# Patient Record
Sex: MALE | Race: WHITE | Employment: OTHER | URBAN - METROPOLITAN AREA
[De-identification: names, ages, dates, MRNs, and addresses within clinical notes are randomized per-mention and may not be internally consistent; named-entity substitution may affect disease eponyms.]

---

## 2018-02-01 ENCOUNTER — SURG/PROC ORDERS (OUTPATIENT)
Dept: SURGERY | Age: 58
End: 2018-02-01

## 2018-02-01 ENCOUNTER — INITIAL CONSULT (OUTPATIENT)
Dept: SURGERY | Age: 58
End: 2018-02-01

## 2018-02-01 VITALS
HEIGHT: 60 IN | BODY MASS INDEX: 19.04 KG/M2 | SYSTOLIC BLOOD PRESSURE: 120 MMHG | DIASTOLIC BLOOD PRESSURE: 70 MMHG | WEIGHT: 97 LBS

## 2018-02-01 DIAGNOSIS — D17.0 LIPOMA OF SCALP: Primary | ICD-10-CM

## 2018-02-01 PROCEDURE — 99202 OFFICE O/P NEW SF 15 MIN: CPT | Performed by: SURGERY

## 2018-02-01 RX ORDER — SODIUM CHLORIDE 0.9 % (FLUSH) 0.9 %
10 SYRINGE (ML) INJECTION PRN
Status: CANCELLED | OUTPATIENT
Start: 2018-02-01

## 2018-02-01 RX ORDER — HEPARIN SODIUM 5000 [USP'U]/ML
5000 INJECTION, SOLUTION INTRAVENOUS; SUBCUTANEOUS ONCE
Status: CANCELLED | OUTPATIENT
Start: 2018-02-01

## 2018-02-01 RX ORDER — SODIUM CHLORIDE 0.9 % (FLUSH) 0.9 %
10 SYRINGE (ML) INJECTION EVERY 12 HOURS SCHEDULED
Status: CANCELLED | OUTPATIENT
Start: 2018-02-01

## 2018-02-01 NOTE — PROGRESS NOTES
New Patient Via Saroj Mustafa MD    [address], [address] [facility]  [facility]  [phone]    Lincoln Pappas   YOB: 1960    Date of Visit:  2/1/2018    Annette Trujillo MD    Chief Complaint: Scalp cyst    HPI:  Patient resents for evaluation of a lump on his posterior scalp. He is nonverbal but has caregivers with him state that he has had a lump on his scalp for some time. It is slowly been growing larger. It seems to be bothersome to him as he rubs on it. The skin overlying it has gotten a little red. It has not been infected or drained    Allergies   Allergen Reactions    Clonidine Derivatives     Penicillins     Tetracyclines & Related      Outpatient Prescriptions Marked as Taking for the 2/1/18 encounter (Initial consult) with Karly Slaughter MD   Medication Sig Dispense Refill    QUEtiapine (SEROQUEL XR) 50 MG extended release tablet Take 100 mg by mouth nightly      polyethylene glycol (MIRALAX) powder Take 17 g by mouth daily.  traZODone (DESYREL) 50 MG tablet Take 50 mg by mouth nightly.  Calcium-Vitamin D (OYSTER-CABRERA 500 + D PO) Take 1 tablet by mouth 2 times daily. Past Medical History:   Diagnosis Date    Bipolar disorder ([address]     Impulse control disorder     Influenza A 2/13/14    Mental retardation, profound (I.Q. < 20)     Osteopenia      Past Surgical History:   Procedure Laterality Date    CATARACT REMOVAL      COLONOSCOPY  06/20/2016    incomplete, poor prep     History reviewed. No pertinent family history. Social History     Social History    Marital status: Single     Spouse name: N/A    Number of children: N/A    Years of education: N/A     Occupational History    Not on file.      Social History Main Topics    Smoking status: Never Smoker    Smokeless tobacco: Never Used    Alcohol use No    Drug use: No    Sexual activity: Not Currently     Other

## 2018-02-19 ENCOUNTER — HOSPITAL ENCOUNTER (OUTPATIENT)
Dept: SURGERY | Age: 58
Discharge: HOME | End: 2018-02-19
Attending: SURGERY | Admitting: SURGERY

## 2018-02-19 VITALS
RESPIRATION RATE: 16 BRPM | BODY MASS INDEX: 19.04 KG/M2 | TEMPERATURE: 96.8 F | SYSTOLIC BLOOD PRESSURE: 105 MMHG | DIASTOLIC BLOOD PRESSURE: 67 MMHG | HEIGHT: 60 IN | OXYGEN SATURATION: 94 % | WEIGHT: 97 LBS | HEART RATE: 109 BPM

## 2018-02-19 PROCEDURE — 21014 EXC FACE TUM DEEP 2 CM/>: CPT | Performed by: SURGERY

## 2018-02-19 RX ORDER — LIDOCAINE HYDROCHLORIDE 10 MG/ML
1 INJECTION, SOLUTION EPIDURAL; INFILTRATION; INTRACAUDAL; PERINEURAL
Status: ACTIVE | OUTPATIENT
Start: 2018-02-19 | End: 2018-02-19

## 2018-02-19 RX ORDER — SODIUM CHLORIDE 0.9 % (FLUSH) 0.9 %
10 SYRINGE (ML) INJECTION PRN
Status: DISCONTINUED | OUTPATIENT
Start: 2018-02-19 | End: 2018-02-20 | Stop reason: HOSPADM

## 2018-02-19 RX ORDER — SODIUM CHLORIDE 0.9 % (FLUSH) 0.9 %
10 SYRINGE (ML) INJECTION EVERY 12 HOURS SCHEDULED
Status: DISCONTINUED | OUTPATIENT
Start: 2018-02-19 | End: 2018-02-20 | Stop reason: HOSPADM

## 2018-02-19 RX ORDER — SODIUM CHLORIDE, SODIUM LACTATE, POTASSIUM CHLORIDE, CALCIUM CHLORIDE 600; 310; 30; 20 MG/100ML; MG/100ML; MG/100ML; MG/100ML
INJECTION, SOLUTION INTRAVENOUS CONTINUOUS
Status: DISCONTINUED | OUTPATIENT
Start: 2018-02-19 | End: 2018-02-20 | Stop reason: HOSPADM

## 2018-02-19 RX ORDER — HEPARIN SODIUM 5000 [USP'U]/ML
INJECTION, SOLUTION INTRAVENOUS; SUBCUTANEOUS
Status: DISCONTINUED
Start: 2018-02-19 | End: 2018-02-20 | Stop reason: HOSPADM

## 2018-02-19 RX ORDER — HEPARIN SODIUM 5000 [USP'U]/ML
5000 INJECTION, SOLUTION INTRAVENOUS; SUBCUTANEOUS ONCE
Status: DISCONTINUED | OUTPATIENT
Start: 2018-02-19 | End: 2018-02-20 | Stop reason: HOSPADM

## 2018-02-19 ASSESSMENT — PAIN - FUNCTIONAL ASSESSMENT: PAIN_FUNCTIONAL_ASSESSMENT: 0-10

## 2018-02-19 NOTE — ANESTHESIA PRE-OP
Department of Anesthesiology  Preprocedure Note       Name:  Parish Santana   Age:  63 y.o.  :  1960                                          MRN:  [id number]         Date:  2018      Surgeon:    Procedure:    Medications prior to admission:   Prior to Admission medications    Medication Sig Start Date End Date Taking? Authorizing Provider   QUEtiapine (SEROQUEL XR) 50 MG extended release tablet Take 100 mg by mouth 3 times daily     Historical Provider, MD   polyethylene glycol (MIRALAX) powder Take 17 g by mouth daily. Historical Provider, MD   traZODone (DESYREL) 50 MG tablet Take 50 mg by mouth nightly. Historical Provider, MD   Calcium-Vitamin D (OYSTER-CABRERA 500 + D PO) Take 1 tablet by mouth 2 times daily. 11   Historical Provider, MD       Current medications:    Current Outpatient Prescriptions   Medication Sig Dispense Refill    QUEtiapine (SEROQUEL XR) 50 MG extended release tablet Take 100 mg by mouth 3 times daily       polyethylene glycol (MIRALAX) powder Take 17 g by mouth daily.  traZODone (DESYREL) 50 MG tablet Take 50 mg by mouth nightly.  Calcium-Vitamin D (OYSTER-CABRERA 500 + D PO) Take 1 tablet by mouth 2 times daily. No current facility-administered medications for this encounter. Allergies: Allergies   Allergen Reactions    Clonidine Derivatives     Penicillins     Tetracyclines & Related        Problem List:  There is no problem list on file for this patient.       Past Medical History:        Diagnosis Date    Bipolar disorder ([facility] [address]     Developmental non-verbal disorder     Impulse control disorder     Influenza A 14    Mental retardation, profound (I.Q. < 20)     Osteopenia        Past Surgical History:        Procedure Laterality Date    CATARACT REMOVAL      COLONOSCOPY  2016    incomplete, poor prep       Social History:    Social History   Substance Use Topics    Smoking status: Never Smoker    Smokeless tobacco: Never

## 2018-02-22 ENCOUNTER — TELEPHONE (OUTPATIENT)
Dept: SURGERY | Age: 58
End: 2018-02-22

## 2018-11-05 ENCOUNTER — HOSPITAL ENCOUNTER (EMERGENCY)
Age: 58
Discharge: HOME | End: 2018-11-05
Attending: EMERGENCY MEDICINE
Payer: MEDICARE

## 2018-11-05 VITALS
RESPIRATION RATE: 18 BRPM | OXYGEN SATURATION: 96 % | HEART RATE: 114 BPM | SYSTOLIC BLOOD PRESSURE: 132 MMHG | TEMPERATURE: 98.2 F | DIASTOLIC BLOOD PRESSURE: 83 MMHG | WEIGHT: 95 LBS | BODY MASS INDEX: 18.55 KG/M2

## 2018-11-05 DIAGNOSIS — S01.01XA LACERATION OF SCALP WITHOUT FOREIGN BODY, INITIAL ENCOUNTER: Primary | ICD-10-CM

## 2018-11-05 PROCEDURE — 99282 EMERGENCY DEPT VISIT SF MDM: CPT

## 2018-11-05 PROCEDURE — 4500000022 HC ED LEVEL 2 PROCEDURE

## 2018-11-05 ASSESSMENT — PAIN SCALES - WONG BAKER: WONGBAKER_NUMERICALRESPONSE: 4

## 2018-11-06 NOTE — ED PROVIDER NOTES
[facility] Emergency Department  11/5/18    I independently performed a history and physical on Duane A Carota. All imaging, and labs performed at this visit were reviewed. All diagnostic, treatment, and disposition decisions were made by myself in conjunction with the mid-level provider. Briefly, this is a 63 y.o. male here for right posterior scalp laceration. ED Triage Vitals [11/05/18 1747]   BP Temp Temp src Pulse Resp SpO2 Height Weight   132/83 -- -- 132 18 95 % -- 95 lb (43.1 kg)        Exam showed Laceration to right occipital scalp. Patient at baseline mental status. No vomiting. No LOC. He would require sedation for CT head, which I do not think would be beneficial.  Discussed with caregiver to return if any change in mental status. Discussed return precautions and need for f/u. Verbalized understanding of diagnosis and discharge plan. For further details of Duane A Carota's emergency department encounter, please see Beronica Bull's documentation. This chart was generated in part by using Dragon Dictation system and may contain errors related to that system including errors in grammar, punctuation, and spelling, as well as words and phrases that may be inappropriate. If there are any questions or concerns please feel free to contact the dictating provider for clarification.            Venus Cr DO  11/05/18 5847

## 2018-11-06 NOTE — ED NOTES
Discharge instructions reviewed with Mr. Curtis Erickson 's caregiver; she verbalized understanding. Copy of discharge instructions given. Mr. Curtis Erickson was discharged to home in good condition per personal vehicle, caregiver driving. He exited the ED without difficulty.         Nazia Perdo RN  11/05/18 9903

## 2018-12-27 ENCOUNTER — HOSPITAL ENCOUNTER (EMERGENCY)
Age: 58
Discharge: HOME | End: 2018-12-27
Payer: MEDICARE

## 2018-12-27 VITALS — HEART RATE: 104 BPM | WEIGHT: 95 LBS | BODY MASS INDEX: 18.55 KG/M2 | RESPIRATION RATE: 18 BRPM
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
## 2018-12-27 PROCEDURE — 99284 EMERGENCY DEPT VISIT MOD MDM: CPT

## 2018-12-28 NOTE — ED PROVIDER NOTES
Positives and Pertinent negatives as per HPI. Except as noted above in the ROS, problem specific ROS was completed and is negative. Physical Exam:  Physical Exam   Constitutional: He appears well-developed and well-nourished. No distress. HENT:   Head: Normocephalic and atraumatic. Right Ear: External ear normal.   Left Ear: External ear normal.   Nose: Nose normal.   Mouth/Throat: Oropharynx is clear and moist.   Eyes: Pupils are equal, round, and reactive to light. Conjunctivae are normal.   Neck: Normal range of motion. No spinous process tenderness and no muscular tenderness present. Cardiovascular: Normal rate, regular rhythm, normal heart sounds and intact distal pulses. Exam reveals no gallop and no friction rub. No murmur heard. Pulmonary/Chest: Effort normal and breath sounds normal. No respiratory distress. He has no wheezes. He has no rales. He exhibits no tenderness. Abdominal: Soft. There is no tenderness. There is no guarding. Musculoskeletal: Normal range of motion. He exhibits no edema, tenderness or deformity. Lymphadenopathy:     He has no cervical adenopathy. Neurological: He is alert. No sensory deficit. Skin: Skin is warm and dry. Capillary refill takes less than 2 seconds. He is not diaphoretic. Psychiatric: He has a normal mood and affect. His behavior is normal. Judgment and thought content normal.   Normal per baseline       MEDICAL DECISION MAKING    Vitals:    Vitals:    12/27/18 1924   Pulse: 104   Resp: 18   Weight: 95 lb (43.1 kg)       LABS:Labs Reviewed - No data to display     Remainder of labs reviewed and werenegative at this time or not returned at the time of this note.     RADIOLOGY:   Non-plain film images such as CT, Ultrasound and MRI are read by the radiologist. MAYA Anand CNP have directly visualized the radiologic plain film image(s) with the below findings:        Interpretation per the Radiologist below, if available at the time

## 2019-04-30 ENCOUNTER — APPOINTMENT (OUTPATIENT)
Dept: CT IMAGING | Age: 59
End: 2019-04-30
Payer: MEDICARE

## 2019-04-30 ENCOUNTER — HOSPITAL ENCOUNTER (EMERGENCY)
Age: 59
Discharge: HOME | End: 2019-04-30
Attending: EMERGENCY MEDICINE
Payer: MEDICARE

## 2019-04-30 VITALS
BODY MASS INDEX: 18.65 KG/M2 | HEART RATE: 80 BPM | SYSTOLIC BLOOD PRESSURE: 110 MMHG | TEMPERATURE: 98.9 F | RESPIRATION RATE: 18 BRPM | DIASTOLIC BLOOD PRESSURE: 68 MMHG | WEIGHT: 95 LBS | HEIGHT: 60 IN | OXYGEN SATURATION: 100 %

## 2019-04-30 DIAGNOSIS — S09.90XA CLOSED HEAD INJURY, INITIAL ENCOUNTER: Primary | ICD-10-CM

## 2019-04-30 DIAGNOSIS — S00.81XA FACIAL ABRASION, INITIAL ENCOUNTER: ICD-10-CM

## 2019-04-30 PROCEDURE — 70450 CT HEAD/BRAIN W/O DYE: CPT

## 2019-04-30 PROCEDURE — 72125 CT NECK SPINE W/O DYE: CPT

## 2019-04-30 PROCEDURE — 6370000000 HC RX 637 (ALT 250 FOR IP): Performed by: NURSE PRACTITIONER

## 2019-04-30 PROCEDURE — 96372 THER/PROPH/DIAG INJ SC/IM: CPT

## 2019-04-30 PROCEDURE — 6360000002 HC RX W HCPCS: Performed by: NURSE PRACTITIONER

## 2019-04-30 PROCEDURE — 99283 EMERGENCY DEPT VISIT LOW MDM: CPT

## 2019-04-30 PROCEDURE — 2500000003 HC RX 250 WO HCPCS: Performed by: NURSE PRACTITIONER

## 2019-04-30 RX ORDER — HALOPERIDOL 5 MG/ML
2 INJECTION INTRAMUSCULAR ONCE
Status: COMPLETED | OUTPATIENT
Start: 2019-04-30 | End: 2019-04-30

## 2019-04-30 RX ORDER — DIPHENHYDRAMINE HYDROCHLORIDE 50 MG/ML
25 INJECTION INTRAMUSCULAR; INTRAVENOUS ONCE
Status: COMPLETED | OUTPATIENT
Start: 2019-04-30 | End: 2019-04-30

## 2019-04-30 RX ORDER — KETAMINE HYDROCHLORIDE 100 MG/ML
1 INJECTION, SOLUTION INTRAMUSCULAR; INTRAVENOUS ONCE
Status: COMPLETED | OUTPATIENT
Start: 2019-04-30 | End: 2019-04-30

## 2019-04-30 RX ORDER — LORAZEPAM 2 MG/ML
1 INJECTION INTRAMUSCULAR ONCE
Status: COMPLETED | OUTPATIENT
Start: 2019-04-30 | End: 2019-04-30

## 2019-04-30 RX ADMIN — IBUPROFEN 432 MG: 100 SUSPENSION ORAL at 17:10

## 2019-04-30 RX ADMIN — DIPHENHYDRAMINE HYDROCHLORIDE 25 MG: 50 INJECTION, SOLUTION INTRAMUSCULAR; INTRAVENOUS at 20:32

## 2019-04-30 RX ADMIN — LORAZEPAM 1 MG: 2 INJECTION, SOLUTION INTRAMUSCULAR; INTRAVENOUS at 18:56

## 2019-04-30 RX ADMIN — KETAMINE HYDROCHLORIDE 40 MG: 100 INJECTION INTRAMUSCULAR; INTRAVENOUS at 20:33

## 2019-04-30 RX ADMIN — HALOPERIDOL LACTATE 2 MG: 5 INJECTION INTRAMUSCULAR at 19:22

## 2019-04-30 ASSESSMENT — PAIN DESCRIPTION - PAIN TYPE
TYPE: ACUTE PAIN
TYPE: ACUTE PAIN

## 2019-04-30 ASSESSMENT — PAIN SCALES - WONG BAKER
WONGBAKER_NUMERICALRESPONSE: 2
WONGBAKER_NUMERICALRESPONSE: 2
WONGBAKER_NUMERICALRESPONSE: 0

## 2019-04-30 ASSESSMENT — PAIN SCALES - GENERAL
PAINLEVEL_OUTOF10: 0
PAINLEVEL_OUTOF10: 2
PAINLEVEL_OUTOF10: 0
PAINLEVEL_OUTOF10: 2

## 2019-04-30 ASSESSMENT — PAIN DESCRIPTION - LOCATION: LOCATION: HEAD

## 2019-04-30 NOTE — ED PROVIDER NOTES
**[facility]  [facility]  eMERGENCY dEPARTMENT eNCOUnter      Pt Name: Mala Coleman  DWE:[id number]  Barbaragf[place]iana 1960  Date of evaluation: 4/30/2019  Provider: MAYA Sarmiento - CNP      Chief Complaint:    Chief Complaint   Patient presents with   [facility] Fall     tripped off of the back porch, abrasion to R eye and right hand. Nursing Notes, Past Medical Hx, Past Surgical Hx, Social Hx, Allergies, and Family Hx were all reviewed and agreed with or any disagreements were addressed in the HPI.    HPI:  (Location, Duration, Timing, Severity,Quality, Assoc Sx, Context, Modifying factors)  This is a  63 y.o. male who presents today with caregiver, patient was in outside  He went to walk outside, fell out the back porch and fell forward, hitting the right side of his head on the ground causing abrasion to his right eyebrow and right cheek, abrasion to his right hand on his first knuckle. However, nursing staff that takes care of him is here at the bedside, the caregiver states the patient is acting appropriately with a normal mental status. Long finger pain is a 2 out of 10. Injury occurred just prior to ED arrival, they deny giving any medicine for symptoms. No additional complaints, no additional aggravating or alleviating factors. The patient presents awake, alert and in no acute distress or toxic appearance.     PastMedical/Surgical History:      Diagnosis Date    Bipolar disorder ([address]     Developmental non-verbal disorder     Impulse control disorder     Influenza A 2/13/14    Mental retardation, profound (I.Q. < 20)     Osteopenia          Procedure Laterality Date    CATARACT REMOVAL      COLONOSCOPY  06/20/2016    incomplete, poor prep       Medications:  Discharge Medication List as of 4/30/2019  9:38 PM      CONTINUE these medications which have NOT CHANGED    Details   QUEtiapine (SEROQUEL XR) 50 MG extended release tablet Take 100 mg by mouth 3 times daily Historical Med      polyethylene glycol (MIRALAX) powder Take 17 g by mouth daily. traZODone (DESYREL) 50 MG tablet Take 50 mg by mouth nightly. Calcium-Vitamin D (OYSTER-CABRERA 500 + D PO) Take 1 tablet by mouth 2 times daily. Review of Systems:  Review of Systems   Unable to perform ROS: Other (A complete review of systems by the patient is unable to be obtained secondary to the patient's developmental delays and disabilities)     Positives and Pertinent negatives as per HPI. Except as noted above in the ROS, problem specific ROS was completed and is negative. Physical Exam:  Physical Exam   Constitutional: He appears well-developed and well-nourished. HENT:   Head: Normocephalic. Right Ear: External ear normal.   Left Ear: External ear normal.   Mouth/Throat: Oropharynx is clear and moist.   Eyes: Right eye exhibits no discharge. Left eye exhibits no discharge. No scleral icterus. Neck: Normal range of motion. Neck supple. Cardiovascular: Normal rate and normal heart sounds. Pulmonary/Chest: Effort normal and breath sounds normal. No respiratory distress. Abdominal: Soft. There is no tenderness. Musculoskeletal: Normal range of motion. Moving arms and legs in attempting to walk which is normal per the caregivers, no signs of acute bony abnormality   Neurological: He is alert. Patient has developmental disability, profound mental retardation, essentially is nonverbal.  However he is acting appropriate per the nursing caregivers at the bedside who take care of him regularly. Skin: Skin is warm. Capillary refill takes less than 2 seconds. He is not diaphoretic. No pallor. Abrasion to the right hand across the entire first knuckle. No open lacerations. Patient also has superficial abrasion with hematoma to the right eyelid. All bleeding is controlled. Psychiatric: He has a normal mood and affect.  His behavior is normal.   Nursing note and vitals reviewed. MEDICAL DECISION MAKING    Vitals:    Vitals:    04/30/19 1607 04/30/19 1817 04/30/19 2000 04/30/19 2213   BP:   114/78 110/68   Pulse: 98  78 80   Resp: 22 20 18 18   Temp: 98.9 °F (37.2 °C)      TempSrc: Axillary      SpO2: 98% 97% 100% 100%   Weight: 95 lb (43.1 kg)      Height: 5' (1.524 m)          LABS:Labs Reviewed - No data to display     Remainder of labs reviewed and werenegative at this time or not returned at the time of this note. RADIOLOGY:   Non-plain film images such as CT, Ultrasound and MRI are read by the radiologist. Christa BUTTS APRN - CNP have directly visualized the radiologic plain film image(s) with the below findings:        Interpretation per the Radiologist below, if available at the time of thisnote:    CT HEAD WO CONTRAST   Final Result   No acute intracranial abnormality. Questionable minimally displaced fracture of the lateral wall of the right   maxillary sinus. Air-fluid level in the right maxillary sinus could represent hemorrhage or   acute sinusitis. CT Cervical Spine WO Contrast   Final Result   No acute fracture or traumatic malalignment. Mild reversal the normal cervical lordosis, which may be secondary to patient   positioning, but also raises the possibility of muscle spasm.               MEDICAL DECISION MAKING / ED COURSE:      PROCEDURES:   Procedures    None    Patient was given:     Medications   ibuprofen (ADVIL;MOTRIN) 100 MG/5ML suspension 432 mg (432 mg Oral Given 4/30/19 1710)   LORazepam (ATIVAN) injection 1 mg (1 mg Intramuscular Given 4/30/19 1856)   haloperidol lactate (HALDOL) injection 2 mg (2 mg Intramuscular Given 4/30/19 1922)   diphenhydrAMINE (BENADRYL) injection 25 mg (25 mg Intramuscular Given 4/30/19 2032)   ketamine (KETALAR) injection 40 mg (40 mg Intramuscular Given 4/30/19 2033)       Patient presents today with caregiver, patient had just gotten home from adult , he went to walk outside, fell out the back porch and fell forward, hitting the right side of his head on the ground causing abrasion to his right eyebrow and right cheek, abrasion to his right hand on his first knuckle. After evaluation and examination patient I ordered a CT scan of the head and neck, patient was unable to lay flat asked really appear to be very anxious. He was given Ativan and Haldol along with Benadryl. Patient still was very anxious and refusing to lie down. I did evaluate the patient's chart it appears he had a similar fall back in April 2018, at that time they had to give him ketamine in order to get imaging. Ketamine was ordered after discussing case with attending physician, Dr. Matt Olmedo. Patient tolerated the CT scan well. CT of the head shows no acute intracranial abnormality. There is a questionably minimally displaced maxillary fracture on the lateral wall however, his discomfort is all in the right eyebrow. CT cervical spine shows no acute fracture or traumatic malalignment. However, I estimate there is LOW risk for SKULL FRACTURE, INTRACRANIAL HEMORRHAGE, CERVICAL SPINE INJURY, SUBDURAL OR EPIDURAL HEMATOMA,  thus I consider the discharge disposition reasonable. Therefore, shared medical decision was made between the attending physician, the patient, his caregivers and myself and we agreed the patient could be discharged home with outpatient follow-up. Patient was discharged home with instructions to follow-up with the PCP in the next 2-3 days reevaluation. Return to the ER for any worsening symptoms. The patient's caregiver verbalized understanding of discharge instructions. The patient tolerated their visit well. I evaluated the patient. The physician was available for consultation as needed. The patient and / or the family were informed of the results of anytests, a time was given to answer questions, a plan was proposed and they agreed with plan.  Patient left the emergency department stable condition. CLINICAL IMPRESSION:  1. Closed head injury, initial encounter    2.  Facial abrasion, initial encounter        DISPOSITION Decision To Discharge 04/30/2019 09:35:42 PM      PATIENT REFERRED TO:  Jamari Aleman MD  [address] [place] [address] [address]. [address] [facility]  [phone]    Schedule an appointment as soon as possible for a visit in 2 days  Follow-up with her family doctor in the next 2 days for reevaluation      DISCHARGE MEDICATIONS:  Discharge Medication List as of 4/30/2019  9:38 PM          DISCONTINUED MEDICATIONS:  Discharge Medication List as of 4/30/2019  9:38 PM                 (Please note the MDM and HPI sections of this note were completed with a voice recognition program.  Efforts weremade to edit the dictations but occasionally words are mis-transcribed.)    Electronically signed, MAYA Burrows CNP,         MAYA Burrows CNP  04/30/19 3540

## 2019-05-01 NOTE — ED NOTES
Hadolol administered per order, attempted to CT scan, unsuccessful, NP notified. Pt trying to get on floor, restless and cannot sit still.       Catia Mckeon, RN  04/30/19 104 Ilsa Lujan RN  04/30/19 8930

## 2019-05-01 NOTE — ED PROVIDER NOTES
I independently performed a history and physical on Duane A Carota. All diagnostic, treatment, and disposition decisions were made by myself in conjunction with the advanced practice provider.     -Riley Kingsley is a 63 y.o. male with a history of developmental nonverbal disorder, bipolar disorder, impulse control disorder presents to ED status post fall. -PE: patient with swelling and laceration to right side of face, EOMI, PERRL  -CT head: No acute intracranial abnormality. Questionable minimally displaced fracture of the lateral wall of the right maxillary sinus. Air-fluid level in the right maxillary sinus Hemorrhage or  Acute Sinusitis  -CT c spine: No acute fracture or traumatic malalignment. Mild reversal of the normal cervical lordosis which may be secondary to patient positioning but also received possibility of muscle spasm.  -No acute pathology was noted and plan for discharge home with close follow up with PCP was discussed with patient and family. Strict ED return precautions given for new/worsending symptoms. Patient and family in agreement with plan, verbally confirm understanding and have no further questions/concerns. For further details of [address] [place] [address] emergency department encounter, please see NP [facility]'s documentation.         Keturah Hernandez MD  04/30/19 1527

## 2019-05-01 NOTE — ED NOTES
Ativan administered per order, attempted CT unsuccessful.  NP notified      Mayda Rubio, RN  04/30/19 0846

## 2019-05-01 NOTE — ED NOTES
Wound cleansed, surgical glue applied, pt tolerated procedure well.       Tennille Cheatham RN  04/30/19 5204

## 2019-05-01 NOTE — ED NOTES
Care aide states that pt has blood in his mouth, assessment revealed nasal bleeding that was dry and dark red. Water offered pt refused.       Ricky Simms RN  04/30/19 8450

## 2019-05-01 NOTE — ED NOTES
Ketamine and Benadryl administered per order, pt on monitor, VSS, CT scan successful, NP notified.       Nick Tovar RN  04/30/19 9750

## 2019-10-01 ENCOUNTER — APPOINTMENT (OUTPATIENT)
Dept: CT IMAGING | Age: 59
End: 2019-10-01
Payer: MEDICARE

## 2019-10-01 ENCOUNTER — HOSPITAL ENCOUNTER (EMERGENCY)
Age: 59
Discharge: HOME | End: 2019-10-01
Payer: MEDICARE

## 2019-10-01 VITALS
BODY MASS INDEX: 19.24 KG/M2 | WEIGHT: 98 LBS | HEART RATE: 96 BPM | OXYGEN SATURATION: 98 % | RESPIRATION RATE: 20 BRPM | HEIGHT: 60 IN
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
## 2019-10-01 DIAGNOSIS — S09.90XA CLOSED HEAD INJURY, INITIAL ENCOUNTER: ICD-10-CM

## 2019-10-01 PROCEDURE — 6370000000 HC RX 637 (ALT 250 FOR IP): Performed by: PHYSICIAN ASSISTANT

## 2019-10-01 PROCEDURE — 70450 CT HEAD/BRAIN W/O DYE: CPT

## 2019-10-01 PROCEDURE — 6360000002 HC RX W HCPCS: Performed by: PHYSICIAN ASSISTANT

## 2019-10-01 PROCEDURE — 99283 EMERGENCY DEPT VISIT LOW MDM: CPT

## 2019-10-01 PROCEDURE — 72125 CT NECK SPINE W/O DYE: CPT

## 2019-10-01 PROCEDURE — 96372 THER/PROPH/DIAG INJ SC/IM: CPT

## 2019-10-01 RX ORDER — ACETAMINOPHEN 160 MG/5ML
650 SOLUTION ORAL ONCE
Status: COMPLETED | OUTPATIENT
Start: 2019-10-01 | End: 2019-10-01

## 2019-10-01 RX ORDER — LORAZEPAM 2 MG/ML
2 INJECTION INTRAMUSCULAR ONCE
Status: COMPLETED | OUTPATIENT
Start: 2019-10-01 | End: 2019-10-01

## 2019-10-01 RX ORDER — HALOPERIDOL 5 MG/ML
5 INJECTION INTRAMUSCULAR ONCE
Status: COMPLETED | OUTPATIENT
Start: 2019-10-01 | End: 2019-10-01

## 2019-10-01 RX ORDER — LORAZEPAM 2 MG/ML
2 INJECTION INTRAMUSCULAR ONCE
Status: DISCONTINUED | OUTPATIENT
Start: 2019-10-01 | End: 2019-10-01

## 2019-10-01 RX ORDER — DIPHENHYDRAMINE HYDROCHLORIDE 50 MG/ML
50 INJECTION INTRAMUSCULAR; INTRAVENOUS ONCE
Status: COMPLETED | OUTPATIENT
Start: 2019-10-01 | End: 2019-10-01

## 2019-10-01 RX ADMIN — HALOPERIDOL LACTATE 5 MG: 5 INJECTION INTRAMUSCULAR at 15:13

## 2019-10-01 RX ADMIN — ACETAMINOPHEN 650 MG: 650 SOLUTION ORAL at 13:06

## 2019-10-01 RX ADMIN — DIPHENHYDRAMINE HYDROCHLORIDE 50 MG: 50 INJECTION, SOLUTION INTRAMUSCULAR; INTRAVENOUS at 12:36

## 2019-10-01 RX ADMIN — LORAZEPAM 2 MG: 2 INJECTION INTRAMUSCULAR; INTRAVENOUS at 12:36

## 2019-10-01 RX ADMIN — LORAZEPAM 2 MG: 2 INJECTION, SOLUTION INTRAMUSCULAR; INTRAVENOUS at 13:48

## 2019-10-01 RX ADMIN — HALOPERIDOL LACTATE 5 MG: 5 INJECTION, SOLUTION INTRAMUSCULAR at 14:15

## 2019-10-01 ASSESSMENT — PAIN SCALES - GENERAL: PAINLEVEL_OUTOF10: 8

## 2019-12-01 ENCOUNTER — APPOINTMENT (OUTPATIENT)
Dept: CT IMAGING | Age: 59
End: 2019-12-01
Payer: MEDICARE

## 2019-12-01 ENCOUNTER — APPOINTMENT (OUTPATIENT)
Dept: GENERAL RADIOLOGY | Age: 59
End: 2019-12-01
Payer: MEDICARE

## 2019-12-01 ENCOUNTER — HOSPITAL ENCOUNTER (OUTPATIENT)
Age: 59
Setting detail: OBSERVATION
Discharge: ANOTHER HOSPITAL | End: 2019-12-02
Attending: EMERGENCY MEDICINE | Admitting: FAMILY MEDICINE
Payer: MEDICARE

## 2019-12-01 ENCOUNTER — HOSPITAL ENCOUNTER (EMERGENCY)
Age: 59
Discharge: HOME | End: 2019-12-01
Attending: EMERGENCY MEDICINE
Payer: MEDICARE

## 2019-12-01 VITALS
SYSTOLIC BLOOD PRESSURE: 131 MMHG | DIASTOLIC BLOOD PRESSURE: 95 MMHG | RESPIRATION RATE: 16 BRPM | OXYGEN SATURATION: 97 % | TEMPERATURE: 98.7 F | HEART RATE: 85 BPM

## 2019-12-01 DIAGNOSIS — N34.2 INFECTIVE URETHRITIS: ICD-10-CM

## 2019-12-01 DIAGNOSIS — R40.4 ALTERED LEVEL OF CONSCIOUSNESS: ICD-10-CM

## 2019-12-01 DIAGNOSIS — N20.0 KIDNEY STONE: ICD-10-CM

## 2019-12-01 DIAGNOSIS — R53.83 FATIGUE, UNSPECIFIED TYPE: Primary | ICD-10-CM

## 2019-12-01 DIAGNOSIS — N30.00 ACUTE CYSTITIS WITHOUT HEMATURIA: ICD-10-CM

## 2019-12-01 DIAGNOSIS — R53.1 GENERAL WEAKNESS: ICD-10-CM

## 2019-12-01 DIAGNOSIS — I48.91 ATRIAL FIBRILLATION WITH RAPID VENTRICULAR RESPONSE (HCC): Primary | ICD-10-CM

## 2019-12-01 LAB
A/G RATIO: 1.5 (ref 1.1–2.2)
A/G RATIO: 1.5 (ref 1.1–2.2)
ALBUMIN SERPL-MCNC: 4.3 G/DL (ref 3.4–5)
ALBUMIN SERPL-MCNC: 4.4 G/DL (ref 3.4–5)
ALP BLD-CCNC: 115 U/L (ref 40–129)
ALP BLD-CCNC: 125 U/L (ref 40–129)
ALT SERPL-CCNC: 12 U/L (ref 10–40)
ALT SERPL-CCNC: 12 U/L (ref 10–40)
AMORPHOUS: ABNORMAL /HPF
ANION GAP SERPL CALCULATED.3IONS-SCNC: 12 MMOL/L (ref 3–16)
ANION GAP SERPL CALCULATED.3IONS-SCNC: 13 MMOL/L (ref 3–16)
AST SERPL-CCNC: 15 U/L (ref 15–37)
AST SERPL-CCNC: 20 U/L (ref 15–37)
BACTERIA: ABNORMAL /HPF
BASE EXCESS VENOUS: 4.3 MMOL/L (ref -3–3)
BASOPHILS ABSOLUTE: 0 K/UL (ref 0–0.2)
BASOPHILS ABSOLUTE: 0 K/UL (ref 0–0.2)
BASOPHILS RELATIVE PERCENT: 0.5 %
BASOPHILS RELATIVE PERCENT: 0.7 %
BILIRUB SERPL-MCNC: 0.3 MG/DL (ref 0–1)
BILIRUB SERPL-MCNC: 0.4 MG/DL (ref 0–1)
BILIRUBIN URINE: NEGATIVE
BLOOD, URINE: ABNORMAL
BUN BLDV-MCNC: 18 MG/DL (ref 7–20)
BUN BLDV-MCNC: 21 MG/DL (ref 7–20)
CALCIUM SERPL-MCNC: 10 MG/DL (ref 8.3–10.6)
CALCIUM SERPL-MCNC: 10.3 MG/DL (ref 8.3–10.6)
CARBOXYHEMOGLOBIN: 2.3 % (ref 0–1.5)
CHLORIDE BLD-SCNC: 100 MMOL/L (ref 99–110)
CHLORIDE BLD-SCNC: 101 MMOL/L (ref 99–110)
CLARITY: ABNORMAL
CO2: 29 MMOL/L (ref 21–32)
CO2: 29 MMOL/L (ref 21–32)
COLOR: YELLOW
CREAT SERPL-MCNC: 0.9 MG/DL (ref 0.9–1.3)
CREAT SERPL-MCNC: 1 MG/DL (ref 0.9–1.3)
EOSINOPHILS ABSOLUTE: 0 K/UL (ref 0–0.6)
EOSINOPHILS ABSOLUTE: 0 K/UL (ref 0–0.6)
EOSINOPHILS RELATIVE PERCENT: 0.3 %
EOSINOPHILS RELATIVE PERCENT: 0.8 %
GFR AFRICAN AMERICAN: >60
GFR AFRICAN AMERICAN: >60
GFR NON-AFRICAN AMERICAN: >60
GFR NON-AFRICAN AMERICAN: >60
GLOBULIN: 2.8 G/DL
GLOBULIN: 3 G/DL
GLUCOSE BLD-MCNC: 111 MG/DL (ref 70–99)
GLUCOSE BLD-MCNC: 135 MG/DL (ref 70–99)
GLUCOSE URINE: NEGATIVE MG/DL
HCO3 VENOUS: 30.6 MMOL/L (ref 23–29)
HCT VFR BLD CALC: 41.5 % (ref 40.5–52.5)
HCT VFR BLD CALC: 44.5 % (ref 40.5–52.5)
HEMOGLOBIN: 13.9 G/DL (ref 13.5–17.5)
HEMOGLOBIN: 14.9 G/DL (ref 13.5–17.5)
KETONES, URINE: NEGATIVE MG/DL
LACTIC ACID: 0.9 MMOL/L (ref 0.4–2)
LACTIC ACID: 1.2 MMOL/L (ref 0.4–2)
LEUKOCYTE ESTERASE, URINE: NEGATIVE
LYMPHOCYTES ABSOLUTE: 0.9 K/UL (ref 1–5.1)
LYMPHOCYTES ABSOLUTE: 1.1 K/UL (ref 1–5.1)
LYMPHOCYTES RELATIVE PERCENT: 10.4 %
LYMPHOCYTES RELATIVE PERCENT: 19.8 %
MCH RBC QN AUTO: 28.9 PG (ref 26–34)
MCH RBC QN AUTO: 29 PG (ref 26–34)
MCHC RBC AUTO-ENTMCNC: 33.5 G/DL (ref 31–36)
MCHC RBC AUTO-ENTMCNC: 33.6 G/DL (ref 31–36)
MCV RBC AUTO: 86.3 FL (ref 80–100)
MCV RBC AUTO: 86.5 FL (ref 80–100)
METHEMOGLOBIN VENOUS: 0.6 %
MICROSCOPIC EXAMINATION: YES
MONOCYTES ABSOLUTE: 0.5 K/UL (ref 0–1.3)
MONOCYTES ABSOLUTE: 0.6 K/UL (ref 0–1.3)
MONOCYTES RELATIVE PERCENT: 7 %
MONOCYTES RELATIVE PERCENT: 8.2 %
NEUTROPHILS ABSOLUTE: 4 K/UL (ref 1.7–7.7)
NEUTROPHILS ABSOLUTE: 7.3 K/UL (ref 1.7–7.7)
NEUTROPHILS RELATIVE PERCENT: 70.5 %
NEUTROPHILS RELATIVE PERCENT: 81.8 %
NITRITE, URINE: NEGATIVE
O2 CONTENT, VEN: 17 VOL %
O2 SAT, VEN: 76 %
O2 THERAPY: ABNORMAL
PCO2, VEN: 51.2 MMHG (ref 40–50)
PDW BLD-RTO: 13.7 % (ref 12.4–15.4)
PDW BLD-RTO: 13.9 % (ref 12.4–15.4)
PH UA: 7.5 (ref 5–8)
PH VENOUS: 7.39 (ref 7.35–7.45)
PLATELET # BLD: 372 K/UL (ref 135–450)
PLATELET # BLD: 376 K/UL (ref 135–450)
PMV BLD AUTO: 7.5 FL (ref 5–10.5)
PMV BLD AUTO: 7.5 FL (ref 5–10.5)
PO2, VEN: 40.4 MMHG (ref 25–40)
POTASSIUM SERPL-SCNC: 4.1 MMOL/L (ref 3.5–5.1)
POTASSIUM SERPL-SCNC: 4.6 MMOL/L (ref 3.5–5.1)
PROTEIN UA: NEGATIVE MG/DL
RAPID INFLUENZA  B AGN: NEGATIVE
RAPID INFLUENZA A AGN: NEGATIVE
RBC # BLD: 4.81 M/UL (ref 4.2–5.9)
RBC # BLD: 5.15 M/UL (ref 4.2–5.9)
RBC UA: ABNORMAL /HPF (ref 0–2)
SODIUM BLD-SCNC: 141 MMOL/L (ref 136–145)
SODIUM BLD-SCNC: 143 MMOL/L (ref 136–145)
SPECIFIC GRAVITY UA: 1.01 (ref 1–1.03)
TCO2 CALC VENOUS: 32 MMOL/L
TOTAL PROTEIN: 7.1 G/DL (ref 6.4–8.2)
TOTAL PROTEIN: 7.4 G/DL (ref 6.4–8.2)
TROPONIN: <0.01 NG/ML
URINE TYPE: ABNORMAL
UROBILINOGEN, URINE: 0.2 E.U./DL
WBC # BLD: 5.6 K/UL (ref 4–11)
WBC # BLD: 8.9 K/UL (ref 4–11)
WBC UA: ABNORMAL /HPF (ref 0–5)

## 2019-12-01 PROCEDURE — 96374 THER/PROPH/DIAG INJ IV PUSH: CPT

## 2019-12-01 PROCEDURE — 2500000003 HC RX 250 WO HCPCS: Performed by: EMERGENCY MEDICINE

## 2019-12-01 PROCEDURE — 71045 X-RAY EXAM CHEST 1 VIEW: CPT

## 2019-12-01 PROCEDURE — 83605 ASSAY OF LACTIC ACID: CPT

## 2019-12-01 PROCEDURE — 96361 HYDRATE IV INFUSION ADD-ON: CPT

## 2019-12-01 PROCEDURE — 96365 THER/PROPH/DIAG IV INF INIT: CPT

## 2019-12-01 PROCEDURE — 85025 COMPLETE CBC W/AUTO DIFF WBC: CPT

## 2019-12-01 PROCEDURE — 2580000003 HC RX 258: Performed by: EMERGENCY MEDICINE

## 2019-12-01 PROCEDURE — 80053 COMPREHEN METABOLIC PANEL: CPT

## 2019-12-01 PROCEDURE — 93005 ELECTROCARDIOGRAM TRACING: CPT | Performed by: EMERGENCY MEDICINE

## 2019-12-01 PROCEDURE — 99284 EMERGENCY DEPT VISIT MOD MDM: CPT

## 2019-12-01 PROCEDURE — 96375 TX/PRO/DX INJ NEW DRUG ADDON: CPT

## 2019-12-01 PROCEDURE — 84484 ASSAY OF TROPONIN QUANT: CPT

## 2019-12-01 PROCEDURE — 82803 BLOOD GASES ANY COMBINATION: CPT

## 2019-12-01 PROCEDURE — 87804 INFLUENZA ASSAY W/OPTIC: CPT

## 2019-12-01 PROCEDURE — 99291 CRITICAL CARE FIRST HOUR: CPT

## 2019-12-01 PROCEDURE — 81001 URINALYSIS AUTO W/SCOPE: CPT

## 2019-12-01 PROCEDURE — 2500000003 HC RX 250 WO HCPCS

## 2019-12-01 PROCEDURE — 6360000002 HC RX W HCPCS: Performed by: EMERGENCY MEDICINE

## 2019-12-01 PROCEDURE — 74176 CT ABD & PELVIS W/O CONTRAST: CPT

## 2019-12-01 RX ORDER — ONDANSETRON 2 MG/ML
4 INJECTION INTRAMUSCULAR; INTRAVENOUS ONCE
Status: COMPLETED | OUTPATIENT
Start: 2019-12-01 | End: 2019-12-01

## 2019-12-01 RX ORDER — 0.9 % SODIUM CHLORIDE 0.9 %
1000 INTRAVENOUS SOLUTION INTRAVENOUS ONCE
Status: COMPLETED | OUTPATIENT
Start: 2019-12-01 | End: 2019-12-02

## 2019-12-01 RX ORDER — CEFUROXIME AXETIL 250 MG/1
250 TABLET ORAL 2 TIMES DAILY
Qty: 14 TABLET | Refills: 0 | Status: ON HOLD | OUTPATIENT
Start: 2019-12-01 | End: 2019-12-05 | Stop reason: HOSPADM

## 2019-12-01 RX ORDER — DILTIAZEM HYDROCHLORIDE 5 MG/ML
INJECTION INTRAVENOUS
Status: COMPLETED
Start: 2019-12-01 | End: 2019-12-01

## 2019-12-01 RX ORDER — 0.9 % SODIUM CHLORIDE 0.9 %
1000 INTRAVENOUS SOLUTION INTRAVENOUS ONCE
Status: COMPLETED | OUTPATIENT
Start: 2019-12-01 | End: 2019-12-01

## 2019-12-01 RX ORDER — DILTIAZEM HYDROCHLORIDE 5 MG/ML
10 INJECTION INTRAVENOUS ONCE
Status: COMPLETED | OUTPATIENT
Start: 2019-12-02 | End: 2019-12-02

## 2019-12-01 RX ORDER — DILTIAZEM HYDROCHLORIDE 5 MG/ML
10 INJECTION INTRAVENOUS ONCE
Status: COMPLETED | OUTPATIENT
Start: 2019-12-01 | End: 2019-12-01

## 2019-12-01 RX ADMIN — ONDANSETRON 4 MG: 2 INJECTION INTRAMUSCULAR; INTRAVENOUS at 22:52

## 2019-12-01 RX ADMIN — CEFTRIAXONE SODIUM 1 G: 1 INJECTION, POWDER, FOR SOLUTION INTRAMUSCULAR; INTRAVENOUS at 13:25

## 2019-12-01 RX ADMIN — SODIUM CHLORIDE 1000 ML: 9 INJECTION, SOLUTION INTRAVENOUS at 22:52

## 2019-12-01 RX ADMIN — DILTIAZEM HYDROCHLORIDE 5 MG/HR: 5 INJECTION INTRAVENOUS at 23:12

## 2019-12-01 RX ADMIN — SODIUM CHLORIDE 1000 ML: 9 INJECTION, SOLUTION INTRAVENOUS at 12:43

## 2019-12-01 RX ADMIN — DILTIAZEM HYDROCHLORIDE 10 MG: 5 INJECTION INTRAVENOUS at 22:56

## 2019-12-02 ENCOUNTER — APPOINTMENT (OUTPATIENT)
Dept: CT IMAGING | Age: 59
End: 2019-12-02
Payer: MEDICARE

## 2019-12-02 ENCOUNTER — HOSPITAL ENCOUNTER (INPATIENT)
Age: 59
LOS: 3 days | Discharge: NURSING, REHAB OR LONG-TERM CARE | DRG: 025 | End: 2019-12-05
Attending: INTERNAL MEDICINE | Admitting: INTERNAL MEDICINE
Payer: MEDICARE

## 2019-12-02 ENCOUNTER — ANESTHESIA EVENT (OUTPATIENT)
Dept: OPERATING ROOM | Age: 59
DRG: 025 | End: 2019-12-02
Payer: MEDICARE

## 2019-12-02 VITALS
RESPIRATION RATE: 13 BRPM | TEMPERATURE: 98.4 F | OXYGEN SATURATION: 95 % | WEIGHT: 84.22 LBS | DIASTOLIC BLOOD PRESSURE: 89 MMHG | HEIGHT: 60 IN | HEART RATE: 82 BPM | SYSTOLIC BLOOD PRESSURE: 124 MMHG | BODY MASS INDEX: 16.53 KG/M2

## 2019-12-02 PROBLEM — R41.82 ALTERED MENTAL STATUS: Status: ACTIVE | Noted: 2019-12-02

## 2019-12-02 PROBLEM — I48.91 ATRIAL FIBRILLATION WITH RVR (HCC): Status: ACTIVE | Noted: 2019-12-02

## 2019-12-02 PROBLEM — G93.5 BRAIN HERNIATION (HCC): Status: ACTIVE | Noted: 2019-12-02

## 2019-12-02 PROBLEM — N30.01 ACUTE CYSTITIS WITH HEMATURIA: Status: ACTIVE | Noted: 2019-12-02

## 2019-12-02 PROBLEM — S06.5XAS: Status: ACTIVE | Noted: 2019-12-02

## 2019-12-02 PROBLEM — S06.5XAA SUBDURAL HEMATOMA: Status: ACTIVE | Noted: 2019-12-02

## 2019-12-02 LAB
ANION GAP SERPL CALCULATED.3IONS-SCNC: 7 MMOL/L (ref 3–16)
BILIRUBIN URINE: NEGATIVE
BLOOD, URINE: ABNORMAL
BUN BLDV-MCNC: 18 MG/DL (ref 7–20)
CALCIUM SERPL-MCNC: 8.8 MG/DL (ref 8.3–10.6)
CHLORIDE BLD-SCNC: 110 MMOL/L (ref 99–110)
CLARITY: CLEAR
CO2: 26 MMOL/L (ref 21–32)
COLOR: YELLOW
CREAT SERPL-MCNC: 0.7 MG/DL (ref 0.9–1.3)
D DIMER: <200 NG/ML DDU (ref 0–229)
EKG ATRIAL RATE: 357 BPM
EKG DIAGNOSIS: NORMAL
EKG Q-T INTERVAL: 266 MS
EKG QRS DURATION: 84 MS
EKG QTC CALCULATION (BAZETT): 455 MS
EKG R AXIS: -82 DEGREES
EKG T AXIS: 22 DEGREES
EKG VENTRICULAR RATE: 176 BPM
GFR AFRICAN AMERICAN: >60
GFR NON-AFRICAN AMERICAN: >60
GLUCOSE BLD-MCNC: 119 MG/DL (ref 70–99)
GLUCOSE URINE: NEGATIVE MG/DL
HCT VFR BLD CALC: 35.4 % (ref 40.5–52.5)
HEMOGLOBIN: 11.8 G/DL (ref 13.5–17.5)
KETONES, URINE: ABNORMAL MG/DL
LEUKOCYTE ESTERASE, URINE: NEGATIVE
MCH RBC QN AUTO: 29.2 PG (ref 26–34)
MCHC RBC AUTO-ENTMCNC: 33.3 G/DL (ref 31–36)
MCV RBC AUTO: 87.6 FL (ref 80–100)
MICROSCOPIC EXAMINATION: YES
NITRITE, URINE: NEGATIVE
PDW BLD-RTO: 13.8 % (ref 12.4–15.4)
PH UA: 6 (ref 5–8)
PLATELET # BLD: 278 K/UL (ref 135–450)
PMV BLD AUTO: 7.3 FL (ref 5–10.5)
POTASSIUM REFLEX MAGNESIUM: 3.9 MMOL/L (ref 3.5–5.1)
PRO-BNP: 119 PG/ML (ref 0–124)
PROTEIN UA: NEGATIVE MG/DL
RBC # BLD: 4.04 M/UL (ref 4.2–5.9)
RBC UA: ABNORMAL /HPF (ref 0–2)
SODIUM BLD-SCNC: 143 MMOL/L (ref 136–145)
SPECIFIC GRAVITY UA: 1.02 (ref 1–1.03)
TROPONIN: <0.01 NG/ML
TSH REFLEX: 0.93 UIU/ML (ref 0.27–4.2)
URINE TYPE: ABNORMAL
UROBILINOGEN, URINE: 0.2 E.U./DL
WBC # BLD: 5.5 K/UL (ref 4–11)
WBC UA: ABNORMAL /HPF (ref 0–5)

## 2019-12-02 PROCEDURE — 84484 ASSAY OF TROPONIN QUANT: CPT

## 2019-12-02 PROCEDURE — 81001 URINALYSIS AUTO W/SCOPE: CPT

## 2019-12-02 PROCEDURE — 83880 ASSAY OF NATRIURETIC PEPTIDE: CPT

## 2019-12-02 PROCEDURE — 86850 RBC ANTIBODY SCREEN: CPT

## 2019-12-02 PROCEDURE — 86901 BLOOD TYPING SEROLOGIC RH(D): CPT

## 2019-12-02 PROCEDURE — 2580000003 HC RX 258

## 2019-12-02 PROCEDURE — 87086 URINE CULTURE/COLONY COUNT: CPT

## 2019-12-02 PROCEDURE — 70450 CT HEAD/BRAIN W/O DYE: CPT

## 2019-12-02 PROCEDURE — 80048 BASIC METABOLIC PNL TOTAL CA: CPT

## 2019-12-02 PROCEDURE — G0378 HOSPITAL OBSERVATION PER HR: HCPCS

## 2019-12-02 PROCEDURE — 96376 TX/PRO/DX INJ SAME DRUG ADON: CPT

## 2019-12-02 PROCEDURE — 6370000000 HC RX 637 (ALT 250 FOR IP)

## 2019-12-02 PROCEDURE — 36415 COLL VENOUS BLD VENIPUNCTURE: CPT

## 2019-12-02 PROCEDURE — 2500000003 HC RX 250 WO HCPCS

## 2019-12-02 PROCEDURE — 51701 INSERT BLADDER CATHETER: CPT

## 2019-12-02 PROCEDURE — 96361 HYDRATE IV INFUSION ADD-ON: CPT

## 2019-12-02 PROCEDURE — 51798 US URINE CAPACITY MEASURE: CPT

## 2019-12-02 PROCEDURE — 6360000002 HC RX W HCPCS

## 2019-12-02 PROCEDURE — 2500000003 HC RX 250 WO HCPCS: Performed by: EMERGENCY MEDICINE

## 2019-12-02 PROCEDURE — 96372 THER/PROPH/DIAG INJ SC/IM: CPT

## 2019-12-02 PROCEDURE — 85027 COMPLETE CBC AUTOMATED: CPT

## 2019-12-02 PROCEDURE — 85379 FIBRIN DEGRADATION QUANT: CPT

## 2019-12-02 PROCEDURE — 99220 PR INITIAL OBSERVATION CARE/DAY 70 MINUTES: CPT | Performed by: INTERNAL MEDICINE

## 2019-12-02 PROCEDURE — 2000000000 HC ICU R&B

## 2019-12-02 PROCEDURE — 86900 BLOOD TYPING SEROLOGIC ABO: CPT

## 2019-12-02 PROCEDURE — 85610 PROTHROMBIN TIME: CPT

## 2019-12-02 PROCEDURE — 84443 ASSAY THYROID STIM HORMONE: CPT

## 2019-12-02 DEVICE — PLATE BNE L12MM THK0.4MM 2 H CRANIOMAXILLOFACIAL BILAT BLU: Type: IMPLANTABLE DEVICE | Site: CRANIAL | Status: FUNCTIONAL

## 2019-12-02 DEVICE — SCREW BNE L4MM DIA1.5MM CRANIOFACIAL TI SELF DRL: Type: IMPLANTABLE DEVICE | Site: CRANIAL | Status: FUNCTIONAL

## 2019-12-02 RX ORDER — QUETIAPINE FUMARATE 50 MG/1
50 TABLET, EXTENDED RELEASE ORAL SEE ADMIN INSTRUCTIONS
Status: DISCONTINUED | OUTPATIENT
Start: 2019-12-02 | End: 2019-12-02

## 2019-12-02 RX ORDER — SODIUM CHLORIDE 0.9 % (FLUSH) 0.9 %
10 SYRINGE (ML) INJECTION EVERY 12 HOURS SCHEDULED
Status: DISCONTINUED | OUTPATIENT
Start: 2019-12-02 | End: 2019-12-02 | Stop reason: HOSPADM

## 2019-12-02 RX ORDER — SODIUM CHLORIDE 9 MG/ML
INJECTION, SOLUTION INTRAVENOUS CONTINUOUS
Status: DISCONTINUED | OUTPATIENT
Start: 2019-12-02 | End: 2019-12-02 | Stop reason: HOSPADM

## 2019-12-02 RX ORDER — TRAZODONE HYDROCHLORIDE 50 MG/1
50 TABLET ORAL NIGHTLY
Status: DISCONTINUED | OUTPATIENT
Start: 2019-12-02 | End: 2019-12-02 | Stop reason: HOSPADM

## 2019-12-02 RX ORDER — SODIUM CHLORIDE 0.9 % (FLUSH) 0.9 %
10 SYRINGE (ML) INJECTION EVERY 12 HOURS SCHEDULED
Status: DISCONTINUED | OUTPATIENT
Start: 2019-12-03 | End: 2019-12-05 | Stop reason: HOSPADM

## 2019-12-02 RX ORDER — POLYETHYLENE GLYCOL 3350 17 G/17G
17 POWDER, FOR SOLUTION ORAL DAILY
Status: DISCONTINUED | OUTPATIENT
Start: 2019-12-02 | End: 2019-12-02

## 2019-12-02 RX ORDER — METOPROLOL SUCCINATE 25 MG/1
25 TABLET, EXTENDED RELEASE ORAL DAILY
Status: DISCONTINUED | OUTPATIENT
Start: 2019-12-02 | End: 2019-12-02

## 2019-12-02 RX ORDER — QUETIAPINE FUMARATE 50 MG/1
150 TABLET, EXTENDED RELEASE ORAL EVERY 24 HOURS
Status: DISCONTINUED | OUTPATIENT
Start: 2019-12-02 | End: 2019-12-02 | Stop reason: HOSPADM

## 2019-12-02 RX ORDER — SODIUM CHLORIDE 0.9 % (FLUSH) 0.9 %
10 SYRINGE (ML) INJECTION PRN
Status: DISCONTINUED | OUTPATIENT
Start: 2019-12-02 | End: 2019-12-02 | Stop reason: HOSPADM

## 2019-12-02 RX ORDER — CEFUROXIME AXETIL 250 MG/1
250 TABLET ORAL EVERY 12 HOURS SCHEDULED
Status: DISCONTINUED | OUTPATIENT
Start: 2019-12-02 | End: 2019-12-02

## 2019-12-02 RX ORDER — QUETIAPINE FUMARATE 50 MG/1
100 TABLET, EXTENDED RELEASE ORAL EVERY 24 HOURS
Status: DISCONTINUED | OUTPATIENT
Start: 2019-12-02 | End: 2019-12-02 | Stop reason: HOSPADM

## 2019-12-02 RX ORDER — SODIUM CHLORIDE 9 MG/ML
INJECTION, SOLUTION INTRAVENOUS CONTINUOUS
Status: DISCONTINUED | OUTPATIENT
Start: 2019-12-02 | End: 2019-12-02

## 2019-12-02 RX ORDER — LABETALOL 20 MG/4 ML (5 MG/ML) INTRAVENOUS SYRINGE
10 EVERY 4 HOURS PRN
Status: DISCONTINUED | OUTPATIENT
Start: 2019-12-02 | End: 2019-12-05 | Stop reason: HOSPADM

## 2019-12-02 RX ORDER — ACETAMINOPHEN 325 MG/1
650 TABLET ORAL EVERY 4 HOURS PRN
Status: DISCONTINUED | OUTPATIENT
Start: 2019-12-02 | End: 2019-12-02 | Stop reason: HOSPADM

## 2019-12-02 RX ORDER — ASPIRIN 81 MG/1
81 TABLET, CHEWABLE ORAL DAILY
Qty: 30 TABLET | Refills: 3 | Status: CANCELLED | OUTPATIENT
Start: 2019-12-03

## 2019-12-02 RX ORDER — DILTIAZEM HYDROCHLORIDE 60 MG/1
30 TABLET, FILM COATED ORAL EVERY 6 HOURS SCHEDULED
Status: DISCONTINUED | OUTPATIENT
Start: 2019-12-02 | End: 2019-12-02

## 2019-12-02 RX ORDER — QUETIAPINE FUMARATE 50 MG/1
50 TABLET, EXTENDED RELEASE ORAL DAILY
Status: DISCONTINUED | OUTPATIENT
Start: 2019-12-02 | End: 2019-12-02 | Stop reason: HOSPADM

## 2019-12-02 RX ORDER — ONDANSETRON 2 MG/ML
4 INJECTION INTRAMUSCULAR; INTRAVENOUS EVERY 6 HOURS PRN
Status: DISCONTINUED | OUTPATIENT
Start: 2019-12-02 | End: 2019-12-02 | Stop reason: HOSPADM

## 2019-12-02 RX ORDER — SODIUM CHLORIDE 0.9 % (FLUSH) 0.9 %
10 SYRINGE (ML) INJECTION PRN
Status: DISCONTINUED | OUTPATIENT
Start: 2019-12-02 | End: 2019-12-05 | Stop reason: HOSPADM

## 2019-12-02 RX ORDER — CALCIUM CARBONATE/VITAMIN D3 250-3.125
500 TABLET ORAL 2 TIMES DAILY
Status: DISCONTINUED | OUTPATIENT
Start: 2019-12-02 | End: 2019-12-02 | Stop reason: HOSPADM

## 2019-12-02 RX ORDER — ASPIRIN 81 MG/1
81 TABLET, CHEWABLE ORAL DAILY
Status: DISCONTINUED | OUTPATIENT
Start: 2019-12-02 | End: 2019-12-02 | Stop reason: HOSPADM

## 2019-12-02 RX ADMIN — SODIUM CHLORIDE: 9 INJECTION, SOLUTION INTRAVENOUS at 13:20

## 2019-12-02 RX ADMIN — DILTIAZEM HYDROCHLORIDE 5 MG/HR: 5 INJECTION INTRAVENOUS at 07:35

## 2019-12-02 RX ADMIN — DILTIAZEM HYDROCHLORIDE 10 MG: 5 INJECTION INTRAVENOUS at 00:00

## 2019-12-02 RX ADMIN — SODIUM CHLORIDE: 9 INJECTION, SOLUTION INTRAVENOUS at 02:48

## 2019-12-02 RX ADMIN — ENOXAPARIN SODIUM 40 MG: 40 INJECTION SUBCUTANEOUS at 10:05

## 2019-12-02 ASSESSMENT — PAIN SCALES - GENERAL
PAINLEVEL_OUTOF10: 0
PAINLEVEL_OUTOF10: 2

## 2019-12-03 ENCOUNTER — APPOINTMENT (OUTPATIENT)
Dept: CT IMAGING | Age: 59
DRG: 025 | End: 2019-12-03
Attending: INTERNAL MEDICINE
Payer: MEDICARE

## 2019-12-03 ENCOUNTER — APPOINTMENT (OUTPATIENT)
Dept: GENERAL RADIOLOGY | Age: 59
DRG: 025 | End: 2019-12-03
Attending: INTERNAL MEDICINE
Payer: MEDICARE

## 2019-12-03 ENCOUNTER — ANESTHESIA (OUTPATIENT)
Dept: OPERATING ROOM | Age: 59
DRG: 025 | End: 2019-12-03
Payer: MEDICARE

## 2019-12-03 VITALS — DIASTOLIC BLOOD PRESSURE: 65 MMHG | SYSTOLIC BLOOD PRESSURE: 94 MMHG | TEMPERATURE: 60.6 F | OXYGEN SATURATION: 99 %

## 2019-12-03 LAB
ABO/RH: NORMAL
ANION GAP SERPL CALCULATED.3IONS-SCNC: 10 MMOL/L (ref 3–16)
ANION GAP SERPL CALCULATED.3IONS-SCNC: 13 MMOL/L (ref 3–16)
ANTIBODY SCREEN: NORMAL
BUN BLDV-MCNC: 16 MG/DL (ref 7–20)
BUN BLDV-MCNC: 17 MG/DL (ref 7–20)
CALCIUM SERPL-MCNC: 8.9 MG/DL (ref 8.3–10.6)
CALCIUM SERPL-MCNC: 9 MG/DL (ref 8.3–10.6)
CHLORIDE BLD-SCNC: 107 MMOL/L (ref 99–110)
CHLORIDE BLD-SCNC: 108 MMOL/L (ref 99–110)
CO2: 20 MMOL/L (ref 21–32)
CO2: 24 MMOL/L (ref 21–32)
CREAT SERPL-MCNC: 0.7 MG/DL (ref 0.9–1.3)
CREAT SERPL-MCNC: 0.8 MG/DL (ref 0.9–1.3)
EKG ATRIAL RATE: 84 BPM
EKG DIAGNOSIS: NORMAL
EKG P AXIS: 60 DEGREES
EKG P-R INTERVAL: 130 MS
EKG Q-T INTERVAL: 398 MS
EKG QRS DURATION: 90 MS
EKG QTC CALCULATION (BAZETT): 470 MS
EKG R AXIS: -71 DEGREES
EKG T AXIS: 37 DEGREES
EKG VENTRICULAR RATE: 84 BPM
GFR AFRICAN AMERICAN: >60
GFR AFRICAN AMERICAN: >60
GFR NON-AFRICAN AMERICAN: >60
GFR NON-AFRICAN AMERICAN: >60
GLUCOSE BLD-MCNC: 95 MG/DL (ref 70–99)
GLUCOSE BLD-MCNC: 96 MG/DL (ref 70–99)
HCT VFR BLD CALC: 39 % (ref 40.5–52.5)
HCT VFR BLD CALC: 39.2 % (ref 40.5–52.5)
HEMOGLOBIN: 12.7 G/DL (ref 13.5–17.5)
HEMOGLOBIN: 12.8 G/DL (ref 13.5–17.5)
INR BLD: 1.13 (ref 0.86–1.14)
MCH RBC QN AUTO: 29.2 PG (ref 26–34)
MCH RBC QN AUTO: 29.3 PG (ref 26–34)
MCHC RBC AUTO-ENTMCNC: 32.7 G/DL (ref 31–36)
MCHC RBC AUTO-ENTMCNC: 32.7 G/DL (ref 31–36)
MCV RBC AUTO: 89.4 FL (ref 80–100)
MCV RBC AUTO: 89.8 FL (ref 80–100)
PDW BLD-RTO: 13.6 % (ref 12.4–15.4)
PDW BLD-RTO: 13.8 % (ref 12.4–15.4)
PLATELET # BLD: 272 K/UL (ref 135–450)
PLATELET # BLD: 284 K/UL (ref 135–450)
PMV BLD AUTO: 7.5 FL (ref 5–10.5)
PMV BLD AUTO: 8 FL (ref 5–10.5)
POTASSIUM REFLEX MAGNESIUM: 3.7 MMOL/L (ref 3.5–5.1)
POTASSIUM SERPL-SCNC: 4.6 MMOL/L (ref 3.5–5.1)
PROTHROMBIN TIME: 13.1 SEC (ref 10–13.2)
RBC # BLD: 4.36 M/UL (ref 4.2–5.9)
RBC # BLD: 4.37 M/UL (ref 4.2–5.9)
SODIUM BLD-SCNC: 141 MMOL/L (ref 136–145)
SODIUM BLD-SCNC: 141 MMOL/L (ref 136–145)
URINE CULTURE, ROUTINE: NORMAL
WBC # BLD: 6.3 K/UL (ref 4–11)
WBC # BLD: 8.6 K/UL (ref 4–11)

## 2019-12-03 PROCEDURE — 94761 N-INVAS EAR/PLS OXIMETRY MLT: CPT

## 2019-12-03 PROCEDURE — 94750 HC PULMONARY COMPLIANCE STUDY: CPT

## 2019-12-03 PROCEDURE — 2720000010 HC SURG SUPPLY STERILE: Performed by: NEUROLOGICAL SURGERY

## 2019-12-03 PROCEDURE — 009430Z DRAINAGE OF INTRACRANIAL SUBDURAL SPACE WITH DRAINAGE DEVICE, PERCUTANEOUS APPROACH: ICD-10-PCS | Performed by: NEUROLOGICAL SURGERY

## 2019-12-03 PROCEDURE — 70450 CT HEAD/BRAIN W/O DYE: CPT

## 2019-12-03 PROCEDURE — 6370000000 HC RX 637 (ALT 250 FOR IP): Performed by: NEUROLOGICAL SURGERY

## 2019-12-03 PROCEDURE — 74018 RADEX ABDOMEN 1 VIEW: CPT

## 2019-12-03 PROCEDURE — 94002 VENT MGMT INPAT INIT DAY: CPT

## 2019-12-03 PROCEDURE — 6360000002 HC RX W HCPCS: Performed by: ANESTHESIOLOGY

## 2019-12-03 PROCEDURE — 93005 ELECTROCARDIOGRAM TRACING: CPT | Performed by: STUDENT IN AN ORGANIZED HEALTH CARE EDUCATION/TRAINING PROGRAM

## 2019-12-03 PROCEDURE — 36415 COLL VENOUS BLD VENIPUNCTURE: CPT

## 2019-12-03 PROCEDURE — 2580000003 HC RX 258: Performed by: STUDENT IN AN ORGANIZED HEALTH CARE EDUCATION/TRAINING PROGRAM

## 2019-12-03 PROCEDURE — 6360000002 HC RX W HCPCS

## 2019-12-03 PROCEDURE — 2500000003 HC RX 250 WO HCPCS: Performed by: NEUROLOGICAL SURGERY

## 2019-12-03 PROCEDURE — 2580000003 HC RX 258: Performed by: ANESTHESIOLOGY

## 2019-12-03 PROCEDURE — 2700000000 HC OXYGEN THERAPY PER DAY

## 2019-12-03 PROCEDURE — 2500000003 HC RX 250 WO HCPCS: Performed by: STUDENT IN AN ORGANIZED HEALTH CARE EDUCATION/TRAINING PROGRAM

## 2019-12-03 PROCEDURE — 2709999900 HC NON-CHARGEABLE SUPPLY: Performed by: NEUROLOGICAL SURGERY

## 2019-12-03 PROCEDURE — 80048 BASIC METABOLIC PNL TOTAL CA: CPT

## 2019-12-03 PROCEDURE — 3600000014 HC SURGERY LEVEL 4 ADDTL 15MIN: Performed by: NEUROLOGICAL SURGERY

## 2019-12-03 PROCEDURE — 3700000001 HC ADD 15 MINUTES (ANESTHESIA): Performed by: NEUROLOGICAL SURGERY

## 2019-12-03 PROCEDURE — 2580000003 HC RX 258

## 2019-12-03 PROCEDURE — 2580000003 HC RX 258: Performed by: NEUROLOGICAL SURGERY

## 2019-12-03 PROCEDURE — 3700000000 HC ANESTHESIA ATTENDED CARE: Performed by: NEUROLOGICAL SURGERY

## 2019-12-03 PROCEDURE — 3600000004 HC SURGERY LEVEL 4 BASE: Performed by: NEUROLOGICAL SURGERY

## 2019-12-03 PROCEDURE — 94770 HC ETCO2 MONITOR DAILY: CPT

## 2019-12-03 PROCEDURE — 2000000000 HC ICU R&B

## 2019-12-03 PROCEDURE — 85027 COMPLETE CBC AUTOMATED: CPT

## 2019-12-03 PROCEDURE — 6360000002 HC RX W HCPCS: Performed by: STUDENT IN AN ORGANIZED HEALTH CARE EDUCATION/TRAINING PROGRAM

## 2019-12-03 PROCEDURE — 93010 ELECTROCARDIOGRAM REPORT: CPT | Performed by: INTERNAL MEDICINE

## 2019-12-03 PROCEDURE — 2500000003 HC RX 250 WO HCPCS: Performed by: ANESTHESIOLOGY

## 2019-12-03 PROCEDURE — C1713 ANCHOR/SCREW BN/BN,TIS/BN: HCPCS | Performed by: NEUROLOGICAL SURGERY

## 2019-12-03 PROCEDURE — 99291 CRITICAL CARE FIRST HOUR: CPT | Performed by: INTERNAL MEDICINE

## 2019-12-03 RX ORDER — TRAMADOL HYDROCHLORIDE 50 MG/1
100 TABLET ORAL EVERY 6 HOURS PRN
Status: DISCONTINUED | OUTPATIENT
Start: 2019-12-03 | End: 2019-12-05 | Stop reason: HOSPADM

## 2019-12-03 RX ORDER — BISACODYL 10 MG
10 SUPPOSITORY, RECTAL RECTAL DAILY PRN
Status: DISCONTINUED | OUTPATIENT
Start: 2019-12-03 | End: 2019-12-05 | Stop reason: HOSPADM

## 2019-12-03 RX ORDER — NALOXONE HYDROCHLORIDE 0.4 MG/ML
0.2 INJECTION, SOLUTION INTRAMUSCULAR; INTRAVENOUS; SUBCUTANEOUS PRN
Status: DISCONTINUED | OUTPATIENT
Start: 2019-12-03 | End: 2019-12-05 | Stop reason: HOSPADM

## 2019-12-03 RX ORDER — TRAZODONE HYDROCHLORIDE 50 MG/1
50 TABLET ORAL NIGHTLY
Status: DISCONTINUED | OUTPATIENT
Start: 2019-12-03 | End: 2019-12-05 | Stop reason: HOSPADM

## 2019-12-03 RX ORDER — BUPIVACAINE HYDROCHLORIDE AND EPINEPHRINE 5; 5 MG/ML; UG/ML
INJECTION, SOLUTION EPIDURAL; INTRACAUDAL; PERINEURAL PRN
Status: DISCONTINUED | OUTPATIENT
Start: 2019-12-03 | End: 2019-12-03 | Stop reason: ALTCHOICE

## 2019-12-03 RX ORDER — MAGNESIUM HYDROXIDE 1200 MG/15ML
LIQUID ORAL CONTINUOUS PRN
Status: COMPLETED | OUTPATIENT
Start: 2019-12-03 | End: 2019-12-03

## 2019-12-03 RX ORDER — CLINDAMYCIN PHOSPHATE 150 MG/ML
INJECTION, SOLUTION INTRAVENOUS PRN
Status: DISCONTINUED | OUTPATIENT
Start: 2019-12-03 | End: 2019-12-03 | Stop reason: SDUPTHER

## 2019-12-03 RX ORDER — SENNA AND DOCUSATE SODIUM 50; 8.6 MG/1; MG/1
1 TABLET, FILM COATED ORAL 2 TIMES DAILY
Status: DISCONTINUED | OUTPATIENT
Start: 2019-12-03 | End: 2019-12-05 | Stop reason: HOSPADM

## 2019-12-03 RX ORDER — SODIUM CHLORIDE 9 MG/ML
INJECTION, SOLUTION INTRAVENOUS CONTINUOUS
Status: DISCONTINUED | OUTPATIENT
Start: 2019-12-03 | End: 2019-12-05 | Stop reason: HOSPADM

## 2019-12-03 RX ORDER — FENTANYL CITRATE 50 UG/ML
INJECTION, SOLUTION INTRAMUSCULAR; INTRAVENOUS PRN
Status: DISCONTINUED | OUTPATIENT
Start: 2019-12-03 | End: 2019-12-03 | Stop reason: SDUPTHER

## 2019-12-03 RX ORDER — ONDANSETRON 2 MG/ML
INJECTION INTRAMUSCULAR; INTRAVENOUS PRN
Status: DISCONTINUED | OUTPATIENT
Start: 2019-12-03 | End: 2019-12-03 | Stop reason: SDUPTHER

## 2019-12-03 RX ORDER — SODIUM CHLORIDE, SODIUM LACTATE, POTASSIUM CHLORIDE, CALCIUM CHLORIDE 600; 310; 30; 20 MG/100ML; MG/100ML; MG/100ML; MG/100ML
INJECTION, SOLUTION INTRAVENOUS CONTINUOUS PRN
Status: DISCONTINUED | OUTPATIENT
Start: 2019-12-03 | End: 2019-12-03 | Stop reason: SDUPTHER

## 2019-12-03 RX ORDER — ONDANSETRON 2 MG/ML
4 INJECTION INTRAMUSCULAR; INTRAVENOUS EVERY 6 HOURS PRN
Status: DISCONTINUED | OUTPATIENT
Start: 2019-12-03 | End: 2019-12-05 | Stop reason: HOSPADM

## 2019-12-03 RX ORDER — PROPOFOL 10 MG/ML
INJECTION, EMULSION INTRAVENOUS PRN
Status: DISCONTINUED | OUTPATIENT
Start: 2019-12-03 | End: 2019-12-03 | Stop reason: SDUPTHER

## 2019-12-03 RX ORDER — PROPOFOL 10 MG/ML
10 INJECTION, EMULSION INTRAVENOUS
Status: DISCONTINUED | OUTPATIENT
Start: 2019-12-03 | End: 2019-12-04

## 2019-12-03 RX ORDER — SODIUM CHLORIDE 0.9 % (FLUSH) 0.9 %
10 SYRINGE (ML) INJECTION PRN
Status: DISCONTINUED | OUTPATIENT
Start: 2019-12-03 | End: 2019-12-04

## 2019-12-03 RX ORDER — CLINDAMYCIN PHOSPHATE 600 MG/50ML
600 INJECTION INTRAVENOUS EVERY 8 HOURS
Status: DISCONTINUED | OUTPATIENT
Start: 2019-12-03 | End: 2019-12-04

## 2019-12-03 RX ORDER — ACETAMINOPHEN 325 MG/1
650 TABLET ORAL EVERY 4 HOURS PRN
Status: DISCONTINUED | OUTPATIENT
Start: 2019-12-03 | End: 2019-12-05 | Stop reason: HOSPADM

## 2019-12-03 RX ORDER — SODIUM CHLORIDE 9 MG/ML
INJECTION, SOLUTION INTRAVENOUS CONTINUOUS
Status: DISCONTINUED | OUTPATIENT
Start: 2019-12-03 | End: 2019-12-03

## 2019-12-03 RX ORDER — POLYETHYLENE GLYCOL 3350 17 G/17G
17 POWDER, FOR SOLUTION ORAL DAILY PRN
Status: DISCONTINUED | OUTPATIENT
Start: 2019-12-03 | End: 2019-12-05 | Stop reason: HOSPADM

## 2019-12-03 RX ORDER — GLYCOPYRROLATE 1 MG/5 ML
SYRINGE (ML) INTRAVENOUS PRN
Status: DISCONTINUED | OUTPATIENT
Start: 2019-12-03 | End: 2019-12-03 | Stop reason: SDUPTHER

## 2019-12-03 RX ORDER — CLINDAMYCIN PHOSPHATE 600 MG/50ML
600 INJECTION INTRAVENOUS ONCE
Status: COMPLETED | OUTPATIENT
Start: 2019-12-03 | End: 2019-12-03

## 2019-12-03 RX ORDER — SODIUM CHLORIDE 0.9 % (FLUSH) 0.9 %
10 SYRINGE (ML) INJECTION EVERY 12 HOURS SCHEDULED
Status: DISCONTINUED | OUTPATIENT
Start: 2019-12-03 | End: 2019-12-04

## 2019-12-03 RX ORDER — METOPROLOL TARTRATE 5 MG/5ML
5 INJECTION INTRAVENOUS EVERY 6 HOURS
Status: DISCONTINUED | OUTPATIENT
Start: 2019-12-03 | End: 2019-12-05

## 2019-12-03 RX ORDER — PHENYLEPHRINE HYDROCHLORIDE 10 MG/ML
INJECTION INTRAVENOUS PRN
Status: DISCONTINUED | OUTPATIENT
Start: 2019-12-03 | End: 2019-12-03 | Stop reason: SDUPTHER

## 2019-12-03 RX ORDER — ROCURONIUM BROMIDE 10 MG/ML
INJECTION, SOLUTION INTRAVENOUS PRN
Status: DISCONTINUED | OUTPATIENT
Start: 2019-12-03 | End: 2019-12-03 | Stop reason: SDUPTHER

## 2019-12-03 RX ORDER — QUETIAPINE FUMARATE 25 MG/1
50 TABLET, FILM COATED ORAL 3 TIMES DAILY
Status: DISCONTINUED | OUTPATIENT
Start: 2019-12-03 | End: 2019-12-05

## 2019-12-03 RX ORDER — TRAMADOL HYDROCHLORIDE 50 MG/1
50 TABLET ORAL EVERY 6 HOURS PRN
Status: DISCONTINUED | OUTPATIENT
Start: 2019-12-03 | End: 2019-12-05 | Stop reason: HOSPADM

## 2019-12-03 RX ADMIN — LEVETIRACETAM 500 MG: 100 INJECTION, SOLUTION, CONCENTRATE INTRAVENOUS at 12:30

## 2019-12-03 RX ADMIN — SODIUM CHLORIDE, SODIUM LACTATE, POTASSIUM CHLORIDE, AND CALCIUM CHLORIDE: 600; 310; 30; 20 INJECTION, SOLUTION INTRAVENOUS at 01:27

## 2019-12-03 RX ADMIN — CLINDAMYCIN PHOSPHATE 600 MG: 600 INJECTION, SOLUTION INTRAVENOUS at 18:31

## 2019-12-03 RX ADMIN — PHENYLEPHRINE HYDROCHLORIDE 100 MCG: 10 INJECTION INTRAVENOUS at 01:11

## 2019-12-03 RX ADMIN — SODIUM CHLORIDE: 9 INJECTION, SOLUTION INTRAVENOUS at 17:45

## 2019-12-03 RX ADMIN — PROPOFOL 120 MG: 10 INJECTION, EMULSION INTRAVENOUS at 00:28

## 2019-12-03 RX ADMIN — Medication 10 ML: at 20:31

## 2019-12-03 RX ADMIN — Medication 0.8 MG: at 01:26

## 2019-12-03 RX ADMIN — CLINDAMYCIN PHOSPHATE 600 MG: 600 INJECTION, SOLUTION INTRAVENOUS at 02:09

## 2019-12-03 RX ADMIN — DEXMEDETOMIDINE 1.3 MCG/KG/HR: 100 INJECTION, SOLUTION, CONCENTRATE INTRAVENOUS at 20:59

## 2019-12-03 RX ADMIN — FAMOTIDINE 20 MG: 10 INJECTION, SOLUTION INTRAVENOUS at 01:22

## 2019-12-03 RX ADMIN — CLINDAMYCIN PHOSPHATE 600 MG: 150 INJECTION, SOLUTION, CONCENTRATE INTRAVENOUS at 00:42

## 2019-12-03 RX ADMIN — FENTANYL CITRATE 100 MCG: 50 INJECTION INTRAMUSCULAR; INTRAVENOUS at 00:28

## 2019-12-03 RX ADMIN — METOPROLOL TARTRATE 5 MG: 5 INJECTION INTRAVENOUS at 03:00

## 2019-12-03 RX ADMIN — PROPOFOL 10 MCG/KG/MIN: 10 INJECTION, EMULSION INTRAVENOUS at 02:39

## 2019-12-03 RX ADMIN — SENNOSIDES AND DOCUSATE SODIUM 1 TABLET: 8.6; 5 TABLET ORAL at 20:30

## 2019-12-03 RX ADMIN — SODIUM CHLORIDE: 9 INJECTION, SOLUTION INTRAVENOUS at 02:35

## 2019-12-03 RX ADMIN — Medication 10 ML: at 09:00

## 2019-12-03 RX ADMIN — ROCURONIUM BROMIDE 40 MG: 10 INJECTION, SOLUTION INTRAVENOUS at 00:28

## 2019-12-03 RX ADMIN — SODIUM CHLORIDE, SODIUM LACTATE, POTASSIUM CHLORIDE, AND CALCIUM CHLORIDE: 600; 310; 30; 20 INJECTION, SOLUTION INTRAVENOUS at 00:28

## 2019-12-03 RX ADMIN — NEOSTIGMINE METHYLSULFATE 4 MG: 1 INJECTION INTRAMUSCULAR; INTRAVENOUS; SUBCUTANEOUS at 01:26

## 2019-12-03 RX ADMIN — LEVETIRACETAM 500 MG: 100 INJECTION, SOLUTION, CONCENTRATE INTRAVENOUS at 00:47

## 2019-12-03 RX ADMIN — ONDANSETRON 4 MG: 2 INJECTION INTRAMUSCULAR; INTRAVENOUS at 01:22

## 2019-12-03 RX ADMIN — DEXMEDETOMIDINE 0.4 MCG/KG/HR: 100 INJECTION, SOLUTION, CONCENTRATE INTRAVENOUS at 13:57

## 2019-12-03 RX ADMIN — CLINDAMYCIN PHOSPHATE 600 MG: 600 INJECTION, SOLUTION INTRAVENOUS at 11:27

## 2019-12-03 ASSESSMENT — PULMONARY FUNCTION TESTS
PIF_VALUE: 22
PIF_VALUE: 22
PIF_VALUE: 21
PIF_VALUE: 22
PIF_VALUE: 25
PIF_VALUE: 34
PIF_VALUE: 22
PIF_VALUE: 21
PIF_VALUE: 23
PIF_VALUE: 21
PIF_VALUE: 17
PIF_VALUE: 20
PIF_VALUE: 35
PIF_VALUE: 22
PIF_VALUE: 27
PIF_VALUE: 38
PIF_VALUE: 22
PIF_VALUE: 22
PIF_VALUE: 35
PIF_VALUE: 22
PIF_VALUE: 22
PIF_VALUE: 19
PIF_VALUE: 43
PIF_VALUE: 0
PIF_VALUE: 21
PIF_VALUE: 39
PIF_VALUE: 27
PIF_VALUE: 33
PIF_VALUE: 22
PIF_VALUE: 22
PIF_VALUE: 13
PIF_VALUE: 3
PIF_VALUE: 21
PIF_VALUE: 26
PIF_VALUE: 15
PIF_VALUE: 29
PIF_VALUE: 22
PIF_VALUE: 22
PIF_VALUE: 36
PIF_VALUE: 43
PIF_VALUE: 22
PIF_VALUE: 39
PIF_VALUE: 39
PIF_VALUE: 23
PIF_VALUE: 22
PIF_VALUE: 22
PIF_VALUE: 27
PIF_VALUE: 22
PIF_VALUE: 1
PIF_VALUE: 33
PIF_VALUE: 22
PIF_VALUE: 1
PIF_VALUE: 30
PIF_VALUE: 22
PIF_VALUE: 1
PIF_VALUE: 21
PIF_VALUE: 22
PIF_VALUE: 33
PIF_VALUE: 31
PIF_VALUE: 19
PIF_VALUE: 21
PIF_VALUE: 5
PIF_VALUE: 21
PIF_VALUE: 33
PIF_VALUE: 28
PIF_VALUE: 30
PIF_VALUE: 22
PIF_VALUE: 49
PIF_VALUE: 22
PIF_VALUE: 25
PIF_VALUE: 22
PIF_VALUE: 22
PIF_VALUE: 35
PIF_VALUE: 23
PIF_VALUE: 22
PIF_VALUE: 22
PIF_VALUE: 16
PIF_VALUE: 21
PIF_VALUE: 34
PIF_VALUE: 23
PIF_VALUE: 22
PIF_VALUE: 13
PIF_VALUE: 22
PIF_VALUE: 44
PIF_VALUE: 21
PIF_VALUE: 37
PIF_VALUE: 22
PIF_VALUE: 30
PIF_VALUE: 21
PIF_VALUE: 30
PIF_VALUE: 22
PIF_VALUE: 22
PIF_VALUE: 30
PIF_VALUE: 36
PIF_VALUE: 28
PIF_VALUE: 22
PIF_VALUE: 13
PIF_VALUE: 33
PIF_VALUE: 33
PIF_VALUE: 23
PIF_VALUE: 41
PIF_VALUE: 21
PIF_VALUE: 22
PIF_VALUE: 30
PIF_VALUE: 22
PIF_VALUE: 22
PIF_VALUE: 1
PIF_VALUE: 22
PIF_VALUE: 33
PIF_VALUE: 28

## 2019-12-03 ASSESSMENT — PAIN SCALES - GENERAL: PAINLEVEL_OUTOF10: 6

## 2019-12-04 PROCEDURE — 2700000000 HC OXYGEN THERAPY PER DAY

## 2019-12-04 PROCEDURE — 99291 CRITICAL CARE FIRST HOUR: CPT | Performed by: INTERNAL MEDICINE

## 2019-12-04 PROCEDURE — 6370000000 HC RX 637 (ALT 250 FOR IP): Performed by: INTERNAL MEDICINE

## 2019-12-04 PROCEDURE — 6360000002 HC RX W HCPCS

## 2019-12-04 PROCEDURE — 2000000000 HC ICU R&B

## 2019-12-04 PROCEDURE — 94770 HC ETCO2 MONITOR DAILY: CPT

## 2019-12-04 PROCEDURE — 94003 VENT MGMT INPAT SUBQ DAY: CPT

## 2019-12-04 PROCEDURE — 6360000002 HC RX W HCPCS: Performed by: STUDENT IN AN ORGANIZED HEALTH CARE EDUCATION/TRAINING PROGRAM

## 2019-12-04 PROCEDURE — 94750 HC PULMONARY COMPLIANCE STUDY: CPT

## 2019-12-04 PROCEDURE — 2580000003 HC RX 258: Performed by: NEUROLOGICAL SURGERY

## 2019-12-04 PROCEDURE — 2500000003 HC RX 250 WO HCPCS: Performed by: STUDENT IN AN ORGANIZED HEALTH CARE EDUCATION/TRAINING PROGRAM

## 2019-12-04 PROCEDURE — 2580000003 HC RX 258: Performed by: STUDENT IN AN ORGANIZED HEALTH CARE EDUCATION/TRAINING PROGRAM

## 2019-12-04 PROCEDURE — 2500000003 HC RX 250 WO HCPCS: Performed by: NEUROLOGICAL SURGERY

## 2019-12-04 PROCEDURE — 2580000003 HC RX 258

## 2019-12-04 PROCEDURE — 6370000000 HC RX 637 (ALT 250 FOR IP): Performed by: NEUROLOGICAL SURGERY

## 2019-12-04 PROCEDURE — 94761 N-INVAS EAR/PLS OXIMETRY MLT: CPT

## 2019-12-04 RX ADMIN — LEVETIRACETAM 500 MG: 100 INJECTION, SOLUTION, CONCENTRATE INTRAVENOUS at 01:24

## 2019-12-04 RX ADMIN — LEVETIRACETAM 500 MG: 100 INJECTION, SOLUTION, CONCENTRATE INTRAVENOUS at 13:42

## 2019-12-04 RX ADMIN — CLINDAMYCIN PHOSPHATE 600 MG: 600 INJECTION, SOLUTION INTRAVENOUS at 02:09

## 2019-12-04 RX ADMIN — QUETIAPINE FUMARATE 50 MG: 25 TABLET ORAL at 14:57

## 2019-12-04 RX ADMIN — SENNOSIDES AND DOCUSATE SODIUM 1 TABLET: 8.6; 5 TABLET ORAL at 20:23

## 2019-12-04 RX ADMIN — SODIUM CHLORIDE: 9 INJECTION, SOLUTION INTRAVENOUS at 06:56

## 2019-12-04 RX ADMIN — CLINDAMYCIN PHOSPHATE 600 MG: 600 INJECTION, SOLUTION INTRAVENOUS at 10:49

## 2019-12-04 RX ADMIN — Medication 10 ML: at 20:23

## 2019-12-04 RX ADMIN — TRAZODONE HYDROCHLORIDE 50 MG: 50 TABLET ORAL at 20:24

## 2019-12-04 RX ADMIN — PROPOFOL 10 MCG/KG/MIN: 10 INJECTION, EMULSION INTRAVENOUS at 04:34

## 2019-12-04 RX ADMIN — QUETIAPINE FUMARATE 50 MG: 25 TABLET ORAL at 08:57

## 2019-12-04 RX ADMIN — DEXMEDETOMIDINE 1.4 MCG/KG/HR: 100 INJECTION, SOLUTION, CONCENTRATE INTRAVENOUS at 17:00

## 2019-12-04 RX ADMIN — Medication 10 ML: at 08:58

## 2019-12-04 RX ADMIN — METOPROLOL TARTRATE 5 MG: 5 INJECTION INTRAVENOUS at 08:57

## 2019-12-04 RX ADMIN — SODIUM CHLORIDE: 9 INJECTION, SOLUTION INTRAVENOUS at 20:38

## 2019-12-04 RX ADMIN — QUETIAPINE FUMARATE 50 MG: 25 TABLET ORAL at 20:24

## 2019-12-04 RX ADMIN — SENNOSIDES AND DOCUSATE SODIUM 1 TABLET: 8.6; 5 TABLET ORAL at 08:57

## 2019-12-04 ASSESSMENT — PULMONARY FUNCTION TESTS
PIF_VALUE: 28
PIF_VALUE: 26
PIF_VALUE: 31
PIF_VALUE: 30
PIF_VALUE: 34
PIF_VALUE: 39
PIF_VALUE: 29
PIF_VALUE: 29
PIF_VALUE: 14
PIF_VALUE: 34
PIF_VALUE: 26
PIF_VALUE: 30
PIF_VALUE: 28
PIF_VALUE: 38
PIF_VALUE: 28
PIF_VALUE: 33
PIF_VALUE: 34
PIF_VALUE: 14
PIF_VALUE: 27
PIF_VALUE: 31
PIF_VALUE: 39
PIF_VALUE: 26
PIF_VALUE: 27
PIF_VALUE: 31

## 2019-12-05 VITALS
BODY MASS INDEX: 19.65 KG/M2 | SYSTOLIC BLOOD PRESSURE: 142 MMHG | DIASTOLIC BLOOD PRESSURE: 87 MMHG | TEMPERATURE: 98 F | OXYGEN SATURATION: 95 % | WEIGHT: 100.09 LBS | HEIGHT: 60 IN | RESPIRATION RATE: 22 BRPM | HEART RATE: 105 BPM

## 2019-12-05 PROCEDURE — 6370000000 HC RX 637 (ALT 250 FOR IP): Performed by: NEUROLOGICAL SURGERY

## 2019-12-05 PROCEDURE — 6370000000 HC RX 637 (ALT 250 FOR IP): Performed by: FAMILY MEDICINE

## 2019-12-05 PROCEDURE — 92610 EVALUATE SWALLOWING FUNCTION: CPT

## 2019-12-05 PROCEDURE — 2580000003 HC RX 258

## 2019-12-05 PROCEDURE — 6370000000 HC RX 637 (ALT 250 FOR IP): Performed by: INTERNAL MEDICINE

## 2019-12-05 PROCEDURE — 2500000003 HC RX 250 WO HCPCS: Performed by: STUDENT IN AN ORGANIZED HEALTH CARE EDUCATION/TRAINING PROGRAM

## 2019-12-05 PROCEDURE — 2580000003 HC RX 258: Performed by: STUDENT IN AN ORGANIZED HEALTH CARE EDUCATION/TRAINING PROGRAM

## 2019-12-05 PROCEDURE — 99232 SBSQ HOSP IP/OBS MODERATE 35: CPT | Performed by: INTERNAL MEDICINE

## 2019-12-05 PROCEDURE — 6360000002 HC RX W HCPCS

## 2019-12-05 RX ORDER — QUETIAPINE FUMARATE 25 MG/1
100 TABLET, FILM COATED ORAL 3 TIMES DAILY
Status: DISCONTINUED | OUTPATIENT
Start: 2019-12-05 | End: 2019-12-05 | Stop reason: HOSPADM

## 2019-12-05 RX ORDER — SENNA AND DOCUSATE SODIUM 50; 8.6 MG/1; MG/1
2 TABLET, FILM COATED ORAL
Qty: 30 TABLET | Refills: 0 | Status: ON HOLD | OUTPATIENT
Start: 2019-12-05 | End: 2021-09-21 | Stop reason: SDUPTHER

## 2019-12-05 RX ORDER — TRAZODONE HYDROCHLORIDE 50 MG/1
50 TABLET ORAL NIGHTLY
Qty: 30 TABLET | Refills: 0 | Status: SHIPPED | OUTPATIENT
Start: 2019-12-05 | End: 2021-10-25

## 2019-12-05 RX ORDER — POLYETHYLENE GLYCOL 3350 17 G/17G
17 POWDER, FOR SOLUTION ORAL DAILY PRN
Qty: 527 G | Refills: 1 | Status: SHIPPED | OUTPATIENT
Start: 2019-12-05 | End: 2020-01-04

## 2019-12-05 RX ORDER — METOPROLOL TARTRATE 5 MG/5ML
2.5 INJECTION INTRAVENOUS EVERY 8 HOURS
Status: DISCONTINUED | OUTPATIENT
Start: 2019-12-05 | End: 2019-12-05

## 2019-12-05 RX ADMIN — SENNOSIDES AND DOCUSATE SODIUM 1 TABLET: 8.6; 5 TABLET ORAL at 10:02

## 2019-12-05 RX ADMIN — DEXMEDETOMIDINE 0.6 MCG/KG/HR: 100 INJECTION, SOLUTION, CONCENTRATE INTRAVENOUS at 06:31

## 2019-12-05 RX ADMIN — QUETIAPINE FUMARATE 100 MG: 25 TABLET ORAL at 13:20

## 2019-12-05 RX ADMIN — METOPROLOL TARTRATE 25 MG: 25 TABLET, FILM COATED ORAL at 12:00

## 2019-12-05 RX ADMIN — POLYETHYLENE GLYCOL 3350 17 G: 17 POWDER, FOR SOLUTION ORAL at 04:29

## 2019-12-05 RX ADMIN — QUETIAPINE FUMARATE 50 MG: 25 TABLET ORAL at 10:02

## 2019-12-05 RX ADMIN — LEVETIRACETAM 500 MG: 100 INJECTION, SOLUTION, CONCENTRATE INTRAVENOUS at 01:09

## 2019-12-05 RX ADMIN — LEVETIRACETAM 500 MG: 100 INJECTION, SOLUTION, CONCENTRATE INTRAVENOUS at 13:20

## 2020-02-03 ENCOUNTER — APPOINTMENT (OUTPATIENT)
Dept: CT IMAGING | Age: 60
End: 2020-02-03
Payer: MEDICARE

## 2020-02-03 ENCOUNTER — HOSPITAL ENCOUNTER (EMERGENCY)
Age: 60
Discharge: HOME | End: 2020-02-04
Attending: EMERGENCY MEDICINE
Payer: MEDICARE

## 2020-02-03 PROCEDURE — 99284 EMERGENCY DEPT VISIT MOD MDM: CPT

## 2020-02-03 RX ORDER — TAMSULOSIN HYDROCHLORIDE 0.4 MG/1
0.8 CAPSULE ORAL DAILY
COMMUNITY

## 2020-02-03 RX ORDER — ACETAMINOPHEN 325 MG/1
650 TABLET ORAL EVERY 6 HOURS PRN
COMMUNITY
End: 2020-02-04 | Stop reason: SDUPTHER

## 2020-02-03 RX ORDER — ONDANSETRON 4 MG/1
4 TABLET, FILM COATED ORAL EVERY 8 HOURS PRN
COMMUNITY
End: 2020-10-14

## 2020-02-03 RX ORDER — CLOTRIMAZOLE 1 %
CREAM (GRAM) TOPICAL 2 TIMES DAILY
COMMUNITY
End: 2021-05-27 | Stop reason: ALTCHOICE

## 2020-02-03 RX ORDER — MEGESTROL ACETATE 40 MG/ML
400 SUSPENSION ORAL DAILY
COMMUNITY
End: 2020-10-14

## 2020-02-03 RX ORDER — KETAMINE HYDROCHLORIDE 100 MG/ML
1 INJECTION, SOLUTION INTRAMUSCULAR; INTRAVENOUS ONCE
Status: COMPLETED | OUTPATIENT
Start: 2020-02-03 | End: 2020-02-04

## 2020-02-03 RX ORDER — LEVETIRACETAM 250 MG/1
250 TABLET ORAL 2 TIMES DAILY
Status: ON HOLD | COMMUNITY
End: 2021-09-21 | Stop reason: HOSPADM

## 2020-02-04 ENCOUNTER — APPOINTMENT (OUTPATIENT)
Dept: CT IMAGING | Age: 60
End: 2020-02-04
Payer: MEDICARE

## 2020-02-04 VITALS
WEIGHT: 102 LBS | HEIGHT: 60 IN | RESPIRATION RATE: 11 BRPM | SYSTOLIC BLOOD PRESSURE: 114 MMHG | DIASTOLIC BLOOD PRESSURE: 79 MMHG | BODY MASS INDEX: 20.03 KG/M2 | OXYGEN SATURATION: 97 % | HEART RATE: 84 BPM | TEMPERATURE: 98.2 F

## 2020-02-04 LAB
A/G RATIO: 1.4 (ref 1.1–2.2)
ALBUMIN SERPL-MCNC: 3.6 G/DL (ref 3.4–5)
ALP BLD-CCNC: 99 U/L (ref 40–129)
ALT SERPL-CCNC: 11 U/L (ref 10–40)
ANION GAP SERPL CALCULATED.3IONS-SCNC: 11 MMOL/L (ref 3–16)
AST SERPL-CCNC: 12 U/L (ref 15–37)
BACTERIA: ABNORMAL /HPF
BILIRUB SERPL-MCNC: <0.2 MG/DL (ref 0–1)
BILIRUBIN URINE: NEGATIVE
BLOOD, URINE: ABNORMAL
BUN BLDV-MCNC: 12 MG/DL (ref 7–20)
CALCIUM SERPL-MCNC: 9.1 MG/DL (ref 8.3–10.6)
CHLORIDE BLD-SCNC: 101 MMOL/L (ref 99–110)
CLARITY: ABNORMAL
CO2: 26 MMOL/L (ref 21–32)
COLOR: YELLOW
CREAT SERPL-MCNC: 0.8 MG/DL (ref 0.9–1.3)
GFR AFRICAN AMERICAN: >60
GFR NON-AFRICAN AMERICAN: >60
GLOBULIN: 2.5 G/DL
GLUCOSE BLD-MCNC: 112 MG/DL (ref 70–99)
GLUCOSE URINE: NEGATIVE MG/DL
HCT VFR BLD CALC: 34.4 % (ref 40.5–52.5)
HEMOGLOBIN: 11.5 G/DL (ref 13.5–17.5)
INR BLD: 1.08 (ref 0.86–1.14)
KETONES, URINE: NEGATIVE MG/DL
LEUKOCYTE ESTERASE, URINE: ABNORMAL
MCH RBC QN AUTO: 28.3 PG (ref 26–34)
MCHC RBC AUTO-ENTMCNC: 33.4 G/DL (ref 31–36)
MCV RBC AUTO: 84.7 FL (ref 80–100)
MICROSCOPIC EXAMINATION: YES
NITRITE, URINE: NEGATIVE
PDW BLD-RTO: 14.4 % (ref 12.4–15.4)
PH UA: 7 (ref 5–8)
PLATELET # BLD: 349 K/UL (ref 135–450)
PMV BLD AUTO: 7.4 FL (ref 5–10.5)
POTASSIUM SERPL-SCNC: 3.6 MMOL/L (ref 3.5–5.1)
PROTEIN UA: NEGATIVE MG/DL
PROTHROMBIN TIME: 12.5 SEC (ref 10–13.2)
RBC # BLD: 4.07 M/UL (ref 4.2–5.9)
RBC UA: ABNORMAL /HPF (ref 0–2)
SODIUM BLD-SCNC: 138 MMOL/L (ref 136–145)
SPECIFIC GRAVITY UA: 1.01 (ref 1–1.03)
TOTAL PROTEIN: 6.1 G/DL (ref 6.4–8.2)
URINE REFLEX TO CULTURE: YES
URINE TYPE: ABNORMAL
UROBILINOGEN, URINE: 0.2 E.U./DL
WBC # BLD: 7 K/UL (ref 4–11)
WBC UA: >100 /HPF (ref 0–5)

## 2020-02-04 PROCEDURE — 80053 COMPREHEN METABOLIC PANEL: CPT

## 2020-02-04 PROCEDURE — 6360000002 HC RX W HCPCS: Performed by: EMERGENCY MEDICINE

## 2020-02-04 PROCEDURE — 87086 URINE CULTURE/COLONY COUNT: CPT

## 2020-02-04 PROCEDURE — 72125 CT NECK SPINE W/O DYE: CPT

## 2020-02-04 PROCEDURE — 2500000003 HC RX 250 WO HCPCS: Performed by: NURSE PRACTITIONER

## 2020-02-04 PROCEDURE — 70450 CT HEAD/BRAIN W/O DYE: CPT

## 2020-02-04 PROCEDURE — 87186 SC STD MICRODIL/AGAR DIL: CPT

## 2020-02-04 PROCEDURE — 85610 PROTHROMBIN TIME: CPT

## 2020-02-04 PROCEDURE — 81001 URINALYSIS AUTO W/SCOPE: CPT

## 2020-02-04 PROCEDURE — 85027 COMPLETE CBC AUTOMATED: CPT

## 2020-02-04 PROCEDURE — 87077 CULTURE AEROBIC IDENTIFY: CPT

## 2020-02-04 PROCEDURE — 96372 THER/PROPH/DIAG INJ SC/IM: CPT

## 2020-02-04 RX ORDER — CEFTRIAXONE 1 G/1
1 INJECTION, POWDER, FOR SOLUTION INTRAMUSCULAR; INTRAVENOUS ONCE
Status: COMPLETED | OUTPATIENT
Start: 2020-02-04 | End: 2020-02-04

## 2020-02-04 RX ORDER — HYDROCODONE BITARTRATE AND ACETAMINOPHEN 5; 325 MG/1; MG/1
1 TABLET ORAL EVERY 6 HOURS PRN
Qty: 8 TABLET | Refills: 0 | Status: SHIPPED | OUTPATIENT
Start: 2020-02-04 | End: 2020-02-04 | Stop reason: ALTCHOICE

## 2020-02-04 RX ORDER — IBUPROFEN 400 MG/1
400 TABLET ORAL EVERY 6 HOURS PRN
Qty: 30 TABLET | Refills: 0 | Status: SHIPPED | OUTPATIENT
Start: 2020-02-04 | End: 2021-05-27 | Stop reason: ALTCHOICE

## 2020-02-04 RX ORDER — CEFUROXIME AXETIL 250 MG/1
250 TABLET ORAL 2 TIMES DAILY
Qty: 14 TABLET | Refills: 0 | Status: SHIPPED | OUTPATIENT
Start: 2020-02-04 | End: 2020-02-10 | Stop reason: ALTCHOICE

## 2020-02-04 RX ORDER — ACETAMINOPHEN 325 MG/1
650 TABLET ORAL EVERY 6 HOURS PRN
Qty: 120 TABLET | Refills: 0 | Status: SHIPPED | OUTPATIENT
Start: 2020-02-04 | End: 2021-05-27 | Stop reason: ALTCHOICE

## 2020-02-04 RX ADMIN — CEFTRIAXONE SODIUM 1 G: 1 INJECTION, POWDER, FOR SOLUTION INTRAMUSCULAR; INTRAVENOUS at 03:06

## 2020-02-04 RX ADMIN — KETAMINE HYDROCHLORIDE 50 MG: 100 INJECTION INTRAMUSCULAR; INTRAVENOUS at 00:10

## 2020-02-04 ASSESSMENT — PAIN SCALES - GENERAL: PAINLEVEL_OUTOF10: 6

## 2020-02-04 NOTE — ED PROVIDER NOTES
ill-appearing, toxic-appearing or diaphoretic. HENT:      Head: Normocephalic and atraumatic. Right Ear: Tympanic membrane normal.      Left Ear: Tympanic membrane normal.      Nose: Nose normal.      Mouth/Throat:      Mouth: Mucous membranes are moist.   Eyes:      Extraocular Movements: Extraocular movements intact. Conjunctiva/sclera: Conjunctivae normal.      Pupils: Pupils are equal, round, and reactive to light. Neck:      Musculoskeletal: Normal range of motion and neck supple. No muscular tenderness. Cardiovascular:      Rate and Rhythm: Normal rate and regular rhythm. Pulses: Normal pulses. Heart sounds: Normal heart sounds. No murmur. No friction rub. No gallop. Pulmonary:      Effort: Pulmonary effort is normal. No respiratory distress. Breath sounds: Normal breath sounds. No stridor. No wheezing, rhonchi or rales. Chest:      Chest wall: No tenderness. Abdominal:      General: Abdomen is flat. Bowel sounds are normal. There is no distension. Tenderness: There is no abdominal tenderness. There is no guarding. Musculoskeletal: Normal range of motion. Lymphadenopathy:      Cervical: No cervical adenopathy. Skin:     General: Skin is warm and dry. Capillary Refill: Capillary refill takes less than 2 seconds. Neurological:      Mental Status: He is alert. Mental status is at baseline. Psychiatric:         Mood and Affect: Mood normal.         Behavior: Behavior normal.         Thought Content:  Thought content normal.         Judgment: Judgment normal.         DIAGNOSTIC RESULTS   LABS:    Labs Reviewed   CBC - Abnormal; Notable for the following components:       Result Value    RBC 4.07 (*)     Hemoglobin 11.5 (*)     Hematocrit 34.4 (*)     All other components within normal limits    Narrative:     Performed at:  [address], [address] [place] [address]   Phone [phone]   COMPREHENSIVE METABOLIC mandibular angle is age-indeterminate though favored   to be early subacute. No results found. PROCEDURES   Unless otherwise noted below, none     Procedures    CRITICAL CARE TIME   N/A    CONSULTS:  None      EMERGENCY DEPARTMENT COURSE and DIFFERENTIAL DIAGNOSIS/MDM:   Vitals:    Vitals:    02/03/20 2026 02/04/20 0109   BP: 113/80 114/79   Pulse: 102 111   Resp: 16 18   Temp: 98.2 °F (36.8 °C)    TempSrc: Oral    SpO2: 97% 97%   Weight: 102 lb (46.3 kg)    Height: 4' 6\" (1.372 m)        Patient was given thefollowing medications:  Medications   cefTRIAXone (ROCEPHIN) 1 g IVPB in 50 mL D5W minibag (has no administration in time range)   ketamine (KETALAR) injection 50 mg (50 mg Intravenous Given 2/4/20 0010)       Patient is alert but is non-verbal at baseline. Head is normocephalic and without any signs of new trauma. No palpable deformity noted to the face. Neck with FROM. PERRLA, EOMI. Moist mucus membranes. Heart with RRR. Lungs are clear to auscultation. Abdomen is soft and non-tender on exam, no guarding or grimacing noted with palpation. Distal pulses are intact  Differentials: closed head injury, intracranial hemorrhage, skull fracture  CT head: no acute intracranial abnormality  CT cervical:Cervical spondylosis without evidence for cervical spine fracture. 2. Fracture of the left mandibular angle is age-indeterminate though favored  to be early subacute. Labs:  No signs of leukocytosis or RUBEN  Urine: UTI present (rocephin given in the ED)  Diagnosis: fall in shower, left mandibular fracture, UTI  Patient will be discharged with Ceftin and Norco and instructed to follow up with PCP this week and oral maxillofacial surgeon. Patient will need to be on a soft diet until follow up. FINAL IMPRESSION      1. Closed fracture of left mandibular angle, initial encounter ([facility] [address]    2. Urinary tract infection without hematuria, site unspecified    3.  Fall in (into) shower or empty bathtub, initial encounter    4. Closed head injury, initial encounter          DISPOSITION/PLAN   DISPOSITION Discharge - Pending Orders Complete 02/04/2020 01:46:19 AM      PATIENT REFERREDTO:  Gloria Sharma MD  [address] [facility]   [address] [address] [facility]  [phone]    Schedule an appointment as soon as possible for a visit in 3 days  For waqas Dum[place], RANDI  [facility] [address] [facility]  [phone]    Schedule an appointment as soon as possible for a visit in 3 days  For waqas    [facility]  ED  [address] [facility] [address] [facility]  Go to   For new or worsening symptoms      DISCHARGE MEDICATIONS:  New Prescriptions    CEFUROXIME (CEFTIN) 250 MG TABLET    Take 1 tablet by mouth 2 times daily for 7 days Please give medication at 7am and 7pm.    HYDROCODONE-ACETAMINOPHEN (NORCO) 5-325 MG PER TABLET    Take 1 tablet by mouth every 6 hours as needed for Pain for up to 3 days. Sedation precautions please       DISCONTINUED MEDICATIONS:  Discontinued Medications    CALCIUM-VITAMIN D (OYSTER-CABRERA 500 + D PO)    Take 1 tablet by mouth 2 times daily.               (Please note that portions of this note were completed with a voice recognition program.  Efforts were made to edit the dictations but occasionally words are mis-transcribed.)    MAYA Garrett CNP (electronically signed)           MAYA Garrett CNP  02/04/20 5871

## 2020-02-04 NOTE — ED PROVIDER NOTES
Blood pressure 114/79, pulse 84, temperature 98.2 °F (36.8 °C), temperature source Oral, resp. rate 11, height 4' 6\" (1.372 m), weight 102 lb (46.3 kg), SpO2 97 %.             Anca Hartman MD  02/04/20 0675

## 2020-02-06 LAB
ORGANISM: ABNORMAL
URINE CULTURE, ROUTINE: ABNORMAL

## 2020-02-07 NOTE — RESULT ENCOUNTER NOTE
Culture reviewed, culture is positive but resistant to antibiotics prescribed at discharge. Recommend changing antibiotic to Bactrim /160mg twice daily x7 days.

## 2020-02-10 ENCOUNTER — APPOINTMENT (OUTPATIENT)
Dept: GENERAL RADIOLOGY | Age: 60
End: 2020-02-10
Payer: MEDICARE

## 2020-02-10 ENCOUNTER — HOSPITAL ENCOUNTER (EMERGENCY)
Age: 60
Discharge: HOME | End: 2020-02-10
Attending: EMERGENCY MEDICINE
Payer: MEDICARE

## 2020-02-10 VITALS
WEIGHT: 100 LBS | HEIGHT: 60 IN | HEART RATE: 77 BPM | TEMPERATURE: 97.9 F | SYSTOLIC BLOOD PRESSURE: 113 MMHG | BODY MASS INDEX: 19.63 KG/M2 | RESPIRATION RATE: 20 BRPM | OXYGEN SATURATION: 97 % | DIASTOLIC BLOOD PRESSURE: 81 MMHG

## 2020-02-10 PROCEDURE — 74019 RADEX ABDOMEN 2 VIEWS: CPT

## 2020-02-10 PROCEDURE — 99283 EMERGENCY DEPT VISIT LOW MDM: CPT

## 2020-02-10 RX ORDER — SULFAMETHOXAZOLE AND TRIMETHOPRIM 800; 160 MG/1; MG/1
1 TABLET ORAL 2 TIMES DAILY
Qty: 14 TABLET | Refills: 0 | Status: SHIPPED | OUTPATIENT
Start: 2020-02-10 | End: 2020-02-17

## 2020-02-10 NOTE — ED PROVIDER NOTES
Lifestyle    Physical activity:     Days per week: Not on file     Minutes per session: Not on file    Stress: Not on file   Relationships    Social connections:     Talks on phone: Not on file     Gets together: Not on file     Attends Druze service: Not on file     Active member of club or organization: Not on file     Attends meetings of clubs or organizations: Not on file     Relationship status: Not on file    Intimate partner violence:     Fear of current or ex partner: Not on file     Emotionally abused: Not on file     Physically abused: Not on file     Forced sexual activity: Not on file   Other Topics Concern    Not on file   Social History Narrative    Not on file     No current facility-administered medications for this encounter. Current Outpatient Medications   Medication Sig Dispense Refill    sulfamethoxazole-trimethoprim (BACTRIM DS) 800-160 MG per tablet Take 1 tablet by mouth 2 times daily for 7 days Take 1 tablet at 7 AM and 1 tablet at 7 PM for days. 14 tablet 0    acetaminophen (PHARBETOL) 325 MG tablet Take 2 tablets by mouth every 6 hours as needed for Pain 120 tablet 0    ibuprofen (ADVIL;MOTRIN) 400 MG tablet Take 1 tablet by mouth every 6 hours as needed for Pain 30 tablet 0    clotrimazole (LOTRIMIN) 1 % cream Apply topically 2 times daily Apply topically 2 times daily.       levETIRAcetam (KEPPRA) 250 MG tablet Take 250 mg by mouth 2 times daily      tamsulosin (FLOMAX) 0.4 MG capsule Take 0.8 mg by mouth daily      megestrol acetate (MEGACE) 400 MG/10ML SUSP Take 400 mg by mouth daily 10ml every evening      ondansetron (ZOFRAN) 4 MG tablet Take 4 mg by mouth every 8 hours as needed for Nausea or Vomiting      traZODone (DESYREL) 50 MG tablet 1 tablet by Per NG tube route nightly 30 tablet 0    metoprolol tartrate (LOPRESSOR) 25 MG tablet 1 tablet by Per NG tube route 2 times daily 60 tablet 3    sennosides-docusate sodium (SENOKOT-S) 8.6-50 MG tablet Take 2 tablets by mouth every 72 hours 30 tablet 0    QUEtiapine (SEROQUEL XR) 50 MG extended release tablet Take 100 mg by mouth 3 times daily 50mg in AM  100mg @ 1600  150mg @ 1900       Allergies   Allergen Reactions    Clonidine Derivatives     Penicillins     Tetracyclines & Related        REVIEW OF SYSTEMS  10 systems reviewed, pertinent positives per HPI otherwise noted to be negative    PHYSICAL EXAM  /81   Pulse 77   Temp 97.9 °F (36.6 °C) (Axillary)   Resp 20   Ht 4' 6\" (1.372 m)   Wt 100 lb (45.4 kg)   SpO2 97%   BMI 24.11 kg/m²   GENERAL APPEARANCE: Awake and alert. Cooperative. No acute distress. Vital signs are stable. Well appearing and non toxic. HEAD: Normocephalic. Atraumatic. EYES: PERRL. EOM's grossly intact. ENT: Mucous membranes are dry. NECK: Supple. Normal ROM. HEART: RRR. No murmurs. Distal pulses are equal and intact. Cap refill less than 2 seconds. LUNGS: Respirations unlabored. CTAB. Good air exchange. Speaking comfortably in full sentences. No wheezing, rhonchi, rales, stridor. ABDOMEN: Soft. Mild distention. Non-tender. No guarding or rebound. No masses. No organomegaly. No rigidity. Bowel sounds are hypoactive. EXTREMITIES: No peripheral edema. Moves all extremities equally. All extremities neurovascularly intact. SKIN: Warm and dry. No acute rashes. NEUROLOGICAL: non verbal history of mrdd. SCREENINGS       RADIOLOGY    Xr Abdomen (2 Views)    Result Date: 2/10/2020  EXAMINATION: TWO XRAY VIEWS OF THE ABDOMEN 2/10/2020 10:36 am COMPARISON: December 3, 2019 HISTORY: ORDERING SYSTEM PROVIDED HISTORY: constipation TECHNOLOGIST PROVIDED HISTORY: Reason for exam:->constipation Reason for Exam: Constipation (caretaker reports pt not having BM for approximately 7 days. no relief with stool softener, miralax and enema. ) Acuity: Acute Type of Exam: Initial FINDINGS: Lung bases appear clear. Remote appearing lower left rib fractures.   3 calcifications in the region left kidney measuring up to 7 mm. Moderate to severe disc height loss at L1-L2 and L2-L3. Mildly dilated loop of colon in the right abdomen measures 6.1 cm. Mildly dilated loop of colon in the right abdomen measuring up to 6 cm. Findings are nonspecific. ED COURSE/MDM  Patient seen and evaluated. Old records reviewed. Diagnostic testing reviewed and results discussed. I have seen this patient in collaboration with supervising physician Dr. Rylee Ocasio. We thoroughly discussed the history, physical exam, diagnostic testing and emergency department course. Duane A Carota presented to the ED today with above noted complaints. KUB of the abdomen shows mildly dilated loop of colon in the right abdomen measuring up to 6 cm findings are nonspecific. Patient had 2 large bowel movements during his emergency department course. No rectal impaction. At this point I do not feel the patient requires further work up and it is reasonable to discharge the patient. A discussion was had with the patient and/or their surrogate regarding diagnosis, diagnostic testing results, treatment/ plan of care, and follow up. There was shared decision-making between myself as well as the patient and/or their surrogate and we are all in agreement with discharge home. There was an opportunity for questions and all questions were answered to the best of my ability and to the satisfaction of the patient and/or patient family. Patient will follow up with PCP for further evaluation/treatment. The patient was given strict return precautions as we discussed symptoms that would necessitate return to the ED. Patient will return to ED for new/worsening symptoms. The patient verbalized their understanding and agreement with the above plan. Please refer to AVS for further details regarding discharge instructions. No results found for this visit on 02/10/20.       I estimate there is LOW risk for ACUTE APPENDICITIS, BOWEL OBSTRUCTION, CHOLECYSTITIS, DIVERTICULITIS, INCARCERATED HERNIA, PANCREATITIS, or PERFORATED BOWEL or ULCER, thus I consider the discharge disposition reasonable. Also, there is no evidence or peritonitis, sepsis, or toxicity. Duane A Carota and I have discussed the diagnosis and risks, and we agree with discharging home to follow-up with their primary doctor. We also discussed returning to the Emergency Department immediately if new or worsening symptoms occur. We have discussed the symptoms which are most concerning (e.g., bloody stool, fever, changing or worsening pain, vomiting) that necessitate immediate return. FINAL Impression    1. Constipation, unspecified constipation type        Blood pressure 113/81, pulse 77, temperature 97.9 °F (36.6 °C), temperature source Axillary, resp. rate 20, height 4' 6\" (1.372 m), weight 100 lb (45.4 kg), SpO2 97 %. mdm    Patient was sent home with a prescription for below medication/s. I did [place] patient on appropriate use of these medication. Discharge Medication List as of 2/10/2020 12:17 PM      START taking these medications    Details   sulfamethoxazole-trimethoprim (BACTRIM DS) 800-160 MG per tablet Take 1 tablet by mouth 2 times daily for 7 days Take 1 tablet at 7 AM and 1 tablet at 7 PM for days. , Disp-14 tablet, R-0Print                 FOLLOW UP  Abhishek Preciado MD  [address] [place] [address] [address]. [address] [facility]  [phone]    Schedule an appointment as soon as possible for a visit   follow up    [facility]  ED  [address]  Go to   As needed, If symptoms worsen      DISPOSITION  Patient was discharged to home in good condition. Comment: Please note this report has been produced using speech recognition software and may contain errors related to that system including errors in grammar, punctuation, and spelling, as well as words and phrases that may be inappropriate.  If there are any questions or concerns please feel free to contact the dictating provider for clarification.             Chris Parikh, MAYA - CNP  02/10/20 [id number]

## 2020-02-23 ENCOUNTER — APPOINTMENT (OUTPATIENT)
Dept: CT IMAGING | Age: 60
DRG: 713 | End: 2020-02-23
Payer: MEDICARE

## 2020-02-23 ENCOUNTER — HOSPITAL ENCOUNTER (INPATIENT)
Age: 60
LOS: 7 days | Discharge: HOME | DRG: 713 | End: 2020-03-01
Attending: INTERNAL MEDICINE | Admitting: INTERNAL MEDICINE
Payer: MEDICARE

## 2020-02-23 PROBLEM — R77.8 ELEVATED TROPONIN: Status: ACTIVE | Noted: 2020-02-23

## 2020-02-23 LAB
A/G RATIO: 1.4 (ref 1.1–2.2)
ALBUMIN SERPL-MCNC: 3.9 G/DL (ref 3.4–5)
ALP BLD-CCNC: 95 U/L (ref 40–129)
ALT SERPL-CCNC: 8 U/L (ref 10–40)
ANION GAP SERPL CALCULATED.3IONS-SCNC: 11 MMOL/L (ref 3–16)
AST SERPL-CCNC: 12 U/L (ref 15–37)
BILIRUB SERPL-MCNC: <0.2 MG/DL (ref 0–1)
BILIRUBIN URINE: NEGATIVE
BLOOD, URINE: NEGATIVE
BUN BLDV-MCNC: 15 MG/DL (ref 7–20)
CALCIUM SERPL-MCNC: 9.2 MG/DL (ref 8.3–10.6)
CHLORIDE BLD-SCNC: 99 MMOL/L (ref 99–110)
CLARITY: CLEAR
CO2: 27 MMOL/L (ref 21–32)
COLOR: YELLOW
CREAT SERPL-MCNC: 0.7 MG/DL (ref 0.9–1.3)
GFR AFRICAN AMERICAN: >60
GFR NON-AFRICAN AMERICAN: >60
GLOBULIN: 2.7 G/DL
GLUCOSE BLD-MCNC: 117 MG/DL (ref 70–99)
GLUCOSE URINE: NEGATIVE MG/DL
HCT VFR BLD CALC: 37.6 % (ref 40.5–52.5)
HEMOGLOBIN: 12.7 G/DL (ref 13.5–17.5)
KETONES, URINE: NEGATIVE MG/DL
LEUKOCYTE ESTERASE, URINE: NEGATIVE
MCH RBC QN AUTO: 28.3 PG (ref 26–34)
MCHC RBC AUTO-ENTMCNC: 33.6 G/DL (ref 31–36)
MCV RBC AUTO: 84.2 FL (ref 80–100)
MICROSCOPIC EXAMINATION: NORMAL
NITRITE, URINE: NEGATIVE
PDW BLD-RTO: 14.8 % (ref 12.4–15.4)
PH UA: 6 (ref 5–8)
PLATELET # BLD: 441 K/UL (ref 135–450)
PMV BLD AUTO: 6.6 FL (ref 5–10.5)
POTASSIUM SERPL-SCNC: 3.7 MMOL/L (ref 3.5–5.1)
PRO-BNP: 89 PG/ML (ref 0–124)
PROTEIN UA: NEGATIVE MG/DL
RBC # BLD: 4.47 M/UL (ref 4.2–5.9)
SODIUM BLD-SCNC: 137 MMOL/L (ref 136–145)
SPECIFIC GRAVITY UA: 1.02 (ref 1–1.03)
TOTAL PROTEIN: 6.6 G/DL (ref 6.4–8.2)
TROPONIN: 0.02 NG/ML
TROPONIN: 0.02 NG/ML
TROPONIN: 0.03 NG/ML
URINE TYPE: NORMAL
UROBILINOGEN, URINE: 0.2 E.U./DL
WBC # BLD: 6.2 K/UL (ref 4–11)

## 2020-02-23 PROCEDURE — 96374 THER/PROPH/DIAG INJ IV PUSH: CPT

## 2020-02-23 PROCEDURE — 6360000002 HC RX W HCPCS: Performed by: PHYSICIAN ASSISTANT

## 2020-02-23 PROCEDURE — 99284 EMERGENCY DEPT VISIT MOD MDM: CPT

## 2020-02-23 PROCEDURE — 96372 THER/PROPH/DIAG INJ SC/IM: CPT

## 2020-02-23 PROCEDURE — 85027 COMPLETE CBC AUTOMATED: CPT

## 2020-02-23 PROCEDURE — 93005 ELECTROCARDIOGRAM TRACING: CPT | Performed by: PHYSICIAN ASSISTANT

## 2020-02-23 PROCEDURE — 6370000000 HC RX 637 (ALT 250 FOR IP): Performed by: NURSE PRACTITIONER

## 2020-02-23 PROCEDURE — 2580000003 HC RX 258: Performed by: NURSE PRACTITIONER

## 2020-02-23 PROCEDURE — 83880 ASSAY OF NATRIURETIC PEPTIDE: CPT

## 2020-02-23 PROCEDURE — 84484 ASSAY OF TROPONIN QUANT: CPT

## 2020-02-23 PROCEDURE — 2580000003 HC RX 258: Performed by: PHYSICIAN ASSISTANT

## 2020-02-23 PROCEDURE — 80053 COMPREHEN METABOLIC PANEL: CPT

## 2020-02-23 PROCEDURE — 81003 URINALYSIS AUTO W/O SCOPE: CPT

## 2020-02-23 PROCEDURE — 1200000000 HC SEMI PRIVATE

## 2020-02-23 PROCEDURE — 74176 CT ABD & PELVIS W/O CONTRAST: CPT

## 2020-02-23 RX ORDER — TRAZODONE HYDROCHLORIDE 50 MG/1
150 TABLET ORAL NIGHTLY
Status: DISCONTINUED | OUTPATIENT
Start: 2020-02-24 | End: 2020-02-23

## 2020-02-23 RX ORDER — PROMETHAZINE HYDROCHLORIDE 25 MG/1
12.5 TABLET ORAL EVERY 6 HOURS PRN
Status: DISCONTINUED | OUTPATIENT
Start: 2020-02-23 | End: 2020-03-01 | Stop reason: HOSPADM

## 2020-02-23 RX ORDER — QUETIAPINE FUMARATE 100 MG/1
100 TABLET, FILM COATED ORAL
Status: DISCONTINUED | OUTPATIENT
Start: 2020-02-24 | End: 2020-03-01 | Stop reason: HOSPADM

## 2020-02-23 RX ORDER — SODIUM CHLORIDE 0.9 % (FLUSH) 0.9 %
10 SYRINGE (ML) INJECTION PRN
Status: DISCONTINUED | OUTPATIENT
Start: 2020-02-23 | End: 2020-03-01 | Stop reason: HOSPADM

## 2020-02-23 RX ORDER — LACTULOSE 10 G/15ML
20 SOLUTION ORAL 2 TIMES DAILY
Status: ON HOLD | COMMUNITY
End: 2021-09-21 | Stop reason: SDUPTHER

## 2020-02-23 RX ORDER — QUETIAPINE FUMARATE 50 MG/1
150 TABLET, EXTENDED RELEASE ORAL NIGHTLY
Status: DISCONTINUED | OUTPATIENT
Start: 2020-02-23 | End: 2020-02-23

## 2020-02-23 RX ORDER — TRAZODONE HYDROCHLORIDE 50 MG/1
150 TABLET ORAL NIGHTLY
Status: DISCONTINUED | OUTPATIENT
Start: 2020-02-23 | End: 2020-03-01 | Stop reason: HOSPADM

## 2020-02-23 RX ORDER — 0.9 % SODIUM CHLORIDE 0.9 %
1000 INTRAVENOUS SOLUTION INTRAVENOUS ONCE
Status: COMPLETED | OUTPATIENT
Start: 2020-02-23 | End: 2020-02-23

## 2020-02-23 RX ORDER — TAMSULOSIN HYDROCHLORIDE 0.4 MG/1
0.8 CAPSULE ORAL DAILY
Status: DISCONTINUED | OUTPATIENT
Start: 2020-02-24 | End: 2020-02-23

## 2020-02-23 RX ORDER — QUETIAPINE FUMARATE 25 MG/1
50 TABLET, FILM COATED ORAL
Status: DISCONTINUED | OUTPATIENT
Start: 2020-02-24 | End: 2020-03-01 | Stop reason: HOSPADM

## 2020-02-23 RX ORDER — LANOLIN ALCOHOL/MO/W.PET/CERES
5 CREAM (GRAM) TOPICAL NIGHTLY
Status: ON HOLD | COMMUNITY
End: 2021-09-21 | Stop reason: SDUPTHER

## 2020-02-23 RX ORDER — ACETAMINOPHEN 650 MG/1
650 SUPPOSITORY RECTAL EVERY 6 HOURS PRN
Status: DISCONTINUED | OUTPATIENT
Start: 2020-02-23 | End: 2020-03-01 | Stop reason: HOSPADM

## 2020-02-23 RX ORDER — SODIUM CHLORIDE 9 MG/ML
INJECTION, SOLUTION INTRAVENOUS CONTINUOUS
Status: DISCONTINUED | OUTPATIENT
Start: 2020-02-23 | End: 2020-02-24

## 2020-02-23 RX ORDER — LORAZEPAM 2 MG/ML
2 INJECTION INTRAMUSCULAR ONCE
Status: COMPLETED | OUTPATIENT
Start: 2020-02-23 | End: 2020-02-23

## 2020-02-23 RX ORDER — LEVETIRACETAM 250 MG/1
250 TABLET ORAL 2 TIMES DAILY
Status: DISCONTINUED | OUTPATIENT
Start: 2020-02-23 | End: 2020-03-01 | Stop reason: HOSPADM

## 2020-02-23 RX ORDER — MEGESTROL ACETATE 40 MG/ML
400 SUSPENSION ORAL DAILY
Status: DISCONTINUED | OUTPATIENT
Start: 2020-02-24 | End: 2020-03-01 | Stop reason: HOSPADM

## 2020-02-23 RX ORDER — LORAZEPAM 2 MG/ML
1 INJECTION INTRAMUSCULAR ONCE
Status: COMPLETED | OUTPATIENT
Start: 2020-02-23 | End: 2020-02-23

## 2020-02-23 RX ORDER — SENNA AND DOCUSATE SODIUM 50; 8.6 MG/1; MG/1
2 TABLET, FILM COATED ORAL
Status: DISCONTINUED | OUTPATIENT
Start: 2020-02-23 | End: 2020-02-23

## 2020-02-23 RX ORDER — SODIUM CHLORIDE 0.9 % (FLUSH) 0.9 %
10 SYRINGE (ML) INJECTION EVERY 12 HOURS SCHEDULED
Status: DISCONTINUED | OUTPATIENT
Start: 2020-02-23 | End: 2020-03-01 | Stop reason: HOSPADM

## 2020-02-23 RX ORDER — TAMSULOSIN HYDROCHLORIDE 0.4 MG/1
0.8 CAPSULE ORAL NIGHTLY
Status: DISCONTINUED | OUTPATIENT
Start: 2020-02-23 | End: 2020-03-01 | Stop reason: HOSPADM

## 2020-02-23 RX ORDER — QUETIAPINE FUMARATE 50 MG/1
100 TABLET, EXTENDED RELEASE ORAL 3 TIMES DAILY
Status: DISCONTINUED | OUTPATIENT
Start: 2020-02-23 | End: 2020-02-23

## 2020-02-23 RX ORDER — ACETAMINOPHEN 325 MG/1
650 TABLET ORAL EVERY 6 HOURS PRN
Status: DISCONTINUED | OUTPATIENT
Start: 2020-02-23 | End: 2020-03-01 | Stop reason: HOSPADM

## 2020-02-23 RX ORDER — LACTULOSE 10 G/15ML
20 SOLUTION ORAL 2 TIMES DAILY
Status: DISCONTINUED | OUTPATIENT
Start: 2020-02-23 | End: 2020-03-01 | Stop reason: HOSPADM

## 2020-02-23 RX ORDER — TRAZODONE HYDROCHLORIDE 50 MG/1
50 TABLET ORAL NIGHTLY
Status: DISCONTINUED | OUTPATIENT
Start: 2020-02-23 | End: 2020-02-23

## 2020-02-23 RX ORDER — ONDANSETRON 2 MG/ML
4 INJECTION INTRAMUSCULAR; INTRAVENOUS EVERY 6 HOURS PRN
Status: DISCONTINUED | OUTPATIENT
Start: 2020-02-23 | End: 2020-03-01 | Stop reason: HOSPADM

## 2020-02-23 RX ORDER — CLOTRIMAZOLE 1 %
CREAM (GRAM) TOPICAL 2 TIMES DAILY
Status: DISCONTINUED | OUTPATIENT
Start: 2020-02-23 | End: 2020-03-01 | Stop reason: HOSPADM

## 2020-02-23 RX ORDER — SENNA AND DOCUSATE SODIUM 50; 8.6 MG/1; MG/1
1 TABLET, FILM COATED ORAL 2 TIMES DAILY
Status: DISCONTINUED | OUTPATIENT
Start: 2020-02-23 | End: 2020-03-01 | Stop reason: HOSPADM

## 2020-02-23 RX ORDER — CHOLECALCIFEROL (VITAMIN D3) 125 MCG
5 CAPSULE ORAL NIGHTLY PRN
Status: DISCONTINUED | OUTPATIENT
Start: 2020-02-23 | End: 2020-03-01 | Stop reason: HOSPADM

## 2020-02-23 RX ADMIN — MELATONIN TAB 5 MG 5 MG: 5 TAB at 22:54

## 2020-02-23 RX ADMIN — QUETIAPINE FUMARATE 150 MG: 100 TABLET ORAL at 22:54

## 2020-02-23 RX ADMIN — SODIUM CHLORIDE 1000 ML: 9 INJECTION, SOLUTION INTRAVENOUS at 16:34

## 2020-02-23 RX ADMIN — LEVETIRACETAM 250 MG: 250 TABLET ORAL at 22:55

## 2020-02-23 RX ADMIN — LORAZEPAM 1 MG: 2 INJECTION, SOLUTION INTRAMUSCULAR; INTRAVENOUS at 14:51

## 2020-02-23 RX ADMIN — SENNOSIDES AND DOCUSATE SODIUM 1 TABLET: 8.6; 5 TABLET ORAL at 22:55

## 2020-02-23 RX ADMIN — TRAZODONE HYDROCHLORIDE 150 MG: 50 TABLET ORAL at 22:54

## 2020-02-23 RX ADMIN — SODIUM CHLORIDE: 9 INJECTION, SOLUTION INTRAVENOUS at 22:54

## 2020-02-23 RX ADMIN — Medication 10 ML: at 22:56

## 2020-02-23 RX ADMIN — METOPROLOL TARTRATE 12.5 MG: 25 TABLET ORAL at 22:55

## 2020-02-23 RX ADMIN — LORAZEPAM 1 MG: 2 INJECTION, SOLUTION INTRAMUSCULAR; INTRAVENOUS at 16:34

## 2020-02-23 RX ADMIN — TAMSULOSIN HYDROCHLORIDE 0.8 MG: 0.4 CAPSULE ORAL at 22:54

## 2020-02-23 RX ADMIN — LACTULOSE 20 G: 20 SOLUTION ORAL at 22:55

## 2020-02-23 ASSESSMENT — PAIN SCALES - WONG BAKER: WONGBAKER_NUMERICALRESPONSE: 0

## 2020-02-23 ASSESSMENT — PAIN SCALES - GENERAL
PAINLEVEL_OUTOF10: 0
PAINLEVEL_OUTOF10: 0

## 2020-02-23 NOTE — ED PROVIDER NOTES
[facility] Emergency Department    CHIEF COMPLAINT  Urinary Retention (Caregivers stating pt has not urinated in 12 hours, report foul smelling urine )      HISTORY OF PRESENT ILLNESS  Duane A Nancy Pillow is a 62 y.o. male who presents to the ED complaining of concern for urinary retention. Patient history of MRDD. Here today with caregiver. She reports that ever since brain bleed patient has had some constipation and urinary retention which is been progressively worsening. Reports that abdomen has become distended and he has been uncomfortable. They deny any recent falls or injuries. States that they have been providing his medications as prescribed. Did have some swelling to the legs which appears improved. No other complaints, modifying factors or associated symptoms. Nursing notes reviewed. Past Medical History:   Diagnosis Date    Bipolar disorder ([address]     Developmental non-verbal disorder     Impulse control disorder     Influenza A 2/13/14    Mental retardation, profound (I.Q. < 20)     Osteopenia      Past Surgical History:   Procedure Laterality Date    CATARACT REMOVAL      COLONOSCOPY  06/20/2016    incomplete, poor prep    CRANIOTOMY Right 12/2/2019    Right Trephine Craniotomy For Evacuation of Subdural Hematoma performed by Hunter Monte MD at [address] reviewed. No pertinent family history.   Social History     Socioeconomic History    Marital status: Single     Spouse name: Not on file    Number of children: Not on file    Years of education: Not on file    Highest education level: Not on file   Occupational History    Not on file   Social Needs    Financial resource strain: Not on file    Food insecurity:     Worry: Not on file     Inability: Not on file    Transportation needs:     Medical: Not on file     Non-medical: Not on file   Tobacco Use    Smoking status: Never Smoker    Smokeless tobacco: Never Used   Substance and Sexual Activity    Alcohol use: No    Drug use: No    Sexual activity: Not Currently   Lifestyle    Physical activity:     Days per week: Not on file     Minutes per session: Not on file    Stress: Not on file   Relationships    Social connections:     Talks on phone: Not on file     Gets together: Not on file     Attends Ep[employer]opalian service: Not on file     Active member of club or organization: Not on file     Attends meetings of clubs or organizations: Not on file     Relationship status: Not on file    Intimate partner violence:     Fear of current or ex partner: Not on file     Emotionally abused: Not on file     Physically abused: Not on file     Forced sexual activity: Not on file   Other Topics Concern    Not on file   Social History Narrative    Not on file     Current Facility-Administered Medications   Medication Dose Route Frequency Provider Last Rate Last Dose    LORazepam (ATIVAN) injection 2 mg  2 mg Intramuscular Once Manny Catalan         Current Outpatient Medications   Medication Sig Dispense Refill    acetaminophen (PHARBETOL) 325 MG tablet Take 2 tablets by mouth every 6 hours as needed for Pain 120 tablet 0    ibuprofen (ADVIL;MOTRIN) 400 MG tablet Take 1 tablet by mouth every 6 hours as needed for Pain 30 tablet 0    clotrimazole (LOTRIMIN) 1 % cream Apply topically 2 times daily Apply topically 2 times daily.       levETIRAcetam (KEPPRA) 250 MG tablet Take 250 mg by mouth 2 times daily      tamsulosin (FLOMAX) 0.4 MG capsule Take 0.8 mg by mouth daily      megestrol acetate (MEGACE) 400 MG/10ML SUSP Take 400 mg by mouth daily 10ml every evening      ondansetron (ZOFRAN) 4 MG tablet Take 4 mg by mouth every 8 hours as needed for Nausea or Vomiting      traZODone (DESYREL) 50 MG tablet 1 tablet by Per NG tube route nightly 30 tablet 0    metoprolol tartrate (LOPRESSOR) 25 MG tablet 1 tablet by Per NG tube route 2 times daily 60 tablet 3    sennosides-docusate sodium (SENOKOT-S) 8.6-50 MG tablet Take 2 tablets by mouth every 72 hours 30 tablet 0    QUEtiapine (SEROQUEL XR) 50 MG extended release tablet Take 100 mg by mouth 3 times daily 50mg in AM  100mg @ 1600  150mg @ 1900       Allergies   Allergen Reactions    Clonidine Derivatives     Penicillins     Tetracyclines & Related        REVIEW OF SYSTEMS  10 systems reviewed, pertinent positives per HPI otherwise noted to be negative    PHYSICAL EXAM  BP (!) 142/89   Pulse 125 Comment: PT VERY AGITATED  Temp 97.8 °F (36.6 °C) (Axillary)   Resp 18   SpO2 99%   GENERAL APPEARANCE: Awake and alert. Cooperative. No acute distress. HEAD: Normocephalic. Atraumatic. EYES: PERRL. EOM's grossly intact. ENT: Mucous membranes are moist.   NECK: Supple. No midline bony tenderness. HEART: RRR. No murmurs. LUNGS: Respirations unlabored. CTAB. Good air exchange. Speaking comfortably in full sentences. ABDOMEN: Soft. Mild abdominal distention with associated global tenderness. Nonfocal.  Negative Davenport's, McBurney's and Rovsing's. No fluid waves or ascites. No hernias or masses. Bowel sounds normal quadrants. No CVA tenderness. No masses. No organomegaly. EXTREMITIES: No peripheral edema. No unilateral calf pain, redness or swelling. Moves all extremities equally. All extremities neurovascularly intact. SKIN: Warm and dry. No acute rashes. NEUROLOGICAL: Alert and oriented. CN's 2-12 intact. No gross facial drooping. Strength 5/5, sensation intact. PSYCHIATRIC: Normal mood and affect. RADIOLOGY  Ct Abdomen Pelvis Wo Contrast Additional Contrast? None    Result Date: 2/23/2020  EXAMINATION: CT OF THE ABDOMEN AND PELVIS WITHOUT CONTRAST 2/23/2020 3:38 pm TECHNIQUE: CT of the abdomen and pelvis was performed without the administration of intravenous contrast. Multiplanar reformatted images are provided for review.  Dose modulation, iterative reconstruction, and/or weight based adjustment of the mA/kV was utilized to reduce the radiation dose to as low as reasonably achievable. COMPARISON: 12/01/2019 HISTORY: ORDERING SYSTEM PROVIDED HISTORY: urinary retention TECHNOLOGIST PROVIDED HISTORY: Reason for exam:->urinary retention Additional Contrast?->None Reason for Exam: r/o kidney stone, urinary retention Acuity: Acute Type of Exam: Initial FINDINGS: Lower Chest: Lung bases grossly clear when accounting for distortion from patient breathing Organs: Nonobstructing bilateral renal calculi measuring up to 5 mm on the left. Remaining solid organs and gallbladder unremarkable. GI/Bowel: No gastrointestinal abnormality demonstrated. Pelvis: Urinary bladder unremarkable. No pelvic fluid Peritoneum/Retroperitoneum: No ascites or pneumoperitoneum. Aorta unremarkable Bones/Soft Tissues: No acute bony abnormality. Diffuse degenerative disease of the spine     1. No acute process. No obstructive uropathy 2. Bilateral nephrolithiasis     Ct Head Wo Contrast    Result Date: 2/4/2020  EXAMINATION: CT OF THE HEAD WITHOUT CONTRAST  2/4/2020 12:35 am TECHNIQUE: CT of the head was performed without the administration of intravenous contrast. Dose modulation, iterative reconstruction, and/or weight based adjustment of the mA/kV was utilized to reduce the radiation dose to as low as reasonably achievable. COMPARISON: 12/03/2019 HISTORY: ORDERING SYSTEM PROVIDED HISTORY: fall TECHNOLOGIST PROVIDED HISTORY: Reason for exam:->fall Has a \"code stroke\" or \"stroke alert\" been called? ->No Reason for Exam: Fall in shower, hit head FINDINGS: BRAIN/VENTRICLES: Extra-axial intermediate density adjacent to a small right craniotomy defect likely represents dural thickening. No evidence for acute hemorrhage is seen. Thickening of the falx is again noted. There is moderate periventricular and subcortical white matter low attenuation without evidence to suggest acute ischemia. There is no hydrocephalus. Cavum septum pellucidum is noted.  ORBITS: treatment. Final Impression  1. Elevated troponin      Blood pressure 115/82, pulse 125, temperature 97.8 °F (36.6 °C), temperature source Axillary, resp. rate 18, SpO2 99 %. DISPOSITION  Patient was admitted to the hospital in stable condition.           Aixa Rocha, [address]  02/23/20 2035

## 2020-02-23 NOTE — ED PROVIDER NOTES
EKG read myself. Dated 8/30/2019. Rate 120 sinus tachycardia. There is a pulmonary disease pattern with a right atrial enlargement and a P mitrale. This appears unchanged from 3 December 2019.      Nayely Kimball MD  02/23/20 2021

## 2020-02-24 ENCOUNTER — APPOINTMENT (OUTPATIENT)
Dept: GENERAL RADIOLOGY | Age: 60
DRG: 713 | End: 2020-02-24
Payer: MEDICARE

## 2020-02-24 LAB
ANION GAP SERPL CALCULATED.3IONS-SCNC: 10 MMOL/L (ref 3–16)
BASOPHILS ABSOLUTE: 0 K/UL (ref 0–0.2)
BASOPHILS RELATIVE PERCENT: 1 %
BUN BLDV-MCNC: 13 MG/DL (ref 7–20)
CALCIUM SERPL-MCNC: 8.8 MG/DL (ref 8.3–10.6)
CHLORIDE BLD-SCNC: 105 MMOL/L (ref 99–110)
CO2: 23 MMOL/L (ref 21–32)
CREAT SERPL-MCNC: 0.6 MG/DL (ref 0.9–1.3)
EKG ATRIAL RATE: 120 BPM
EKG DIAGNOSIS: NORMAL
EKG P AXIS: 52 DEGREES
EKG P-R INTERVAL: 118 MS
EKG Q-T INTERVAL: 326 MS
EKG QRS DURATION: 84 MS
EKG QTC CALCULATION (BAZETT): 460 MS
EKG R AXIS: 267 DEGREES
EKG T AXIS: 49 DEGREES
EKG VENTRICULAR RATE: 120 BPM
EOSINOPHILS ABSOLUTE: 0.2 K/UL (ref 0–0.6)
EOSINOPHILS RELATIVE PERCENT: 4.2 %
GFR AFRICAN AMERICAN: >60
GFR NON-AFRICAN AMERICAN: >60
GLUCOSE BLD-MCNC: 87 MG/DL (ref 70–99)
HCT VFR BLD CALC: 34.6 % (ref 40.5–52.5)
HEMOGLOBIN: 11.4 G/DL (ref 13.5–17.5)
LV EF: 58 %
LVEF MODALITY: NORMAL
LYMPHOCYTES ABSOLUTE: 1.4 K/UL (ref 1–5.1)
LYMPHOCYTES RELATIVE PERCENT: 34.2 %
MCH RBC QN AUTO: 28.4 PG (ref 26–34)
MCHC RBC AUTO-ENTMCNC: 32.9 G/DL (ref 31–36)
MCV RBC AUTO: 86.2 FL (ref 80–100)
MONOCYTES ABSOLUTE: 0.5 K/UL (ref 0–1.3)
MONOCYTES RELATIVE PERCENT: 11.9 %
NEUTROPHILS ABSOLUTE: 1.9 K/UL (ref 1.7–7.7)
NEUTROPHILS RELATIVE PERCENT: 48.7 %
PDW BLD-RTO: 14.4 % (ref 12.4–15.4)
PLATELET # BLD: 366 K/UL (ref 135–450)
PMV BLD AUTO: 6.7 FL (ref 5–10.5)
POTASSIUM REFLEX MAGNESIUM: 4 MMOL/L (ref 3.5–5.1)
RBC # BLD: 4.01 M/UL (ref 4.2–5.9)
SODIUM BLD-SCNC: 138 MMOL/L (ref 136–145)
TROPONIN: 0.02 NG/ML
WBC # BLD: 4 K/UL (ref 4–11)

## 2020-02-24 PROCEDURE — 74018 RADEX ABDOMEN 1 VIEW: CPT

## 2020-02-24 PROCEDURE — 97530 THERAPEUTIC ACTIVITIES: CPT

## 2020-02-24 PROCEDURE — 93010 ELECTROCARDIOGRAM REPORT: CPT | Performed by: INTERNAL MEDICINE

## 2020-02-24 PROCEDURE — 84484 ASSAY OF TROPONIN QUANT: CPT

## 2020-02-24 PROCEDURE — 6360000002 HC RX W HCPCS: Performed by: INTERNAL MEDICINE

## 2020-02-24 PROCEDURE — 6360000002 HC RX W HCPCS: Performed by: NURSE PRACTITIONER

## 2020-02-24 PROCEDURE — 2580000003 HC RX 258: Performed by: NURSE PRACTITIONER

## 2020-02-24 PROCEDURE — 97167 OT EVAL HIGH COMPLEX 60 MIN: CPT

## 2020-02-24 PROCEDURE — 1200000000 HC SEMI PRIVATE

## 2020-02-24 PROCEDURE — 6370000000 HC RX 637 (ALT 250 FOR IP): Performed by: NURSE PRACTITIONER

## 2020-02-24 PROCEDURE — 93306 TTE W/DOPPLER COMPLETE: CPT

## 2020-02-24 PROCEDURE — 97162 PT EVAL MOD COMPLEX 30 MIN: CPT

## 2020-02-24 PROCEDURE — 85025 COMPLETE CBC W/AUTO DIFF WBC: CPT

## 2020-02-24 PROCEDURE — 99223 1ST HOSP IP/OBS HIGH 75: CPT | Performed by: INTERNAL MEDICINE

## 2020-02-24 PROCEDURE — 51798 US URINE CAPACITY MEASURE: CPT

## 2020-02-24 PROCEDURE — 80048 BASIC METABOLIC PNL TOTAL CA: CPT

## 2020-02-24 RX ORDER — LORAZEPAM 2 MG/ML
1 INJECTION INTRAMUSCULAR EVERY 6 HOURS PRN
Status: DISCONTINUED | OUTPATIENT
Start: 2020-02-24 | End: 2020-03-01 | Stop reason: HOSPADM

## 2020-02-24 RX ORDER — MORPHINE SULFATE 2 MG/ML
INJECTION, SOLUTION INTRAMUSCULAR; INTRAVENOUS
Status: DISPENSED
Start: 2020-02-24 | End: 2020-02-25

## 2020-02-24 RX ORDER — MORPHINE SULFATE 2 MG/ML
2 INJECTION, SOLUTION INTRAMUSCULAR; INTRAVENOUS
Status: COMPLETED | OUTPATIENT
Start: 2020-02-24 | End: 2020-02-24

## 2020-02-24 RX ORDER — MORPHINE SULFATE 2 MG/ML
2 INJECTION, SOLUTION INTRAMUSCULAR; INTRAVENOUS EVERY 4 HOURS PRN
Status: DISCONTINUED | OUTPATIENT
Start: 2020-02-24 | End: 2020-03-01 | Stop reason: HOSPADM

## 2020-02-24 RX ORDER — LORAZEPAM 2 MG/ML
0.5 INJECTION INTRAMUSCULAR
Status: COMPLETED | OUTPATIENT
Start: 2020-02-24 | End: 2020-02-24

## 2020-02-24 RX ADMIN — QUETIAPINE FUMARATE 50 MG: 100 TABLET ORAL at 06:49

## 2020-02-24 RX ADMIN — METOPROLOL TARTRATE 12.5 MG: 25 TABLET ORAL at 10:15

## 2020-02-24 RX ADMIN — LORAZEPAM 1 MG: 2 INJECTION, SOLUTION INTRAMUSCULAR; INTRAVENOUS at 19:45

## 2020-02-24 RX ADMIN — SODIUM CHLORIDE: 9 INJECTION, SOLUTION INTRAVENOUS at 10:04

## 2020-02-24 RX ADMIN — ENOXAPARIN SODIUM 40 MG: 40 INJECTION SUBCUTANEOUS at 10:09

## 2020-02-24 RX ADMIN — QUETIAPINE FUMARATE 100 MG: 100 TABLET ORAL at 17:16

## 2020-02-24 RX ADMIN — CLOTRIMAZOLE: 10 CREAM TOPICAL at 10:21

## 2020-02-24 RX ADMIN — MORPHINE SULFATE 2 MG: 2 INJECTION, SOLUTION INTRAMUSCULAR; INTRAVENOUS at 22:31

## 2020-02-24 RX ADMIN — MORPHINE SULFATE 2 MG: 2 INJECTION, SOLUTION INTRAMUSCULAR; INTRAVENOUS at 17:52

## 2020-02-24 RX ADMIN — MORPHINE SULFATE 2 MG: 2 INJECTION, SOLUTION INTRAMUSCULAR; INTRAVENOUS at 11:53

## 2020-02-24 RX ADMIN — SENNOSIDES AND DOCUSATE SODIUM 1 TABLET: 8.6; 5 TABLET ORAL at 10:16

## 2020-02-24 RX ADMIN — Medication 10 ML: at 10:21

## 2020-02-24 RX ADMIN — MEGESTROL ACETATE 400 MG: 40 SUSPENSION ORAL at 10:21

## 2020-02-24 RX ADMIN — CLOTRIMAZOLE: 10 CREAM TOPICAL at 00:26

## 2020-02-24 RX ADMIN — LEVETIRACETAM 250 MG: 250 TABLET ORAL at 10:14

## 2020-02-24 RX ADMIN — LORAZEPAM 0.5 MG: 2 INJECTION, SOLUTION INTRAMUSCULAR; INTRAVENOUS at 17:13

## 2020-02-24 RX ADMIN — ACETAMINOPHEN 650 MG: 325 TABLET ORAL at 10:50

## 2020-02-24 RX ADMIN — LACTULOSE 20 G: 20 SOLUTION ORAL at 10:15

## 2020-02-24 RX ADMIN — ACETAMINOPHEN 650 MG: 325 TABLET ORAL at 03:49

## 2020-02-24 RX ADMIN — MAGNESIUM HYDROXIDE 30 ML: 2400 SUSPENSION ORAL at 17:15

## 2020-02-24 RX ADMIN — QUETIAPINE FUMARATE 50 MG: 100 TABLET ORAL at 11:05

## 2020-02-24 RX ADMIN — LEVETIRACETAM 250 MG: 100 INJECTION, SOLUTION INTRAVENOUS at 23:01

## 2020-02-24 ASSESSMENT — PAIN SCALES - PAIN ASSESSMENT IN ADVANCED DEMENTIA (PAINAD)
BREATHING: 1
BODYLANGUAGE: 1
TOTALSCORE: 4
NEGVOCALIZATION: 1
FACIALEXPRESSION: 0
CONSOLABILITY: 1

## 2020-02-24 ASSESSMENT — PAIN SCALES - GENERAL
PAINLEVEL_OUTOF10: 4
PAINLEVEL_OUTOF10: 5
PAINLEVEL_OUTOF10: 9
PAINLEVEL_OUTOF10: 8

## 2020-02-24 NOTE — PROGRESS NOTES
4 Eyes Skin Assessment     The patient is being assess for  Admission    I agree that 2 RN's have performed a thorough Head to Toe Skin Assessment on the patient. ALL assessment sites listed below have been assessed. Areas assessed by both nurses: yes  [x]   Head, Face, and Ears   [x]   Shoulders, Back, and Chest  [x]   Arms, Elbows, and Hands   [x]   Coccyx, Sacrum, and IschIum  [x]   Legs, Feet, and Heels        Does the Patient have Skin Breakdown?   No         Dc Prevention initiated:  Yes   Wound Care Orders initiated:  NA      [facility] nurse consulted for Pressure Injury (Stage 3,4, Unstageable, DTI, NWPT, and Complex wounds), New and Established Ostomies:  NA      Nurse 1 eSignature: Electronically signed by Miguel Encinas RN on 2/24/20 at 12:34 AM    **SHARE this note so that the co-signing nurse is able to place an eSignature**    Nurse 2 eSignature: {Esignature:[id number]}

## 2020-02-24 NOTE — PROGRESS NOTES
covers overhead  Pain Assessment  Pain Assessment: Advanced Dementia(unable to localize or gesture)  Pain Level: 5  Vital Signs  Level of Consciousness: Alert  Social/Functional History  Social/Functional History  Additional Comments: per chart review, pt resident of Group home with 24 hr/7days/week supervision, assist. Unable to get PLOF from pt d/t pt non-verbal       Objective        Orientation  Overall Orientation Status: (CLARITZA, pt non verbal)  Observation/Palpation  Posture: Poor  Balance  Sitting Balance: Minimal assistance  Standing Balance: Dependent/Total(of 2 with poor/absent standing balance for ambulation in room )  Standing Balance  Comment: Hand Held Assist x 2  ADL  Feeding: Maximum assistance(no dentures, max assist to hold beverages - drinks from straw, unable to manage utensils to feed self dental soft food, excessive chewing with minimal/trace scrambled eggs, RN notified)  Toileting: Dependent/Total(per nursing)  Coordination  Movements Are Fluid And Coordinated: No  Coordination and Movement description: Fine motor impairments;Gross motor impairments; Left UE;Right UE     Bed mobility  Rolling to Left: Supervision  Rolling to Right: Supervision  Supine to Sit: Maximum assistance  Sit to Supine: Dependent/Total(max assist of 2 due to pt's decreased height & increased height of bed)  Transfers  Sit to stand: 2 Person assistance  Stand to sit: 2 Person assistance     Cognition  Overall Cognitive Status: Exceptions  Arousal/Alertness: Inconsistent responses to stimuli  Following Commands: Does not follow commands  Attention Span: Unable to maintain attention  Insights: Not aware of deficits  Initiation: Requires cues for all  Sequencing: Requires cues for all             Plan   Plan  Times per week: 2-3 x/ week   Current Treatment Recommendations: Functional Mobility Training, Balance Training, Self-Care / ADL             AM-PAC Score        AM-PAC Inpatient Daily Activity Raw Score: 7 (02/24/20 1054)  AM-PAC Inpatient ADL T-Scale Score : 20.13 (02/24/20 1054)  ADL Inpatient CMS 0-100% Score: 92.44 (02/24/20 1054)  ADL Inpatient CMS G-Code Modifier : CM (02/24/20 1054)    Goals  Short term goals  Time Frame for Short term goals: 1 week trial (3-03-20)  Short term goal 1: max assist of 1 with functional transfers  Short term goal 2: supervision supported sitting in chair for 30 minutes for feeding/eating meals  Patient Goals   Patient goals : unable to verbalize       Therapy Time   Individual Concurrent Group Co-treatment   Time In 1020         Time Out 1040         Minutes [address], [place]

## 2020-02-24 NOTE — PROGRESS NOTES
apparent distress, appears stated age and cooperative. HEENT: Pupils equal, round, and reactive to light. Conjunctivae/corneas clear. Neck: Supple, with full range of motion. No jugular venous distention. Trachea midline. Respiratory:  Normal respiratory effort. Clear to auscultation, bilaterally without Rales/Wheezes/Rhonchi. Cardiovascular: Regular rate and rhythm with normal S1/S2 without murmurs, rubs or gallops. Abdomen: Soft, non-tender, non-distended with normal bowel sounds. Musculoskeletal: No clubbing, cyanosis or edema bilaterally. Full range of motion without deformity. Skin: Skin color, texture, turgor normal.  No rashes or lesions. Neurologic: Could not be assessed as patient is sedated at this time. Psychiatric: Could not be assessed as patient is sedated at this time. Capillary Refill: Brisk,< 3 seconds   Peripheral Pulses: +2 palpable, equal bilaterally       Labs:   Recent Labs     02/23/20  1528 02/24/20  0419   WBC 6.2 4.0   HGB 12.7* 11.4*   HCT 37.6* 34.6*    366     Recent Labs     02/23/20  1528 02/24/20  0419    138   K 3.7 4.0   CL 99 105   CO2 27 23   BUN 15 13   CREATININE 0.7* 0.6*   CALCIUM 9.2 8.8     Recent Labs     02/23/20  1528   AST 12*   ALT 8*   BILITOT <0.2   ALKPHOS 95     No results for input(s): INR in the last 72 hours. Recent Labs     02/23/20  1745 02/23/20  2238 02/24/20  0419   TROPONINI 0.02* 0.03* 0.02*       Urinalysis:   Lab Results   Component Value Date    NITRU Negative 02/23/2020    WBCUA >100 02/04/2020    BACTERIA Rare 02/04/2020    RBCUA see below 02/04/2020    BLOODU Negative 02/23/2020    SPECGRAV 1.025 02/23/2020    GLUCOSEU Negative 02/23/2020       Radiology:  CT ABDOMEN PELVIS WO CONTRAST Additional Contrast? None   Final Result   1. No acute process. No obstructive uropathy   2.  Bilateral nephrolithiasis           Assessment/Plan:  Active Hospital Problems    Diagnosis Date Noted    Elevated troponin [R79.89] 02/23/2020   

## 2020-02-24 NOTE — CONSULTS
[id number]    Elev trop - do not suspect ACS  SDH 12/2019  PAF - no AC  Nonverbal  Tachycardia    Echo  No ischemic eval at this time  No AC or antiplatelet

## 2020-02-24 NOTE — PROGRESS NOTES
Patient admitted to room 351-2 from ED. Patient oriented to room, call light, bed rails, phone, lights, & bathroom. Patient instructed about the schedule of the day including: VS frequency, lab draws, possible tests, frequency of MD & staff rounds. Telemetry box in place, patient aware of placement & reason. Bed locked, in lowest position, side rails up 3/4, call light within reach.  /70   Pulse 75   Temp 96.3 °F (35.7 °C) (Axillary)   Resp 18   Ht 4' 6\" (1.372 m)   Wt 94 lb 12.8 oz (43 kg)   SpO2 93%   BMI 22.86 kg/m²

## 2020-02-24 NOTE — H&P
medications    Medication Sig Start Date End Date Taking? Authorizing Provider   melatonin 3 MG TABS tablet Take 3 mg by mouth nightly   Yes Historical Provider, MD   lactulose (CHRONULAC) 10 GM/15ML solution Take 20 g by mouth 2 times daily   Yes Historical Provider, MD   clotrimazole (LOTRIMIN) 1 % cream Apply topically 2 times daily Apply topically 2 times daily. Yes Historical Provider, MD   levETIRAcetam (KEPPRA) 250 MG tablet Take 250 mg by mouth 2 times daily   Yes Historical Provider, MD   tamsulosin (FLOMAX) 0.4 MG capsule Take 0.8 mg by mouth daily   Yes Historical Provider, MD   traZODone (DESYREL) 50 MG tablet 1 tablet by Per NG tube route nightly 12/5/19  Yes Ailyn Alva MD   sennosides-docusate sodium (SENOKOT-S) 8.6-50 MG tablet Take 2 tablets by mouth every 72 hours 12/5/19  Yes Ailyn Alva MD   QUEtiapine (SEROQUEL XR) 50 MG extended release tablet Take 100 mg by mouth 3 times daily 50mg in AM  100mg @ 1600  150mg @ 1900   Yes Historical Provider, MD   acetaminophen (PHARBETOL) 325 MG tablet Take 2 tablets by mouth every 6 hours as needed for Pain 2/4/20   Lexi Keller MD   ibuprofen (ADVIL;MOTRIN) 400 MG tablet Take 1 tablet by mouth every 6 hours as needed for Pain 2/4/20   Lexi Keller MD   megestrol acetate (MEGACE) 400 MG/10ML SUSP Take 400 mg by mouth daily 10ml every evening    Historical Provider, MD   ondansetron (ZOFRAN) 4 MG tablet Take 4 mg by mouth every 8 hours as needed for Nausea or Vomiting    Historical Provider, MD   metoprolol tartrate (LOPRESSOR) 25 MG tablet 1 tablet by Per NG tube route 2 times daily 12/5/19   Ailyn Alva MD       Allergies:  Benadryl [diphenhydramine]; Clonidine derivatives; Penicillins; and Tetracyclines & related    Social History:      The patient currently lives at a group home? Unclear as no one at bedside. TOBACCO:   reports that he has never smoked.  He has never used smokeless tobacco.  ETOH:   reports no history of alcohol use. Family History:      Reviewed in detail. Positive as follows:    History reviewed. No pertinent family history. REVIEW OF SYSTEMS:   Pertinent positives as noted in the HPI. All other systems reviewed and negative. PHYSICAL EXAM PERFORMED:    /83   Pulse 77   Temp 96.8 °F (36 °C) (Axillary)   Resp 18   Ht 4' 6\" (1.372 m)   Wt 94 lb 12.8 oz (43 kg)   SpO2 93%   BMI 22.86 kg/m²     General appearance:  Pleasant, non-verbal male in no apparent distress, appears stated age and cooperative. HEENT:  Normal cephalic, atraumatic without obvious deformity. Pupils equal, round, and reactive to light. Extra ocular muscles intact. Conjunctivae/corneas clear. Neck: Supple, with full range of motion. No jugular venous distention. Trachea midline. Respiratory:  Normal respiratory effort. Clear to auscultation, bilaterally without Rales/Wheezes/Rhonchi. Cardiovascular:  Regular rate and rhythm with normal S1/S2 without murmurs, rubs or gallops. Abdomen: Soft, non-tender, slightly distended with normal bowel sounds. Musculoskeletal:  No clubbing, cyanosis or edema bilaterally. Full range of motion without deformity. Skin: Skin color, texture, turgor normal.  No rashes or lesions. Neurologic:  Neurovascularly intact without any focal sensory/motor deficits. Cranial nerves: II-XII intact, grossly non-focal.  Psychiatric:  Alert and oriented, thought content appropriate, normal insight  Capillary Refill: Brisk,< 3 seconds   Peripheral Pulses: +2 palpable, equal bilaterally       Labs:     Recent Labs     02/23/20  1528   WBC 6.2   HGB 12.7*   HCT 37.6*        Recent Labs     02/23/20  1528      K 3.7   CL 99   CO2 27   BUN 15   CREATININE 0.7*   CALCIUM 9.2     Recent Labs     02/23/20  1528   AST 12*   ALT 8*   BILITOT <0.2   ALKPHOS 95     No results for input(s): INR in the last 72 hours.   Recent Labs     02/23/20  1528 02/23/20  1745 02/23/20  2238   TROPONINI 0.02* 0.02* 0.03*       Urinalysis:      Lab Results   Component Value Date    NITRU Negative 02/23/2020    WBCUA >100 02/04/2020    BACTERIA Rare 02/04/2020    RBCUA see below 02/04/2020    BLOODU Negative 02/23/2020    SPECGRAV 1.025 02/23/2020    GLUCOSEU Negative 02/23/2020       Radiology:     CXR: I have reviewed the CXR with the following interpretation: n/a  EKG:  I have reviewed the EKG with the following interpretation: EKG read by ED physician in absence of cardiologist. Rate 120 sinus tachycardia. There is a pulmonary disease pattern with a right atrial enlargement and a P mitrale. This appears unchanged from 3 December 2019    CT ABDOMEN PELVIS WO CONTRAST Additional Contrast? None   Final Result   1. No acute process. No obstructive uropathy   2. Bilateral nephrolithiasis             ASSESSMENT:    Active Hospital Problems    Diagnosis Date Noted    Elevated troponin [R79.89] 02/23/2020    Subdural hematoma ([address] [S06.5X9A] 12/02/2019         PLAN:    Elevated troponin, unclear etiology. Seemingly no chest pain though HPI is limited as pt is non-verbal with hx MRDD. Likely demand ischemia as patient is tachycardic on admission.  - EKG nonischemic, tachycardia  - Trend troponins    Hx of acute subdural hematoma s/p right trephine craniotomy with evacuation of subdural hematoma on 12/02/2019. Follows with Dr. Bravo Quintero, neurosurgery. - On oral keppra for seizure prophylaxis, continued  - Monitor    Hx of bipolar disorder, mood currently stable. - Continue home seroquel, trazodone    Hx of chronic paroxysmal a. Fib, currently in ST on admission.  - Continue home metoprolol  - Not on any [facility] which seems appropriate due to hx of frequent falls    Patient [place] [employer] listed as his legal guardian. Number to call is [phone]. Listed direct guardian is Rogers Li.      DVT Prophylaxis: lovenox  Diet: DIET GENERAL; Dental Soft  Code Status: Full Code    PT/OT

## 2020-02-24 NOTE — PLAN OF CARE
Problem: Falls - Risk of:  Goal: Will remain free from falls  Description  Will remain free from falls  2/24/2020 1030 by Barron Alvarado RN  Outcome: Ongoing    Problem: Risk for Impaired Skin Integrity  Goal: Tissue integrity - skin and mucous membranes  Description  Structural intactness and normal physiological function of skin and  mucous membranes.   Outcome: Ongoing     Problem: Injury - Risk of, Physical Injury:  Goal: Will remain free from falls  Description  Will remain free from falls  2/24/2020 1030 by Barron Alvarado RN  Outcome: Ongoing

## 2020-02-24 NOTE — PROGRESS NOTES
(Right, 12/2/2019). Restrictions  Restrictions/Precautions  Restrictions/Precautions: Fall Risk  Position Activity Restriction  Other position/activity restrictions: High fall risk per nursing assessment, telemetry, AVAYS video moniter, Up with assist  Vision/Hearing        Subjective  General  Chart Reviewed: Yes  Patient assessed for rehabilitation services?: Yes  Response To Previous Treatment: Not applicable  Family / Caregiver Present: No  Referring Practitioner: MAYA Corrigan NP  Referral Date : 02/23/20  Subjective  Subjective: Pt following some commands. Non-verbal at baseline.   Pain Screening  Patient Currently in Pain: No          Orientation  Orientation  Overall Orientation Status: Impaired(Unable to determine d/t pt non-verbal)  Social/Functional History  Social/Functional History  Additional Comments: per chart review, pt resident of Group home with 24 hr/7days/week supervision, assist. Unable to get PLOF from pt d/t pt non-verbal  Cognition   Cognition  Overall Cognitive Status: Exceptions  Arousal/Alertness: Inconsistent responses to stimuli  Following Commands: Does not follow commands  Attention Span: Unable to maintain attention  Insights: Not aware of deficits  Initiation: Requires cues for all  Sequencing: Requires cues for all    Objective          PROM RLE (degrees)  RLE PROM: WFL  AROM RLE (degrees)  RLE AROM: WFL  PROM LLE (degrees)  LLE PROM: WFL  AROM LLE (degrees)  LLE AROM : WFL  Strength RLE  Strength RLE: Exception  Comment: Grossly 3/5 with observed mobility, unable to follow commands for MMT  Strength LLE  Strength LLE: Exception  Comment: Grossly 3/5 with observed mobility, unable to follow commands for MMT  Tone RLE  RLE Tone: Normotonic  Tone LLE  LLE Tone: Normotonic  Sensation  Overall Sensation Status: (Unable to determine d/t cognition and communication)  Bed mobility  Rolling to Left: Supervision  Rolling to Right: Supervision  Supine to Sit: Maximum

## 2020-02-25 LAB
ANION GAP SERPL CALCULATED.3IONS-SCNC: 12 MMOL/L (ref 3–16)
BUN BLDV-MCNC: 12 MG/DL (ref 7–20)
CALCIUM SERPL-MCNC: 9.3 MG/DL (ref 8.3–10.6)
CHLORIDE BLD-SCNC: 100 MMOL/L (ref 99–110)
CO2: 25 MMOL/L (ref 21–32)
CREAT SERPL-MCNC: 0.7 MG/DL (ref 0.9–1.3)
GFR AFRICAN AMERICAN: >60
GFR NON-AFRICAN AMERICAN: >60
GLUCOSE BLD-MCNC: 76 MG/DL (ref 70–99)
HCT VFR BLD CALC: 36.6 % (ref 40.5–52.5)
HEMOGLOBIN: 12.2 G/DL (ref 13.5–17.5)
MCH RBC QN AUTO: 28.2 PG (ref 26–34)
MCHC RBC AUTO-ENTMCNC: 33.3 G/DL (ref 31–36)
MCV RBC AUTO: 84.7 FL (ref 80–100)
PDW BLD-RTO: 14.8 % (ref 12.4–15.4)
PLATELET # BLD: 418 K/UL (ref 135–450)
PMV BLD AUTO: 6.7 FL (ref 5–10.5)
POTASSIUM REFLEX MAGNESIUM: 3.9 MMOL/L (ref 3.5–5.1)
RBC # BLD: 4.32 M/UL (ref 4.2–5.9)
SODIUM BLD-SCNC: 137 MMOL/L (ref 136–145)
WBC # BLD: 5.5 K/UL (ref 4–11)

## 2020-02-25 PROCEDURE — 99233 SBSQ HOSP IP/OBS HIGH 50: CPT | Performed by: INTERNAL MEDICINE

## 2020-02-25 PROCEDURE — 6370000000 HC RX 637 (ALT 250 FOR IP): Performed by: NURSE PRACTITIONER

## 2020-02-25 PROCEDURE — 6360000002 HC RX W HCPCS: Performed by: NURSE PRACTITIONER

## 2020-02-25 PROCEDURE — 97530 THERAPEUTIC ACTIVITIES: CPT

## 2020-02-25 PROCEDURE — 85027 COMPLETE CBC AUTOMATED: CPT

## 2020-02-25 PROCEDURE — 36415 COLL VENOUS BLD VENIPUNCTURE: CPT

## 2020-02-25 PROCEDURE — 80048 BASIC METABOLIC PNL TOTAL CA: CPT

## 2020-02-25 PROCEDURE — 2580000003 HC RX 258: Performed by: NURSE PRACTITIONER

## 2020-02-25 PROCEDURE — 6370000000 HC RX 637 (ALT 250 FOR IP): Performed by: INTERNAL MEDICINE

## 2020-02-25 PROCEDURE — 6360000002 HC RX W HCPCS: Performed by: INTERNAL MEDICINE

## 2020-02-25 PROCEDURE — 1200000000 HC SEMI PRIVATE

## 2020-02-25 RX ORDER — POLYETHYLENE GLYCOL 3350 17 G/17G
17 POWDER, FOR SOLUTION ORAL DAILY
Status: DISCONTINUED | OUTPATIENT
Start: 2020-02-25 | End: 2020-03-01 | Stop reason: HOSPADM

## 2020-02-25 RX ADMIN — LACTULOSE 20 G: 20 SOLUTION ORAL at 09:50

## 2020-02-25 RX ADMIN — TAMSULOSIN HYDROCHLORIDE 0.8 MG: 0.4 CAPSULE ORAL at 22:03

## 2020-02-25 RX ADMIN — LORAZEPAM 1 MG: 2 INJECTION, SOLUTION INTRAMUSCULAR; INTRAVENOUS at 17:04

## 2020-02-25 RX ADMIN — LEVETIRACETAM 250 MG: 250 TABLET ORAL at 22:04

## 2020-02-25 RX ADMIN — Medication 10 ML: at 09:49

## 2020-02-25 RX ADMIN — MORPHINE SULFATE 2 MG: 2 INJECTION, SOLUTION INTRAMUSCULAR; INTRAVENOUS at 23:26

## 2020-02-25 RX ADMIN — MORPHINE SULFATE 2 MG: 2 INJECTION, SOLUTION INTRAMUSCULAR; INTRAVENOUS at 17:58

## 2020-02-25 RX ADMIN — Medication 10 ML: at 22:05

## 2020-02-25 RX ADMIN — QUETIAPINE FUMARATE 50 MG: 100 TABLET ORAL at 09:47

## 2020-02-25 RX ADMIN — MELATONIN TAB 5 MG 5 MG: 5 TAB at 23:26

## 2020-02-25 RX ADMIN — QUETIAPINE FUMARATE 150 MG: 100 TABLET ORAL at 23:26

## 2020-02-25 RX ADMIN — TRAZODONE HYDROCHLORIDE 150 MG: 50 TABLET ORAL at 22:03

## 2020-02-25 RX ADMIN — METOPROLOL TARTRATE 12.5 MG: 25 TABLET ORAL at 09:48

## 2020-02-25 RX ADMIN — METOPROLOL TARTRATE 12.5 MG: 25 TABLET ORAL at 22:03

## 2020-02-25 RX ADMIN — ENOXAPARIN SODIUM 40 MG: 40 INJECTION SUBCUTANEOUS at 09:48

## 2020-02-25 RX ADMIN — QUETIAPINE FUMARATE 100 MG: 100 TABLET ORAL at 15:47

## 2020-02-25 RX ADMIN — SENNOSIDES AND DOCUSATE SODIUM 1 TABLET: 8.6; 5 TABLET ORAL at 09:49

## 2020-02-25 RX ADMIN — MEGESTROL ACETATE 400 MG: 40 SUSPENSION ORAL at 11:45

## 2020-02-25 RX ADMIN — LACTULOSE 20 G: 20 SOLUTION ORAL at 22:04

## 2020-02-25 RX ADMIN — POLYETHYLENE GLYCOL 3350 17 G: 17 POWDER, FOR SOLUTION ORAL at 11:44

## 2020-02-25 RX ADMIN — LORAZEPAM 1 MG: 2 INJECTION, SOLUTION INTRAMUSCULAR; INTRAVENOUS at 09:11

## 2020-02-25 RX ADMIN — ACETAMINOPHEN 650 MG: 650 SUPPOSITORY RECTAL at 19:02

## 2020-02-25 RX ADMIN — CLOTRIMAZOLE: 10 CREAM TOPICAL at 22:37

## 2020-02-25 RX ADMIN — LEVETIRACETAM 250 MG: 250 TABLET ORAL at 09:48

## 2020-02-25 RX ADMIN — QUETIAPINE FUMARATE 50 MG: 100 TABLET ORAL at 11:45

## 2020-02-25 ASSESSMENT — PAIN SCALES - GENERAL
PAINLEVEL_OUTOF10: 8
PAINLEVEL_OUTOF10: 8
PAINLEVEL_OUTOF10: 0
PAINLEVEL_OUTOF10: 0
PAINLEVEL_OUTOF10: 10
PAINLEVEL_OUTOF10: 0

## 2020-02-25 ASSESSMENT — PAIN SCALES - WONG BAKER
WONGBAKER_NUMERICALRESPONSE: 0
WONGBAKER_NUMERICALRESPONSE: 0

## 2020-02-25 ASSESSMENT — PAIN SCALES - PAIN ASSESSMENT IN ADVANCED DEMENTIA (PAINAD)
BREATHING: 0
CONSOLABILITY: 0
BODYLANGUAGE: 0
TOTALSCORE: 0
FACIALEXPRESSION: 0
NEGVOCALIZATION: 0

## 2020-02-25 NOTE — PROGRESS NOTES
microspheres, melatonin      Intake/Output Summary (Last 24 hours) at 2/25/2020 1257  Last data filed at 2/25/2020 0659  Gross per 24 hour   Intake 627.5 ml   Output 950 ml   Net -322.5 ml       Physical Exam Performed:  /83   Pulse 64   Temp 97.7 °F (36.5 °C) (Axillary)   Resp 20   Ht 4' 6\" (1.372 m)   Wt 94 lb 12.8 oz (43 kg)   SpO2 97%   BMI 22.86 kg/m²     General appearance: No apparent distress, appears stated age and cooperative. HEENT: Pupils equal, round, and reactive to light. Conjunctivae/corneas clear. Neck: Supple, with full range of motion. No jugular venous distention. Trachea midline. Respiratory:  Normal respiratory effort. Clear to auscultation, bilaterally without Rales/Wheezes/Rhonchi. Cardiovascular: Regular rate and rhythm with normal S1/S2 without murmurs, rubs or gallops. Abdomen: Soft, non-tender, non-distended with normal bowel sounds. Musculoskeletal: No clubbing, cyanosis or edema bilaterally. Full range of motion without deformity. Skin: Skin color, texture, turgor normal.  No rashes or lesions. Neurologic: Could not be assessed as patient is sedated at this time. Psychiatric: Could not be assessed as patient is sedated at this time. Capillary Refill: Brisk,< 3 seconds   Peripheral Pulses: +2 palpable, equal bilaterally       Labs:   Recent Labs     02/23/20  1528 02/24/20  0419 02/25/20  0757   WBC 6.2 4.0 5.5   HGB 12.7* 11.4* 12.2*   HCT 37.6* 34.6* 36.6*    366 418     Recent Labs     02/23/20  1528 02/24/20  0419 02/25/20  0757    138 137   K 3.7 4.0 3.9   CL 99 105 100   CO2 27 23 25   BUN 15 13 12   CREATININE 0.7* 0.6* 0.7*   CALCIUM 9.2 8.8 9.3     Recent Labs     02/23/20  1528   AST 12*   ALT 8*   BILITOT <0.2   ALKPHOS 95     No results for input(s): INR in the last 72 hours.   Recent Labs     02/23/20  1745 02/23/20  2238 02/24/20  0419   TROPONINI 0.02* 0.03* 0.02*       Urinalysis:   Lab Results   Component Value Date    NITRU Negative reported that this is his baseline .    Hx of chronic paroxysmal a. Fib, currently in ST on admission.  - Continue home metoprolol  - Not on any [facility] which seems appropriate due to hx of frequent falls    Acute on chronic constipation with questionable ileus  -Abdominal x-ray on 2/24/2020 suggestive of stool loaded colon with concerns for possible ileus  -Patient chronically on lactulose 20 mg twice daily, Senokot  twice daily, MiraLAX daily being continued. Again abdominal exam noted to be benign. Will follow . DVT Prophylaxis: Lovenox   Diet: DIET GENERAL; Dental Soft  Code Status: Full Code    PT/OT Eval Status:  ordered     Dispo -likely return tomorrow ECF tomorrow pending BM and removing Colindres catheter. The note was completed using Dragon -speech recognition software & EMR  . Every effort was made to ensure accuracy; however, inadvertent computerized transcription errors may be present.     Travis Torres MD

## 2020-02-25 NOTE — CONSULTS
Urology Consult Note      Reason for Consult:  Urinary retention    History:   Pt is a 61 yo WM with MRDD admitted for urinary retention. He had a SDH in December and has had difficulty with constipation and urinary retention since then. Back in December he was getting catheterized for PVR> 600mL but was able to void with 0.8mg flomax. CT on admission showed bilateral nonobstructing stones with no hydronephrosis. Creatinine is normal at 0.6. He is not able to give me any history and is in restraints. KUB this morning indicated an ileus. Meds: see med rec  Family History, Social History, Review of Systems:  Reviewed and agreed to as per chart    Exam:    Vitals:  /83   Pulse 64   Temp 97.7 °F (36.5 °C) (Axillary)   Resp 20   Ht 4' 6\" (1.372 m)   Wt 94 lb 12.8 oz (43 kg)   SpO2 97%   BMI 22.86 kg/m²   Temp  Av.8 °F (36.6 °C)  Min: 97.4 °F (36.3 °C)  Max: 98.5 °F (36.9 °C)    Intake/Output Summary (Last 24 hours) at 2020 1124  Last data filed at 2020 5506  Gross per 24 hour   Intake 627.5 ml   Output 950 ml   Net -322.5 ml       Physical:    Well developed, well nourished in no acute distress   Mood indicates no abnormalities. Pt doesnt appear depressed   Orientated to time and place   Neck is supple, trachea is midline   Respiratory effort is normal   Cardiovascular show no extremity swelling   Abdomen no masses or hernias are palpated, there is no tenderness. Liver and Spleen appear normal.   Skin show no abnormal lesions   Lymph nodes are not palpated in the inguinal, neck, or axillary area.           Labs:  WBC:    Lab Results   Component Value Date    WBC 5.5 2020     Hemoglobin/Hematocrit:    Lab Results   Component Value Date    HGB 12.2 2020    HCT 36.6 2020     BMP:    Lab Results   Component Value Date     2020    K 3.9 2020     2020    CO2 25 2020    BUN 12 2020    LABALBU 3.9 2020    CREATININE 0.7 02/25/2020    CALCIUM 9.3 02/25/2020    GFRAA >60 02/25/2020    LABGLOM >60 02/25/2020     PT/INR:    Lab Results   Component Value Date    PROTIME 12.5 02/04/2020    INR 1.08 02/04/2020     PTT:  No results found for: APTT[APTT    Urinalysis:     Imaging:      Impression/Plan:   Urinary retention-  Continue ly until ileus resolves  Continue 0.8mg flomax    Mikey Huff PA-C

## 2020-02-25 NOTE — PROGRESS NOTES
Score: 81.38 (02/24/20 1218)  Mobility Inpatient CMS G-Code Modifier : CM (02/24/20 1218)        Goals  Short term goals  Time Frame for Short term goals: 5-7 days (unless otherwise stated) by 3/2/20  Short term goal 1: Pt will perform supine<>sit with SBA in a flat bed. Short term goal 2: Pt will perform sit<>stand with Min A x1. Short term goal 3: Pt will ambulate 30 feet with Min A x2 and a RW. Short term goal 4: Pt will participate in 12-15 reps of BLE ther ex for ROM and strength by 2/28/20. Patient Goals   Patient goals : Unable to state. Plan    Plan  Times per week: 2-3x  Times per day: Daily  Plan weeks: 1 week, until 3/2  Current Treatment Recommendations: Strengthening, Balance Training, Functional Mobility Training, Transfer Training, Cognitive/Perceptual Training, Endurance Training, Stair training, Gait Training, Neuromuscular Re-education, Cognitive Reorientation, Pain Management, Home Exercise Program, Safety Education & Training, Patient/Caregiver Education & Training  Safety Devices  Type of devices: All fall risk precautions in place, Bed alarm in place, Patient at risk for falls, Gait belt, Nurse notified, Dc Shannon in use, Left in bed, Sitter present  Restraints  Initially in place: Yes  Restraints: 3 point restraints and 4 bed rails elevated.      Therapy Time   Individual Concurrent Group Co-treatment   Time In       8136   Time Out       1115   Minutes       10   Timed Code Treatment Minutes: [address], PT

## 2020-02-25 NOTE — CARE COORDINATION
DOROTA called Sadi Wakefield, patient's caregiver, patient's baseline IS combative and aggressive. Is usually NOT redirectable. Sadi Wakefield states \"he can return home whenever he is ready. We will come pick him up\".     Brandt Bazan RN CM

## 2020-02-26 ENCOUNTER — ANESTHESIA EVENT (OUTPATIENT)
Dept: OPERATING ROOM | Age: 60
DRG: 713 | End: 2020-02-26
Payer: MEDICARE

## 2020-02-26 PROCEDURE — 6370000000 HC RX 637 (ALT 250 FOR IP): Performed by: INTERNAL MEDICINE

## 2020-02-26 PROCEDURE — 6360000002 HC RX W HCPCS: Performed by: INTERNAL MEDICINE

## 2020-02-26 PROCEDURE — 6370000000 HC RX 637 (ALT 250 FOR IP): Performed by: NURSE PRACTITIONER

## 2020-02-26 PROCEDURE — 1200000000 HC SEMI PRIVATE

## 2020-02-26 PROCEDURE — 2580000003 HC RX 258: Performed by: NURSE PRACTITIONER

## 2020-02-26 PROCEDURE — 6360000002 HC RX W HCPCS: Performed by: NURSE PRACTITIONER

## 2020-02-26 RX ORDER — LORAZEPAM 0.5 MG/1
0.5 TABLET ORAL ONCE
Status: DISCONTINUED | OUTPATIENT
Start: 2020-02-26 | End: 2020-03-01 | Stop reason: HOSPADM

## 2020-02-26 RX ADMIN — Medication 10 ML: at 09:01

## 2020-02-26 RX ADMIN — LEVETIRACETAM 250 MG: 250 TABLET ORAL at 09:01

## 2020-02-26 RX ADMIN — MORPHINE SULFATE 2 MG: 2 INJECTION, SOLUTION INTRAMUSCULAR; INTRAVENOUS at 10:44

## 2020-02-26 RX ADMIN — LORAZEPAM 0.5 MG: 2 INJECTION, SOLUTION INTRAMUSCULAR; INTRAVENOUS at 10:16

## 2020-02-26 RX ADMIN — Medication 10 ML: at 22:58

## 2020-02-26 RX ADMIN — QUETIAPINE FUMARATE 50 MG: 100 TABLET ORAL at 09:48

## 2020-02-26 RX ADMIN — QUETIAPINE FUMARATE 50 MG: 100 TABLET ORAL at 05:42

## 2020-02-26 RX ADMIN — CLOTRIMAZOLE: 10 CREAM TOPICAL at 23:00

## 2020-02-26 RX ADMIN — POLYETHYLENE GLYCOL 3350 17 G: 17 POWDER, FOR SOLUTION ORAL at 09:01

## 2020-02-26 RX ADMIN — LEVETIRACETAM 250 MG: 250 TABLET ORAL at 23:04

## 2020-02-26 RX ADMIN — ENOXAPARIN SODIUM 40 MG: 40 INJECTION SUBCUTANEOUS at 09:01

## 2020-02-26 RX ADMIN — SENNOSIDES AND DOCUSATE SODIUM 1 TABLET: 8.6; 5 TABLET ORAL at 17:00

## 2020-02-26 RX ADMIN — MEGESTROL ACETATE 400 MG: 40 SUSPENSION ORAL at 09:01

## 2020-02-26 RX ADMIN — TAMSULOSIN HYDROCHLORIDE 0.8 MG: 0.4 CAPSULE ORAL at 22:55

## 2020-02-26 RX ADMIN — LACTULOSE 20 G: 20 SOLUTION ORAL at 22:56

## 2020-02-26 RX ADMIN — QUETIAPINE FUMARATE 100 MG: 100 TABLET ORAL at 16:58

## 2020-02-26 RX ADMIN — TRAZODONE HYDROCHLORIDE 150 MG: 50 TABLET ORAL at 22:55

## 2020-02-26 RX ADMIN — METOPROLOL TARTRATE 12.5 MG: 25 TABLET ORAL at 22:55

## 2020-02-26 RX ADMIN — SENNOSIDES AND DOCUSATE SODIUM 1 TABLET: 8.6; 5 TABLET ORAL at 09:01

## 2020-02-26 RX ADMIN — QUETIAPINE FUMARATE 150 MG: 100 TABLET ORAL at 22:55

## 2020-02-26 RX ADMIN — LACTULOSE 20 G: 20 SOLUTION ORAL at 09:01

## 2020-02-26 RX ADMIN — CLOTRIMAZOLE: 10 CREAM TOPICAL at 09:02

## 2020-02-26 RX ADMIN — MORPHINE SULFATE 2 MG: 2 INJECTION, SOLUTION INTRAMUSCULAR; INTRAVENOUS at 16:58

## 2020-02-26 ASSESSMENT — PAIN SCALES - GENERAL
PAINLEVEL_OUTOF10: 0
PAINLEVEL_OUTOF10: 7
PAINLEVEL_OUTOF10: 7

## 2020-02-26 ASSESSMENT — PAIN SCALES - PAIN ASSESSMENT IN ADVANCED DEMENTIA (PAINAD)
CONSOLABILITY: 0
TOTALSCORE: 0
BREATHING: 0
NEGVOCALIZATION: 0
FACIALEXPRESSION: 0
BODYLANGUAGE: 0

## 2020-02-26 ASSESSMENT — PAIN SCALES - WONG BAKER: WONGBAKER_NUMERICALRESPONSE: 0

## 2020-02-26 NOTE — PROGRESS NOTES
cooperative. HEENT: Pupils equal, round, and reactive to light. Conjunctivae/corneas clear. Neck: Supple, with full range of motion. No jugular venous distention. Trachea midline. Respiratory:  Normal respiratory effort. Clear to auscultation, bilaterally without Rales/Wheezes/Rhonchi. Cardiovascular: Regular rate and rhythm with normal S1/S2 without murmurs, rubs or gallops. Abdomen: Soft, non-tender, non-distended with normal bowel sounds. Musculoskeletal: No clubbing, cyanosis or edema bilaterally. Full range of motion without deformity. Skin: Skin color, texture, turgor normal.  No rashes or lesions. Neurologic: No FND . At baseline   Psychiatric: Lacks insight and non verbal at baseline   Capillary Refill: Brisk,< 3 seconds   Peripheral Pulses: +2 palpable, equal bilaterally       Labs:   Recent Labs     02/23/20  1528 02/24/20  0419 02/25/20  0757   WBC 6.2 4.0 5.5   HGB 12.7* 11.4* 12.2*   HCT 37.6* 34.6* 36.6*    366 418     Recent Labs     02/23/20  1528 02/24/20  0419 02/25/20  0757    138 137   K 3.7 4.0 3.9   CL 99 105 100   CO2 27 23 25   BUN 15 13 12   CREATININE 0.7* 0.6* 0.7*   CALCIUM 9.2 8.8 9.3     Recent Labs     02/23/20  1528   AST 12*   ALT 8*   BILITOT <0.2   ALKPHOS 95     No results for input(s): INR in the last 72 hours. Recent Labs     02/23/20  1745 02/23/20  2238 02/24/20  0419   TROPONINI 0.02* 0.03* 0.02*       Urinalysis:   Lab Results   Component Value Date    NITRU Negative 02/23/2020    WBCUA >100 02/04/2020    BACTERIA Rare 02/04/2020    RBCUA see below 02/04/2020    BLOODU Negative 02/23/2020    SPECGRAV 1.025 02/23/2020    GLUCOSEU Negative 02/23/2020       Radiology:  XR ABDOMEN (KUB) (SINGLE AP VIEW)   Final Result   1. Ileus. 2. Bilateral renal calculi, not appreciably changed. CT ABDOMEN PELVIS WO CONTRAST Additional Contrast? None   Final Result   1. No acute process. No obstructive uropathy   2.  Bilateral nephrolithiasis

## 2020-02-26 NOTE — PROGRESS NOTES
The patient responded well to the morphine, and not to the Ativan. When the patient is not in pain, he is compliant and pleasant. The morphine helped to reduce his pain and anxiety.

## 2020-02-27 ENCOUNTER — APPOINTMENT (OUTPATIENT)
Dept: GENERAL RADIOLOGY | Age: 60
DRG: 713 | End: 2020-02-27
Payer: MEDICARE

## 2020-02-27 LAB
ABO/RH: NORMAL
ANTIBODY SCREEN: NORMAL

## 2020-02-27 PROCEDURE — 86900 BLOOD TYPING SEROLOGIC ABO: CPT

## 2020-02-27 PROCEDURE — 6360000002 HC RX W HCPCS: Performed by: INTERNAL MEDICINE

## 2020-02-27 PROCEDURE — 74022 RADEX COMPL AQT ABD SERIES: CPT

## 2020-02-27 PROCEDURE — 2500000003 HC RX 250 WO HCPCS: Performed by: ANESTHESIOLOGY

## 2020-02-27 PROCEDURE — 6370000000 HC RX 637 (ALT 250 FOR IP): Performed by: INTERNAL MEDICINE

## 2020-02-27 PROCEDURE — 2580000003 HC RX 258: Performed by: ANESTHESIOLOGY

## 2020-02-27 PROCEDURE — 2580000003 HC RX 258: Performed by: NURSE PRACTITIONER

## 2020-02-27 PROCEDURE — 1200000000 HC SEMI PRIVATE

## 2020-02-27 PROCEDURE — 36415 COLL VENOUS BLD VENIPUNCTURE: CPT

## 2020-02-27 PROCEDURE — 86901 BLOOD TYPING SEROLOGIC RH(D): CPT

## 2020-02-27 PROCEDURE — 99222 1ST HOSP IP/OBS MODERATE 55: CPT | Performed by: SURGERY

## 2020-02-27 PROCEDURE — 6370000000 HC RX 637 (ALT 250 FOR IP): Performed by: NURSE PRACTITIONER

## 2020-02-27 PROCEDURE — 86850 RBC ANTIBODY SCREEN: CPT

## 2020-02-27 PROCEDURE — 6370000000 HC RX 637 (ALT 250 FOR IP): Performed by: SURGERY

## 2020-02-27 PROCEDURE — 6360000002 HC RX W HCPCS: Performed by: NURSE PRACTITIONER

## 2020-02-27 RX ORDER — SODIUM CHLORIDE 0.9 % (FLUSH) 0.9 %
10 SYRINGE (ML) INJECTION PRN
Status: DISCONTINUED | OUTPATIENT
Start: 2020-02-27 | End: 2020-02-28 | Stop reason: HOSPADM

## 2020-02-27 RX ORDER — SODIUM CHLORIDE, SODIUM LACTATE, POTASSIUM CHLORIDE, CALCIUM CHLORIDE 600; 310; 30; 20 MG/100ML; MG/100ML; MG/100ML; MG/100ML
INJECTION, SOLUTION INTRAVENOUS CONTINUOUS
Status: DISCONTINUED | OUTPATIENT
Start: 2020-02-27 | End: 2020-02-28

## 2020-02-27 RX ORDER — SODIUM CHLORIDE 0.9 % (FLUSH) 0.9 %
10 SYRINGE (ML) INJECTION EVERY 12 HOURS SCHEDULED
Status: DISCONTINUED | OUTPATIENT
Start: 2020-02-27 | End: 2020-02-28 | Stop reason: HOSPADM

## 2020-02-27 RX ADMIN — LEVETIRACETAM 250 MG: 250 TABLET ORAL at 09:55

## 2020-02-27 RX ADMIN — MORPHINE SULFATE 2 MG: 2 INJECTION, SOLUTION INTRAMUSCULAR; INTRAVENOUS at 16:38

## 2020-02-27 RX ADMIN — Medication 330 ML: at 15:37

## 2020-02-27 RX ADMIN — LACTULOSE 20 G: 20 SOLUTION ORAL at 21:09

## 2020-02-27 RX ADMIN — ENOXAPARIN SODIUM 40 MG: 40 INJECTION SUBCUTANEOUS at 09:56

## 2020-02-27 RX ADMIN — SODIUM CHLORIDE, POTASSIUM CHLORIDE, SODIUM LACTATE AND CALCIUM CHLORIDE: 600; 310; 30; 20 INJECTION, SOLUTION INTRAVENOUS at 11:52

## 2020-02-27 RX ADMIN — METOPROLOL TARTRATE 12.5 MG: 25 TABLET ORAL at 21:09

## 2020-02-27 RX ADMIN — QUETIAPINE FUMARATE 50 MG: 100 TABLET ORAL at 09:56

## 2020-02-27 RX ADMIN — TAMSULOSIN HYDROCHLORIDE 0.8 MG: 0.4 CAPSULE ORAL at 21:09

## 2020-02-27 RX ADMIN — CLOTRIMAZOLE: 10 CREAM TOPICAL at 09:57

## 2020-02-27 RX ADMIN — SENNOSIDES AND DOCUSATE SODIUM 1 TABLET: 8.6; 5 TABLET ORAL at 18:18

## 2020-02-27 RX ADMIN — SODIUM CHLORIDE, POTASSIUM CHLORIDE, SODIUM LACTATE AND CALCIUM CHLORIDE: 600; 310; 30; 20 INJECTION, SOLUTION INTRAVENOUS at 23:08

## 2020-02-27 RX ADMIN — QUETIAPINE FUMARATE 150 MG: 100 TABLET ORAL at 18:18

## 2020-02-27 RX ADMIN — QUETIAPINE FUMARATE 50 MG: 100 TABLET ORAL at 11:52

## 2020-02-27 RX ADMIN — MEGESTROL ACETATE 400 MG: 40 SUSPENSION ORAL at 09:59

## 2020-02-27 RX ADMIN — TRAZODONE HYDROCHLORIDE 150 MG: 50 TABLET ORAL at 21:09

## 2020-02-27 RX ADMIN — POLYETHYLENE GLYCOL 3350 17 G: 17 POWDER, FOR SOLUTION ORAL at 09:56

## 2020-02-27 RX ADMIN — SENNOSIDES AND DOCUSATE SODIUM 1 TABLET: 8.6; 5 TABLET ORAL at 09:55

## 2020-02-27 RX ADMIN — MORPHINE SULFATE 2 MG: 2 INJECTION, SOLUTION INTRAMUSCULAR; INTRAVENOUS at 12:38

## 2020-02-27 RX ADMIN — MORPHINE SULFATE 2 MG: 2 INJECTION, SOLUTION INTRAMUSCULAR; INTRAVENOUS at 23:08

## 2020-02-27 RX ADMIN — LEVETIRACETAM 250 MG: 250 TABLET ORAL at 21:09

## 2020-02-27 RX ADMIN — CLOTRIMAZOLE: 10 CREAM TOPICAL at 21:10

## 2020-02-27 RX ADMIN — LACTULOSE 20 G: 20 SOLUTION ORAL at 09:56

## 2020-02-27 RX ADMIN — METOPROLOL TARTRATE 12.5 MG: 25 TABLET ORAL at 09:56

## 2020-02-27 RX ADMIN — FAMOTIDINE 20 MG: 10 INJECTION, SOLUTION INTRAVENOUS at 11:52

## 2020-02-27 RX ADMIN — Medication 10 ML: at 09:57

## 2020-02-27 RX ADMIN — Medication 10 ML: at 12:03

## 2020-02-27 RX ADMIN — MORPHINE SULFATE 2 MG: 2 INJECTION, SOLUTION INTRAMUSCULAR; INTRAVENOUS at 01:29

## 2020-02-27 RX ADMIN — QUETIAPINE FUMARATE 100 MG: 100 TABLET ORAL at 16:24

## 2020-02-27 ASSESSMENT — PAIN SCALES - PAIN ASSESSMENT IN ADVANCED DEMENTIA (PAINAD)
BREATHING: 0
NEGVOCALIZATION: 0
FACIALEXPRESSION: 0
BREATHING: 0
BODYLANGUAGE: 0
TOTALSCORE: 0
FACIALEXPRESSION: 0
NEGVOCALIZATION: 0
TOTALSCORE: 0
BODYLANGUAGE: 0
CONSOLABILITY: 0
CONSOLABILITY: 0

## 2020-02-27 ASSESSMENT — PAIN SCALES - GENERAL
PAINLEVEL_OUTOF10: 0
PAINLEVEL_OUTOF10: 0
PAINLEVEL_OUTOF10: 6
PAINLEVEL_OUTOF10: 7

## 2020-02-27 NOTE — CONSULTS
Department of General Surgery Consult    PATIENT NAME: Duane A Carota   YOB: 1960    ADMISSION DATE: 2/23/2020  2:27 PM      TODAY'S DATE: 2/27/2020    Reason for Consult:  constipatoin    Chief Complaint: urinary retention    Requesting Physician:  Kate Payan    HISTORY OF PRESENT ILLNESS:              The patient is a 62 y.o. male who presents with urinary retention. Planned for TURP tomorrow. Also with constipation on imaging. Has been without abdominal pain but difficult to assess exam.  Has long standing history of constipation and takes multiple stool softeners/laxatives daily.     Past Medical History:        Diagnosis Date    Bipolar disorder ([facility]     Developmental non-verbal disorder     Impulse control disorder     Influenza A 2/13/14    Mental retardation, profound (I.Q. < 20)     Osteopenia        Past Surgical History:        Procedure Laterality Date    CATARACT REMOVAL      COLONOSCOPY  06/20/2016    incomplete, poor prep    CRANIOTOMY Right 12/2/2019    Right Trephine Craniotomy For Evacuation of Subdural Hematoma performed by Danilo Martinez MD at [address]       Current Medications:   Current Facility-Administered Medications: lactated ringers infusion, , Intravenous, Continuous  sodium chloride flush 0.9 % injection 10 mL, 10 mL, Intravenous, 2 times per day  sodium chloride flush 0.9 % injection 10 mL, 10 mL, Intravenous, PRN  SMOG Enema, 330 mL, Rectal, Once  LORazepam (ATIVAN) tablet 0.5 mg, 0.5 mg, Oral, Once  polyethylene glycol (GLYCOLAX) packet 17 g, 17 g, Oral, Daily  morphine (PF) injection 2 mg, 2 mg, Intravenous, Q4H PRN  LORazepam (ATIVAN) injection 1 mg, 1 mg, Intravenous, Q6H PRN  clotrimazole (LOTRIMIN) 1 % cream, , Topical, BID  levETIRAcetam (KEPPRA) tablet 250 mg, 250 mg, Oral, BID  megestrol acetate (MEGACE) 400 MG/10ML suspension 400 mg, 400 mg, Oral, Daily  sodium chloride flush 0.9 % injection 10 mL, 10 mL, Intravenous, 2 times per day  sodium

## 2020-02-27 NOTE — PROGRESS NOTES
functions  -Urology consulted to assist with management. Continue Flomax will follow  - Urology plan for Cystoscopy and possible TURP on 2/28/20 . Verbal Consent obtained from Duncan Vences over the phone on 2/26/20 by me along with RN Nguyen Presley .     Elevated troponin (POA) , unclear etiology. ACS Ruled Out . Seemingly no chest pain though HPI is limited as pt is non-verbal with hx MRDD. Likely demand ischemia as patient is tachycardic on admission.  - EKG nonischemic, tachycardia  - Trended troponin - Plateued @ 1.43   - Cardiology evaluated with no new recommendations.   -Echo showed normal LVEF with no regional wall motion abnormalities     Hx of acute subdural hematoma s/p right craniotomy with evacuation of subdural hematoma on 12/02/2019. Follows with Dr. Darío Funes, neurosurgery. - On oral keppra for seizure prophylaxis, continued  -Recent CT head from 2/4/2020 without any acute abnormalities. - Monitor     Chronic bipolar disorder with intermittent bursts of agitation  - Continue home seroquel, trazodone , requiring intermittent doses of Ativan PRN  -Patient has gotten more worse with his combativeness for which he is requiring three-point restraints for his safety from falling/pulling out Colindres catheter, IV lines. - D/w Caretaker Vlad Sincere ) over the phone on 2/25/2020 and was reported that this is his baseline .    Hx of chronic paroxysmal a. Fib, currently in ST on admission.  - Continue home metoprolol  - [facility] due to hx of frequent falls    Acute on chronic constipation with questionable ileus  -Abdominal x-ray on 2/24/2020 suggestive of stool loaded colon with concerns for possible ileus  -Patient chronically on lactulose 20 mg twice daily, Senokot  twice daily, MiraLAX daily being continued. Again abdominal exam noted to be benign.   Will obtain General Surgery Opinion     DVT Prophylaxis: Lovenox   Diet: DIET GENERAL; Dental Soft  Diet NPO, After Midnight  Code Status: Full Code    PT/OT Eval Status:  ordered     Dispo -likely 2-3 days pending Cystoscopy and TURP on 2/28/20     The note was completed using Dragon -speech recognition software & EMR  . Every effort was made to ensure accuracy; however, inadvertent computerized transcription errors may be present.     Charles Urrutia MD

## 2020-02-28 ENCOUNTER — ANESTHESIA (OUTPATIENT)
Dept: OPERATING ROOM | Age: 60
DRG: 713 | End: 2020-02-28
Payer: MEDICARE

## 2020-02-28 VITALS
DIASTOLIC BLOOD PRESSURE: 78 MMHG | SYSTOLIC BLOOD PRESSURE: 112 MMHG | OXYGEN SATURATION: 99 % | RESPIRATION RATE: 7 BRPM

## 2020-02-28 PROCEDURE — 0VB08ZZ EXCISION OF PROSTATE, VIA NATURAL OR ARTIFICIAL OPENING ENDOSCOPIC: ICD-10-PCS | Performed by: UROLOGY

## 2020-02-28 PROCEDURE — 6360000002 HC RX W HCPCS: Performed by: NURSE PRACTITIONER

## 2020-02-28 PROCEDURE — 6370000000 HC RX 637 (ALT 250 FOR IP): Performed by: UROLOGY

## 2020-02-28 PROCEDURE — 2580000003 HC RX 258: Performed by: UROLOGY

## 2020-02-28 PROCEDURE — 2500000003 HC RX 250 WO HCPCS: Performed by: NURSE ANESTHETIST, CERTIFIED REGISTERED

## 2020-02-28 PROCEDURE — 6360000002 HC RX W HCPCS: Performed by: UROLOGY

## 2020-02-28 PROCEDURE — 2720000010 HC SURG SUPPLY STERILE: Performed by: UROLOGY

## 2020-02-28 PROCEDURE — 7100000001 HC PACU RECOVERY - ADDTL 15 MIN: Performed by: UROLOGY

## 2020-02-28 PROCEDURE — 94761 N-INVAS EAR/PLS OXIMETRY MLT: CPT

## 2020-02-28 PROCEDURE — 6370000000 HC RX 637 (ALT 250 FOR IP): Performed by: NURSE PRACTITIONER

## 2020-02-28 PROCEDURE — 2709999900 HC NON-CHARGEABLE SUPPLY: Performed by: UROLOGY

## 2020-02-28 PROCEDURE — 1200000000 HC SEMI PRIVATE

## 2020-02-28 PROCEDURE — 2580000003 HC RX 258: Performed by: ANESTHESIOLOGY

## 2020-02-28 PROCEDURE — 6360000002 HC RX W HCPCS: Performed by: INTERNAL MEDICINE

## 2020-02-28 PROCEDURE — 3700000001 HC ADD 15 MINUTES (ANESTHESIA): Performed by: UROLOGY

## 2020-02-28 PROCEDURE — 7100000000 HC PACU RECOVERY - FIRST 15 MIN: Performed by: UROLOGY

## 2020-02-28 PROCEDURE — 3600000004 HC SURGERY LEVEL 4 BASE: Performed by: UROLOGY

## 2020-02-28 PROCEDURE — 3600000014 HC SURGERY LEVEL 4 ADDTL 15MIN: Performed by: UROLOGY

## 2020-02-28 PROCEDURE — 88305 TISSUE EXAM BY PATHOLOGIST: CPT

## 2020-02-28 PROCEDURE — 3700000000 HC ANESTHESIA ATTENDED CARE: Performed by: UROLOGY

## 2020-02-28 PROCEDURE — 99231 SBSQ HOSP IP/OBS SF/LOW 25: CPT | Performed by: SURGERY

## 2020-02-28 PROCEDURE — 6360000002 HC RX W HCPCS: Performed by: NURSE ANESTHETIST, CERTIFIED REGISTERED

## 2020-02-28 PROCEDURE — 6360000002 HC RX W HCPCS: Performed by: ANESTHESIOLOGY

## 2020-02-28 PROCEDURE — 2700000000 HC OXYGEN THERAPY PER DAY

## 2020-02-28 RX ORDER — OXYCODONE HYDROCHLORIDE AND ACETAMINOPHEN 5; 325 MG/1; MG/1
2 TABLET ORAL PRN
Status: DISCONTINUED | OUTPATIENT
Start: 2020-02-28 | End: 2020-02-28 | Stop reason: HOSPADM

## 2020-02-28 RX ORDER — ONDANSETRON 2 MG/ML
4 INJECTION INTRAMUSCULAR; INTRAVENOUS
Status: DISCONTINUED | OUTPATIENT
Start: 2020-02-28 | End: 2020-02-28 | Stop reason: HOSPADM

## 2020-02-28 RX ORDER — HYDROMORPHONE HCL 110MG/55ML
0.5 PATIENT CONTROLLED ANALGESIA SYRINGE INTRAVENOUS ONCE
Status: COMPLETED | OUTPATIENT
Start: 2020-02-28 | End: 2020-02-28

## 2020-02-28 RX ORDER — ATROPA BELLADONNA AND OPIUM 16.2; 3 MG/1; MG/1
SUPPOSITORY RECTAL PRN
Status: DISCONTINUED | OUTPATIENT
Start: 2020-02-28 | End: 2020-02-28 | Stop reason: ALTCHOICE

## 2020-02-28 RX ORDER — LIDOCAINE HYDROCHLORIDE 20 MG/ML
INJECTION, SOLUTION INFILTRATION; PERINEURAL PRN
Status: DISCONTINUED | OUTPATIENT
Start: 2020-02-28 | End: 2020-02-28 | Stop reason: SDUPTHER

## 2020-02-28 RX ORDER — FENTANYL CITRATE 50 UG/ML
INJECTION, SOLUTION INTRAMUSCULAR; INTRAVENOUS PRN
Status: DISCONTINUED | OUTPATIENT
Start: 2020-02-28 | End: 2020-02-28 | Stop reason: SDUPTHER

## 2020-02-28 RX ORDER — MORPHINE SULFATE 2 MG/ML
1 INJECTION, SOLUTION INTRAMUSCULAR; INTRAVENOUS EVERY 5 MIN PRN
Status: DISCONTINUED | OUTPATIENT
Start: 2020-02-28 | End: 2020-02-28 | Stop reason: HOSPADM

## 2020-02-28 RX ORDER — ONDANSETRON 2 MG/ML
INJECTION INTRAMUSCULAR; INTRAVENOUS PRN
Status: DISCONTINUED | OUTPATIENT
Start: 2020-02-28 | End: 2020-02-28 | Stop reason: SDUPTHER

## 2020-02-28 RX ORDER — DEXAMETHASONE SODIUM PHOSPHATE 4 MG/ML
INJECTION, SOLUTION INTRA-ARTICULAR; INTRALESIONAL; INTRAMUSCULAR; INTRAVENOUS; SOFT TISSUE PRN
Status: DISCONTINUED | OUTPATIENT
Start: 2020-02-28 | End: 2020-02-28 | Stop reason: SDUPTHER

## 2020-02-28 RX ORDER — MORPHINE SULFATE 2 MG/ML
2 INJECTION, SOLUTION INTRAMUSCULAR; INTRAVENOUS EVERY 5 MIN PRN
Status: DISCONTINUED | OUTPATIENT
Start: 2020-02-28 | End: 2020-02-28 | Stop reason: HOSPADM

## 2020-02-28 RX ORDER — OXYCODONE HYDROCHLORIDE AND ACETAMINOPHEN 5; 325 MG/1; MG/1
1 TABLET ORAL PRN
Status: DISCONTINUED | OUTPATIENT
Start: 2020-02-28 | End: 2020-02-28 | Stop reason: HOSPADM

## 2020-02-28 RX ORDER — ATROPA BELLADONNA AND OPIUM 16.2; 3 MG/1; MG/1
SUPPOSITORY RECTAL
Status: DISPENSED
Start: 2020-02-28 | End: 2020-02-28

## 2020-02-28 RX ORDER — PROPOFOL 10 MG/ML
INJECTION, EMULSION INTRAVENOUS PRN
Status: DISCONTINUED | OUTPATIENT
Start: 2020-02-28 | End: 2020-02-28 | Stop reason: SDUPTHER

## 2020-02-28 RX ADMIN — ONDANSETRON 4 MG: 2 INJECTION INTRAMUSCULAR; INTRAVENOUS at 08:31

## 2020-02-28 RX ADMIN — LIDOCAINE HYDROCHLORIDE 50 MG: 20 INJECTION, SOLUTION INFILTRATION; PERINEURAL at 08:31

## 2020-02-28 RX ADMIN — METOPROLOL TARTRATE 12.5 MG: 25 TABLET ORAL at 21:28

## 2020-02-28 RX ADMIN — TAMSULOSIN HYDROCHLORIDE 0.8 MG: 0.4 CAPSULE ORAL at 21:29

## 2020-02-28 RX ADMIN — QUETIAPINE FUMARATE 150 MG: 100 TABLET ORAL at 21:28

## 2020-02-28 RX ADMIN — TRAZODONE HYDROCHLORIDE 150 MG: 50 TABLET ORAL at 21:28

## 2020-02-28 RX ADMIN — LACTULOSE 20 G: 20 SOLUTION ORAL at 21:29

## 2020-02-28 RX ADMIN — Medication 10 ML: at 21:40

## 2020-02-28 RX ADMIN — PROPOFOL 100 MG: 10 INJECTION, EMULSION INTRAVENOUS at 08:31

## 2020-02-28 RX ADMIN — LEVETIRACETAM 250 MG: 250 TABLET ORAL at 21:29

## 2020-02-28 RX ADMIN — FENTANYL CITRATE 25 MCG: 50 INJECTION INTRAMUSCULAR; INTRAVENOUS at 08:31

## 2020-02-28 RX ADMIN — CEFAZOLIN SODIUM 2 G: 10 INJECTION, POWDER, FOR SOLUTION INTRAVENOUS at 17:44

## 2020-02-28 RX ADMIN — HYDROMORPHONE HYDROCHLORIDE 0.25 MG: 1 INJECTION, SOLUTION INTRAMUSCULAR; INTRAVENOUS; SUBCUTANEOUS at 09:57

## 2020-02-28 RX ADMIN — DEXAMETHASONE SODIUM PHOSPHATE 8 MG: 4 INJECTION, SOLUTION INTRAMUSCULAR; INTRAVENOUS at 08:31

## 2020-02-28 RX ADMIN — HYDROMORPHONE HYDROCHLORIDE 0.5 MG: 2 INJECTION, SOLUTION INTRAMUSCULAR; INTRAVENOUS; SUBCUTANEOUS at 20:19

## 2020-02-28 RX ADMIN — SODIUM CHLORIDE, POTASSIUM CHLORIDE, SODIUM LACTATE AND CALCIUM CHLORIDE: 600; 310; 30; 20 INJECTION, SOLUTION INTRAVENOUS at 07:11

## 2020-02-28 RX ADMIN — MORPHINE SULFATE 2 MG: 2 INJECTION, SOLUTION INTRAMUSCULAR; INTRAVENOUS at 06:13

## 2020-02-28 RX ADMIN — HYDROMORPHONE HYDROCHLORIDE 0.25 MG: 1 INJECTION, SOLUTION INTRAMUSCULAR; INTRAVENOUS; SUBCUTANEOUS at 09:47

## 2020-02-28 RX ADMIN — CLOTRIMAZOLE: 10 CREAM TOPICAL at 21:30

## 2020-02-28 RX ADMIN — HYDROMORPHONE HYDROCHLORIDE 0.5 MG: 1 INJECTION, SOLUTION INTRAMUSCULAR; INTRAVENOUS; SUBCUTANEOUS at 10:03

## 2020-02-28 RX ADMIN — LORAZEPAM 1 MG: 2 INJECTION, SOLUTION INTRAMUSCULAR; INTRAVENOUS at 17:43

## 2020-02-28 RX ADMIN — MORPHINE SULFATE 2 MG: 2 INJECTION, SOLUTION INTRAMUSCULAR; INTRAVENOUS at 17:46

## 2020-02-28 ASSESSMENT — PULMONARY FUNCTION TESTS
PIF_VALUE: 8
PIF_VALUE: 1
PIF_VALUE: 20
PIF_VALUE: 8
PIF_VALUE: 5
PIF_VALUE: 2
PIF_VALUE: 18
PIF_VALUE: 20
PIF_VALUE: 7
PIF_VALUE: 2
PIF_VALUE: 2
PIF_VALUE: 8
PIF_VALUE: 8
PIF_VALUE: 1
PIF_VALUE: 1
PIF_VALUE: 8
PIF_VALUE: 20
PIF_VALUE: 8
PIF_VALUE: 5
PIF_VALUE: 5
PIF_VALUE: 4
PIF_VALUE: 3
PIF_VALUE: 1
PIF_VALUE: 21
PIF_VALUE: 8
PIF_VALUE: 19
PIF_VALUE: 20
PIF_VALUE: 2
PIF_VALUE: 1
PIF_VALUE: 23
PIF_VALUE: 20
PIF_VALUE: 20
PIF_VALUE: 9
PIF_VALUE: 8
PIF_VALUE: 22
PIF_VALUE: 8
PIF_VALUE: 2
PIF_VALUE: 2
PIF_VALUE: 8
PIF_VALUE: 2
PIF_VALUE: 2
PIF_VALUE: 1
PIF_VALUE: 21
PIF_VALUE: 21
PIF_VALUE: 8

## 2020-02-28 ASSESSMENT — PAIN SCALES - PAIN ASSESSMENT IN ADVANCED DEMENTIA (PAINAD)
BREATHING: 1
TOTALSCORE: 9
FACIALEXPRESSION: 2
BODYLANGUAGE: 2
NEGVOCALIZATION: 2
CONSOLABILITY: 2

## 2020-02-28 ASSESSMENT — PAIN SCALES - GENERAL
PAINLEVEL_OUTOF10: 7
PAINLEVEL_OUTOF10: 7
PAINLEVEL_OUTOF10: 6
PAINLEVEL_OUTOF10: 6
PAINLEVEL_OUTOF10: 10
PAINLEVEL_OUTOF10: 10

## 2020-02-28 ASSESSMENT — LIFESTYLE VARIABLES: SMOKING_STATUS: 0

## 2020-02-28 NOTE — PROGRESS NOTES
Hospitalist Progress Note      PCP: Dominique Robertson MD    Date of Admission: 2/23/2020    Chief Complaint: Urinary retention    Hospital Course: Reviewed H&P    Subjective:     S/p cystoscopy/ TURP today. Had several BM's after enema yesterday. Pt non verbal at baseline. Sitter at bedside    Medications:  Reviewed    Scheduled Medications    ceFAZolin (ANCEF) 2 g in dextrose 5 % 100 mL IVPB        cefazolin 2 g in 20 mL SWFI IV Syringe  2 g Intravenous Q8H    opium-belladonna        LORazepam  0.5 mg Oral Once    polyethylene glycol  17 g Oral Daily    clotrimazole   Topical BID    levETIRAcetam  250 mg Oral BID    megestrol acetate  400 mg Oral Daily    sodium chloride flush  10 mL Intravenous 2 times per day    enoxaparin  40 mg Subcutaneous Daily    tamsulosin  0.8 mg Oral Nightly    lactulose  20 g Oral BID    metoprolol tartrate  12.5 mg Oral BID    traZODone  150 mg Oral Nightly    sennosides-docusate sodium  1 tablet Oral BID    QUEtiapine  50 mg Oral 2 times per day    QUEtiapine  100 mg Oral Daily    QUEtiapine  150 mg Oral Daily     PRN Meds: morphine, LORazepam, sodium chloride flush, acetaminophen, acetaminophen, magnesium hydroxide, promethazine, ondansetron, perflutren lipid microspheres, melatonin      Intake/Output Summary (Last 24 hours) at 2/28/2020 1343  Last data filed at 2/28/2020 1055  Gross per 24 hour   Intake 1972 ml   Output 1050 ml   Net 922 ml       Physical Exam Performed:  /79   Pulse 82   Temp 97.9 °F (36.6 °C) (Oral)   Resp 10   Ht 4' 6\" (1.372 m)   Wt 94 lb 12.8 oz (43 kg)   SpO2 98%   BMI 22.86 kg/m²     General appearance: No apparent distress, appears stated age and cooperative. HEENT: Pupils equal, round, and reactive to light. Conjunctivae/corneas clear. Neck: Supple, with full range of motion. No jugular venous distention. Trachea midline. Respiratory:  Normal respiratory effort.  Clear to auscultation, bilaterally without Rales/Wheezes/Rhonchi. Cardiovascular: Regular rate and rhythm with normal S1/S2 without murmurs, rubs or gallops. Abdomen: Soft, non-tender, non-distended with normal bowel sounds. Musculoskeletal: No clubbing, cyanosis or edema bilaterally. Full range of motion without deformity. Skin: Warm and dry   Neurologic: No overt facial droop noted psychiatric: Lacks insight and non verbal at baseline   Capillary Refill: Brisk,< 3 seconds   Peripheral Pulses: +2 palpable, equal bilaterally       Labs:   No results for input(s): WBC, HGB, HCT, PLT in the last 72 hours. No results for input(s): NA, K, CL, CO2, BUN, CREATININE, CALCIUM, PHOS in the last 72 hours. Invalid input(s): MAGNES  Urinalysis:   Lab Results   Component Value Date    NITRU Negative 02/23/2020    WBCUA >100 02/04/2020    BACTERIA Rare 02/04/2020    RBCUA see below 02/04/2020    BLOODU Negative 02/23/2020    SPECGRAV 1.025 02/23/2020    GLUCOSEU Negative 02/23/2020       Radiology:  XR Acute Abd Series Chest 1 VW   Final Result   Moderate gaseous distension of the stomach, new compared to prior. Moderate to large amount of gas and stool throughout nondilated bowel loops   likely related to ileus and/or fecal stasis. No significant interval change. XR ABDOMEN (KUB) (SINGLE AP VIEW)   Final Result   1. Ileus. 2. Bilateral renal calculi, not appreciably changed. CT ABDOMEN PELVIS WO CONTRAST Additional Contrast? None   Final Result   1. No acute process. No obstructive uropathy   2. Bilateral nephrolithiasis           Assessment/Plan:  Active Hospital Problems    Diagnosis Date Noted    Other constipation [K59.09]     Elevated troponin [R79.89] 02/23/2020    Subdural hematoma ([facility] [address] [S06.5X9A] 12/02/2019     Acute urinary retention: ly placed. S/p cystoscopy/ TURP on  2/28/20. Voiding trial tomorrow or Sunday. Elevated troponin (POA): likely demand ischemia due to sinus tachycardia noted on EKG.   Echo showed normal LVEF with no regional wall motion abnormalities. No further testing per cardio needed.          Hx of acute subdural hematoma s/p right craniotomy with evacuation of subdural hematoma on 12/02/2019. Follows with Dr. Day Hardy, neurosurgery. Stable on recent CT. Continue oral keppra for seizure prophylaxis            Chronic bipolar disorder with intermittent bursts of agitation: chronic and at baseline per report. Continue home seroquel, trazodone, Ativan PRN, restraints as needed and sitter for pt's safety.      Hx of chronic paroxysmal a. Fib:  Continue  Metoprolol,  Not on any AC which seems appropriate due to hx of frequent falls    Acute on chronic constipation with questionable ileus. Abdominal x-ray on 2/24/2020 suggestive of stool loaded colon with concerns for possible ileus. Gen surg consulted. S/p enema with multiple BM's . Continue bowel regimen. DVT Prophylaxis: Lovenox   Diet: Diet NPO, After Midnight  Code Status: Full Code    PT/OT Eval Status:  ordered     Dispo - possibly in 1-2 days pending clinical course         The note was completed using Dragon -speech recognition software & EMR  . Every effort was made to ensure accuracy; however, inadvertent computerized transcription errors may be present.     Denver Calvert MD

## 2020-02-28 NOTE — PROGRESS NOTES
[facility] SURGERY    PATIENT NAME: Duane A Carota     TODAY'S DATE: 2/28/2020    CHIEF COMPLAINT: none    INTERVAL HISTORY/HPI:    Pt without abd pain or emesis, no fevers or chills, several bms with enema     REVIEW OF SYSTEMS:  Pertinent positives and negatives as per interval history section    OBJECTIVE:  VITALS:  BP (!) 133/98   Pulse 88   Temp 98.3 °F (36.8 °C) (Temporal)   Resp (!) 6   Ht 4' 6\" (1.372 m)   Wt 94 lb 12.8 oz (43 kg)   SpO2 97%   BMI 22.86 kg/m²     INTAKE/OUTPUT:    I/O last 3 completed shifts: In: 5515 [P.O.:555; I.V.:1067]  Out: 1350 [Urine:1350]  I/O this shift:  In: 200 [I.V.:200]  Out: 0     CONSTITUTIONAL:  awake and alert  LUNGS:  Respirations easy and unlabored, no crackles or wheezing  CARD:  regular rate and rhythm  ABDOMEN:  normal bowel sounds, soft, non-distended, non-tender     Data:  CBC: No results for input(s): WBC, HGB, HCT, PLT in the last 72 hours. BMP:  No results for input(s): NA, K, CL, CO2, BUN, CREATININE, GLUCOSE in the last 72 hours. Hepatic: No results for input(s): AST, ALT, ALB, BILITOT, ALKPHOS in the last 72 hours. Mag:    No results for input(s): MG in the last 72 hours. Phos:   No results for input(s): PHOS in the last 72 hours. INR: No results for input(s): INR in the last 72 hours. Radiology Review:  *Imaging personally reviewed by me. NA      ASSESSMENT AND PLAN:  63 yo with chronic constipation  1. Continue with bowel regimen  2.   Call if any further concerns     Electronically signed by Emily Flaherty MD     [id number]

## 2020-02-28 NOTE — ANESTHESIA PRE PROCEDURE
daily 12/5/19   Curt Dill MD       Current medications:    Current Facility-Administered Medications   Medication Dose Route Frequency Provider Last Rate Last Dose    CEFAZOLIN 2 G D5W 100 ML IVPB IVPB             ceFAZolin (ANCEF) 2 g in sterile water 20 mL IV syringe  2 g Intravenous Q8H Asha Jose MD        lactated ringers infusion   Intravenous Continuous Eneida Funez  mL/hr at 02/28/20 8336      sodium chloride flush 0.9 % injection 10 mL  10 mL Intravenous 2 times per day Eneida Funez MD   10 mL at 02/27/20 1203    sodium chloride flush 0.9 % injection 10 mL  10 mL Intravenous PRN Eneida Funez MD        LORazepam (ATIVAN) tablet 0.5 mg  0.5 mg Oral Once Edith Rivera MD        polyethylene glycol (GLYCOLAX) packet 17 g  17 g Oral Daily Edith Rivera MD   17 g at 02/27/20 0956    morphine (PF) injection 2 mg  2 mg Intravenous Q4H PRN Faina Vargas MD   2 mg at 02/28/20 0450    LORazepam (ATIVAN) injection 1 mg  1 mg Intravenous Q6H PRN Faina Vargas MD   0.5 mg at 02/26/20 1016    clotrimazole (LOTRIMIN) 1 % cream   Topical BID Faylene Bolds, APRN - NP        levETIRAcetam (KEPPRA) tablet 250 mg  250 mg Oral BID Faylene Bolds, APRN - NP   250 mg at 02/27/20 2109    megestrol acetate (MEGACE) 400 MG/10ML suspension 400 mg  400 mg Oral Daily Faylene Bolds, APRN - NP   400 mg at 02/27/20 0959    sodium chloride flush 0.9 % injection 10 mL  10 mL Intravenous 2 times per day Faylene Bolds, APRN - NP   10 mL at 02/27/20 0957    sodium chloride flush 0.9 % injection 10 mL  10 mL Intravenous PRN Faylene Bolds, APRN - NP        acetaminophen (TYLENOL) tablet 650 mg  650 mg Oral Q6H PRN Faylene Bolds, APRN - NP   650 mg at 02/24/20 1050    acetaminophen (TYLENOL) suppository 650 mg  650 mg Rectal Q6H PRN Faylene Bolds, APRN - NP   650 mg at 02/25/20 1902    magnesium hydroxide (MILK OF MAGNESIA) 400 MG/5ML suspension 30 mL 30 mL Oral Daily PRN Olita Ovens, APRN - NP   30 mL at 02/24/20 1715    promethazine (PHENERGAN) tablet 12.5 mg  12.5 mg Oral Q6H PRN Olita Ovens, APRN - NP        ondansetron (ZOFRAN) injection 4 mg  4 mg Intravenous Q6H PRN Olita Ovens, APRN - NP        enoxaparin (LOVENOX) injection 40 mg  40 mg Subcutaneous Daily Olita Ovens, APRN - NP   40 mg at 02/27/20 3240    perflutren lipid microspheres (DEFINITY) injection 1.65 mg  1.5 mL Intravenous ONCE PRN Olita Ovens, APRN - NP        tamsulosin (FLOMAX) capsule 0.8 mg  0.8 mg Oral Nightly Olita Ovens, APRN - NP   0.8 mg at 02/27/20 2109    lactulose (CHRONULAC) 10 GM/15ML solution 20 g  20 g Oral BID Olita Ovens, APRN - NP   20 g at 02/27/20 2109    melatonin tablet 5 mg  5 mg Oral Nightly PRN Olita Ovens, APRN - NP   5 mg at 02/25/20 2326    metoprolol tartrate (LOPRESSOR) tablet 12.5 mg  12.5 mg Oral BID Olita Ovens, APRN - NP   12.5 mg at 02/27/20 2109    traZODone (DESYREL) tablet 150 mg  150 mg Oral Nightly Olita Ovens, APRN - NP   150 mg at 02/27/20 2109    sennosides-docusate sodium (SENOKOT-S) 8.6-50 MG tablet 1 tablet  1 tablet Oral BID Olita Ovens, APRN - NP   1 tablet at 02/27/20 1818    QUEtiapine (SEROQUEL) tablet 50 mg  50 mg Oral 2 times per day Olita Ovens, APRN - NP   50 mg at 02/27/20 1152    QUEtiapine (SEROQUEL) tablet 100 mg  100 mg Oral Daily Olita Ovens, APRN - NP   100 mg at 02/27/20 1624    QUEtiapine (SEROQUEL) tablet 150 mg  150 mg Oral Daily Olita Ovens, APRN - NP   150 mg at 02/27/20 1818       Allergies:     Allergies   Allergen Reactions    Benadryl [Diphenhydramine] Other (See Comments)     Pt becomes agitated and aggressive with Benadryl    Clonidine Derivatives     Penicillins     Tetracyclines & Related        Problem List:    Patient Active Problem List   Diagnosis Code    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 Plan discussed with CRNA. This pre-anesthesia assessment may be used as a history and physical.    DOS STAFF ADDENDUM:    Pt seen and examined, chart reviewed (including anesthesia, drug and allergy history). No interval changes to history and physical examination.   Anesthetic plan, risks, benefits, alternatives, and personnel involved discussed with manasa gomez at [facility] services, [phone], hx and consent, r/b    Elias Zaragoza MD  February 28, 2020  8:24 AM          Elias Zaragoza MD   2/28/2020

## 2020-02-28 NOTE — PROGRESS NOTES
Patient arrived to PACU at 9055. VSS. Report from [place] [address] and [facility]. Oral airway was remove at 0932.

## 2020-02-28 NOTE — OP NOTE
then a 23-[place] three-way catheter was placed. He was awoken and sent to recovery room after B and O suppository was  placed and sent to recovery room in good condition. He will be on  continuous bladder irrigation and will try to do voiding trial in one to  two days. Estimated blood loss is 50 mL.         Argentina Reese MD    D: 02/28/2020 9:43:37       T: 02/28/2020 11:59:03     [email or web address]  Job#: [id number]     Doc#: [id number]    CC:

## 2020-02-28 NOTE — PROGRESS NOTES
Nutrition Assessment    Type and Reason for Visit: Initial    Nutrition Recommendations:   Continue general, dental soft diet  Add Ensure with meals  Monitor po intakes, nutrition adequacy, weights, pertinent labs, BMs    Nutrition Assessment: LOS assessment. Pt nutritionally compromised AEB poor po intakes since admission. Pt with Hx of developmental nonverbal disorder and u/t provide feedback. No family present. Hx obtained from chart review. Po intakes 1-25% per EMR. Weight Hx appears to be relatively stable between  lb per EMR. Will add ONS to help pt meet nutrition needs. Malnutrition Assessment:  · Malnutrition Status: At risk for malnutrition  · Context: Acute illness or injury  · Findings of the 6 clinical characteristics of malnutrition (Minimum of 2 out of 6 clinical characteristics is required to make the diagnosis of moderate or severe Protein Calorie Malnutrition based on AND/ASPEN Guidelines):  1. Energy Intake-Less than or equal to 50% of estimated energy requirement, Greater than or equal to 7 days    2. Weight Loss-No significant weight loss,    3. Fat Loss-Unable to assess(Pt not appropriate),    4. Muscle Loss-Unable to assess,    5. Fluid Accumulation-No significant fluid accumulation,    6.   Strength-Not measured    Nutrition Risk Level: High    Nutrient Needs:  · Estimated Daily Total Kcal: 1569-5786(30-35 kcal/kg)  · Estimated Daily Protein (g): 52-60(1.2-1.4 g/kg)  · Estimated Daily Total Fluid (ml/day): 1 ml/kcal    Nutrition Diagnosis:   · Problem: Inadequate oral intake  · Etiology: related to Insufficient energy/nutrient consumption     Signs and symptoms:  as evidenced by Intake 0-25%    Objective Information:  · Nutrition-Focused Physical Findings: Non-verbal. Edentulous  · Wound Type: None  · Current Nutrition Therapies:  · Oral Diet Orders: General, Dental Soft   · Oral Diet intake: 1-25%  · Oral Nutrition Supplement (ONS) Orders: None  · ONS intake: Unable to

## 2020-02-28 NOTE — PROGRESS NOTES
Occupational Therapy/Physical Therapy  Pt is scheduled for TURP today. Please reorder OT/PT when medically appropriate. Thank you.   Dania Pritchett OTR/L  [address]

## 2020-02-28 NOTE — ANESTHESIA POSTPROCEDURE EVALUATION
Department of Anesthesiology  Postprocedure Note    Patient: Esther Johns  MRN: [id number]  YOB: 1960  Date of evaluation: 2/28/2020  Time:  11:06 AM     Procedure Summary     Date:  02/28/20 Room / Location:  [address] [place] [address] 03 [facility]    Anesthesia Start:  2151 Anesthesia Stop:  1592    Procedure:  CYSTOSCOPY, TRANSURETHRAL RESECTION OF PROSTATE (N/A Bladder) Diagnosis:  (-)    Surgeon:  Richard Antonio MD Responsible Provider:  Deepti Corbin MD    Anesthesia Type:  general ASA Status:  3          Anesthesia Type: No value filed. Saleem Phase I: Saleem Score: 8    Saleem Phase II:      Last vitals: Reviewed and per EMR flowsheets.    Vitals:    02/28/20 0950 02/28/20 0955 02/28/20 1000 02/28/20 1046   BP: 130/82 124/89 (!) 121/95 124/79   Pulse: 99 98 87 82   Resp: 11 (!) 7 8 10   Temp: 97.6 °F (36.4 °C)   97.9 °F (36.6 °C)   TempSrc: Temporal   Oral   SpO2: 99% 99% 98% 98%   Weight:       Height:           Anesthesia Post Evaluation    Patient location during evaluation: bedside  Patient participation: complete - patient cannot participate  Level of consciousness: awake and responsive to verbal stimuli  Airway patency: patent  Nausea & Vomiting: no nausea  Complications: no  Cardiovascular status: hemodynamically stable  Respiratory status: acceptable  Hydration status: euvolemic

## 2020-02-29 PROCEDURE — 2580000003 HC RX 258: Performed by: UROLOGY

## 2020-02-29 PROCEDURE — 6360000002 HC RX W HCPCS: Performed by: UROLOGY

## 2020-02-29 PROCEDURE — 6370000000 HC RX 637 (ALT 250 FOR IP): Performed by: UROLOGY

## 2020-02-29 PROCEDURE — 1200000000 HC SEMI PRIVATE

## 2020-02-29 RX ADMIN — LEVETIRACETAM 250 MG: 250 TABLET ORAL at 21:23

## 2020-02-29 RX ADMIN — TAMSULOSIN HYDROCHLORIDE 0.8 MG: 0.4 CAPSULE ORAL at 21:24

## 2020-02-29 RX ADMIN — QUETIAPINE FUMARATE 100 MG: 100 TABLET ORAL at 18:59

## 2020-02-29 RX ADMIN — METOPROLOL TARTRATE 12.5 MG: 25 TABLET ORAL at 21:24

## 2020-02-29 RX ADMIN — QUETIAPINE FUMARATE 50 MG: 100 TABLET ORAL at 06:40

## 2020-02-29 RX ADMIN — TRAZODONE HYDROCHLORIDE 150 MG: 50 TABLET ORAL at 21:24

## 2020-02-29 RX ADMIN — LACTULOSE 20 G: 20 SOLUTION ORAL at 21:23

## 2020-02-29 RX ADMIN — Medication 10 ML: at 21:23

## 2020-02-29 RX ADMIN — LORAZEPAM 1 MG: 2 INJECTION, SOLUTION INTRAMUSCULAR; INTRAVENOUS at 11:53

## 2020-02-29 RX ADMIN — CLOTRIMAZOLE: 10 CREAM TOPICAL at 21:22

## 2020-02-29 RX ADMIN — MORPHINE SULFATE 2 MG: 2 INJECTION, SOLUTION INTRAMUSCULAR; INTRAVENOUS at 12:17

## 2020-02-29 RX ADMIN — MORPHINE SULFATE 2 MG: 2 INJECTION, SOLUTION INTRAMUSCULAR; INTRAVENOUS at 00:16

## 2020-02-29 RX ADMIN — CEFAZOLIN SODIUM 2 G: 10 INJECTION, POWDER, FOR SOLUTION INTRAVENOUS at 03:25

## 2020-02-29 ASSESSMENT — PAIN SCALES - GENERAL
PAINLEVEL_OUTOF10: 6
PAINLEVEL_OUTOF10: 9
PAINLEVEL_OUTOF10: 0

## 2020-02-29 NOTE — PROGRESS NOTES
Hospitalist Progress Note      PCP: Reuben Powers MD    Date of Admission: 2/23/2020    Chief Complaint: Urinary retention    Hospital Course: Reviewed H&P    Subjective:     S/p cystoscopy/ TURP yesterday. Pt non verbal at baseline. Sitter at bedside. CBI stopped today     Medications:  Reviewed    Scheduled Medications    LORazepam  0.5 mg Oral Once    polyethylene glycol  17 g Oral Daily    clotrimazole   Topical BID    levETIRAcetam  250 mg Oral BID    megestrol acetate  400 mg Oral Daily    sodium chloride flush  10 mL Intravenous 2 times per day    enoxaparin  40 mg Subcutaneous Daily    tamsulosin  0.8 mg Oral Nightly    lactulose  20 g Oral BID    metoprolol tartrate  12.5 mg Oral BID    traZODone  150 mg Oral Nightly    sennosides-docusate sodium  1 tablet Oral BID    QUEtiapine  50 mg Oral 2 times per day    QUEtiapine  100 mg Oral Daily    QUEtiapine  150 mg Oral Daily     PRN Meds: morphine, LORazepam, sodium chloride flush, acetaminophen, acetaminophen, magnesium hydroxide, promethazine, ondansetron, perflutren lipid microspheres, melatonin      Intake/Output Summary (Last 24 hours) at 2/29/2020 0954  Last data filed at 2/29/2020 0757  Gross per 24 hour   Intake 2310 ml   Output -25 ml   Net 2335 ml       Physical Exam Performed:  BP (!) 111/56   Pulse 71   Temp 98.1 °F (36.7 °C) (Axillary)   Resp 18   Ht 4' 6\" (1.372 m)   Wt 94 lb 12.8 oz (43 kg)   SpO2 95%   BMI 22.86 kg/m²     General appearance: No apparent distress, appears stated age and cooperative. HEENT: Pupils equal, round, and reactive to light. Conjunctivae/corneas clear. Neck: Supple, with full range of motion. No jugular venous distention. Trachea midline. Respiratory:  Normal respiratory effort. Clear to auscultation, bilaterally without Rales/Wheezes/Rhonchi. Cardiovascular: Regular rate and rhythm with normal S1/S2 without murmurs, rubs or gallops.   Abdomen: Soft, non-tender, non-distended with normal bowel sounds. Musculoskeletal: No clubbing, cyanosis or edema bilaterally. Full range of motion without deformity. Skin: Warm and dry   Neurologic: No overt facial droop noted psychiatric: Lacks insight and non verbal at baseline   Capillary Refill: Brisk,< 3 seconds   Peripheral Pulses: +2 palpable, equal bilaterally       Labs:   No results for input(s): WBC, HGB, HCT, PLT in the last 72 hours. No results for input(s): NA, K, CL, CO2, BUN, CREATININE, CALCIUM, PHOS in the last 72 hours. Invalid input(s): MAGNES  Urinalysis:   Lab Results   Component Value Date    NITRU Negative 02/23/2020    WBCUA >100 02/04/2020    BACTERIA Rare 02/04/2020    RBCUA see below 02/04/2020    BLOODU Negative 02/23/2020    SPECGRAV 1.025 02/23/2020    GLUCOSEU Negative 02/23/2020       Radiology:  XR Acute Abd Series Chest 1 VW   Final Result   Moderate gaseous distension of the stomach, new compared to prior. Moderate to large amount of gas and stool throughout nondilated bowel loops   likely related to ileus and/or fecal stasis. No significant interval change. XR ABDOMEN (KUB) (SINGLE AP VIEW)   Final Result   1. Ileus. 2. Bilateral renal calculi, not appreciably changed. CT ABDOMEN PELVIS WO CONTRAST Additional Contrast? None   Final Result   1. No acute process. No obstructive uropathy   2. Bilateral nephrolithiasis           Assessment/Plan:  Active Hospital Problems    Diagnosis Date Noted    Other constipation [K59.09]     Elevated troponin [R79.89] 02/23/2020    Subdural hematoma ([facility] [address] [S06.5X9A] 12/02/2019     Acute urinary retention: ly placed. S/p cystoscopy/ TURP on  2/28/20. CBI d/boyd. Voiding trial today. Elevated troponin (POA): likely demand ischemia due to sinus tachycardia noted on EKG. Echo showed normal LVEF with no regional wall motion abnormalities.  No further testing per cardio needed.          Hx of acute subdural hematoma s/p right craniotomy with evacuation of subdural hematoma on 12/02/2019. Follows with Dr. Concepcion Watson, neurosurgery. Stable on recent CT. Continue oral keppra for seizure prophylaxis            Chronic bipolar disorder with intermittent bursts of agitation: chronic and at baseline per report. Continue home seroquel, trazodone, Ativan PRN, restraints as needed and sitter for pt's safety.      Hx of chronic paroxysmal a. Fib:  Continue  Metoprolol,  Not on any AC which seems appropriate due to hx of frequent falls    Acute on chronic constipation with questionable ileus. Abdominal x-ray on 2/24/2020 suggestive of stool loaded colon with concerns for possible ileus. Gen surg consulted. Had multiple BM's after enema. Continue bowel regimen. DVT Prophylaxis: Lovenox   Diet: Dietary Nutrition Supplements: Standard High Calorie Oral Supplement  DIET GENERAL; Dental Soft  Code Status: Full Code        Dispo -likely back to group home when okay with urology and arrangement completed by .      Nancy Cerda MD

## 2020-03-01 VITALS
HEART RATE: 104 BPM | TEMPERATURE: 98.1 F | OXYGEN SATURATION: 95 % | DIASTOLIC BLOOD PRESSURE: 82 MMHG | HEIGHT: 60 IN | BODY MASS INDEX: 18.61 KG/M2 | RESPIRATION RATE: 28 BRPM | WEIGHT: 94.8 LBS | SYSTOLIC BLOOD PRESSURE: 135 MMHG

## 2020-03-01 PROBLEM — I48.20 ATRIAL FIBRILLATION, CHRONIC (HCC): Status: ACTIVE | Noted: 2020-03-01

## 2020-03-01 PROBLEM — S06.5XAA SUBDURAL HEMATOMA: Status: RESOLVED | Noted: 2019-12-02 | Resolved: 2020-03-01

## 2020-03-01 PROBLEM — Z86.79 HISTORY OF SUBDURAL HEMATOMA: Status: ACTIVE | Noted: 2020-03-01

## 2020-03-01 PROBLEM — R33.8 ACUTE URINARY RETENTION: Status: ACTIVE | Noted: 2020-03-01

## 2020-03-01 PROCEDURE — 2580000003 HC RX 258: Performed by: UROLOGY

## 2020-03-01 PROCEDURE — 6360000002 HC RX W HCPCS: Performed by: UROLOGY

## 2020-03-01 PROCEDURE — 6370000000 HC RX 637 (ALT 250 FOR IP): Performed by: UROLOGY

## 2020-03-01 PROCEDURE — 6360000002 HC RX W HCPCS: Performed by: INTERNAL MEDICINE

## 2020-03-01 RX ORDER — POLYETHYLENE GLYCOL 3350 17 G/17G
17 POWDER, FOR SOLUTION ORAL DAILY
Qty: 527 G | Refills: 1 | Status: SHIPPED | OUTPATIENT
Start: 2020-03-01 | End: 2020-03-31

## 2020-03-01 RX ORDER — LORAZEPAM 2 MG/ML
1 INJECTION INTRAMUSCULAR ONCE
Status: COMPLETED | OUTPATIENT
Start: 2020-03-01 | End: 2020-03-01

## 2020-03-01 RX ADMIN — LORAZEPAM 1 MG: 2 INJECTION, SOLUTION INTRAMUSCULAR; INTRAVENOUS at 09:42

## 2020-03-01 RX ADMIN — LEVETIRACETAM 250 MG: 250 TABLET ORAL at 09:39

## 2020-03-01 RX ADMIN — ENOXAPARIN SODIUM 40 MG: 40 INJECTION SUBCUTANEOUS at 09:38

## 2020-03-01 RX ADMIN — CLOTRIMAZOLE: 10 CREAM TOPICAL at 10:06

## 2020-03-01 RX ADMIN — METOPROLOL TARTRATE 12.5 MG: 25 TABLET ORAL at 09:40

## 2020-03-01 RX ADMIN — Medication 10 ML: at 09:04

## 2020-03-01 RX ADMIN — QUETIAPINE FUMARATE 50 MG: 100 TABLET ORAL at 13:00

## 2020-03-01 RX ADMIN — LORAZEPAM 1 MG: 2 INJECTION, SOLUTION INTRAMUSCULAR; INTRAVENOUS at 06:14

## 2020-03-01 RX ADMIN — QUETIAPINE FUMARATE 50 MG: 100 TABLET ORAL at 09:56

## 2020-03-01 NOTE — DISCHARGE SUMMARY
Hospital Medicine Discharge Summary    Patient ID: Logan Keene      Patient's PCP: Shanta Cosme MD    Admit Date: 2/23/2020     Discharge Date: 3/1/2020      Admitting Physician: Jazz Lizarraga MD     Discharge Physician: Reji Joyce MD     Discharge Diagnoses: Active Hospital Problems    Diagnosis    Acute urinary retention [R33.8]     Priority: High    Atrial fibrillation, chronic [I48.20]    History of subdural hematoma [Z86.79]    Constipation [K59.00]    Elevated troponin [R79.89]       The patient was seen and examined on day of discharge and this discharge summary is in conjunction with any daily progress note from day of discharge. HPI on 2/23/2020:   62 y.o. male, with PMH of bipolar disorder, developmental nonverbal disorder, A. fib, and recent subdural hematoma status post craniotomy, who presented to [facility] with progressively worsening constipation and urinary retention. History was obtained from the patient's caregiver and review of the EMR. Per EMR, the patient had a subdural hematoma in December 2019 for which he had a craniotomy with subdural hematoma evacuation performed on 12/2/2019. Per EMR, one of his caregivers has noted that the patient has been increasingly more constipated and having issues with urinary retention. HPI is very limited as patient is nonverbal, but he appears comfortable in bed during my exam. It is quite unclear whether he is having any chest pain. His abdomen does seem distended, but he does not react to deep palpation of his abdomen. At the time of this assessment, the patient was resting comfortably in bed. Hospital Course:    Acute urinary retention: ly placed. S/p cystoscopy/ TURP on  2/28/20. CBI d/boyd. Ly was removed and was voiding fine. Outpatient f/u with urology         Elevated troponin (POA): likely demand ischemia due to sinus tachycardia noted on EKG.   Echo showed normal LVEF with no regional HCT 36.6 02/25/2020     02/25/2020       Renal:    Lab Results   Component Value Date     02/25/2020    K 3.9 02/25/2020     02/25/2020    CO2 25 02/25/2020    BUN 12 02/25/2020    CREATININE 0.7 02/25/2020    CALCIUM 9.3 02/25/2020         Significant Diagnostic Studies    Radiology:   XR Acute Abd Series Chest 1 VW   Final Result   Moderate gaseous distension of the stomach, new compared to prior. Moderate to large amount of gas and stool throughout nondilated bowel loops   likely related to ileus and/or fecal stasis. No significant interval change. XR ABDOMEN (KUB) (SINGLE AP VIEW)   Final Result   1. Ileus. 2. Bilateral renal calculi, not appreciably changed. CT ABDOMEN PELVIS WO CONTRAST Additional Contrast? None   Final Result   1. No acute process. No obstructive uropathy   2.  Bilateral nephrolithiasis                Consults:     IP CONSULT TO HOSPITALIST  IP CONSULT TO CASE MANAGEMENT  IP CONSULT TO CARDIOLOGY  IP CONSULT TO UROLOGY  IP CONSULT TO GENERAL SURGERY    Disposition: Back to group home    Condition at Discharge: Stable    Discharge Instructions/Follow-up: Follow-up with urology in 1 month    Code Status:  Prior     Activity: activity as tolerated    Diet: cardiac diet      Discharge Medications:     Discharge Medication List as of 3/1/2020 12:15 PM           Details   polyethylene glycol (GLYCOLAX) packet Take 17 g by mouth daily, Disp-527 g, R-1Normal              Details   melatonin 3 MG TABS tablet Take 3 mg by mouth nightlyHistorical Med      lactulose (CHRONULAC) 10 GM/15ML solution Take 20 g by mouth 2 times dailyHistorical Med      acetaminophen (PHARBETOL) 325 MG tablet Take 2 tablets by mouth every 6 hours as needed for Pain, Disp-120 tablet, R-0Print      ibuprofen (ADVIL;MOTRIN) 400 MG tablet Take 1 tablet by mouth every 6 hours as needed for Pain, Disp-30 tablet, R-0Print      clotrimazole (LOTRIMIN) 1 % cream Apply topically 2 times daily Apply topically 2 times daily. , Topical, 2 TIMES DAILY, Historical Med      levETIRAcetam (KEPPRA) 250 MG tablet Take 250 mg by mouth 2 times dailyHistorical Med      tamsulosin (FLOMAX) 0.4 MG capsule Take 0.8 mg by mouth dailyHistorical Med      megestrol acetate (MEGACE) 400 MG/10ML SUSP Take 400 mg by mouth daily 10ml every eveningHistorical Med      ondansetron (ZOFRAN) 4 MG tablet Take 4 mg by mouth every 8 hours as needed for Nausea or VomitingHistorical Med      traZODone (DESYREL) 50 MG tablet 1 tablet by Per NG tube route nightly, Disp-30 tablet, R-0Normal      metoprolol tartrate (LOPRESSOR) 25 MG tablet 1 tablet by Per NG tube route 2 times daily, Disp-60 tablet, R-3Normal      sennosides-docusate sodium (SENOKOT-S) 8.6-50 MG tablet Take 2 tablets by mouth every 72 hours, Disp-30 tablet, R-0Normal      QUEtiapine (SEROQUEL XR) 50 MG extended release tablet Take 100 mg by mouth 3 times daily 50mg in AM  100mg @ 1600  150mg @ 1900Historical Med             Time Spent on discharge is more than 30 minutes in the examination, evaluation, counseling and review of medications and discharge plan. Signed:    Lacie Brush MD   3/1/2020      Thank you Ana Cheatham MD for the opportunity to be involved in this patient's care. If you have any questions or concerns please feel free to contact me at [phone].

## 2020-03-01 NOTE — PROGRESS NOTES
Urology Attending Progress Note      Subjective: ly out and voiding into depends    Vitals:  /82   Pulse 104   Temp 98.1 °F (36.7 °C) (Axillary)   Resp 28   Ht 4' 6\" (1.372 m)   Wt 94 lb 12.8 oz (43 kg)   SpO2 95%   BMI 22.86 kg/m²   Temp  Av.1 °F (36.7 °C)  Min: 97.9 °F (36.6 °C)  Max: 98.2 °F (36.8 °C)    Intake/Output Summary (Last 24 hours) at 3/1/2020 1006  Last data filed at 2020 2122  Gross per 24 hour   Intake 370 ml   Output --   Net 370 ml       Exam: Abdomen not distended    Labs:  WBC:    Lab Results   Component Value Date    WBC 5.5 2020     Hemoglobin/Hematocrit:    Lab Results   Component Value Date    HGB 12.2 2020    HCT 36.6 2020     BMP:    Lab Results   Component Value Date     2020    K 3.9 2020     2020    CO2 25 2020    BUN 12 2020    LABALBU 3.9 2020    CREATININE 0.7 2020    CALCIUM 9.3 2020    GFRAA >60 2020    LABGLOM >60 2020     PT/INR:    Lab Results   Component Value Date    PROTIME 12.5 2020    INR 1.08 2020     PTT:  No results found for: APTT[APTT        Impression/Plan: seems to be voiding fine  Okay for discharge  Follow-up with Dr. Vianney Ny in 1 month (or even just prn)    Francisca Kearney MD

## 2020-03-01 NOTE — DISCHARGE INSTR - COC
Continuity of Care Form    Patient Name: Tim Bourgeois   :  1960  MRN:  [id number]    Admit date:  2020  Discharge date:  20      Code Status Order: Full Code   Advance Directives:   Advance Care Flowsheet Documentation     Date/Time Healthcare Directive Type of Healthcare Directive Copy in [address] Agent's Name Healthcare Agent's Phone Number    20 [phone]  No, patient does not have an advance directive for healthcare treatment -- -- -- -- --          Admitting Physician:  Magui Pro MD  PCP: Dominique Robertson MD    Discharging Nurse: [facility] Unit/Room#: 0213/4758-04  Discharging Unit Phone Number: [phone]    Emergency Contact:   Extended Emergency Contact Information  Primary Emergency Contact: [facility]  Address: [address] [place] [address] [address] [place] [address]  Home Phone: [phone]  Mobile Phone: [phone]  Relation: Legal Guardian  Secondary Emergency Contact: Bobby Leroy  [place] Phone: [phone]  Relation: Legal Guardian    Past Surgical History:  Past Surgical History:   Procedure Laterality Date    CATARACT REMOVAL      COLONOSCOPY  2016    incomplete, poor prep    CRANIOTOMY Right 2019    Right Trephine Craniotomy For Evacuation of Subdural Hematoma performed by Vane Truong MD at [address] [place] [address] TUR N/A 2020    CYSTOSCOPY, TRANSURETHRAL RESECTION OF PROSTATE performed by Keon Weber MD at [facility]       Immunization History:   Immunization History   Administered Date(s) Administered    Td, unspecified formulation 2011    Tdap (Boostrix, Adacel) 2013, 2014       Active Problems:  Patient Active Problem List   Diagnosis Code    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 [address] S06.5X9S    Elevated troponin R79.89    Constipation K59.00    Acute urinary retention R33.8    Atrial fibrillation, chronic I48.20    History of subdural hematoma Z86.79       Isolation/Infection:   Isolation          No Isolation        Patient Infection Status     None to display          Nurse Assessment:  Last Vital Signs: /82   Pulse 104   Temp 98.1 °F (36.7 °C) (Axillary)   Resp 28   Ht 4' 6\" (1.372 m)   Wt 94 lb 12.8 oz (43 kg)   SpO2 95%   BMI 22.86 kg/m²     Last documented pain score (0-10 scale): Pain Level: 9  Last Weight:   Wt Readings from Last 1 Encounters:   02/23/20 94 lb 12.8 oz (43 kg)     Mental Status:  alert    IV Access:  - None    Nursing Mobility/ADLs:  Walking   Dependent  Transfer  Dependent  Bathing  Dependent  Dressing  Dependent  Toileting  Dependent  Feeding  Dependent  Med Admin  Dependent  Med Delivery   whole and prefers mixed with pudding    Wound Care Documentation and Therapy:        Elimination:  Continence:   · Bowel: Yes  · Bladder: Yes  Urinary Catheter: None   Colostomy/Ileostomy/Ileal Conduit: No       Date of Last BM: 03/01/20      Intake/Output Summary (Last 24 hours) at 3/1/2020 1137  Last data filed at 2/29/2020 2122  Gross per 24 hour   Intake 370 ml   Output --   Net 370 ml     I/O last 3 completed shifts: In: 370 [P.O.:360; I.V.:10]  Out: 600 [Urine:600]    Safety Concerns: At Risk for Falls and History of Seizures    Impairments/Disabilities:      Speech and Mentation    Nutrition Therapy:  Current Nutrition Therapy:   - Oral Diet:  Dysphagia 1 pureed    Routes of Feeding: Oral  Liquids: No Restrictions  Daily Fluid Restriction: no  Last Modified Barium Swallow with Video (Video Swallowing Test): not done    Treatments at the Time of Hospital Discharge:   Respiratory Treatments: NA  Oxygen Therapy:  is not on home oxygen therapy.   Ventilator:    - No ventilator support    Rehab Therapies: NA  Weight Bearing Status/Restrictions: No weight bearing restirctions  Other Medical Equipment (for information only, NOT a DME order):  wheelchair and hospital bed  Other Treatments:     Patient's personal belongings (please select all that are sent with patient):  None    RN SIGNATURE:  Electronically signed by Reshma Saul RN on 3/1/20 at 11:42 AM    CASE MANAGEMENT/SOCIAL WORK SECTION    Inpatient Status Date: ***    Readmission Risk Assessment Score:  Readmission Risk              Risk of Unplanned Readmission:        18           Discharging to Facility/ Agency   · Name:   · Address:  · Phone:  · Fax:    Dialysis Facility (if applicable)   · Name:  · Address:  · Dialysis Schedule:  · Phone:  · Fax:    / signature: {Esignature:[id number]}    PHYSICIAN SECTION    Prognosis: Fair    Condition at Discharge: Stable    Rehab Potential (if transferring to Rehab): Fair    Recommended Labs or Other Treatments After Discharge: Follow up with urology in one month     Physician Certification: I certify the above information and transfer of Efraín Spears  is necessary for the continuing treatment of the diagnosis listed and that he requires care at group home  for less 30 days.      Update Admission H&P: No change in H&P    PHYSICIAN SIGNATURE:  Electronically signed by Bhavna Reddy MD on 3/1/20 at 12:08 PM

## 2020-03-01 NOTE — PROGRESS NOTES
IV removed without complication. Discharged with documented belongings. Patient under care of [facility] in [facility]. Belkys López here for transportation. Denies questions on patient report. Medications gone through with times of last given. Helmet applied. In wheelchair with safety in place.

## 2020-03-01 NOTE — CARE COORDINATION
CASE MANAGEMENT DISCHARGE SUMMARY      Discharge to: group home      Transportation:   Care giver to transport. Lieutenant Ball will be here at [phone] to transport     Confirmed discharge plan with: RN, caregiver Benkirill Ball     Patient: yes er RN     Family, name and contact number: left message with Guardian at [phone]       RN, name: Adela Lockhart    Note: Discharging nurse to complete LEELEE, reconcile AVS, and place final copy with patient's discharge packet. RN to ensure that written prescriptions for  Level II medications are sent with patient to the facility as per protocol.

## 2020-03-06 ENCOUNTER — APPOINTMENT (OUTPATIENT)
Dept: CT IMAGING | Age: 60
End: 2020-03-06
Payer: MEDICARE

## 2020-03-06 ENCOUNTER — HOSPITAL ENCOUNTER (EMERGENCY)
Age: 60
Discharge: HOME | End: 2020-03-06
Payer: MEDICARE

## 2020-03-06 VITALS
WEIGHT: 100 LBS | SYSTOLIC BLOOD PRESSURE: 113 MMHG | DIASTOLIC BLOOD PRESSURE: 97 MMHG | OXYGEN SATURATION: 97 % | HEART RATE: 89 BPM | BODY MASS INDEX: 19.63 KG/M2 | RESPIRATION RATE: 16 BRPM | HEIGHT: 60 IN

## 2020-03-06 PROCEDURE — 6360000002 HC RX W HCPCS: Performed by: NURSE PRACTITIONER

## 2020-03-06 PROCEDURE — 96372 THER/PROPH/DIAG INJ SC/IM: CPT

## 2020-03-06 PROCEDURE — 70450 CT HEAD/BRAIN W/O DYE: CPT

## 2020-03-06 PROCEDURE — 99283 EMERGENCY DEPT VISIT LOW MDM: CPT

## 2020-03-06 RX ORDER — LORAZEPAM 2 MG/ML
1 INJECTION INTRAMUSCULAR ONCE
Status: COMPLETED | OUTPATIENT
Start: 2020-03-06 | End: 2020-03-06

## 2020-03-06 RX ORDER — HALOPERIDOL 5 MG/ML
5 INJECTION INTRAMUSCULAR ONCE
Status: DISCONTINUED | OUTPATIENT
Start: 2020-03-06 | End: 2020-03-06

## 2020-03-06 RX ADMIN — LORAZEPAM 1 MG: 2 INJECTION, SOLUTION INTRAMUSCULAR; INTRAVENOUS at 18:12

## 2020-03-06 RX ADMIN — LORAZEPAM 1 MG: 2 INJECTION, SOLUTION INTRAMUSCULAR; INTRAVENOUS at 17:56

## 2020-03-07 NOTE — ED NOTES
Pt instructed to follow up with PCP. Assessed per Marylynn Rubinstein NP.      Mauricio Ye, NAYELY  90/18/21 [id number]

## 2020-03-07 NOTE — ED PROVIDER NOTES
Evaluated by Advanced Practice Provider    [facility]  ED       CHIEF COMPLAINT    Fall (Pt slid off toilet, small gash on head)    HISTORY OF PRESENT ILLNESS  Duane A Marlyse Collet is a 62 y.o. male who presents to the ED complaining of fall/head injury. This injury occurred: Just prior to arrival in the ED. Patient has known history of mental retardation/developmental disorder with significantly low IQ, he is nonverbal.  He slid off the toilet and struck his head, there are abrasions present. There is no reports of loss of consciousness or vomiting. Per his care providers were present he is behaving typical for himself. They have protocol at his facility that if he hits his head he is supposed to come to the ED for CT scan of his head. He does require medications for sedation as he will not lay still in the scanner. Patient does have a history of a head bleed in the past.  Tetanus vaccine is up-to-date as it was last given in 2018. The patient arrived to the ED via private car. Patient is accompanied by caregivers who is bedside for the visit.     PAST MEDICAL HISTORY    Past Medical History:   Diagnosis Date    Bipolar disorder ([facility] [address]     Developmental non-verbal disorder     Impulse control disorder     Influenza A 2/13/14    Mental retardation, profound (I.Q. < 20)     Osteopenia        SURGICAL HISTORY    Past Surgical History:   Procedure Laterality Date    CATARACT REMOVAL      COLONOSCOPY  06/20/2016    incomplete, poor prep    CRANIOTOMY Right 12/2/2019    Right Trephine Craniotomy For Evacuation of Subdural Hematoma performed by Ashleigh Lane MD at [address] TURP N/A 2/28/2020    CYSTOSCOPY, TRANSURETHRAL RESECTION OF PROSTATE performed by Bessie Diaz MD at [facility]    Current Outpatient Rx   Medication Sig Dispense Refill    polyethylene glycol (GLYCOLAX) packet Take 17 g by mouth daily 527 g 1    melatonin 3 MG TABS tablet Take 3 mg by mouth nightly      lactulose (CHRONULAC) 10 GM/15ML solution Take 20 g by mouth 2 times daily      acetaminophen (PHARBETOL) 325 MG tablet Take 2 tablets by mouth every 6 hours as needed for Pain 120 tablet 0    ibuprofen (ADVIL;MOTRIN) 400 MG tablet Take 1 tablet by mouth every 6 hours as needed for Pain 30 tablet 0    clotrimazole (LOTRIMIN) 1 % cream Apply topically 2 times daily Apply topically 2 times daily.  levETIRAcetam (KEPPRA) 250 MG tablet Take 250 mg by mouth 2 times daily      tamsulosin (FLOMAX) 0.4 MG capsule Take 0.8 mg by mouth daily      megestrol acetate (MEGACE) 400 MG/10ML SUSP Take 400 mg by mouth daily 10ml every evening      ondansetron (ZOFRAN) 4 MG tablet Take 4 mg by mouth every 8 hours as needed for Nausea or Vomiting      traZODone (DESYREL) 50 MG tablet 1 tablet by Per NG tube route nightly 30 tablet 0    metoprolol tartrate (LOPRESSOR) 25 MG tablet 1 tablet by Per NG tube route 2 times daily 60 tablet 3    sennosides-docusate sodium (SENOKOT-S) 8.6-50 MG tablet Take 2 tablets by mouth every 72 hours 30 tablet 0    QUEtiapine (SEROQUEL XR) 50 MG extended release tablet Take 100 mg by mouth 3 times daily 50mg in AM  100mg @ 1600  150mg @ 1900         ALLERGIES    Allergies   Allergen Reactions    Benadryl [Diphenhydramine] Other (See Comments)     Pt becomes agitated and aggressive with Benadryl    Clonidine Derivatives     Penicillins     Tetracyclines & Related        FAMILY HISTORY    History reviewed. No pertinent family history.     SOCIAL HISTORY    Social History     Socioeconomic History    Marital status: Single     Spouse name: None    Number of children: None    Years of education: None    Highest education level: None   Occupational History    None   Social Needs    Financial resource strain: None    Food insecurity:     Worry: None     Inability: None    Transportation needs:     Medical: None     Non-medical: None   Tobacco Use    Smoking status: Never Smoker    Smokeless tobacco: Never Used   Substance and Sexual Activity    Alcohol use: No    Drug use: No    Sexual activity: Not Currently   Lifestyle    Physical activity:     Days per week: None     Minutes per session: None    Stress: None   Relationships    Social connections:     Talks on phone: None     Gets together: None     Attends [employer]ism service: None     Active member of club or organization: None     Attends meetings of clubs or organizations: None     Relationship status: None    Intimate partner violence:     Fear of current or ex partner: None     Emotionally abused: None     Physically abused: None     Forced sexual activity: None   Other Topics Concern    None   Social History Narrative    None       REVIEW OF SYSTEMS    Unable to perform due to cognitive condition. PHYSICAL EXAM  Vitals:    03/06/20 1951   BP: (!) 113/97   Pulse: 89   Resp: 16   SpO2: 97%     GENERAL: Patient is well-developed, well-nourished,  no acute distress. No apparent discomfort. Non toxic appearing. HEENT:  Normocephalic. Abrasions observed to the top of the scalp. There is no raccoon's eyes or battles sign observed. PERRL. EOMI. Conjunctiva appear normal.  Left external ear with cauliflower appearance. NECK: Supple with normal ROM. Trachea midline. There is no tenderness to palpation of the cervical midline spine. LUNGS:  Normal work of breathing. Chest elevation is symmetric. CADIOVASCULAR:  Regular rate and rhythm. GI: Nondistended. EXTREMITIES: Normal ROM, no edema, or deformity. Distal pulses palpable. No gross deformities or trauma noted. Moving all extremities equally and appropriately. SKIN: Warm/dry. Abrasions to the top of the scalp that are superficial in depth. Minimal discharge present. . No rashes/lesions noted. PSYCHIATRIC: Patient is alert and behaving per his baseline. NEUROLOGIC: Per caregivers at bedside patient is behaving per his baseline.   I have seen this patient in the past and he is behaving per his usual when I have seen him. Procedure  None    LABS  No results found for this visit on 03/06/20. RADIOLOGY    Ct Head Wo Contrast    Result Date: 3/6/2020  EXAMINATION: CT OF THE HEAD WITHOUT CONTRAST  3/6/2020 7:14 pm TECHNIQUE: CT of the head was performed without the administration of intravenous contrast. Dose modulation, iterative reconstruction, and/or weight based adjustment of the mA/kV was utilized to reduce the radiation dose to as low as reasonably achievable. COMPARISON: 02/04/2020 HISTORY: ORDERING SYSTEM PROVIDED HISTORY: head injury TECHNOLOGIST PROVIDED HISTORY: If patient is on cardiac monitor and/or pulse ox, they may be taken off cardiac monitor and pulse ox, left on O2 if currently on. All monitors reattached when patient returns to room. Has a \"code stroke\" or \"stroke alert\" been called? ->No Reason for exam:->head injury Reason for Exam: pt fell and hit right side of head Acuity: Acute Type of Exam: Initial Headache. FINDINGS: BRAIN/VENTRICLES: There is no acute intracranial hemorrhage, mass effect or midline shift. No abnormal extra-axial fluid collection. The gray-white differentiation is maintained without evidence of an acute infarct. There is prominence of the ventricles and sulci due to global parenchymal volume loss. There are nonspecific areas of hypoattenuation within the periventricular and subcortical white matter, which likely represent chronic microvascular ischemic change. ORBITS: The visualized portion of the orbits demonstrate no acute abnormality. SINUSES: The visualized paranasal sinuses and mastoid air cells demonstrate no acute abnormality. SOFT TISSUES/SKULL: No acute abnormality of the visualized skull or soft tissues. Postsurgical changes of remote right lateral frontal craniotomy are present. No acute intracranial abnormality. ED COURSE/MDM  Patient seen and evaluated. Old records reviewed.  Diagnostic testing reviewed and results discussed. I have evaluated this patient. My supervising physician was available for consultation. Duane A Carota presented to the ED today with above noted complaints. Physical exam reveals lesions to the top of the scalp, patient is behaving per his usual per caregivers. I have taken care of him in the past and he is behaving typically for what I have seen. The patient is without signs of basilar skull fracture. CT scan of the head was obtained and this is without acute findings. Abrasions to the scalp are not appropriate for repair. While in ED patient received   Medications   LORazepam (ATIVAN) injection 1 mg (1 mg Intramuscular Given 3/6/20 1756)   LORazepam (ATIVAN) injection 1 mg (1 mg Intramuscular Given 3/6/20 1812)       At this point I do not feel the patient requires further work up and it is reasonable to discharge the patient. A discussion was had with the patient regarding diagnosis, diagnostic testing results, treatment/ plan of care, and follow up. All questions were answered. Patient will follow up as directed for further evaluation/treatment. The patient was given strict return precautions as we discussed symptoms that would necessitate return to the ED. Patient will return to ED for new/worsening symptoms. The patient verbalized their understanding and agreement with the above plan. Please refer to AVS for further details of the discharge instructions. Patient was sent home with a prescription for below medication/s. I did [place] patient on appropriate use of these medication. Discharge Medication List as of 3/6/2020  7:56 PM          I estimate there is LOW risk for SUBARACHNOID HEMORRHAGE, MENINGITIS, INTRACRANIAL HEMORRHAGE, SUBDURAL HEMATOMA, OR STROKE, thus I consider the discharge disposition reasonable. Duane A Carota and I have discussed the diagnosis and risks, and we agree with discharging home to follow-up with their primary doctor.

## 2020-03-24 PROBLEM — R77.8 ELEVATED TROPONIN: Status: RESOLVED | Noted: 2020-02-23 | Resolved: 2020-03-24

## 2020-04-03 ENCOUNTER — HOSPITAL ENCOUNTER (EMERGENCY)
Age: 60
Discharge: HOME | End: 2020-04-03
Payer: MEDICARE

## 2020-04-03 VITALS — RESPIRATION RATE: 16 BRPM | OXYGEN SATURATION: 98 % | TEMPERATURE: 98.1 F | HEART RATE: 79 BPM

## 2020-04-03 PROCEDURE — 99282 EMERGENCY DEPT VISIT SF MDM: CPT

## 2020-04-03 RX ORDER — LORAZEPAM 2 MG/ML
1 INJECTION INTRAMUSCULAR ONCE
Status: DISCONTINUED | OUTPATIENT
Start: 2020-04-03 | End: 2020-04-03

## 2020-04-03 NOTE — ED PROVIDER NOTES
IBUPROFEN (ADVIL;MOTRIN) 400 MG TABLET    Take 1 tablet by mouth every 6 hours as needed for Pain    LACTULOSE (CHRONULAC) 10 GM/15ML SOLUTION    Take 20 g by mouth 2 times daily    LEVETIRACETAM (KEPPRA) 250 MG TABLET    Take 250 mg by mouth 2 times daily    MEGESTROL ACETATE (MEGACE) 400 MG/10ML SUSP    Take 400 mg by mouth daily 10ml every evening    MELATONIN 3 MG TABS TABLET    Take 3 mg by mouth nightly    METOPROLOL TARTRATE (LOPRESSOR) 25 MG TABLET    1 tablet by Per NG tube route 2 times daily    ONDANSETRON (ZOFRAN) 4 MG TABLET    Take 4 mg by mouth every 8 hours as needed for Nausea or Vomiting    QUETIAPINE (SEROQUEL XR) 50 MG EXTENDED RELEASE TABLET    Take 100 mg by mouth 3 times daily 50mg in AM  100mg @ 1600  150mg @ 1900    SENNOSIDES-DOCUSATE SODIUM (SENOKOT-S) 8.6-50 MG TABLET    Take 2 tablets by mouth every 72 hours    TAMSULOSIN (FLOMAX) 0.4 MG CAPSULE    Take 0.8 mg by mouth daily    TRAZODONE (DESYREL) 50 MG TABLET    1 tablet by Per NG tube route nightly         Review of Systems:  Review of Systems   Unable to perform ROS: Patient nonverbal     Positives and Pertinent negatives as per HPI. Except as noted above in the ROS, problem specific ROS was completed and is negative. Physical Exam:  Physical Exam  Constitutional:       General: He is not in acute distress. Appearance: Normal appearance. He is normal weight. He is not ill-appearing, toxic-appearing or diaphoretic. HENT:      Head: Normocephalic and atraumatic. Nose: Nose normal.      Mouth/Throat:      Mouth: Mucous membranes are moist.      Pharynx: Oropharynx is clear. Neck:      Musculoskeletal: Normal range of motion and neck supple. Cardiovascular:      Rate and Rhythm: Normal rate and regular rhythm. Pulses: Normal pulses. Heart sounds: Normal heart sounds. Pulmonary:      Effort: Pulmonary effort is normal. No respiratory distress. Breath sounds: Normal breath sounds. No stridor.  No wheezing,

## 2020-04-03 NOTE — ED NOTES
Patient with moderate sized stool upon entering room. Updated M. Franko Spencer NP.       Zuleyma Lin RN  04/03/20 0112

## 2020-04-03 NOTE — ED NOTES
Bed: 14  Expected date:   Expected time:   Means of arrival:   Comments:  14 Faustina Miranda RN  04/03/20 7257

## 2020-04-17 ENCOUNTER — HOSPITAL ENCOUNTER (EMERGENCY)
Age: 60
Discharge: HOME | End: 2020-04-17
Payer: MEDICARE

## 2020-04-17 ENCOUNTER — APPOINTMENT (OUTPATIENT)
Dept: GENERAL RADIOLOGY | Age: 60
End: 2020-04-17
Payer: MEDICARE

## 2020-04-17 VITALS
HEART RATE: 86 BPM | RESPIRATION RATE: 18 BRPM | BODY MASS INDEX: 20.42 KG/M2 | SYSTOLIC BLOOD PRESSURE: 125 MMHG | WEIGHT: 104 LBS | OXYGEN SATURATION: 100 % | TEMPERATURE: 97.5 F | DIASTOLIC BLOOD PRESSURE: 75 MMHG | HEIGHT: 60 IN

## 2020-04-17 LAB
A/G RATIO: 1.4 (ref 1.1–2.2)
ALBUMIN SERPL-MCNC: 4 G/DL (ref 3.4–5)
ALP BLD-CCNC: 106 U/L (ref 40–129)
ALT SERPL-CCNC: 12 U/L (ref 10–40)
ANION GAP SERPL CALCULATED.3IONS-SCNC: 11 MMOL/L (ref 3–16)
AST SERPL-CCNC: 14 U/L (ref 15–37)
BILIRUB SERPL-MCNC: <0.2 MG/DL (ref 0–1)
BILIRUBIN URINE: NEGATIVE
BLOOD, URINE: ABNORMAL
BUN BLDV-MCNC: 13 MG/DL (ref 7–20)
CALCIUM SERPL-MCNC: 9.7 MG/DL (ref 8.3–10.6)
CHLORIDE BLD-SCNC: 97 MMOL/L (ref 99–110)
CLARITY: CLEAR
CO2: 28 MMOL/L (ref 21–32)
COLOR: YELLOW
CREAT SERPL-MCNC: 0.6 MG/DL (ref 0.9–1.3)
GFR AFRICAN AMERICAN: >60
GFR NON-AFRICAN AMERICAN: >60
GLOBULIN: 2.8 G/DL
GLUCOSE BLD-MCNC: 123 MG/DL (ref 70–99)
GLUCOSE URINE: NEGATIVE MG/DL
HCT VFR BLD CALC: 36.6 % (ref 40.5–52.5)
HEMOGLOBIN: 12.3 G/DL (ref 13.5–17.5)
KETONES, URINE: NEGATIVE MG/DL
LEUKOCYTE ESTERASE, URINE: ABNORMAL
MCH RBC QN AUTO: 28.3 PG (ref 26–34)
MCHC RBC AUTO-ENTMCNC: 33.7 G/DL (ref 31–36)
MCV RBC AUTO: 84.1 FL (ref 80–100)
MICROSCOPIC EXAMINATION: YES
NITRITE, URINE: NEGATIVE
PDW BLD-RTO: 14.1 % (ref 12.4–15.4)
PH UA: 6.5 (ref 5–8)
PLATELET # BLD: 354 K/UL (ref 135–450)
PMV BLD AUTO: 7.2 FL (ref 5–10.5)
POTASSIUM SERPL-SCNC: 3.9 MMOL/L (ref 3.5–5.1)
PROTEIN UA: NEGATIVE MG/DL
RBC # BLD: 4.35 M/UL (ref 4.2–5.9)
RBC UA: ABNORMAL /HPF (ref 0–4)
SODIUM BLD-SCNC: 136 MMOL/L (ref 136–145)
SPECIFIC GRAVITY UA: 1.02 (ref 1–1.03)
TOTAL PROTEIN: 6.8 G/DL (ref 6.4–8.2)
URINE TYPE: ABNORMAL
UROBILINOGEN, URINE: 0.2 E.U./DL
WBC # BLD: 5.2 K/UL (ref 4–11)
WBC UA: ABNORMAL /HPF (ref 0–5)

## 2020-04-17 PROCEDURE — 80053 COMPREHEN METABOLIC PANEL: CPT

## 2020-04-17 PROCEDURE — 87086 URINE CULTURE/COLONY COUNT: CPT

## 2020-04-17 PROCEDURE — 71045 X-RAY EXAM CHEST 1 VIEW: CPT

## 2020-04-17 PROCEDURE — 6370000000 HC RX 637 (ALT 250 FOR IP): Performed by: PHYSICIAN ASSISTANT

## 2020-04-17 PROCEDURE — 85027 COMPLETE CBC AUTOMATED: CPT

## 2020-04-17 PROCEDURE — 87077 CULTURE AEROBIC IDENTIFY: CPT

## 2020-04-17 PROCEDURE — 99283 EMERGENCY DEPT VISIT LOW MDM: CPT

## 2020-04-17 PROCEDURE — 6360000002 HC RX W HCPCS: Performed by: PHYSICIAN ASSISTANT

## 2020-04-17 PROCEDURE — 81001 URINALYSIS AUTO W/SCOPE: CPT

## 2020-04-17 PROCEDURE — 96372 THER/PROPH/DIAG INJ SC/IM: CPT

## 2020-04-17 RX ORDER — CEPHALEXIN 500 MG/1
500 CAPSULE ORAL 3 TIMES DAILY
Qty: 21 CAPSULE | Refills: 0 | Status: SHIPPED | OUTPATIENT
Start: 2020-04-17 | End: 2020-04-24

## 2020-04-17 RX ORDER — CEPHALEXIN 250 MG/1
500 CAPSULE ORAL ONCE
Status: COMPLETED | OUTPATIENT
Start: 2020-04-17 | End: 2020-04-17

## 2020-04-17 RX ORDER — LORAZEPAM 2 MG/ML
1 INJECTION INTRAMUSCULAR ONCE
Status: COMPLETED | OUTPATIENT
Start: 2020-04-17 | End: 2020-04-17

## 2020-04-17 RX ADMIN — LORAZEPAM 1 MG: 2 INJECTION INTRAMUSCULAR; INTRAVENOUS at 17:01

## 2020-04-17 RX ADMIN — CEPHALEXIN 500 MG: 250 CAPSULE ORAL at 18:07

## 2020-04-17 NOTE — ED PROVIDER NOTES
every evening      ondansetron (ZOFRAN) 4 MG tablet Take 4 mg by mouth every 8 hours as needed for Nausea or Vomiting      traZODone (DESYREL) 50 MG tablet 1 tablet by Per NG tube route nightly 30 tablet 0    metoprolol tartrate (LOPRESSOR) 25 MG tablet 1 tablet by Per NG tube route 2 times daily 60 tablet 3    sennosides-docusate sodium (SENOKOT-S) 8.6-50 MG tablet Take 2 tablets by mouth every 72 hours 30 tablet 0    QUEtiapine (SEROQUEL XR) 50 MG extended release tablet Take 100 mg by mouth 3 times daily 50mg in AM  100mg @ 1600  150mg @ 1900       Allergies   Allergen Reactions    Benadryl [Diphenhydramine] Other (See Comments)     Pt becomes agitated and aggressive with Benadryl    Clonidine Derivatives     Penicillins     Tetracyclines & Related        REVIEW OF SYSTEMS  10 systems reviewed, pertinent positives per HPI otherwise noted to be negative    PHYSICAL EXAM  BP (!) 136/104   Pulse 98   Temp 97.5 °F (36.4 °C) (Axillary)   Resp 22   Ht 5' (1.524 m)   Wt 104 lb (47.2 kg)   SpO2 100%   BMI 20.31 kg/m²   GENERAL APPEARANCE: Awake and alert. Cooperative. No acute distress. HEAD: Normocephalic. Atraumatic. EYES: PERRL. EOM's grossly intact. ENT: Mucous membranes are moist.   NECK: Supple. No JVD. No tracheal tenderness or deviation. No crepitus. HEART: RRR. No murmurs. LUNGS: Respirations unlabored. CTAB. Good air exchange. Speaking comfortably in full sentences. ABDOMEN: Soft. Non-distended. Non-tender. No guarding or rebound. No unilateral calf pain, redness or swelling. Negative Davenport's, McBurney's and Rovsing's. No fluids or ascites. No hernias or masses. Bowel sounds normal quadrants. No CVA tenderness. EXTREMITIES: No peripheral edema. Moves all extremities equally. All extremities neurovascularly intact. SKIN: Warm and dry. No acute rashes. NEUROLOGICAL: Alert and oriented. CN's 2-12 intact. No gross facial drooping. Strength 5/5, sensation intact. returning to the Emergency Department immediately if new or worsening symptoms occur. We have discussed the symptoms which are most concerning (e.g., changing or worsening pain, weakness, vomiting, fever) that necessitate immediate return. Final Impression  1. Urinary tract infection without hematuria, site unspecified      Blood pressure (!) 136/104, pulse 98, temperature 97.5 °F (36.4 °C), temperature source Axillary, resp. rate 22, height 5' (1.524 m), weight 104 lb (47.2 kg), SpO2 100 %. DISPOSITION  Patient was discharged to home in good condition.           [place], [place]  04/17/20 4981

## 2020-04-18 ENCOUNTER — CARE COORDINATION (OUTPATIENT)
Dept: CARE COORDINATION | Age: 60
End: 2020-04-18

## 2020-04-18 LAB — URINE CULTURE, ROUTINE: NORMAL

## 2020-04-18 NOTE — CARE COORDINATION
Outreach call made to f/u on pt's ED visit from 4/17/20. Per ED note, pt lives in a group home and is unable to communicate. Number listed is for [facility] ([phone]). No answer when called and automated message states they are closed until Monday. ACM will outreach at at another time.

## 2020-04-21 ENCOUNTER — APPOINTMENT (OUTPATIENT)
Dept: GENERAL RADIOLOGY | Age: 60
End: 2020-04-21
Payer: MEDICARE

## 2020-04-21 ENCOUNTER — HOSPITAL ENCOUNTER (EMERGENCY)
Age: 60
Discharge: HOME | End: 2020-04-22
Payer: MEDICARE

## 2020-04-21 PROCEDURE — 73600 X-RAY EXAM OF ANKLE: CPT

## 2020-04-21 PROCEDURE — 99283 EMERGENCY DEPT VISIT LOW MDM: CPT

## 2020-04-21 PROCEDURE — 73620 X-RAY EXAM OF FOOT: CPT

## 2020-04-21 ASSESSMENT — PAIN SCALES - GENERAL: PAINLEVEL_OUTOF10: 3

## 2020-04-21 ASSESSMENT — PAIN DESCRIPTION - ORIENTATION: ORIENTATION: RIGHT

## 2020-04-21 ASSESSMENT — PAIN DESCRIPTION - LOCATION: LOCATION: FOOT

## 2020-04-22 ENCOUNTER — HOSPITAL ENCOUNTER (OUTPATIENT)
Dept: VASCULAR LAB | Age: 60
Discharge: HOME | End: 2020-04-22
Payer: MEDICARE

## 2020-04-22 VITALS
SYSTOLIC BLOOD PRESSURE: 118 MMHG | TEMPERATURE: 97 F | HEART RATE: 88 BPM | RESPIRATION RATE: 18 BRPM | DIASTOLIC BLOOD PRESSURE: 90 MMHG | OXYGEN SATURATION: 96 %

## 2020-04-22 PROCEDURE — 93971 EXTREMITY STUDY: CPT

## 2020-04-22 ASSESSMENT — ENCOUNTER SYMPTOMS
SHORTNESS OF BREATH: 0
BACK PAIN: 0
WHEEZING: 0
COUGH: 0
VOMITING: 0
COLOR CHANGE: 0
ABDOMINAL PAIN: 0
NAUSEA: 0
DIARRHEA: 0

## 2020-04-22 NOTE — ED NOTES
This nurse and tech cleaned and changed pt due to soft bowel movement.       [employer], RN  04/22/20 9977

## 2020-04-22 NOTE — ED PROVIDER NOTES
[facility] TURP [facility]/A 2/28/2020    CYSTOSCOPY, TRANSURETHRAL RESECTION OF PROSTATE performed by Sriram Streeter MD at [facility] OR       Medications:  Previous Medications    ACETAMINOPHEN (PHARBETOL) 325 MG TABLET    Take 2 tablets by mouth every 6 hours as needed for Pain    CEPHALEXIN (KEFLEX) 500 MG CAPSULE    Take 1 capsule by mouth 3 times daily for 7 days    CLOTRIMAZOLE (LOTRIMIN) 1 % CREAM    Apply topically 2 times daily Apply topically 2 times daily. IBUPROFEN (ADVIL;MOTRIN) 400 MG TABLET    Take 1 tablet by mouth every 6 hours as needed for Pain    LACTULOSE (CHRONULAC) 10 GM/15ML SOLUTION    Take 20 g by mouth 2 times daily    LEVETIRACETAM (KEPPRA) 250 MG TABLET    Take 250 mg by mouth 2 times daily    MEGESTROL ACETATE (MEGACE) 400 MG/10ML SUSP    Take 400 mg by mouth daily 10ml every evening    MELATONIN 3 MG TABS TABLET    Take 3 mg by mouth nightly    METOPROLOL TARTRATE (LOPRESSOR) 25 MG TABLET    1 tablet by Per NG tube route 2 times daily    ONDANSETRON (ZOFRAN) 4 MG TABLET    Take 4 mg by mouth every 8 hours as needed for Nausea or Vomiting    QUETIAPINE (SEROQUEL XR) 50 MG EXTENDED RELEASE TABLET    Take 100 mg by mouth 3 times daily 50mg in AM  100mg @ 1600  150mg @ 1900    SENNOSIDES-DOCUSATE SODIUM (SENOKOT-S) 8.6-50 MG TABLET    Take 2 tablets by mouth every 72 hours    TAMSULOSIN (FLOMAX) 0.4 MG CAPSULE    Take 0.8 mg by mouth daily    TRAZODONE (DESYREL) 50 MG TABLET    1 tablet by Per NG tube route nightly         Review of Systems:  Review of Systems   Constitutional: Negative for chills and fever. HENT: Negative for congestion. Respiratory: Negative for cough, shortness of breath and wheezing. Cardiovascular: Negative for chest pain. Gastrointestinal: Negative for abdominal pain, diarrhea, nausea and vomiting. Genitourinary: Negative for difficulty urinating and dysuria. Musculoskeletal: Positive for joint swelling. Negative for back pain.         Patient the time of this note. RADIOLOGY:   Non-plain film images such as CT, Ultrasound and MRI are read by the radiologist. Christa BUTTS APRN - CNP have directly visualized the radiologic plain film image(s) with the below findings:        Interpretation per the Radiologist below, if available at the time of this note:    XR FOOT RIGHT (2 VIEWS)   Final Result   No acute fracture or dislocation of the ankle/foot. XR ANKLE RIGHT (2 VIEWS)   Final Result   No acute fracture or dislocation of the ankle/foot. VL Extremity Venous Right    (Results Pending)        Xr Ankle Right (2 Views)    Result Date: 4/21/2020  EXAMINATION: 2 XRAY VIEWS OF THE RIGHT ANKLE; TWO XRAY VIEWS OF THE RIGHT FOOT 4/21/2020 10:17 pm COMPARISON: 01/26/2014 HISTORY: ORDERING SYSTEM PROVIDED HISTORY: injury TECHNOLOGIST PROVIDED HISTORY: Reason for exam:->injury Reason for Exam: injury Acuity: Acute Type of Exam: Initial FINDINGS: Two views of the ankle and foot are submitted for review. No acute fracture or dislocation identified. Overlying soft tissues are unremarkable. Ankle mortise is maintained. No acute fracture or dislocation of the ankle/foot. Xr Foot Right (2 Views)    Result Date: 4/21/2020  EXAMINATION: 2 XRAY VIEWS OF THE RIGHT ANKLE; TWO XRAY VIEWS OF THE RIGHT FOOT 4/21/2020 10:17 pm COMPARISON: 01/26/2014 HISTORY: ORDERING SYSTEM PROVIDED HISTORY: injury TECHNOLOGIST PROVIDED HISTORY: Reason for exam:->injury Reason for Exam: injury Acuity: Acute Type of Exam: Initial FINDINGS: Two views of the ankle and foot are submitted for review. No acute fracture or dislocation identified. Overlying soft tissues are unremarkable. Ankle mortise is maintained. No acute fracture or dislocation of the ankle/foot.      Xr Chest Portable    Result Date: 4/17/2020  EXAMINATION: ONE XRAY VIEW OF THE CHEST 4/17/2020 4:08 pm COMPARISON: 02/27/2020 HISTORY: ORDERING SYSTEM PROVIDED HISTORY: cough TECHNOLOGIST

## 2020-04-22 NOTE — FLOWSHEET NOTE
Pt sitting up in bed in wheelchair with caregiver. Pt respirations even and unlabored. Will continue to monitor.

## 2020-05-02 ENCOUNTER — HOSPITAL ENCOUNTER (EMERGENCY)
Age: 60
Discharge: HOME | End: 2020-05-02
Payer: MEDICARE

## 2020-05-02 ENCOUNTER — APPOINTMENT (OUTPATIENT)
Dept: CT IMAGING | Age: 60
End: 2020-05-02
Payer: MEDICARE

## 2020-05-02 VITALS
HEART RATE: 101 BPM | OXYGEN SATURATION: 97 % | TEMPERATURE: 98 F | DIASTOLIC BLOOD PRESSURE: 86 MMHG | SYSTOLIC BLOOD PRESSURE: 108 MMHG | RESPIRATION RATE: 18 BRPM

## 2020-05-02 PROCEDURE — 70450 CT HEAD/BRAIN W/O DYE: CPT

## 2020-05-02 PROCEDURE — 6360000002 HC RX W HCPCS: Performed by: PHYSICIAN ASSISTANT

## 2020-05-02 PROCEDURE — 96372 THER/PROPH/DIAG INJ SC/IM: CPT

## 2020-05-02 PROCEDURE — 72125 CT NECK SPINE W/O DYE: CPT

## 2020-05-02 PROCEDURE — 99283 EMERGENCY DEPT VISIT LOW MDM: CPT

## 2020-05-02 RX ORDER — LORAZEPAM 2 MG/ML
1 INJECTION INTRAMUSCULAR ONCE
Status: COMPLETED | OUTPATIENT
Start: 2020-05-02 | End: 2020-05-02

## 2020-05-02 RX ADMIN — LORAZEPAM 1 MG: 2 INJECTION INTRAMUSCULAR; INTRAVENOUS at 20:39

## 2020-05-02 RX ADMIN — LORAZEPAM 1 MG: 2 INJECTION INTRAMUSCULAR; INTRAVENOUS at 20:40

## 2020-05-03 NOTE — ED PROVIDER NOTES
[facility] Emergency Department    CHIEF COMPLAINT  Head Injury (hit back of head on a desk; no LOC; witness by group home staff. )      HISTORY OF PRESENT ILLNESS  Duane Rosalynd Shallow is a 62 y.o. male who presents to the ED complaining of  closed head injury. Patient with history of profound MRDD. Was sitting in wheelchair at group home 1 witnessed by caretaker patient threw his head backwards and struck head on corner of desk. No loss of consciousness. No seizures or vomiting. No blood thinners. Appears to be acting normally. No other complaints, modifying factors or associated symptoms. Nursing notes reviewed. Past Medical History:   Diagnosis Date    Bipolar disorder ([facility] [address]     Developmental non-verbal disorder     Impulse control disorder     Influenza A 2/13/14    Mental retardation, profound (I.Q. < 20)     Osteopenia      Past Surgical History:   Procedure Laterality Date    CATARACT REMOVAL      COLONOSCOPY  06/20/2016    incomplete, poor prep    CRANIOTOMY Right 12/2/2019    Right Trephine Craniotomy For Evacuation of Subdural Hematoma performed by Abelino Gould MD at [address] [place] [address] TURP N/A 2/28/2020    CYSTOSCOPY, TRANSURETHRAL RESECTION OF PROSTATE performed by Katie Chilel MD at [facility] reviewed. No pertinent family history.   Social History     Socioeconomic History    Marital status: Single     Spouse name: Not on file    Number of children: Not on file    Years of education: Not on file    Highest education level: Not on file   Occupational History    Not on file   Social Needs    Financial resource strain: Not on file    Food insecurity     Worry: Not on file     Inability: Not on file    Transportation needs     Medical: Not on file     Non-medical: Not on file   Tobacco Use    Smoking status: Never Smoker    Smokeless tobacco: Never Used   Substance and Sexual Activity    Alcohol use: No    Drug use: No    Sexual activity: Not review. No acute fracture or dislocation identified. Overlying soft tissues are unremarkable. Ankle mortise is maintained. No acute fracture or dislocation of the ankle/foot. Xr Foot Right (2 Views)    Result Date: 4/21/2020  EXAMINATION: 2 XRAY VIEWS OF THE RIGHT ANKLE; TWO XRAY VIEWS OF THE RIGHT FOOT 4/21/2020 10:17 pm COMPARISON: 01/26/2014 HISTORY: ORDERING SYSTEM PROVIDED HISTORY: injury TECHNOLOGIST PROVIDED HISTORY: Reason for exam:->injury Reason for Exam: injury Acuity: Acute Type of Exam: Initial FINDINGS: Two views of the ankle and foot are submitted for review. No acute fracture or dislocation identified. Overlying soft tissues are unremarkable. Ankle mortise is maintained. No acute fracture or dislocation of the ankle/foot. Ct Head Wo Contrast    Result Date: 5/2/2020  EXAMINATION: CT OF THE CERVICAL SPINE WITHOUT CONTRAST; CT OF THE HEAD WITHOUT CONTRAST 5/2/2020 8:50 pm TECHNIQUE: CT of the cervical spine was performed without the administration of intravenous contrast. Multiplanar reformatted images are provided for review. Dose modulation, iterative reconstruction, and/or weight based adjustment of the mA/kV was utilized to reduce the radiation dose to as low as reasonably achievable.; CT of the head was performed without the administration of intravenous contrast. Dose modulation, iterative reconstruction, and/or weight based adjustment of the mA/kV was utilized to reduce the radiation dose to as low as reasonably achievable. COMPARISON: None. HISTORY: ORDERING SYSTEM PROVIDED HISTORY: fall TECHNOLOGIST PROVIDED HISTORY: Reason for exam:->fall Reason for Exam: PT hit head on office desk Acuity: Acute Type of Exam: Initial; ORDERING SYSTEM PROVIDED HISTORY: fall TECHNOLOGIST PROVIDED HISTORY: Reason for exam:->fall Has a \"code stroke\" or \"stroke alert\" been called? ->No Reason for Exam: PT hit head on office desk Acuity: Acute FINDINGS: CT head:  There is generalized volume loss RVT, RDCS,   Ordering Physician Christa Banegas    Interpreting        [facility]                     E., CNP            Physician           Readers                                                            Jose Lynch MD  Procedure Type of Study:   Veins:Lower Extremities DVT Study, VL EXTREMITY VENOUS DUPLEX RIGHT. Vascular Sonographer Report  Additional Indications:Swelling Venous Duplex Scan: B-mode imaging of the deep and superficial veins, with compression maneuvers, including color and Doppler spectral waveform analysis. Impressions Right Impression Limited exam due to combative patient and constant movement. Pulsed wave Doppler not completed due to limitations. No evidence of deep or superficial venous thrombosis seen involving the lower extremity. Left Impression Limited exam due to combative patient and constant movement. Pulsed wave Doppler not completed due to limitations. No evidence of deep venous thrombosis seen involving the common femoral vein. Conclusions   Summary   Within the limits of this exam, there is no evidence of deep or superficial  venous thrombosis in the right lower extremity   Signature   ------------------------------------------------------------------  Electronically signed by Jose Lynch MD (Interpreting  physician) on 04/24/2020 at 10:51 AM  ------------------------------------------------------------------  Patient Status:STAT. Study [facility] [address] - Vascular Lab. Technical Quality:Limited visualization due to combative patient.   - Results were reported to:Preliminary results attempted to ,     listed number for Hernesto Santos MD @ 1:58pm. Risk Factors History +---------+----------+-------------------------------------------------------+ ! Diagnosis! Date      ! Comments                                               ! +---------+----------+-------------------------------------------------------+ ! Other    !04/22/2020! Patient is non verbal with altered mental status and   ! !         !          !combative nature. ! +---------+----------+-------------------------------------------------------+ Velocities are measured in cm/s ; Diameters are measured in mm Right Lower Extremities DVT Study Measurements Right 2D Measurements +------------------------+----------+---------------+----------+ ! Location                ! Visualized! Compressibility! Thrombosis! +------------------------+----------+---------------+----------+ ! Sapheno Femoral Junction! Yes       ! Yes            ! None      ! +------------------------+----------+---------------+----------+ ! GSV Thigh               ! Yes       ! Yes            ! None      ! +------------------------+----------+---------------+----------+ ! Common Femoral          !Yes       ! Yes            ! None      ! +------------------------+----------+---------------+----------+ ! Femoral                 !Yes       ! Yes            ! None      ! +------------------------+----------+---------------+----------+ ! Deep Femoral            !Yes       ! Yes            ! None      ! +------------------------+----------+---------------+----------+ ! Popliteal               !Yes       ! Yes            ! None      ! +------------------------+----------+---------------+----------+ ! GSV Below Knee          ! Yes       ! Yes            ! None      ! +------------------------+----------+---------------+----------+ ! Gastroc                 ! Yes       ! Yes            ! None      ! +------------------------+----------+---------------+----------+ ! Soleal                  !Partial   !Yes            ! None      ! +------------------------+----------+---------------+----------+ ! PTV                     ! Partial   !Yes            ! None      ! +------------------------+----------+---------------+----------+

## 2020-05-03 NOTE — FLOWSHEET NOTE
Pt hit back of head off of desk observed by caregiver. Caregiver states no loss of conscious. Pt acting appropriate per caregiver.

## 2020-06-17 ENCOUNTER — APPOINTMENT (OUTPATIENT)
Dept: CT IMAGING | Age: 60
End: 2020-06-17
Payer: MEDICARE

## 2020-06-17 ENCOUNTER — HOSPITAL ENCOUNTER (EMERGENCY)
Age: 60
Discharge: HOME | End: 2020-06-17
Attending: EMERGENCY MEDICINE
Payer: MEDICARE

## 2020-06-17 VITALS
TEMPERATURE: 96.6 F | BODY MASS INDEX: 20.42 KG/M2 | HEIGHT: 60 IN | WEIGHT: 104 LBS | SYSTOLIC BLOOD PRESSURE: 106 MMHG | RESPIRATION RATE: 16 BRPM | OXYGEN SATURATION: 94 % | DIASTOLIC BLOOD PRESSURE: 74 MMHG | HEART RATE: 94 BPM

## 2020-06-17 PROCEDURE — 99283 EMERGENCY DEPT VISIT LOW MDM: CPT

## 2020-06-17 PROCEDURE — 6360000002 HC RX W HCPCS: Performed by: EMERGENCY MEDICINE

## 2020-06-17 PROCEDURE — 70450 CT HEAD/BRAIN W/O DYE: CPT

## 2020-06-17 PROCEDURE — 96372 THER/PROPH/DIAG INJ SC/IM: CPT

## 2020-06-17 PROCEDURE — 72125 CT NECK SPINE W/O DYE: CPT

## 2020-06-17 RX ORDER — LORAZEPAM 2 MG/ML
1 INJECTION INTRAMUSCULAR ONCE
Status: COMPLETED | OUTPATIENT
Start: 2020-06-17 | End: 2020-06-17

## 2020-06-17 RX ORDER — MIDAZOLAM HYDROCHLORIDE 1 MG/ML
2 INJECTION INTRAMUSCULAR; INTRAVENOUS ONCE
Status: COMPLETED | OUTPATIENT
Start: 2020-06-17 | End: 2020-06-17

## 2020-06-17 RX ADMIN — MIDAZOLAM 2 MG: 1 INJECTION INTRAMUSCULAR; INTRAVENOUS at 20:53

## 2020-06-17 RX ADMIN — LORAZEPAM 1 MG: 2 INJECTION, SOLUTION INTRAMUSCULAR; INTRAVENOUS at 19:00

## 2020-06-17 RX ADMIN — LORAZEPAM 1 MG: 2 INJECTION, SOLUTION INTRAMUSCULAR; INTRAVENOUS at 19:46

## 2020-06-17 NOTE — ED PROVIDER NOTES
Previous Medications    ACETAMINOPHEN (PHARBETOL) 325 MG TABLET    Take 2 tablets by mouth every 6 hours as needed for Pain    CLOTRIMAZOLE (LOTRIMIN) 1 % CREAM    Apply topically 2 times daily Apply topically 2 times daily. IBUPROFEN (ADVIL;MOTRIN) 400 MG TABLET    Take 1 tablet by mouth every 6 hours as needed for Pain    LACTULOSE (CHRONULAC) 10 GM/15ML SOLUTION    Take 20 g by mouth 2 times daily    LEVETIRACETAM (KEPPRA) 250 MG TABLET    Take 250 mg by mouth 2 times daily    MEGESTROL ACETATE (MEGACE) 400 MG/10ML SUSP    Take 400 mg by mouth daily 10ml every evening    MELATONIN 3 MG TABS TABLET    Take 3 mg by mouth nightly    METOPROLOL TARTRATE (LOPRESSOR) 25 MG TABLET    1 tablet by Per NG tube route 2 times daily    ONDANSETRON (ZOFRAN) 4 MG TABLET    Take 4 mg by mouth every 8 hours as needed for Nausea or Vomiting    QUETIAPINE (SEROQUEL XR) 50 MG EXTENDED RELEASE TABLET    Take 100 mg by mouth 3 times daily 50mg in AM  100mg @ 1600  150mg @ 1900    SENNOSIDES-DOCUSATE SODIUM (SENOKOT-S) 8.6-50 MG TABLET    Take 2 tablets by mouth every 72 hours    TAMSULOSIN (FLOMAX) 0.4 MG CAPSULE    Take 0.8 mg by mouth daily    TRAZODONE (DESYREL) 50 MG TABLET    1 tablet by Per NG tube route nightly       ALLERGIES     Benadryl [diphenhydramine]; Clonidine derivatives; Penicillins; and Tetracyclines & related    FAMILY HISTORY     No family history on file.        SOCIAL HISTORY       Social History     Socioeconomic History    Marital status: Single     Spouse name: Not on file    Number of children: Not on file    Years of education: Not on file    Highest education level: Not on file   Occupational History    Not on file   Social Needs    Financial resource strain: Not on file    Food insecurity     Worry: Not on file     Inability: Not on file    Transportation needs     Medical: Not on file     Non-medical: Not on file   Tobacco Use    Smoking status: Never Smoker    Smokeless tobacco: Never Used   Substance and Sexual Activity    Alcohol use: No    Drug use: No    Sexual activity: Not Currently   Lifestyle    Physical activity     Days per week: Not on file     Minutes per session: Not on file    Stress: Not on file   Relationships    Social connections     Talks on phone: Not on file     Gets together: Not on file     Attends Gnosticist service: Not on file     Active member of club or organization: Not on file     Attends meetings of clubs or organizations: Not on file     Relationship status: Not on file    Intimate partner violence     Fear of current or ex partner: Not on file     Emotionally abused: Not on file     Physically abused: Not on file     Forced sexual activity: Not on file   Other Topics Concern    Not on file   Social History Narrative    Not on file       SCREENINGS             PHYSICAL EXAM    (up to 7 for level 4, 8 ormore for level 5)     ED Triage Vitals [06/17/20 1750]   BP Temp Temp Source Pulse Resp SpO2 Height Weight   -- 96.6 °F (35.9 °C) Oral -- -- -- -- --       Physical Exam  Vitals signs and nursing note reviewed. Constitutional:       General: He is not in acute distress. Appearance: He is well-developed. He is not ill-appearing, toxic-appearing or diaphoretic. Comments: Well-appearing, nontoxic, not in acute distress. Answers questions in full sentences and following verbal commands appropriately   HENT:      Head: Normocephalic and atraumatic. Comments: No abrasion, laceration, swelling or any other obvious signs of injury to the head  Eyes:      General:         Right eye: No discharge. Left eye: No discharge. Extraocular Movements: Extraocular movements intact. Conjunctiva/sclera: Conjunctivae normal.   Neck:      Musculoskeletal: Normal range of motion and neck supple. Pulmonary:      Effort: Pulmonary effort is normal. No respiratory distress. Musculoskeletal: Normal range of motion.    Skin:     Coloration: Skin is not pale. Neurological:      Mental Status: He is alert and oriented to person, place, and time. Psychiatric:         Behavior: Behavior normal. Behavior is cooperative. DIAGNOSTIC RESULTS     EKG: All EKG's are interpreted by the Emergency Department Physicianwho either signs or Co-signs this chart in the absence of a cardiologist.      RADIOLOGY:   Non-plain film images such as CT, Ultrasound and MRI are read by the radiologist. Plain radiographic images are visualized and preliminarily interpreted by the emergency physician with the below findings:      Interpretation per the Radiologist below, if available at the time of this note:    CT Head WO Contrast    (Results Pending)   CT Cervical Spine WO Contrast    (Results Pending)         ED BEDSIDE ULTRASOUND:   Performed by ED Physician - none    LABS:  Labs Reviewed - No data to display    All other labs were within normal range ornot returned as of this dictation. EMERGENCY DEPARTMENT COURSE and DIFFERENTIAL DIAGNOSIS/MDM:   Vitals:    Vitals:    06/17/20 1750 06/17/20 1824   BP:  (!) 115/97   Pulse:  110   Resp:  18   Temp: 96.6 °F (35.9 °C)    TempSrc: Oral    SpO2:  95%   Weight:  104 lb (47.2 kg)   Height:  5' (1.524 m)         MDM    ED COURSE/MDM    -Duane A Carota is a 62 y.o. male with a history of MRDD, bipolar, nonverbal who presents to ED status post fall. Was witnessed by caretaker, states that he slipped and hit his head against the tub. Denies loss of consciousness. -Was given 1 mg Ativan IM, however patient still active moving around and then was given another milligram of Ativan IM without desired response. Was then given 2 mg of Versed IM and patient tolerated CT scans.  -Handoff to Dr. Maria Dolores Rascon, patient awaiting results from CT head and CT C-spine. Negative patient likely to be discharged in the care of caretaker.     REASSESSMENT      Unchanged from above      CRITICAL CARE TIME   Total Critical Care time was 0 minutes,

## 2020-07-29 ENCOUNTER — HOSPITAL ENCOUNTER (EMERGENCY)
Age: 60
Discharge: HOME | End: 2020-07-30
Payer: MEDICARE

## 2020-07-29 ENCOUNTER — APPOINTMENT (OUTPATIENT)
Dept: CT IMAGING | Age: 60
End: 2020-07-29
Payer: MEDICARE

## 2020-07-29 LAB
A/G RATIO: 1.4 (ref 1.1–2.2)
ALBUMIN SERPL-MCNC: 3.9 G/DL (ref 3.4–5)
ALP BLD-CCNC: 94 U/L (ref 40–129)
ALT SERPL-CCNC: 12 U/L (ref 10–40)
ANION GAP SERPL CALCULATED.3IONS-SCNC: 7 MMOL/L (ref 3–16)
AST SERPL-CCNC: 18 U/L (ref 15–37)
BACTERIA: ABNORMAL /HPF
BASOPHILS ABSOLUTE: 0.1 K/UL (ref 0–0.2)
BASOPHILS RELATIVE PERCENT: 1.3 %
BILIRUB SERPL-MCNC: <0.2 MG/DL (ref 0–1)
BILIRUBIN URINE: NEGATIVE
BLOOD, URINE: ABNORMAL
BUN BLDV-MCNC: 15 MG/DL (ref 7–20)
CALCIUM SERPL-MCNC: 9.7 MG/DL (ref 8.3–10.6)
CHLORIDE BLD-SCNC: 100 MMOL/L (ref 99–110)
CLARITY: CLEAR
CO2: 29 MMOL/L (ref 21–32)
COLOR: YELLOW
CREAT SERPL-MCNC: 0.8 MG/DL (ref 0.9–1.3)
CRYSTALS, UA: ABNORMAL /HPF
EOSINOPHILS ABSOLUTE: 0.2 K/UL (ref 0–0.6)
EOSINOPHILS RELATIVE PERCENT: 4 %
EPITHELIAL CELLS, UA: ABNORMAL /HPF (ref 0–5)
GFR AFRICAN AMERICAN: >60
GFR NON-AFRICAN AMERICAN: >60
GLOBULIN: 2.8 G/DL
GLUCOSE BLD-MCNC: 106 MG/DL (ref 70–99)
GLUCOSE URINE: NEGATIVE MG/DL
HCT VFR BLD CALC: 38.6 % (ref 40.5–52.5)
HEMOGLOBIN: 12.8 G/DL (ref 13.5–17.5)
KETONES, URINE: NEGATIVE MG/DL
LEUKOCYTE ESTERASE, URINE: ABNORMAL
LIPASE: 31 U/L (ref 13–60)
LYMPHOCYTES ABSOLUTE: 1.5 K/UL (ref 1–5.1)
LYMPHOCYTES RELATIVE PERCENT: 27.4 %
MCH RBC QN AUTO: 27.5 PG (ref 26–34)
MCHC RBC AUTO-ENTMCNC: 33.1 G/DL (ref 31–36)
MCV RBC AUTO: 83.3 FL (ref 80–100)
MICROSCOPIC EXAMINATION: YES
MONOCYTES ABSOLUTE: 0.5 K/UL (ref 0–1.3)
MONOCYTES RELATIVE PERCENT: 8.6 %
NEUTROPHILS ABSOLUTE: 3.1 K/UL (ref 1.7–7.7)
NEUTROPHILS RELATIVE PERCENT: 58.7 %
NITRITE, URINE: NEGATIVE
PDW BLD-RTO: 14.2 % (ref 12.4–15.4)
PH UA: 6.5 (ref 5–8)
PLATELET # BLD: 314 K/UL (ref 135–450)
PMV BLD AUTO: 7.1 FL (ref 5–10.5)
POTASSIUM REFLEX MAGNESIUM: 4.2 MMOL/L (ref 3.5–5.1)
PROTEIN UA: NEGATIVE MG/DL
RBC # BLD: 4.64 M/UL (ref 4.2–5.9)
RBC UA: ABNORMAL /HPF (ref 0–4)
SODIUM BLD-SCNC: 136 MMOL/L (ref 136–145)
SPECIFIC GRAVITY UA: <=1.005 (ref 1–1.03)
TOTAL PROTEIN: 6.7 G/DL (ref 6.4–8.2)
URINE TYPE: ABNORMAL
UROBILINOGEN, URINE: 0.2 E.U./DL
WBC # BLD: 5.3 K/UL (ref 4–11)
WBC UA: ABNORMAL /HPF (ref 0–5)

## 2020-07-29 PROCEDURE — 96372 THER/PROPH/DIAG INJ SC/IM: CPT

## 2020-07-29 PROCEDURE — 99283 EMERGENCY DEPT VISIT LOW MDM: CPT

## 2020-07-29 PROCEDURE — 74176 CT ABD & PELVIS W/O CONTRAST: CPT

## 2020-07-29 PROCEDURE — 87086 URINE CULTURE/COLONY COUNT: CPT

## 2020-07-29 PROCEDURE — 80053 COMPREHEN METABOLIC PANEL: CPT

## 2020-07-29 PROCEDURE — 6360000002 HC RX W HCPCS: Performed by: NURSE PRACTITIONER

## 2020-07-29 PROCEDURE — 83690 ASSAY OF LIPASE: CPT

## 2020-07-29 PROCEDURE — 51798 US URINE CAPACITY MEASURE: CPT

## 2020-07-29 PROCEDURE — 70450 CT HEAD/BRAIN W/O DYE: CPT

## 2020-07-29 PROCEDURE — 85025 COMPLETE CBC W/AUTO DIFF WBC: CPT

## 2020-07-29 PROCEDURE — 81001 URINALYSIS AUTO W/SCOPE: CPT

## 2020-07-29 RX ORDER — LORAZEPAM 0.5 MG/1
TABLET ORAL
COMMUNITY
Start: 2020-08-08 | End: 2020-09-07

## 2020-07-29 RX ORDER — LORAZEPAM 2 MG/ML
4 INJECTION INTRAMUSCULAR ONCE
Status: COMPLETED | OUTPATIENT
Start: 2020-07-29 | End: 2020-07-29

## 2020-07-29 RX ADMIN — LORAZEPAM 4 MG: 2 INJECTION INTRAMUSCULAR; INTRAVENOUS at 22:09

## 2020-07-29 NOTE — ED NOTES
Bladder scan reads that there is 231 ml of urine in pt's bladder.      Zandra Frausto  07/29/20 1939

## 2020-07-30 VITALS
RESPIRATION RATE: 16 BRPM | TEMPERATURE: 96.8 F | OXYGEN SATURATION: 95 % | HEART RATE: 70 BPM | SYSTOLIC BLOOD PRESSURE: 102 MMHG | DIASTOLIC BLOOD PRESSURE: 79 MMHG

## 2020-07-30 LAB — URINE CULTURE, ROUTINE: NORMAL

## 2020-07-30 PROCEDURE — 6370000000 HC RX 637 (ALT 250 FOR IP): Performed by: NURSE PRACTITIONER

## 2020-07-30 RX ORDER — CIPROFLOXACIN 500 MG/1
500 TABLET, FILM COATED ORAL ONCE
Status: COMPLETED | OUTPATIENT
Start: 2020-07-30 | End: 2020-07-30

## 2020-07-30 RX ORDER — CIPROFLOXACIN 500 MG/1
500 TABLET, FILM COATED ORAL 2 TIMES DAILY
Qty: 14 TABLET | Refills: 0 | Status: SHIPPED | OUTPATIENT
Start: 2020-07-30 | End: 2020-08-06

## 2020-07-30 RX ADMIN — CIPROFLOXACIN 500 MG: 500 TABLET, FILM COATED ORAL at 01:16

## 2020-07-30 NOTE — ED NOTES
Spoke to Porsha Francis [place]. Unable to botain bloodwork after several attempts and multiple RNs.  Will give IM  Ativan and reattempt     Kel Blas RN  07/29/20 1214

## 2020-07-30 NOTE — ED PROVIDER NOTES
[address] [place] [address]  ED  [address] [place] [id number]  Dept: [phone]  Dept Fax: [phone]  Loc: [facility]        This patient was not seen or evaluated by the attending physician. I evaluated this patient, the attending physician was available for consultation. CHIEF COMPLAINT    Chief Complaint   Patient presents with    Dysuria     facility thinks he has UTI. odor with urine. not drinking a lot; not urinating a lot. pt nonverbal.        HPI    Patrick Boone is a 62 y.o. male who presents with his caretaker with complaint of possibility of a UTI. According to the caretaker, as patient is nonverbal, patient has been having foul smelling urine, cloudy in clarity, and \"keeps grabbing himself and screaming out in pain anytime he pees. \"  He has had similar symptoms before in the past with UTIs. They did not note any gross hematuria. The onset of the symptoms was yesterday. The duration has been constant since the onset. Patient appears calm at this time and comfortable.     REVIEW OF SYSTEMS  --per patient's caretaker as patient is nonverbal  : See HPI, no gross hematuria  GI: No vomiting or diarrhea  General: No measured fevers    PAST MEDICAL & SURGICAL HISTORY    Past Medical History:   Diagnosis Date    Bipolar disorder ([address]     Developmental non-verbal disorder     Impulse control disorder     Influenza A 2/13/14    Mental retardation, profound (I.Q. < 20)     Osteopenia      Past Surgical History:   Procedure Laterality Date    CATARACT REMOVAL      COLONOSCOPY  06/20/2016    incomplete, poor prep    CRANIOTOMY Right 12/2/2019    Right Trephine Craniotomy For Evacuation of Subdural Hematoma performed by Maria Luisa Gooden MD at [facility] TURP N/A 2/28/2020    CYSTOSCOPY, TRANSURETHRAL RESECTION OF PROSTATE performed by Kt Randle MD at [address]  (may include discharge medications prescribed in the ED)  Current Outpatient Rx   Medication Sig Dispense Refill    ciprofloxacin (CIPRO) 500 MG tablet Take 1 tablet by mouth 2 times daily for 7 days 14 tablet 0    [START ON 8/8/2020] LORazepam (ATIVAN) 0.5 MG tablet To use every day at 1700 for anxiety related hygiene-care      melatonin 3 MG TABS tablet Take 3 mg by mouth nightly      lactulose (CHRONULAC) 10 GM/15ML solution Take 20 g by mouth 2 times daily      acetaminophen (PHARBETOL) 325 MG tablet Take 2 tablets by mouth every 6 hours as needed for Pain 120 tablet 0    ibuprofen (ADVIL;MOTRIN) 400 MG tablet Take 1 tablet by mouth every 6 hours as needed for Pain 30 tablet 0    clotrimazole (LOTRIMIN) 1 % cream Apply topically 2 times daily Apply topically 2 times daily.       levETIRAcetam (KEPPRA) 250 MG tablet Take 250 mg by mouth 2 times daily      tamsulosin (FLOMAX) 0.4 MG capsule Take 0.8 mg by mouth daily      megestrol acetate (MEGACE) 400 MG/10ML SUSP Take 400 mg by mouth daily 10ml every evening      ondansetron (ZOFRAN) 4 MG tablet Take 4 mg by mouth every 8 hours as needed for Nausea or Vomiting      traZODone (DESYREL) 50 MG tablet 1 tablet by Per NG tube route nightly 30 tablet 0    metoprolol tartrate (LOPRESSOR) 25 MG tablet 1 tablet by Per NG tube route 2 times daily 60 tablet 3    sennosides-docusate sodium (SENOKOT-S) 8.6-50 MG tablet Take 2 tablets by mouth every 72 hours 30 tablet 0    QUEtiapine (SEROQUEL XR) 50 MG extended release tablet Take 100 mg by mouth 3 times daily 50mg in AM  100mg @ 1600  150mg @ 1900         ALLERGIES    Allergies   Allergen Reactions    Benadryl [Diphenhydramine] Other (See Comments)     Pt becomes agitated and aggressive with Benadryl    Clonidine Derivatives     Penicillins     Tetracyclines & Related        SOCIAL HISTORY    Social History     Socioeconomic History    Marital status: Single     Spouse name: None    Number of children: None    Years of education: None  Highest education level: None   Occupational History    None   Social Needs    Financial resource strain: None    Food insecurity     Worry: None     Inability: None    Transportation needs     Medical: None     Non-medical: None   Tobacco Use    Smoking status: Never Smoker    Smokeless tobacco: Never Used   Substance and Sexual Activity    Alcohol use: No    Drug use: No    Sexual activity: Not Currently   Lifestyle    Physical activity     Days per week: None     Minutes per session: None    Stress: None   Relationships    Social connections     Talks on phone: None     Gets together: None     Attends Taoist service: None     Active member of club or organization: None     Attends meetings of clubs or organizations: None     Relationship status: None    Intimate partner violence     Fear of current or ex partner: None     Emotionally abused: None     Physically abused: None     Forced sexual activity: None   Other Topics Concern    None   Social History Narrative    None       PHYSICAL EXAM    VITAL SIGNS: /79   Pulse 70   Temp 96.8 °F (36 °C) (Temporal)   Resp 16   SpO2 95%    Constitutional:  Well developed, well nourished  HENT:  Atraumatic,  Moist mucus membranes  EYES: Conjunctiva Moist, Nonicteric  NECK: No JVD, supple   Respiratory:  No respiratory distress  Cardiovascular: no JVD   Abdomen:  Soft, no overt abdominal tenderness to palpation, no CVA flank tenderness   Integument:  Skin is warm and dry   Neurologic: Awake, alert, normal flow speech, no tremors    RADIOLOGY/PROCEDURES    CT Head WO Contrast   Final Result   No acute intracranial abnormality. CT ABDOMEN PELVIS WO CONTRAST Additional Contrast? None   Preliminary Result   1. Nonobstructing bilateral renal calculi as well as bladder calculi. No   hydronephrosis or ureteral calculus. 2. Otherwise no acute abdominal or pelvic abnormality on this unenhanced   study.              ED COURSE & MEDICAL DECISION MAKING    Pertinent Labs studies interpreted and reviewed. (See chart for details)  See chart for details of medications given during the ED stay. Differential Diagnosis: Urinary retention, pyelonephritis, bladder infection, urosepsis, GI emergency, surgical emergency, dehydration, other    Patient is afebrile and nontoxic in appearance. No abdominal tenderness or CVA tenderness on exam.  Urinalysis reveals a large amount of leukocytes, however there is also a large amount of blood. Microscopic urinalysis shows 21-50 WBC with 21-50 RBCs and rare bacteria as well as a few calcium oxalate crystals and therefore I do believe that a further work-up is warranted at this point time. CBC and CMP as well as lipase all within normal limits. I did obtain a CT of the abdomen pelvis without IV contrast, and at the request due to the patient's history of a ICH also a CT of the head without IV contrast per the caretakers request.  CT of the head as read by the radiologist as above. CT of the abdomen pelvis without contrast does show nonobstructing bilateral renal calculi as well as bladder calculi, no hydronephrosis or ureteral calculus is noted. Given this I will treat him as a kidney stone with a UTI. I had a urine culture sent. He will be placed on a course of Cipro. I spoke to the patient's caretaker, he does need very close follow-up with urology but I have a very low suspicion for sepsis. Patient is to follow-up within the next 48 hours with urology group, a referral was given for the patient's caretaker. He is verbalized understanding, is in agreement with this plan as well as in agreement with the plan of discharge and does not want the patient to be admitted at this point in time. He has no further questions or concerns. Patient remained afebrile and hemodynamically stable throughout his entire ED course and will be discharged home in stable condition.     Results for orders placed or performed during the hospital encounter of 07/29/20   Urinalysis   Result Value Ref Range    Color, UA Yellow Straw/Yellow    Clarity, UA Clear Clear    Glucose, Ur Negative Negative mg/dL    Bilirubin Urine Negative Negative    Ketones, Urine Negative Negative mg/dL    Specific Gravity, UA <=1.005 1.005 - 1.030    Blood, Urine LARGE (A) Negative    pH, UA 6.5 5.0 - 8.0    Protein, UA Negative Negative mg/dL    Urobilinogen, Urine 0.2 <2.0 E.U./dL    Nitrite, Urine Negative Negative    Leukocyte Esterase, Urine LARGE (A) Negative    Microscopic Examination YES     Urine Type NotGiven    Microscopic Urinalysis   Result Value Ref Range    WBC, UA 21-50 (A) 0 - 5 /HPF    RBC, UA 21-50 (A) 0 - 4 /HPF    Epithelial Cells, UA 0-1 0 - 5 /HPF    Bacteria, UA Rare (A) None Seen /HPF    Crystals, UA Few Ca.  Oxalate (A) None Seen /HPF   CBC Auto Differential   Result Value Ref Range    WBC 5.3 4.0 - 11.0 K/uL    RBC 4.64 4.20 - 5.90 M/uL    Hemoglobin 12.8 (L) 13.5 - 17.5 g/dL    Hematocrit 38.6 (L) 40.5 - 52.5 %    MCV 83.3 80.0 - 100.0 fL    MCH 27.5 26.0 - 34.0 pg    MCHC 33.1 31.0 - 36.0 g/dL    RDW 14.2 12.4 - 15.4 %    Platelets 811 721 - 790 K/uL    MPV 7.1 5.0 - 10.5 fL    Neutrophils % 58.7 %    Lymphocytes % 27.4 %    Monocytes % 8.6 %    Eosinophils % 4.0 %    Basophils % 1.3 %    Neutrophils Absolute 3.1 1.7 - 7.7 K/uL    Lymphocytes Absolute 1.5 1.0 - 5.1 K/uL    Monocytes Absolute 0.5 0.0 - 1.3 K/uL    Eosinophils Absolute 0.2 0.0 - 0.6 K/uL    Basophils Absolute 0.1 0.0 - 0.2 K/uL   Comprehensive Metabolic Panel w/ Reflex to MG   Result Value Ref Range    Sodium 136 136 - 145 mmol/L    Potassium reflex Magnesium 4.2 3.5 - 5.1 mmol/L    Chloride 100 99 - 110 mmol/L    CO2 29 21 - 32 mmol/L    Anion Gap 7 3 - 16    Glucose 106 (H) 70 - 99 mg/dL    BUN 15 7 - 20 mg/dL    CREATININE 0.8 (L) 0.9 - 1.3 mg/dL    GFR Non-African American >60 >60    GFR African American >60 >60    Calcium 9.7 8.3 - 10.6 mg/dL    Total Protein 6.7 6.4 - 8.2 g/dL    Alb 3.9 3.4 - 5.0 g/dL    Albumin/Globulin Ratio 1.4 1.1 - 2.2    Total Bilirubin <0.2 0.0 - 1.0 mg/dL    Alkaline Phosphatase 94 40 - 129 U/L    ALT 12 10 - 40 U/L    AST 18 15 - 37 U/L    Globulin 2.8 g/dL   Lipase   Result Value Ref Range    Lipase 31.0 13.0 - 60.0 U/L     I estimate there is LOW risk for ACUTE APPENDICITIS, BOWEL OBSTRUCTION, CHOLECYSTITIS, DIVERTICULITIS, INCARCERATED HERNIA, PANCREATITIS, or PERFORATED BOWEL or ULCER, thus I consider the discharge disposition reasonable. Also, there is no evidence or peritonitis, sepsis, or toxicity. Duane A Carota and I have discussed the diagnosis and risks, and we agree with discharging home to follow-up with their primary doctor. We also discussed returning to the Emergency Department immediately if new or worsening symptoms occur. We have discussed the symptoms which are most concerning (e.g., bloody stool, fever, changing or worsening pain, vomiting) that necessitate immediate return. Blood pressure 102/79, pulse 70, temperature 96.8 °F (36 °C), temperature source Temporal, resp. rate 16, SpO2 95 %. I instructed the patients caretaker to follow up as an outpatient in 1-2 days. I instructed the patient to return to the ED immediately for any new or worsening symptoms. The patient verbalizes understanding. FINAL IMPRESSION    1. Acute cystitis with hematuria    2.  Kidney stone        PLAN  Discharge with outpatient followup, instructions and medication (see EMR)     (Please note that this note was completed with a voice recognition program.  Every attempt was made to edit the dictations, but inevitably there remain words that are mis-transcribed.)                  Thor Schwarz, MAYA - CNP  07/30/20 0131

## 2020-08-04 ENCOUNTER — HOSPITAL ENCOUNTER (EMERGENCY)
Age: 60
Discharge: HOME | End: 2020-08-04
Attending: EMERGENCY MEDICINE
Payer: MEDICARE

## 2020-08-04 VITALS
WEIGHT: 104 LBS | SYSTOLIC BLOOD PRESSURE: 104 MMHG | DIASTOLIC BLOOD PRESSURE: 74 MMHG | HEIGHT: 60 IN | HEART RATE: 89 BPM | BODY MASS INDEX: 20.42 KG/M2 | RESPIRATION RATE: 14 BRPM | TEMPERATURE: 97.8 F | OXYGEN SATURATION: 95 %

## 2020-08-04 LAB
A/G RATIO: 1.5 (ref 1.1–2.2)
ALBUMIN SERPL-MCNC: 4.4 G/DL (ref 3.4–5)
ALP BLD-CCNC: 121 U/L (ref 40–129)
ALT SERPL-CCNC: 18 U/L (ref 10–40)
ANION GAP SERPL CALCULATED.3IONS-SCNC: 10 MMOL/L (ref 3–16)
AST SERPL-CCNC: 15 U/L (ref 15–37)
BILIRUB SERPL-MCNC: <0.2 MG/DL (ref 0–1)
BUN BLDV-MCNC: 16 MG/DL (ref 7–20)
CALCIUM SERPL-MCNC: 10.2 MG/DL (ref 8.3–10.6)
CHLORIDE BLD-SCNC: 101 MMOL/L (ref 99–110)
CO2: 29 MMOL/L (ref 21–32)
CREAT SERPL-MCNC: 0.9 MG/DL (ref 0.9–1.3)
GFR AFRICAN AMERICAN: >60
GFR NON-AFRICAN AMERICAN: >60
GLOBULIN: 3 G/DL
GLUCOSE BLD-MCNC: 120 MG/DL (ref 70–99)
HCT VFR BLD CALC: 42.5 % (ref 40.5–52.5)
HEMOGLOBIN: 13.9 G/DL (ref 13.5–17.5)
MCH RBC QN AUTO: 27.8 PG (ref 26–34)
MCHC RBC AUTO-ENTMCNC: 32.8 G/DL (ref 31–36)
MCV RBC AUTO: 84.8 FL (ref 80–100)
OCCULT BLOOD SCREENING: NORMAL
PDW BLD-RTO: 14.7 % (ref 12.4–15.4)
PLATELET # BLD: 313 K/UL (ref 135–450)
PMV BLD AUTO: 7 FL (ref 5–10.5)
POTASSIUM REFLEX MAGNESIUM: 4.3 MMOL/L (ref 3.5–5.1)
RBC # BLD: 5.01 M/UL (ref 4.2–5.9)
SODIUM BLD-SCNC: 140 MMOL/L (ref 136–145)
SPECIMEN STATUS: NORMAL
TOTAL PROTEIN: 7.4 G/DL (ref 6.4–8.2)
WBC # BLD: 5.9 K/UL (ref 4–11)

## 2020-08-04 PROCEDURE — 80053 COMPREHEN METABOLIC PANEL: CPT

## 2020-08-04 PROCEDURE — 99283 EMERGENCY DEPT VISIT LOW MDM: CPT

## 2020-08-04 PROCEDURE — G0328 FECAL BLOOD SCRN IMMUNOASSAY: HCPCS

## 2020-08-04 PROCEDURE — 85027 COMPLETE CBC AUTOMATED: CPT

## 2020-08-04 PROCEDURE — 96374 THER/PROPH/DIAG INJ IV PUSH: CPT

## 2020-08-04 PROCEDURE — 6360000002 HC RX W HCPCS: Performed by: PHYSICIAN ASSISTANT

## 2020-08-04 RX ORDER — HYDROCORTISONE ACETATE 25 MG/1
25 SUPPOSITORY RECTAL EVERY 12 HOURS
Qty: 12 SUPPOSITORY | Refills: 0 | Status: SHIPPED | OUTPATIENT
Start: 2020-08-04 | End: 2021-05-27 | Stop reason: ALTCHOICE

## 2020-08-04 RX ORDER — LORAZEPAM 2 MG/ML
4 INJECTION INTRAMUSCULAR ONCE
Status: COMPLETED | OUTPATIENT
Start: 2020-08-04 | End: 2020-08-04

## 2020-08-04 RX ADMIN — LORAZEPAM 4 MG: 2 INJECTION, SOLUTION INTRAMUSCULAR; INTRAVENOUS at 11:05

## 2020-08-04 ASSESSMENT — PAIN SCALES - WONG BAKER: WONGBAKER_NUMERICALRESPONSE: 0

## 2020-08-04 NOTE — ED PROVIDER NOTES
Emergency Department Provider Note     Location: [address] [place] [address]  ED  8/4/2020    I independently performed a history and physical on Duane A Carota. All diagnostic, treatment, and disposition decisions were made by myself in conjunction with the mid-level provider. Briefly, this is a 62 y.o. male here for concern for blood in stool. Patient has caretakers and when they changed his depends they noted bright red blood in his diaper. They were unsure if it was coming from his urine as he is recently passed kidney stones or from his rectum. Patient was not complaining of any abdominal pain. ED Triage Vitals   BP Temp Temp Source Pulse Resp SpO2 Height Weight   08/04/20 0918 08/04/20 1108 08/04/20 0918 08/04/20 0918 08/04/20 0918 08/04/20 0918 08/04/20 0918 08/04/20 0918   (!) 135/101 97.8 °F (36.6 °C) Oral 95 16 96 % 5' (1.524 m) 104 lb (47.2 kg)        Patient resting comfortably in no acute distress. Heart is regular rate and rhythm. Lungs clear to auscultation bilaterally. Abdomen is soft, nondistended, and nontender. No peripheral edema noted. Patient seen and examined. Vital signs stable and within normal limits. Physical exam as documented above. Lab work-up notable for stable hemoglobin. On exam patient is noted to have a hemorrhoid which is likely the source of the blood. As patient is hemodynamically stable without abdominal tenderness feels the patient is safe for discharge home at this time. We will have him follow-up with GI as an outpatient. No results found.       Results for orders placed or performed during the hospital encounter of 08/04/20   CBC   Result Value Ref Range    WBC 5.9 4.0 - 11.0 K/uL    RBC 5.01 4.20 - 5.90 M/uL    Hemoglobin 13.9 13.5 - 17.5 g/dL    Hematocrit 42.5 40.5 - 52.5 %    MCV 84.8 80.0 - 100.0 fL    MCH 27.8 26.0 - 34.0 pg    MCHC 32.8 31.0 - 36.0 g/dL    RDW 14.7 12.4 - 15.4 %    Platelets 311 830 - 929 K/uL    MPV 7.0 5.0 - 10.5 fL   Comprehensive Metabolic Panel w/ Reflex to MG   Result Value Ref Range    Sodium 140 136 - 145 mmol/L    Potassium reflex Magnesium 4.3 3.5 - 5.1 mmol/L    Chloride 101 99 - 110 mmol/L    CO2 29 21 - 32 mmol/L    Anion Gap 10 3 - 16    Glucose 120 (H) 70 - 99 mg/dL    BUN 16 7 - 20 mg/dL    CREATININE 0.9 0.9 - 1.3 mg/dL    GFR Non-African American >60 >60    GFR African American >60 >60    Calcium 10.2 8.3 - 10.6 mg/dL    Total Protein 7.4 6.4 - 8.2 g/dL    Alb 4.4 3.4 - 5.0 g/dL    Albumin/Globulin Ratio 1.5 1.1 - 2.2    Total Bilirubin <0.2 0.0 - 1.0 mg/dL    Alkaline Phosphatase 121 40 - 129 U/L    ALT 18 10 - 40 U/L    AST 15 15 - 37 U/L    Globulin 3.0 g/dL   Blood Occult Stool Screen #1   Result Value Ref Range    Occult Blood Screening Result: Negative  Normal range: Negative      Sample possible blood bank testing   Result Value Ref Range    Specimen Status JAMIL        For further details of Duane A Carota's emergency department encounter, please see Rosette Boateng documentation. This chart was generated in part by using Dragon Dictation system and may contain errors related to that system including errors in grammar, punctuation, and spelling, as well as words and phrases that may be inappropriate. If there are any questions or concerns please feel free to contact the dictating provider for clarification.           Giancarlo Faith MD  08/04/20 1133

## 2020-08-04 NOTE — ED TRIAGE NOTES
Pt is from a group home; developmentally delayed; care giver at bedside with pt.   Pt is unable to verbalize needs

## 2020-08-04 NOTE — ED PROVIDER NOTES
[facility]  ED  EMERGENCY DEPARTMENT ENCOUNTER        Pt Name: Nicolas Dockery  MRN: [id number]  Barbaragfkatiana 1960  Date of evaluation: 8/4/2020  Provider: Fernando Connors PA-C  PCP: Mario Santana MD     I have seen and evaluated this patient with my supervising physician Giancarlo Schwarz MD.    [address] [place] [address]       Chief Complaint   Patient presents with    Rectal Bleeding     Pt from a group home; this morning staff found red/brown stool in depend. HISTORY OF PRESENT ILLNESS   (Location, Timing/Onset, Context/Setting, Quality, Duration, Modifying Factors, Severity, Associated Signs and Symptoms)  Note limiting factors. Nicolas Dockery is a 62 y.o. male presenting for evaluation of rectal bleeding. He is nonverbal, from group home with his caregiver. She reports that she noticed BRB in his stool this Am when changing his diaper. He has not complained of pain or had rectal bleeding before. He was just evaluated and treated for bilateral ureteral stones and UTI with Cipro. He is nonverbal and cannot communicate proper HPI. Nursing Notes were all reviewed and agreed with or any disagreements were addressed in the HPI. REVIEW OF SYSTEMS    (2-9 systems for level 4, 10 or more for level 5)     Review of Systems   Unable to perform ROS: Patient nonverbal       Positives and Pertinent negatives as per HPI. Except as noted above in the ROS, all other systems were reviewed and negative.        PAST MEDICAL HISTORY     Past Medical History:   Diagnosis Date    Bipolar disorder ([address]     Developmental non-verbal disorder     Impulse control disorder     Influenza A 2/13/14    Mental retardation, profound (I.Q. < 20)     Osteopenia          SURGICAL HISTORY     Past Surgical History:   Procedure Laterality Date    CATARACT REMOVAL      COLONOSCOPY  06/20/2016    incomplete, poor prep    CRANIOTOMY Right 12/2/2019    Right Trephine Craniotomy For Evacuation of Subdural No       SCREENINGS             PHYSICAL EXAM    (up to 7 for level 4, 8 or more for level 5)     ED Triage Vitals   BP Temp Temp Source Pulse Resp SpO2 Height Weight   08/04/20 0918 08/04/20 1108 08/04/20 0918 08/04/20 0918 08/04/20 0918 08/04/20 0918 08/04/20 0918 08/04/20 0918   (!) 135/101 97.8 °F (36.6 °C) Oral 95 16 96 % 5' (1.524 m) 104 lb (47.2 kg)       Physical Exam  Vitals signs and nursing note reviewed. Constitutional:       Appearance: He is well-developed and normal weight. He is not diaphoretic. HENT:      Head: Normocephalic and atraumatic. Nose: Nose normal.   Eyes:      General:         Right eye: No discharge. Left eye: No discharge. Extraocular Movements: Extraocular movements intact. Pupils: Pupils are equal, round, and reactive to light. Neck:      Musculoskeletal: Normal range of motion and neck supple. Pulmonary:      Effort: Pulmonary effort is normal. No respiratory distress. Abdominal:      General: Abdomen is flat. There is distension. Palpations: Abdomen is soft. Genitourinary:     Rectum: Guaiac result positive. External hemorrhoid and internal hemorrhoid present. Musculoskeletal: Normal range of motion. Skin:     General: Skin is warm and dry. Neurological:      Mental Status: He is alert and oriented to person, place, and time. Psychiatric:         Mood and Affect: Mood normal.         Behavior: Behavior normal.         Thought Content:  Thought content normal.         Judgment: Judgment normal.         DIAGNOSTIC RESULTS   LABS:    Labs Reviewed   COMPREHENSIVE METABOLIC PANEL W/ REFLEX TO MG FOR LOW K - Abnormal; Notable for the following components:       Result Value    Glucose 120 (*)     All other components within normal limits    Narrative:     Performed at:  [facility]  [address],  [place], [address]   Phone [phone]   CBC    Narrative:     Performed at:  [id number] [employer] [address] [facility], [facility] [place]   Phone [phone]   SAMPLE POSSIBLE BLOOD BANK TESTING    Narrative:     Performed at:  [facility]) [facility]  [address] [facility], [address] [place] [address] [place]   Phone [phone]   BLOOD OCCULT STOOL SCREEN #1       All other labs were within normal range or not returned as of this dictation. EKG: All EKG's are interpreted by the Emergency Department Physician in the absence of a cardiologist.  Please see their note for interpretation of EKG. RADIOLOGY:   Non-plain film images such as CT, Ultrasound and MRI are read by the radiologist. Plain radiographic images are visualized and preliminarily interpreted by the ED Provider with the below findings:        Interpretation per the Radiologist below, if available at the time of this note:    No orders to display     Ct Abdomen Pelvis Wo Contrast Additional Contrast? None    Result Date: 8/2/2020  EXAMINATION: CT OF THE ABDOMEN AND PELVIS WITHOUT CONTRAST, 7/29/2020 8:55 pm TECHNIQUE: CT of the abdomen and pelvis was performed without the administration of intravenous contrast. Multiplanar reformatted images are provided for review. Dose modulation, iterative reconstruction, and/or weight based adjustment of the mA/kV was utilized to reduce the radiation dose to as low as reasonably achievable. COMPARISON: 02/23/2020 HISTORY: ORDERING SYSTEM PROVIDED HISTORY: Hematuria; suprapubic pain TECHNOLOGIST PROVIDED HISTORY: Reason for exam:-> Hematuria; suprapubic pain Additional Contrast?->None Reason for Exam: Hematuria, suprapubic pain Initial encounter FINDINGS: Lower Chest: There is a 5 mm subpleural nodule in the left lower lobe. No follow-up is recommended per Fleischner Society guidelines lung bases are otherwise grossly clear. Organs: The liver, spleen, pancreas, gallbladder and adrenal glands are without focal abnormality.   Nonobstructing bilateral renal calculi the largest measuring up to 6 mm. No ureteral calculus. No hydronephrosis or perinephric stranding. GI/Bowel: No dilated loops of bowel or bowel thickening. Small hiatal hernia. Moderate amount of stool in the colon. The appendix is normal. Pelvis: Multiple bladder calculi are noted the largest measuring up to 4 mm. No bladder wall thickening. No pelvic adenopathy or free fluid. Peritoneum/Retroperitoneum: The aorta is normal in caliber. No retroperitoneal or mesenteric adenopathy. No ascites or drainable fluid collection. Bones/Soft Tissues: Remote left rib fractures. Grade 1 anterolisthesis of L4 on L5. Degenerative changes of the thoracolumbar spine. 1. Nonobstructing bilateral renal calculi as well as bladder calculi. No hydronephrosis or ureteral calculus. 2. Otherwise no acute abdominal or pelvic abnormality on this unenhanced study. Ct Head Wo Contrast    Result Date: 7/30/2020  EXAMINATION: CT OF THE HEAD WITHOUT CONTRAST  7/29/2020 11:35 pm TECHNIQUE: CT of the head was performed without the administration of intravenous contrast. Dose modulation, iterative reconstruction, and/or weight based adjustment of the mA/kV was utilized to reduce the radiation dose to as low as reasonably achievable. COMPARISON: 06/17/2020 HISTORY: ORDERING SYSTEM PROVIDED HISTORY: history of head bleed TECHNOLOGIST PROVIDED HISTORY: Reason for exam:->history of head bleed Has a \"code stroke\" or \"stroke alert\" been called? ->No Reason for Exam: HISTORY OF HEAD BLEED FINDINGS: BRAIN/VENTRICLES: There is no acute intracranial hemorrhage, mass effect or midline shift. No abnormal extra-axial fluid collection. The gray-white differentiation is maintained without evidence of an acute infarct. There is no evidence of hydrocephalus. Central atrophy and mild chronic white matter disease are similar to the prior study. ORBITS: The visualized portion of the orbits demonstrate no acute abnormality.  SINUSES: Mild ethmoid sinus mucosal thickening is identified. The remaining visualized paranasal sinuses and mastoid air cells are clear. SOFT TISSUES/SKULL:  No acute abnormality of the visualized skull or soft tissues. Again identified are dystrophic calcifications within the patient's ears. Postsurgical changes of the anterior right skull are again noted. No acute intracranial abnormality. PROCEDURES   Unless otherwise noted below, none     Procedures    CRITICAL CARE TIME   N/A    CONSULTS:  None      EMERGENCY DEPARTMENT COURSE and DIFFERENTIAL DIAGNOSIS/MDM:   Vitals:    Vitals:    08/04/20 0918 08/04/20 1108   BP: (!) 135/101 (!) 135/107   Pulse: 95 91   Resp: 16 16   Temp:  97.8 °F (36.6 °C)   TempSrc: Oral Axillary   SpO2: 96% 94%   Weight: 104 lb (47.2 kg)    Height: 5' (1.524 m)        Patient was given the following medications:  Medications   LORazepam (ATIVAN) injection 4 mg (4 mg Intravenous Given 8/4/20 1105)         This patient presented to the emergency department for evaluation of Rectal bleeding. At presentation, vital signs were stable. The patient was in no acute distress. Physical Exam findings were as noted above. Labs were drawn which showed Stable hemoglobin 1 point higher than his Hgb 5 days ago, which is reassuring. Stool studies were positive for heme. Rectal exam revealed hemorrhoids and BRB. I do believe this is the cause of his symptoms. Likely this was exacerbated by oral Abx. I will discharge home safely on Anusol and Preparation H wipes. He is to follow with GI as an outpatient and was provided with a referral.     I estimate there is LOW risk for ACUTE APPENDICITIS, BOWEL OBSTRUCTION, CHOLECYSTITIS, DIVERTICULITIS, INCARCERATED HERNIA, PANCREATITIS, PERFORATED BOWEL, BOWEL ISCHEMIA, GONADAL TORSION, OR CARDIAC ISCHEMIA, thus I consider the discharge disposition reasonable. Also, there is no evidence or peritonitis, sepsis, or toxicity.       This patient will be discharged home with the medications listed below. I counseled on how to take these medicines and risks/benefits and AEs. The patient was agreeable to the prescriptions. He/She will follow up with primary care provider or present back for worsening symptoms. FINAL IMPRESSION      1. Rectal bleeding    2.  External hemorrhoid          DISPOSITION/PLAN   DISPOSITION Decision To Discharge 08/04/2020 11:35:49 AM      PATIENT REFERREDTO:  Chikis Cash MD  [place]  [address]  [phone]    Schedule an appointment as soon as possible for a visit in 3 days      [facility]  ED  [address]  Go to   As needed, If symptoms worsen    Miller Medellin, [address] [place] [address]  [address] [facility] [address] [phone]    Schedule an appointment as soon as possible for a visit in 1 week        DISCHARGE MEDICATIONS:  New Prescriptions    HYDROCORTISONE (ANUSOL-HC) 25 MG SUPPOSITORY    Place 1 suppository rectally every 12 hours       DISCONTINUED MEDICATIONS:  Discontinued Medications    No medications on file              (Please note that portions of this note were completed with a voice recognition program.  Efforts were made to edit the dictations but occasionally words are mis-transcribed.)    Shivam Kunz PA-C (electronically signed)           Malick Machado PA-C  08/04/20 1009

## 2020-08-20 ENCOUNTER — HOSPITAL ENCOUNTER (OUTPATIENT)
Age: 60
Discharge: HOME | End: 2020-08-20
Payer: MEDICARE

## 2020-08-20 PROCEDURE — U0003 INFECTIOUS AGENT DETECTION BY NUCLEIC ACID (DNA OR RNA); SEVERE ACUTE RESPIRATORY SYNDROME CORONAVIRUS 2 (SARS-COV-2) (CORONAVIRUS DISEASE [COVID-19]), AMPLIFIED PROBE TECHNIQUE, MAKING USE OF HIGH THROUGHPUT TECHNOLOGIES AS DESCRIBED BY CMS-2020-01-R: HCPCS

## 2020-08-21 LAB — SARS-COV-2, NAA: NOT DETECTED

## 2020-08-25 ENCOUNTER — ANESTHESIA EVENT (OUTPATIENT)
Dept: ENDOSCOPY | Age: 60
End: 2020-08-25
Payer: MEDICARE

## 2020-08-25 NOTE — ANESTHESIA PRE PROCEDURE
Department of Anesthesiology  Preprocedure Note       Name:  Jose Ramon Mendez   Age:  62 y.o.  :  1960                                          MRN:  [id number]         Date:  2020      Surgeon:  Miller Medellin MD    Procedure:  COLON W/ANES. (9:00) COVID TEST -. PT IS NON-VERBAL     HPI:  Jose Ramon Mendez is a 62 y.o. male who presented to the ED recently for evaluation of rectal bleeding. He is nonverbal, from group home with his caregiver. She reports that she noticed BRB in his stool this Am when changing his diaper. He has not complained of pain or had rectal bleeding before. He was just evaluated and treated for bilateral ureteral stones and UTI with Cipro. He is nonverbal and cannot communicate proper HPI. Patient is non-communicative and unable to provide any hx. Medications prior to admission:   hydrocortisone (ANUSOL-HC) 25 MG suppository Place 1 suppository rectally every 12 hours   LORazepam (ATIVAN) 0.5 MG tablet To use every day at 1700 for anxiety related hygiene-care   melatonin 3 MG TABS tablet Take 3 mg by mouth nightly   lactulose (CHRONULAC) 10 GM/15ML solution Take 20 g by mouth 2 times daily   acetaminophen (PHARBETOL) 325 MG tablet Take 2 tablets by mouth every 6 hours as needed for Pain   ibuprofen (ADVIL;MOTRIN) 400 MG tablet Take 1 tablet by mouth every 6 hours as needed for Pain   clotrimazole (LOTRIMIN) 1 % cream Apply topically 2 times daily Apply topically 2 times daily.    levETIRAcetam (KEPPRA) 250 MG tablet Take 250 mg by mouth 2 times daily   tamsulosin (FLOMAX) 0.4 MG capsule Take 0.8 mg by mouth daily   megestrol acetate (MEGACE) 400 MG/10ML SUSP Take 400 mg by mouth daily 10ml every evening   ondansetron (ZOFRAN) 4 MG tablet Take 4 mg by mouth every 8 hours as needed for Nausea or Vomiting   traZODone (DESYREL) 50 MG tablet 1 tablet by Per NG tube route nightly   metoprolol tartrate (LOPRESSOR) 25 MG tablet 1 tablet by Per NG tube route 2 times daily sennosides-docusate sodium (SENOKOT-S) 8.6-50 MG tablet Take 2 tablets by mouth every 72 hours   QUEtiapine (SEROQUEL XR) 50 MG extended release tablet Take 100 mg by mouth 3 times daily 50mg in AM; 100mg @ 1600; 150mg @ 1900     Allergies:     Benadryl [Diphenhydramine] Other (See Comments)     Pt becomes agitated and aggressive with Benadryl    Clonidine Derivatives     Penicillins     Tetracyclines & Related      Problem List:     Lipoma of head    Atrial fibrillation with RVR ([facility] [address]    Acute cystitis with hematuria    Altered mental status    Brain herniation (HCC)    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 ([facility] [address]    Constipation    Acute urinary retention    Atrial fibrillation, chronic    History of subdural hematoma     Past Medical History:     Bipolar disorder ([facility] [address]     Developmental non-verbal disorder     Impulse control disorder     Influenza A 2/13/14    Mental retardation, profound (I.Q. < 20)     Osteopenia      Past Surgical History:     CATARACT REMOVAL      COLONOSCOPY  06/20/2016    incomplete, poor prep    CRANIOTOMY Right 12/2/2019    Right Trephine Craniotomy For Evacuation of Subdural Hematoma performed by Emy Mederos MD at [address] [place] [address] TURP N/A 2/28/2020    CYSTOSCOPY, TRANSURETHRAL RESECTION OF PROSTATE performed by Hawk Palacios MD at [place] [address] History:    Tobacco Use    Smoking status: Never Smoker    Smokeless tobacco: Never Used   Substance Use Topics    Alcohol use: No     Vital Signs (Current):    BP Readings from Last 3 Encounters:   08/04/20 104/74   07/30/20 102/79   06/17/20 106/74     NPO Status: >8 hrs                        BMI:   Wt Readings from Last 3 Encounters:   08/04/20 104 lb (47.2 kg)   06/17/20 104 lb (47.2 kg)   04/17/20 104 lb (47.2 kg)       CBC:    WBC 5.9 08/04/2020    HGB 13.9 08/04/2020    HCT 42.5 08/04/2020     08/04/2020     CMP:     08/04/2020    K 4.3 08/04/2020     08/04/2020    CO2 29 08/04/2020

## 2020-08-25 NOTE — PROGRESS NOTES
PAT call for Endoscopy procedure scheduled for 8/26/2020. Spoke with Jose Miguel  regarding arrival time of 0800, NPO after am dose of prep completed. Pt instructed to call physician if any questions or concerns prior to procedure, phone number provided. Pt verbalized understanding, no further questions at present time. Requested fax of pt medications if possible.  (he is not in the home at this time but will try to send them over.)

## 2020-08-26 ENCOUNTER — ANESTHESIA (OUTPATIENT)
Dept: ENDOSCOPY | Age: 60
End: 2020-08-26
Payer: MEDICARE

## 2020-08-26 ENCOUNTER — HOSPITAL ENCOUNTER (OUTPATIENT)
Age: 60
Setting detail: OUTPATIENT SURGERY
Discharge: OTHER INSTITUTION | End: 2020-08-26
Attending: INTERNAL MEDICINE | Admitting: INTERNAL MEDICINE
Payer: MEDICARE

## 2020-08-26 VITALS
HEART RATE: 70 BPM | DIASTOLIC BLOOD PRESSURE: 66 MMHG | OXYGEN SATURATION: 96 % | SYSTOLIC BLOOD PRESSURE: 99 MMHG | RESPIRATION RATE: 16 BRPM | TEMPERATURE: 96.9 F

## 2020-08-26 VITALS
DIASTOLIC BLOOD PRESSURE: 50 MMHG | SYSTOLIC BLOOD PRESSURE: 86 MMHG | OXYGEN SATURATION: 96 % | RESPIRATION RATE: 21 BRPM

## 2020-08-26 PROCEDURE — 3609027000 HC COLONOSCOPY: Performed by: INTERNAL MEDICINE

## 2020-08-26 PROCEDURE — 2500000003 HC RX 250 WO HCPCS: Performed by: NURSE ANESTHETIST, CERTIFIED REGISTERED

## 2020-08-26 PROCEDURE — 7100000011 HC PHASE II RECOVERY - ADDTL 15 MIN: Performed by: INTERNAL MEDICINE

## 2020-08-26 PROCEDURE — 6360000002 HC RX W HCPCS: Performed by: NURSE ANESTHETIST, CERTIFIED REGISTERED

## 2020-08-26 PROCEDURE — 2580000003 HC RX 258: Performed by: NURSE ANESTHETIST, CERTIFIED REGISTERED

## 2020-08-26 PROCEDURE — 2709999900 HC NON-CHARGEABLE SUPPLY: Performed by: INTERNAL MEDICINE

## 2020-08-26 PROCEDURE — 3700000000 HC ANESTHESIA ATTENDED CARE: Performed by: INTERNAL MEDICINE

## 2020-08-26 PROCEDURE — 7100000010 HC PHASE II RECOVERY - FIRST 15 MIN: Performed by: INTERNAL MEDICINE

## 2020-08-26 PROCEDURE — 3700000001 HC ADD 15 MINUTES (ANESTHESIA): Performed by: INTERNAL MEDICINE

## 2020-08-26 RX ORDER — LIDOCAINE HYDROCHLORIDE 10 MG/ML
0.3 INJECTION, SOLUTION EPIDURAL; INFILTRATION; INTRACAUDAL; PERINEURAL
Status: DISCONTINUED | OUTPATIENT
Start: 2020-08-26 | End: 2020-08-26 | Stop reason: HOSPADM

## 2020-08-26 RX ORDER — OXYCODONE HYDROCHLORIDE AND ACETAMINOPHEN 5; 325 MG/1; MG/1
2 TABLET ORAL PRN
Status: DISCONTINUED | OUTPATIENT
Start: 2020-08-26 | End: 2020-08-26 | Stop reason: HOSPADM

## 2020-08-26 RX ORDER — ONDANSETRON 2 MG/ML
4 INJECTION INTRAMUSCULAR; INTRAVENOUS
Status: DISCONTINUED | OUTPATIENT
Start: 2020-08-26 | End: 2020-08-26 | Stop reason: HOSPADM

## 2020-08-26 RX ORDER — PHENYLEPHRINE HCL IN 0.9% NACL 1 MG/10 ML
SYRINGE (ML) INTRAVENOUS PRN
Status: DISCONTINUED | OUTPATIENT
Start: 2020-08-26 | End: 2020-08-26 | Stop reason: SDUPTHER

## 2020-08-26 RX ORDER — POLYETHYLENE GLYCOL 3350 17 G/17G
17 POWDER, FOR SOLUTION ORAL DAILY PRN
Status: ON HOLD | COMMUNITY
End: 2021-09-21 | Stop reason: SDUPTHER

## 2020-08-26 RX ORDER — MEPERIDINE HYDROCHLORIDE 25 MG/ML
12.5 INJECTION INTRAMUSCULAR; INTRAVENOUS; SUBCUTANEOUS EVERY 5 MIN PRN
Status: DISCONTINUED | OUTPATIENT
Start: 2020-08-26 | End: 2020-08-26 | Stop reason: HOSPADM

## 2020-08-26 RX ORDER — QUETIAPINE FUMARATE 100 MG/1
100 TABLET, FILM COATED ORAL
COMMUNITY
End: 2020-10-14 | Stop reason: ALTCHOICE

## 2020-08-26 RX ORDER — OXYCODONE HYDROCHLORIDE AND ACETAMINOPHEN 5; 325 MG/1; MG/1
1 TABLET ORAL PRN
Status: DISCONTINUED | OUTPATIENT
Start: 2020-08-26 | End: 2020-08-26 | Stop reason: HOSPADM

## 2020-08-26 RX ORDER — LIDOCAINE HYDROCHLORIDE 20 MG/ML
INJECTION, SOLUTION INFILTRATION; PERINEURAL PRN
Status: DISCONTINUED | OUTPATIENT
Start: 2020-08-26 | End: 2020-08-26 | Stop reason: SDUPTHER

## 2020-08-26 RX ORDER — HYDRALAZINE HYDROCHLORIDE 20 MG/ML
5 INJECTION INTRAMUSCULAR; INTRAVENOUS EVERY 10 MIN PRN
Status: DISCONTINUED | OUTPATIENT
Start: 2020-08-26 | End: 2020-08-26 | Stop reason: HOSPADM

## 2020-08-26 RX ORDER — PROPOFOL 10 MG/ML
INJECTION, EMULSION INTRAVENOUS PRN
Status: DISCONTINUED | OUTPATIENT
Start: 2020-08-26 | End: 2020-08-26 | Stop reason: SDUPTHER

## 2020-08-26 RX ORDER — PEDIATRIC NUTRIT, IRON/DHA/ARA 4G/150KCAL
POWDER (GRAM) ORAL 2 TIMES DAILY
Status: ON HOLD | COMMUNITY
End: 2021-09-21 | Stop reason: HOSPADM

## 2020-08-26 RX ORDER — SODIUM CHLORIDE, SODIUM LACTATE, POTASSIUM CHLORIDE, CALCIUM CHLORIDE 600; 310; 30; 20 MG/100ML; MG/100ML; MG/100ML; MG/100ML
INJECTION, SOLUTION INTRAVENOUS CONTINUOUS PRN
Status: DISCONTINUED | OUTPATIENT
Start: 2020-08-26 | End: 2020-08-26 | Stop reason: SDUPTHER

## 2020-08-26 RX ORDER — SODIUM CHLORIDE 0.9 % (FLUSH) 0.9 %
10 SYRINGE (ML) INJECTION EVERY 12 HOURS SCHEDULED
Status: DISCONTINUED | OUTPATIENT
Start: 2020-08-26 | End: 2020-08-26 | Stop reason: HOSPADM

## 2020-08-26 RX ORDER — PROMETHAZINE HYDROCHLORIDE 25 MG/ML
6.25 INJECTION, SOLUTION INTRAMUSCULAR; INTRAVENOUS
Status: DISCONTINUED | OUTPATIENT
Start: 2020-08-26 | End: 2020-08-26 | Stop reason: HOSPADM

## 2020-08-26 RX ORDER — SODIUM CHLORIDE, SODIUM LACTATE, POTASSIUM CHLORIDE, CALCIUM CHLORIDE 600; 310; 30; 20 MG/100ML; MG/100ML; MG/100ML; MG/100ML
INJECTION, SOLUTION INTRAVENOUS CONTINUOUS
Status: DISCONTINUED | OUTPATIENT
Start: 2020-08-26 | End: 2020-08-26 | Stop reason: HOSPADM

## 2020-08-26 RX ORDER — FENTANYL CITRATE 50 UG/ML
25 INJECTION, SOLUTION INTRAMUSCULAR; INTRAVENOUS EVERY 5 MIN PRN
Status: DISCONTINUED | OUTPATIENT
Start: 2020-08-26 | End: 2020-08-26 | Stop reason: HOSPADM

## 2020-08-26 RX ORDER — SODIUM CHLORIDE 0.9 % (FLUSH) 0.9 %
10 SYRINGE (ML) INJECTION PRN
Status: DISCONTINUED | OUTPATIENT
Start: 2020-08-26 | End: 2020-08-26 | Stop reason: HOSPADM

## 2020-08-26 RX ADMIN — LIDOCAINE HYDROCHLORIDE 50 MG: 20 INJECTION, SOLUTION INFILTRATION; PERINEURAL at 09:20

## 2020-08-26 RX ADMIN — Medication 50 MCG: at 09:35

## 2020-08-26 RX ADMIN — Medication 50 MCG: at 09:42

## 2020-08-26 RX ADMIN — SODIUM CHLORIDE, POTASSIUM CHLORIDE, SODIUM LACTATE AND CALCIUM CHLORIDE: 600; 310; 30; 20 INJECTION, SOLUTION INTRAVENOUS at 09:20

## 2020-08-26 RX ADMIN — Medication 50 MCG: at 09:39

## 2020-08-26 RX ADMIN — PROPOFOL 170 MG: 10 INJECTION, EMULSION INTRAVENOUS at 09:20

## 2020-08-26 RX ADMIN — Medication 50 MCG: at 09:50

## 2020-08-26 ASSESSMENT — PAIN SCALES - WONG BAKER
WONGBAKER_NUMERICALRESPONSE: 0
WONGBAKER_NUMERICALRESPONSE: 0

## 2020-08-26 NOTE — OP NOTE
exam was normal.  No masses were felt. COLON:  The visualized colon mucosa appeared normal without any evidence  of inflammation, ulcers, pseudomembranes, polyps, or masses. The exam  was limited by poor prep as mentioned above. RECTUM:  Retroflexed view of the rectum showed small internal  hemorrhoids. SUMMARY:  1. Normal colon. 2.  Limited by poor prep. RECOMMENDATIONS:  1. Return the patient to group home. 2.  Repeat colonoscopy in 1 year given the poor prep. 3.  Follow up as needed.     EBL: < 5mL    Nida Castillo MD    D: 08/26/2020 11:02:50       T: 08/26/2020 11:09:20     [email or web address]  Job#: [id number]     Doc#: [id number]    CC:  Desire Mcmillan MD       [facility]

## 2020-08-26 NOTE — FLOWSHEET NOTE
Patient preop vitals, assessment, and alexa score delayed due to patient combative and uncooperative state. Patient attempted to kick myself 3-4 times up arrival and with with any intervention. Patient care giver from nursing home was at beside, until start of procedure. Will continue to monitor.

## 2020-08-26 NOTE — H&P
every 6 hours as needed for Pain 2/4/20   Valerie Cummins MD   ibuprofen (ADVIL;MOTRIN) 400 MG tablet Take 1 tablet by mouth every 6 hours as needed for Pain 2/4/20   Valerie Cummins MD   clotrimazole (LOTRIMIN) 1 % cream Apply topically 2 times daily Apply topically 2 times daily. Historical Provider, MD   levETIRAcetam (KEPPRA) 250 MG tablet Take 250 mg by mouth 2 times daily    Historical Provider, MD   tamsulosin (FLOMAX) 0.4 MG capsule Take 0.8 mg by mouth daily    Historical Provider, MD   megestrol acetate (MEGACE) 400 MG/10ML SUSP Take 400 mg by mouth daily 10ml every evening    Historical Provider, MD   ondansetron (ZOFRAN) 4 MG tablet Take 4 mg by mouth every 8 hours as needed for Nausea or Vomiting    Historical Provider, MD   traZODone (DESYREL) 50 MG tablet 1 tablet by Per NG tube route nightly 12/5/19   Ramos Lindquist MD   metoprolol tartrate (LOPRESSOR) 25 MG tablet 1 tablet by Per NG tube route 2 times daily 12/5/19   Ramos Lindquist MD   sennosides-docusate sodium (SENOKOT-S) 8.6-50 MG tablet Take 2 tablets by mouth every 72 hours 12/5/19   Ramos Lindquist MD   QUEtiapine (SEROQUEL XR) 50 MG extended release tablet Take 100 mg by mouth 3 times daily 50mg in AM  100mg @ 1600  150mg @ 1900    Historical Provider, MD        Allergies: Allergies   Allergen Reactions    Benadryl [Diphenhydramine] Other (See Comments)     Pt becomes agitated and aggressive with Benadryl    Clonidine Derivatives     Penicillins     Tetracyclines & Related        Nurses notes reviewed and agreed. Physical Exam:  Vital Signs: There were no vitals taken for this visit. Airway: Mallampati: II (soft palate, uvula, fauces visible)  Pulmonary:Normal  Cardiac:Normal  Abdomen:Normal    Pre-Procedure Assessment / Plan:  ASA: Class 3 - A patient with severe systemic disease that limits activity but is not incapacitating  Level of Sedation Plan: Moderate sedation  Post Procedure plan: Return to same level of care    I assessed the patient and find that the patient is in satisfactory condition to proceed with the planned procedure and sedation plan. I have explained the risk, benefits, and alternatives to the procedure; the patient understands and agrees to proceed.        Alireza Beaver  8/26/2020

## 2020-08-26 NOTE — BRIEF OP NOTE
Brief Postoperative Note      Patient: Hortencia Del Valle  YOB: 1960  MRN: [id number]    Date of Procedure: 8/26/2020    Pre-Op Diagnosis: SCREENING    Post-Op Diagnosis: poor prep, normal colon otherwise       Procedure(s):  COLON W/ANES. (9:00) COVID TEST 8/20-8/22. PT IS NON-VERBAL, IS NOT IN A FACILITY. IS CARED FOR BY 2 CAREGIVERS IN HIS HOME.     Surgeon(s):  Deniz Cota MD    Assistant:  * No surgical staff found *    Anesthesia: Monitor Anesthesia Care    Estimated Blood Loss (mL): Minimal    Complications: None    Specimens:   * No specimens in log *    Implants:  * No implants in log *      Drains: * No LDAs found *    Findings: Poor prep, otherwise normal colon    Electronically signed by Deniz Cota MD on 8/26/2020 at 9:53 AM

## 2020-08-26 NOTE — PROGRESS NOTES
Assessment unchanged from previous. Verbalizes understanding of discharge instructions and written instructions also given to patient. Questions and concerns addressed at this time. Denies any needs at this time. IV d/c'd. Pt discharged via personal wheelchair to car in good condition. Discharge instructions gone over with and given to pt's caregiver. Pt in stable condition at this time.

## 2020-08-26 NOTE — ANESTHESIA POSTPROCEDURE EVALUATION
Department of Anesthesiology  Postprocedure Note    Patient: Amos Porter  MRN: [id number]  YOB: 1960  Date of evaluation: 8/26/2020    Procedure Summary     Date:  08/26/20 Room / Location:  [place] [address] 01 / [facility]    Anesthesia Start:  0862 Anesthesia Stop:  4668    Procedure:  COLON W/ANES. (9:00) COVID TEST 8/20-8/22. PT IS NON-VERBAL, IS NOT IN A FACILITY. IS CARED FOR BY 2 CAREGIVERS IN HIS HOME. (N/A ) Diagnosis:       Special screening for malignant neoplasms, colon      (SCREENING)    Surgeon:  Nito Sargent MD Responsible Provider:  Olivia Weeks MD    Anesthesia Type:  TIVA, general ASA Status:  4        Anesthesia Type: TIVA, general    Saleem Phase I: Saleem Score: 10    Saleem Phase II: Saleem Score: 10    Last vitals: Reviewed and per EMR flowsheets.      Anesthesia Post Evaluation   Anesthetic Problems: no   Cardiovascular System Stable: yes  Respiratory Function: Airway Patent yes  ETT no  Ventilator no  Level of consciousness: awake, alert and oriented  Post-op pain: adequate analgesia  Hydration Adequate: yes  Nausea/Vomiting:no  Other Issues:     Esa Johnson MD

## 2020-08-27 ENCOUNTER — HOSPITAL ENCOUNTER (EMERGENCY)
Age: 60
Discharge: HOME | End: 2020-08-27
Attending: EMERGENCY MEDICINE
Payer: MEDICARE

## 2020-08-27 VITALS
TEMPERATURE: 98.9 F | DIASTOLIC BLOOD PRESSURE: 71 MMHG | HEIGHT: 60 IN | WEIGHT: 104 LBS | RESPIRATION RATE: 14 BRPM | OXYGEN SATURATION: 96 % | SYSTOLIC BLOOD PRESSURE: 94 MMHG | HEART RATE: 73 BPM | BODY MASS INDEX: 20.42 KG/M2

## 2020-08-27 LAB
ANION GAP SERPL CALCULATED.3IONS-SCNC: 7 MMOL/L (ref 3–16)
BASOPHILS ABSOLUTE: 0 K/UL (ref 0–0.2)
BASOPHILS RELATIVE PERCENT: 0.8 %
BILIRUBIN URINE: NEGATIVE
BLOOD, URINE: ABNORMAL
BUN BLDV-MCNC: 10 MG/DL (ref 7–20)
CALCIUM SERPL-MCNC: 9.2 MG/DL (ref 8.3–10.6)
CHLORIDE BLD-SCNC: 102 MMOL/L (ref 99–110)
CLARITY: CLEAR
CO2: 30 MMOL/L (ref 21–32)
COLOR: YELLOW
CREAT SERPL-MCNC: 0.7 MG/DL (ref 0.9–1.3)
EOSINOPHILS ABSOLUTE: 0.2 K/UL (ref 0–0.6)
EOSINOPHILS RELATIVE PERCENT: 3.8 %
GFR AFRICAN AMERICAN: >60
GFR NON-AFRICAN AMERICAN: >60
GLUCOSE BLD-MCNC: 114 MG/DL (ref 70–99)
GLUCOSE URINE: NEGATIVE MG/DL
HCT VFR BLD CALC: 37.4 % (ref 40.5–52.5)
HEMOGLOBIN: 12.5 G/DL (ref 13.5–17.5)
KETONES, URINE: NEGATIVE MG/DL
LEUKOCYTE ESTERASE, URINE: NEGATIVE
LYMPHOCYTES ABSOLUTE: 1.1 K/UL (ref 1–5.1)
LYMPHOCYTES RELATIVE PERCENT: 26.6 %
MCH RBC QN AUTO: 27.8 PG (ref 26–34)
MCHC RBC AUTO-ENTMCNC: 33.3 G/DL (ref 31–36)
MCV RBC AUTO: 83.5 FL (ref 80–100)
MICROSCOPIC EXAMINATION: YES
MONOCYTES ABSOLUTE: 0.3 K/UL (ref 0–1.3)
MONOCYTES RELATIVE PERCENT: 8.1 %
NEUTROPHILS ABSOLUTE: 2.6 K/UL (ref 1.7–7.7)
NEUTROPHILS RELATIVE PERCENT: 60.7 %
NITRITE, URINE: NEGATIVE
PDW BLD-RTO: 13.7 % (ref 12.4–15.4)
PH UA: 6 (ref 5–8)
PLATELET # BLD: 304 K/UL (ref 135–450)
PMV BLD AUTO: 7.3 FL (ref 5–10.5)
POTASSIUM SERPL-SCNC: 3.6 MMOL/L (ref 3.5–5.1)
PROTEIN UA: NEGATIVE MG/DL
RBC # BLD: 4.48 M/UL (ref 4.2–5.9)
RBC UA: NORMAL /HPF (ref 0–4)
SODIUM BLD-SCNC: 139 MMOL/L (ref 136–145)
SPECIFIC GRAVITY UA: 1.01 (ref 1–1.03)
URINE REFLEX TO CULTURE: ABNORMAL
URINE TYPE: ABNORMAL
UROBILINOGEN, URINE: 0.2 E.U./DL
WBC # BLD: 4.2 K/UL (ref 4–11)
WBC UA: NORMAL /HPF (ref 0–5)

## 2020-08-27 PROCEDURE — 80048 BASIC METABOLIC PNL TOTAL CA: CPT

## 2020-08-27 PROCEDURE — 6360000002 HC RX W HCPCS: Performed by: PHYSICIAN ASSISTANT

## 2020-08-27 PROCEDURE — 6360000002 HC RX W HCPCS: Performed by: EMERGENCY MEDICINE

## 2020-08-27 PROCEDURE — 85025 COMPLETE CBC W/AUTO DIFF WBC: CPT

## 2020-08-27 PROCEDURE — 96372 THER/PROPH/DIAG INJ SC/IM: CPT

## 2020-08-27 PROCEDURE — 81001 URINALYSIS AUTO W/SCOPE: CPT

## 2020-08-27 PROCEDURE — 51798 US URINE CAPACITY MEASURE: CPT

## 2020-08-27 PROCEDURE — 99283 EMERGENCY DEPT VISIT LOW MDM: CPT

## 2020-08-27 RX ORDER — ZIPRASIDONE MESYLATE 20 MG/ML
10 INJECTION, POWDER, LYOPHILIZED, FOR SOLUTION INTRAMUSCULAR ONCE
Status: COMPLETED | OUTPATIENT
Start: 2020-08-27 | End: 2020-08-27

## 2020-08-27 RX ORDER — MIDAZOLAM HYDROCHLORIDE 1 MG/ML
5 INJECTION INTRAMUSCULAR; INTRAVENOUS ONCE
Status: COMPLETED | OUTPATIENT
Start: 2020-08-27 | End: 2020-08-27

## 2020-08-27 RX ADMIN — MIDAZOLAM 5 MG: 1 INJECTION INTRAMUSCULAR; INTRAVENOUS at 20:58

## 2020-08-27 RX ADMIN — ZIPRASIDONE MESYLATE 10 MG: 20 INJECTION, POWDER, LYOPHILIZED, FOR SOLUTION INTRAMUSCULAR at 21:44

## 2020-08-28 ENCOUNTER — CARE COORDINATION (OUTPATIENT)
Dept: CARE COORDINATION | Age: 60
End: 2020-08-28

## 2020-08-28 NOTE — ED PROVIDER NOTES
[facility]  ED  EMERGENCY DEPARTMENT ENCOUNTER         Pt Name: Sun Segura  MRN: [id number]  Barbaragfkatiana 1960  Date of evaluation: 8/27/2020  Provider: Leila Ferguson PA-C  PCP: Ramos Ball MD  ED Attending: Radha Navarrete DO      This patient was seen by the attending provider Radha Navarrete DO    History provided by the patient's care giver    CHIEF COMPLAINT:     Chief Complaint   Patient presents with    Oliguria     caregriver states patient has voided in over 12 hours. HISTORY OF PRESENT ILLNESS:      Duane A Glenora Bur is a 62 y.o. male who arrives to the ED by private vehicle with caregiver. Patient has extensive past medical history that includes but not limited to MR, developmental nonverbal disorder, bipolar disorder, impulse control disorder, A. fib, osteopenia, subdural hematoma with brain herniation and urinary retention. He is known kidney stones and gallstones per caregiver. He follows with urology. Patient is brought in for urinary retention. He has not passed urine since approximately 7:30 AM today. There have been no noted changes with his behavior or mental state. No signs of distress per caregiver. Everything is baseline though they have noted he has not put out urine. Nursing Notes were reviewed     REVIEW OF SYSTEMS:     Review of Systems   Unable to perform ROS: Patient nonverbal   Patient with psychiatric disorder and unable to provide history. Except as noted above in the ROS, all other systems were reviewed and negative.          PAST MEDICAL HISTORY:     Past Medical History:   Diagnosis Date    A-fib ([address]     Bipolar disorder ([address]     Brain herniation ([address]     Constipation     Cystitis     Developmental non-verbal disorder     Impulse control disorder     Influenza A 2/13/14    Mental retardation, profound (I.Q. < 20)     Osteopenia     Subdural hematoma (HCC)          SURGICAL HISTORY:      Past Surgical History: Take 100 mg by mouth 3 times daily 50mg in AM  100mg @ 1600  150mg @ 1900    QUETIAPINE (SEROQUEL) 100 MG TABLET    Take 100 mg by mouth Take one and and one half tablets by mouth at 7pm    SENNOSIDES-DOCUSATE SODIUM (SENOKOT-S) 8.6-50 MG TABLET    Take 2 tablets by mouth every 72 hours    TAMSULOSIN (FLOMAX) 0.4 MG CAPSULE    Take 0.8 mg by mouth daily    TRAZODONE (DESYREL) 50 MG TABLET    1 tablet by Per NG tube route nightly         ALLERGIES:    Benadryl [diphenhydramine]; Clonidine derivatives;  Penicillins; and Tetracyclines & related    FAMILY HISTORY:       Family History   Family history unknown: Yes          SOCIAL HISTORY:       Social History     Socioeconomic History    Marital status: Single     Spouse name: None    Number of children: None    Years of education: None    Highest education level: None   Occupational History    None   Social Needs    Financial resource strain: None    Food insecurity     Worry: None     Inability: None    Transportation needs     Medical: None     Non-medical: None   Tobacco Use    Smoking status: Never Smoker    Smokeless tobacco: Never Used   Substance and Sexual Activity    Alcohol use: No    Drug use: No    Sexual activity: Not Currently   Lifestyle    Physical activity     Days per week: None     Minutes per session: None    Stress: None   Relationships    Social connections     Talks on phone: None     Gets together: None     Attends Mosque service: None     Active member of club or organization: None     Attends meetings of clubs or organizations: None     Relationship status: None    Intimate partner violence     Fear of current or ex partner: None     Emotionally abused: None     Physically abused: None     Forced sexual activity: None   Other Topics Concern    None   Social History Narrative    None       SCREENINGS:             PHYSICAL EXAM:       ED Triage Vitals [08/27/20 2036]   BP Temp Temp Source Pulse Resp SpO2 Height Weight   (!) 121/98 98.9 °F (37.2 °C) Oral 78 16 98 % 5' (1.524 m) 104 lb (47.2 kg)       Physical Exam    CONSTITUTIONAL: Awake and alert. Well-developed. Well-nourished. Non-toxic. Patient growls and makes persistent moaning type noises. HENT: Normocephalic. Atraumatic. No nasal discharge. Oropharynx clear. Mucous membranes moist.  EYES: Conjunctiva non-injected. No scleral icterus. PERRL. EOM's grossly intact. NECK: Supple. Normal ROM. CARDIOVASCULAR: RRR. Intact distal pulses. PULMONARY/CHEST WALL: Effort normal. No tachypnea. Lungs clear to ausculation. ABDOMEN: Normal BS. Soft. Nondistended. No tenderness to palpate. No guarding. /ANORECTAL: Circumcised male. Normal male genitalia. No swelling. No rash. MUSKULOSKELETAL: Normal ROM. No acute deformities. No edema. No tenderness to palpate. SKIN: Warm and dry. Mild erythematous rash to the inguinal creases. NEUROLOGICAL: Awake and alert. Patient moves all 4 extremities without hesitation. Patient combative which per caregiver is baseline. PSYCHIATRIC: Agitated.         DIAGNOSTICRESULTS:     LABS:    Results for orders placed or performed during the hospital encounter of 08/27/20   Urinalysis Reflex to Culture    Specimen: Urine, clean catch   Result Value Ref Range    Color, UA Yellow Straw/Yellow    Clarity, UA Clear Clear    Glucose, Ur Negative Negative mg/dL    Bilirubin Urine Negative Negative    Ketones, Urine Negative Negative mg/dL    Specific Gravity, UA 1.015 1.005 - 1.030    Blood, Urine TRACE-INTACT (A) Negative    pH, UA 6.0 5.0 - 8.0    Protein, UA Negative Negative mg/dL    Urobilinogen, Urine 0.2 <2.0 E.U./dL    Nitrite, Urine Negative Negative    Leukocyte Esterase, Urine Negative Negative    Microscopic Examination YES     Urine Type NotGiven     Urine Reflex to Culture Not Indicated    Basic metabolic panel   Result Value Ref Range    Sodium 139 136 - 145 mmol/L    Potassium 3.6 3.5 - 5.1 mmol/L    Chloride 102 99 - 110 mmol/L CO2 30 21 - 32 mmol/L    Anion Gap 7 3 - 16    Glucose 114 (H) 70 - 99 mg/dL    BUN 10 7 - 20 mg/dL    CREATININE 0.7 (L) 0.9 - 1.3 mg/dL    GFR Non-African American >60 >60    GFR African American >60 >60    Calcium 9.2 8.3 - 10.6 mg/dL   CBC Auto Differential   Result Value Ref Range    WBC 4.2 4.0 - 11.0 K/uL    RBC 4.48 4.20 - 5.90 M/uL    Hemoglobin 12.5 (L) 13.5 - 17.5 g/dL    Hematocrit 37.4 (L) 40.5 - 52.5 %    MCV 83.5 80.0 - 100.0 fL    MCH 27.8 26.0 - 34.0 pg    MCHC 33.3 31.0 - 36.0 g/dL    RDW 13.7 12.4 - 15.4 %    Platelets 182 483 - 545 K/uL    MPV 7.3 5.0 - 10.5 fL    Neutrophils % 60.7 %    Lymphocytes % 26.6 %    Monocytes % 8.1 %    Eosinophils % 3.8 %    Basophils % 0.8 %    Neutrophils Absolute 2.6 1.7 - 7.7 K/uL    Lymphocytes Absolute 1.1 1.0 - 5.1 K/uL    Monocytes Absolute 0.3 0.0 - 1.3 K/uL    Eosinophils Absolute 0.2 0.0 - 0.6 K/uL    Basophils Absolute 0.0 0.0 - 0.2 K/uL   Microscopic Urinalysis   Result Value Ref Range    WBC, UA None seen 0 - 5 /HPF    RBC, UA 0-2 0 - 4 /HPF         RADIOLOGY:  All x-ray studies areviewed/reviewed by me. Formal interpretations per the radiologist are as follows:      Ct Abdomen Pelvis Wo Contrast Additional Contrast? None    Result Date: 8/2/2020  EXAMINATION: CT OF THE ABDOMEN AND PELVIS WITHOUT CONTRAST, 7/29/2020 8:55 pm TECHNIQUE: CT of the abdomen and pelvis was performed without the administration of intravenous contrast. Multiplanar reformatted images are provided for review. Dose modulation, iterative reconstruction, and/or weight based adjustment of the mA/kV was utilized to reduce the radiation dose to as low as reasonably achievable.  COMPARISON: 02/23/2020 HISTORY: ORDERING SYSTEM PROVIDED HISTORY: Hematuria; suprapubic pain TECHNOLOGIST PROVIDED HISTORY: Reason for exam:-> Hematuria; suprapubic pain Additional Contrast?->None Reason for Exam: Hematuria, suprapubic pain Initial encounter FINDINGS: Lower Chest: There is a 5 mm subpleural nodule in the left lower lobe. No follow-up is recommended per Fleischner Society guidelines lung bases are otherwise grossly clear. Organs: The liver, spleen, pancreas, gallbladder and adrenal glands are without focal abnormality. Nonobstructing bilateral renal calculi the largest measuring up to 6 mm. No ureteral calculus. No hydronephrosis or perinephric stranding. GI/Bowel: No dilated loops of bowel or bowel thickening. Small hiatal hernia. Moderate amount of stool in the colon. The appendix is normal. Pelvis: Multiple bladder calculi are noted the largest measuring up to 4 mm. No bladder wall thickening. No pelvic adenopathy or free fluid. Peritoneum/Retroperitoneum: The aorta is normal in caliber. No retroperitoneal or mesenteric adenopathy. No ascites or drainable fluid collection. Bones/Soft Tissues: Remote left rib fractures. Grade 1 anterolisthesis of L4 on L5. Degenerative changes of the thoracolumbar spine. 1. Nonobstructing bilateral renal calculi as well as bladder calculi. No hydronephrosis or ureteral calculus. 2. Otherwise no acute abdominal or pelvic abnormality on this unenhanced study. Ct Head Wo Contrast    Result Date: 7/30/2020  EXAMINATION: CT OF THE HEAD WITHOUT CONTRAST  7/29/2020 11:35 pm TECHNIQUE: CT of the head was performed without the administration of intravenous contrast. Dose modulation, iterative reconstruction, and/or weight based adjustment of the mA/kV was utilized to reduce the radiation dose to as low as reasonably achievable. COMPARISON: 06/17/2020 HISTORY: ORDERING SYSTEM PROVIDED HISTORY: history of head bleed TECHNOLOGIST PROVIDED HISTORY: Reason for exam:->history of head bleed Has a \"code stroke\" or \"stroke alert\" been called? ->No Reason for Exam: HISTORY OF HEAD BLEED FINDINGS: BRAIN/VENTRICLES: There is no acute intracranial hemorrhage, mass effect or midline shift. No abnormal extra-axial fluid collection.   The gray-white differentiation is maintained without evidence of an acute infarct. There is no evidence of hydrocephalus. Central atrophy and mild chronic white matter disease are similar to the prior study. ORBITS: The visualized portion of the orbits demonstrate no acute abnormality. SINUSES: Mild ethmoid sinus mucosal thickening is identified. The remaining visualized paranasal sinuses and mastoid air cells are clear. SOFT TISSUES/SKULL:  No acute abnormality of the visualized skull or soft tissues. Again identified are dystrophic calcifications within the patient's ears. Postsurgical changes of the anterior right skull are again noted. No acute intracranial abnormality. PROCEDURES:   N/A    CRITICAL CARE TIME:       None      CONSULTS:  None      EMERGENCY DEPARTMENT COURSE and DIFFERENTIAL DIAGNOSIS/MDM:   Vitals:    Vitals:    08/27/20 2036 08/27/20 2148 08/27/20 2245   BP: (!) 121/98 107/73 94/71   Pulse: 78 91 73   Resp: 16 20 14   Temp: 98.9 °F (37.2 °C)     TempSrc: Oral     SpO2: 98% 94% 96%   Weight: 104 lb (47.2 kg)     Height: 5' (1.524 m)         Patient was given the following medications:  Medications   midazolam (VERSED) injection 5 mg (5 mg Intramuscular Given 8/27/20 2058)   ziprasidone (GEODON) injection 10 mg (10 mg Intramuscular Given 8/27/20 2144)         Patient was evaluated by both myself and Dulce Aragon DO. Old records were reviewed. Patient brought in by caregiver secondary to concerns for urinary retention. He has had no other notable abnormalities with his mental status, behavior or activity. He is severely mentally handicapped, nonverbal and a psychiatric disorder. Bladder scan revealed approximately 450 mL's of urine in his bladder. Patient was given first Versed 5 mg IM and next Geodon 10 mg IM before he was more calm so that we could attempt cath urine and blood draw. Upon cathing patient for urine we removed his pants and found his depends to be soaked with urine.   However given the amount of urine seen on bladder scan we did straight catheter and were able to obtain approximately 400 more mL's of urine. The patient has a normal CBC and BMP including BUN 10 and creatinine 0.7. His urinalysis is normal to without sign of infection or significant hematuria. I spoke with the patient's caregiver here in the ED as well as with another nurse . They requested that we not discharge the patient with a Colindres catheter as he has pulled them out in the past.  They state that he is established with urology and they believe they will be able to contact urology tomorrow and have been seen quickly. Again we did drain the patient's bladder. There is no sign of infection. There is no sign of kidney injury, leukocytosis or anemia. I think this care is appropriate given his mental state. They report he does much better outside of the hospital but being in the hospital always agitates him more. Patient's caregivers given return precautions. They will monitor urine output at his facility and bring him back if he continues to exhibit retention and cannot be seen by urology in a timely manner. I estimate there is LOW risk for ACUTE APPENDICITIS, PYELONEPHRITIS, BOWEL OBSTRUCTION, CHOLECYSTITIS, DIVERTICULITIS, INCARCERATED HERNIA, PANCREATITIS, PERFORATED BOWEL or ULCER, thus I consider the discharge disposition reasonable. Also, there is no evidence or peritonitis, sepsis, or toxicity. Duane A Carota and I have discussed the diagnosis and risks, and we agree with discharging home to follow-up with their primary doctor. We also discussed returning to the Emergency Department immediately if new or worsening symptoms occur. We have discussed the symptoms which are most concerning (e.g., bloody stool, fever, changing or worsening pain, vomiting) that necessitate immediate return. FINAL IMPRESSION:      1.  Urinary retention          DISPOSITION/PLAN:   DISPOSITION Decision To Discharge      PATIENT REFERRED TO:  [facility]  ED  [facility] [address]  Go to   If symptoms worsen      DISCHARGE MEDICATIONS:  New Prescriptions    No medications on file                  (Please note thatportions of this note were completed with a voice recognition program.  Efforts were made to edit the dictations, but occasionally words are mis-transcribed.)    Nola Alfaro PA-C (electronicallysigned)              Joy Paredes, [id number]  08/27/20 5962

## 2020-08-28 NOTE — ED NOTES
All discharge paperwork and follow-up instructions reviewed with pts caregiver, Kasey Perez. Pts caregiver verbalized understanding. Pt discharged via personal wheelchair in stable condition.      Henrietta Bloch, RN  08/27/20 0904

## 2020-08-28 NOTE — ED NOTES
Fall risk screening completed. Fall risk bracelet applied to patient. Non-skid socks provided and placed on patient. The fall risk is indicated using  dome light . Based on score, a bed alarm was indicated and applied. The call light is within the patient's reach, and instructions for use were provided. The bed is in the lowest position with wheels locked. The patient has been advised to notify staff, using the call light, if there is a need to get up or use restroom. The patient verbalized understanding of safety precautions and how to contact staff for assistance.        Theresa Braxton RN  08/27/20 7463

## 2020-08-28 NOTE — ED NOTES
Prior to straight cathing pt, pts depend was saturated with urine and his shorts were also wet. Per Manny Oconnor okay to still straight cath. 400mL returned from straight cath. Urine and blood sent to lab.      Frederick Palacios RN  08/27/20 0462

## 2020-08-28 NOTE — ED PROVIDER NOTES
Emergency Department Provider Note     Location: [address] [place] [address]  ED  8/27/2020    I independently performed a history and physical on Duane A Carota. All diagnostic, treatment, and disposition decisions were made by myself in conjunction with the mid-level provider. Briefly, this is a 62 y.o. male here for urination problems, hx of urinary retention, MR from [facility]. ED Triage Vitals [08/27/20 2036]   BP Temp Temp Source Pulse Resp SpO2 Height Weight   (!) 121/98 98.9 °F (37.2 °C) Oral 78 16 98 % 5' (1.524 m) 104 lb (47.2 kg)      Exam:  Heart regular rate and rhythm, lungs clear to auscultation bilaterally, mouth breathing, but tacky/dry mucous membranes, diastases recti, rotund abdomen, but no rigidity, no peritoneal signs, but asked uncomfortable with palpation diffusely, moving all 4 extremities, does not answer questions, frequently moaning but otherwise nonverbal, rolling around/thrashing in the bed  Patient seen and examined. No results found.     Results for orders placed or performed during the hospital encounter of 08/27/20   Urinalysis Reflex to Culture    Specimen: Urine, clean catch   Result Value Ref Range    Color, UA Yellow Straw/Yellow    Clarity, UA Clear Clear    Glucose, Ur Negative Negative mg/dL    Bilirubin Urine Negative Negative    Ketones, Urine Negative Negative mg/dL    Specific Gravity, UA 1.015 1.005 - 1.030    Blood, Urine TRACE-INTACT (A) Negative    pH, UA 6.0 5.0 - 8.0    Protein, UA Negative Negative mg/dL    Urobilinogen, Urine 0.2 <2.0 E.U./dL    Nitrite, Urine Negative Negative    Leukocyte Esterase, Urine Negative Negative    Microscopic Examination YES     Urine Type NotGiven     Urine Reflex to Culture Not Indicated    Basic metabolic panel   Result Value Ref Range    Sodium 139 136 - 145 mmol/L    Potassium 3.6 3.5 - 5.1 mmol/L    Chloride 102 99 - 110 mmol/L    CO2 30 21 - 32 mmol/L    Anion Gap 7 3 - 16    Glucose 114 (H) 70 - 99 mg/dL    BUN 10 7 - 20 mg/dL    CREATININE 0.7 (L) 0.9 - 1.3 mg/dL    GFR Non-African American >60 >60    GFR African American >60 >60    Calcium 9.2 8.3 - 10.6 mg/dL   CBC Auto Differential   Result Value Ref Range    WBC 4.2 4.0 - 11.0 K/uL    RBC 4.48 4.20 - 5.90 M/uL    Hemoglobin 12.5 (L) 13.5 - 17.5 g/dL    Hematocrit 37.4 (L) 40.5 - 52.5 %    MCV 83.5 80.0 - 100.0 fL    MCH 27.8 26.0 - 34.0 pg    MCHC 33.3 31.0 - 36.0 g/dL    RDW 13.7 12.4 - 15.4 %    Platelets 778 790 - 768 K/uL    MPV 7.3 5.0 - 10.5 fL    Neutrophils % 60.7 %    Lymphocytes % 26.6 %    Monocytes % 8.1 %    Eosinophils % 3.8 %    Basophils % 0.8 %    Neutrophils Absolute 2.6 1.7 - 7.7 K/uL    Lymphocytes Absolute 1.1 1.0 - 5.1 K/uL    Monocytes Absolute 0.3 0.0 - 1.3 K/uL    Eosinophils Absolute 0.2 0.0 - 0.6 K/uL    Basophils Absolute 0.0 0.0 - 0.2 K/uL   Microscopic Urinalysis   Result Value Ref Range    WBC, UA None seen 0 - 5 /HPF    RBC, UA 0-2 0 - 4 /HPF         For further details of Duane A Carota's emergency department encounter, please see GALLO Parmar's documentation. 81-year-old male with urinary retention, patient will be following up probably with urologist, and caregivers skeptical about sending him home with a Colindres but given that he has ripped them out before, he was urinating some on his own, had saturated his depends with urine, but still retaining a little urine with straight cath output 400 cc, normal renal function, no evidence of infection, it took Versed and Geodon to obtain labs and urine, and he was much more comfortable, caregiver was able to take him back to the group home, and verbalized understanding of plan.      Orders Placed This Encounter   Procedures    Urinalysis Reflex to Culture    Basic metabolic panel    CBC Auto Differential    Microscopic Urinalysis    Bladder scan     Orders Placed This Encounter   Medications    midazolam (VERSED) injection 5 mg    ziprasidone (GEODON) injection 10 mg        Clinical Impression:  1. Urinary retention    2. Cognitive developmental delay    3. Altered behavior        Disposition:  Patient discharged home in stable condition. This chart was generated in part by using Dragon Dictation system and may contain errors related to that system including errors in grammar, punctuation, and spelling, as well as words and phrases that may be inappropriate. If there are any questions or concerns please feel free to contact the dictating provider for clarification.           Jessica Lehman,   08/28/20 8311

## 2020-08-28 NOTE — CARE COORDINATION
Per chart review, patient lives in a group home and is monitored 24/7. No outreach needed. No future appointments. Giselle Vasques MSN, RN  Ambulatory Care Manager  [phone]  [email or web address]

## 2020-10-14 ENCOUNTER — HOSPITAL ENCOUNTER (EMERGENCY)
Age: 60
Discharge: HOME | End: 2020-10-15
Attending: EMERGENCY MEDICINE
Payer: MEDICARE

## 2020-10-14 ENCOUNTER — APPOINTMENT (OUTPATIENT)
Dept: CT IMAGING | Age: 60
End: 2020-10-14
Payer: MEDICARE

## 2020-10-14 VITALS
SYSTOLIC BLOOD PRESSURE: 97 MMHG | DIASTOLIC BLOOD PRESSURE: 63 MMHG | OXYGEN SATURATION: 97 % | TEMPERATURE: 96.8 F | RESPIRATION RATE: 18 BRPM | HEART RATE: 85 BPM

## 2020-10-14 LAB
A/G RATIO: 1.3 (ref 1.1–2.2)
ALBUMIN SERPL-MCNC: 3.5 G/DL (ref 3.4–5)
ALP BLD-CCNC: 112 U/L (ref 40–129)
ALT SERPL-CCNC: 12 U/L (ref 10–40)
ANION GAP SERPL CALCULATED.3IONS-SCNC: 11 MMOL/L (ref 3–16)
AST SERPL-CCNC: 15 U/L (ref 15–37)
BASOPHILS ABSOLUTE: 0 K/UL (ref 0–0.2)
BASOPHILS RELATIVE PERCENT: 0.3 %
BILIRUB SERPL-MCNC: 0.5 MG/DL (ref 0–1)
BUN BLDV-MCNC: 15 MG/DL (ref 7–20)
CALCIUM SERPL-MCNC: 9 MG/DL (ref 8.3–10.6)
CHLORIDE BLD-SCNC: 92 MMOL/L (ref 99–110)
CO2: 26 MMOL/L (ref 21–32)
CREAT SERPL-MCNC: 0.7 MG/DL (ref 0.9–1.3)
EOSINOPHILS ABSOLUTE: 0 K/UL (ref 0–0.6)
EOSINOPHILS RELATIVE PERCENT: 0.3 %
GFR AFRICAN AMERICAN: >60
GFR NON-AFRICAN AMERICAN: >60
GLOBULIN: 2.8 G/DL
GLUCOSE BLD-MCNC: 188 MG/DL (ref 70–99)
HCT VFR BLD CALC: 38.1 % (ref 40.5–52.5)
HEMOGLOBIN: 12.5 G/DL (ref 13.5–17.5)
LIPASE: 17 U/L (ref 13–60)
LYMPHOCYTES ABSOLUTE: 0.2 K/UL (ref 1–5.1)
LYMPHOCYTES RELATIVE PERCENT: 3.9 %
MCH RBC QN AUTO: 27.5 PG (ref 26–34)
MCHC RBC AUTO-ENTMCNC: 32.9 G/DL (ref 31–36)
MCV RBC AUTO: 83.7 FL (ref 80–100)
MONOCYTES ABSOLUTE: 0.4 K/UL (ref 0–1.3)
MONOCYTES RELATIVE PERCENT: 6 %
NEUTROPHILS ABSOLUTE: 5.3 K/UL (ref 1.7–7.7)
NEUTROPHILS RELATIVE PERCENT: 89.5 %
PDW BLD-RTO: 14.3 % (ref 12.4–15.4)
PLATELET # BLD: 186 K/UL (ref 135–450)
PMV BLD AUTO: 7.1 FL (ref 5–10.5)
POTASSIUM REFLEX MAGNESIUM: 3.6 MMOL/L (ref 3.5–5.1)
RBC # BLD: 4.56 M/UL (ref 4.2–5.9)
SODIUM BLD-SCNC: 129 MMOL/L (ref 136–145)
TOTAL PROTEIN: 6.3 G/DL (ref 6.4–8.2)
WBC # BLD: 6 K/UL (ref 4–11)

## 2020-10-14 PROCEDURE — 83690 ASSAY OF LIPASE: CPT

## 2020-10-14 PROCEDURE — 99283 EMERGENCY DEPT VISIT LOW MDM: CPT

## 2020-10-14 PROCEDURE — 81001 URINALYSIS AUTO W/SCOPE: CPT

## 2020-10-14 PROCEDURE — 85025 COMPLETE CBC W/AUTO DIFF WBC: CPT

## 2020-10-14 PROCEDURE — 87086 URINE CULTURE/COLONY COUNT: CPT

## 2020-10-14 PROCEDURE — 87077 CULTURE AEROBIC IDENTIFY: CPT

## 2020-10-14 PROCEDURE — 87186 SC STD MICRODIL/AGAR DIL: CPT

## 2020-10-14 PROCEDURE — 80053 COMPREHEN METABOLIC PANEL: CPT

## 2020-10-14 RX ORDER — LORAZEPAM 0.5 MG/1
0.5 TABLET ORAL EVERY EVENING
COMMUNITY

## 2020-10-14 RX ORDER — LORAZEPAM 2 MG/ML
2 INJECTION INTRAMUSCULAR ONCE
Status: COMPLETED | OUTPATIENT
Start: 2020-10-14 | End: 2020-10-15

## 2020-10-14 ASSESSMENT — PAIN SCALES - WONG BAKER: WONGBAKER_NUMERICALRESPONSE: 6

## 2020-10-15 ENCOUNTER — APPOINTMENT (OUTPATIENT)
Dept: CT IMAGING | Age: 60
End: 2020-10-15
Payer: MEDICARE

## 2020-10-15 LAB
BACTERIA: ABNORMAL /HPF
BILIRUBIN URINE: NEGATIVE
BLOOD, URINE: ABNORMAL
CLARITY: ABNORMAL
COLOR: YELLOW
EPITHELIAL CELLS, UA: ABNORMAL /HPF (ref 0–5)
GLUCOSE URINE: >=1000 MG/DL
KETONES, URINE: NEGATIVE MG/DL
LEUKOCYTE ESTERASE, URINE: ABNORMAL
MICROSCOPIC EXAMINATION: YES
NITRITE, URINE: POSITIVE
PH UA: 6.5 (ref 5–8)
PROTEIN UA: 30 MG/DL
RBC UA: ABNORMAL /HPF (ref 0–4)
SPECIFIC GRAVITY UA: 1.02 (ref 1–1.03)
URINE REFLEX TO CULTURE: YES
URINE TYPE: ABNORMAL
UROBILINOGEN, URINE: 0.2 E.U./DL
WBC UA: ABNORMAL /HPF (ref 0–5)

## 2020-10-15 PROCEDURE — 2580000003 HC RX 258: Performed by: EMERGENCY MEDICINE

## 2020-10-15 PROCEDURE — 96365 THER/PROPH/DIAG IV INF INIT: CPT

## 2020-10-15 PROCEDURE — 74176 CT ABD & PELVIS W/O CONTRAST: CPT

## 2020-10-15 PROCEDURE — 6360000002 HC RX W HCPCS: Performed by: EMERGENCY MEDICINE

## 2020-10-15 PROCEDURE — 96375 TX/PRO/DX INJ NEW DRUG ADDON: CPT

## 2020-10-15 RX ORDER — 0.9 % SODIUM CHLORIDE 0.9 %
1000 INTRAVENOUS SOLUTION INTRAVENOUS ONCE
Status: COMPLETED | OUTPATIENT
Start: 2020-10-15 | End: 2020-10-15

## 2020-10-15 RX ORDER — CEPHALEXIN 500 MG/1
500 CAPSULE ORAL 4 TIMES DAILY
Qty: 28 CAPSULE | Refills: 0 | Status: SHIPPED | OUTPATIENT
Start: 2020-10-15 | End: 2020-10-17 | Stop reason: ALTCHOICE

## 2020-10-15 RX ORDER — KETOCONAZOLE 20 MG/G
CREAM TOPICAL
Qty: 30 G | Refills: 1 | Status: SHIPPED | OUTPATIENT
Start: 2020-10-15 | End: 2021-05-27 | Stop reason: ALTCHOICE

## 2020-10-15 RX ADMIN — CEFTRIAXONE SODIUM 1 G: 1 INJECTION, POWDER, FOR SOLUTION INTRAMUSCULAR; INTRAVENOUS at 01:11

## 2020-10-15 RX ADMIN — SODIUM CHLORIDE 1000 ML: 9 INJECTION, SOLUTION INTRAVENOUS at 01:11

## 2020-10-15 RX ADMIN — LORAZEPAM 2 MG: 2 INJECTION, SOLUTION INTRAMUSCULAR; INTRAVENOUS at 00:03

## 2020-10-15 NOTE — ED NOTES
Patient had bowel movement in depends. Patient cleaned and depends changed. Caregiver remains at bedside.      Dori Powell RN  10/15/20 1492

## 2020-10-15 NOTE — ED NOTES
This RN spoke to Dr Cecily Herrera at this time okay for no enema since pt had a bowel movement while in the ED.       Cleopatra Lopez, RN  10/15/20 9947

## 2020-10-15 NOTE — ED NOTES
Patient identified as a positive fall risk on the ED triage fall screening. Patient placed in fall precautions which includes:  yellow fall risk bracelet on wrist,\"Be Safe\" sign placed on patient's door. Patient does moves continuously and very light weight, therefore bed alarm turned off. Caregiver is at bedside with patient and instructed on importance of not getting out of bed or ambulating without assistance for safety.            Emerita Nunes RN  10/14/20 7230

## 2020-10-15 NOTE — ED PROVIDER NOTES
[address] [facility]  ED  [facility]MENT[facility]      Pt Name: Zainab Espinoza  MRN: [id number]  Barbaragfkatiana 1960  Date of evaluation: 10/14/2020  Provider: Master Garzon MD    CHIEF COMPLAINT       Chief Complaint   Patient presents with    Hemorrhoids     Painful and is making BM difficult, external/internal    Rash     right groin started today         HISTORY OF PRESENT ILLNESS   (Location/Symptom, Timing/Onset,Context/Setting, Quality, Duration, Modifying Factors, Severity)  Note limiting factors. Zainab Espinoza is a 62 y.o. male who presents to the emergency department for rash in the genital region and possible hemorrhoids. The patient is unable to provide me with any history due to the developmental delay and being nonverbal.  His caretakers that she was cleaned them and noticed a red rash around the penis. She also noted that he was struggling to have a bowel movement and when she examined him she saw what appeared to be a large hemorrhoid that was blocking his bowels. She said also the patient was screaming out in pain he usually does make abnormal noises but they were louder than usual.  Patient has been having difficulty to try given an enema 3 days ago with no results. Nursing notes were reviewed. REVIEW OF SYSTEMS    (2-9 systems for level 4, 10 or more for level 5)     Review of Systems    Unable to obtain secondary to underlying medical condition.     PAST MEDICAL HISTORY     Past Medical History:   Diagnosis Date    A-fib ([address]     Bipolar disorder ([address]     Brain herniation ([address]     Constipation     Cystitis     Developmental non-verbal disorder     Impulse control disorder     Influenza A 2/13/14    Mental retardation, profound (I.Q. < 20)     Osteopenia     Subdural hematoma (HCC)          SURGICALHISTORY       Past Surgical History:   Procedure Laterality Date    CATARACT REMOVAL      COLONOSCOPY  06/20/2016    incomplete, poor prep    COLONOSCOPY 08/26/2020    COLON W/ANES.  COLONOSCOPY N/A 8/26/2020    COLON W/ANES. (9:00) COVID TEST 8/20-8/22. PT IS NON-VERBAL, IS NOT IN A FACILITY. IS CARED FOR BY 2 CAREGIVERS IN HIS HOME. performed by Dayana Proctor MD at [address] [facility] Right 12/2/2019    Right Trephine Craniotomy For Evacuation of Subdural Hematoma performed by Mejia Quinn MD at [address] TURP N/A 2/28/2020    CYSTOSCOPY, TRANSURETHRAL RESECTION OF PROSTATE performed by Dee Dee Puri MD at [address] [facility]       Discharge Medication List as of 10/15/2020  1:42 AM      CONTINUE these medications which have NOT CHANGED    Details   LORazepam (ATIVAN) 0.5 MG tablet Take 0.5 mg by mouth every 6 hours as needed for Anxiety. Historical Med      polyethylene glycol (GLYCOLAX) 17 g packet Take 17 g by mouth daily as needed for ConstipationHistorical Med      Nutritional Supplements (ENSURE ENLIVE) LIQD Take by mouth 2 times daily In between mealsHistorical Med      melatonin 3 MG TABS tablet Take 5 mg by mouth nightly Historical Med      levETIRAcetam (KEPPRA) 250 MG tablet Take 250 mg by mouth 2 times dailyHistorical Med      tamsulosin (FLOMAX) 0.4 MG capsule Take 0.8 mg by mouth dailyHistorical Med      traZODone (DESYREL) 50 MG tablet 1 tablet by Per NG tube route nightly, Disp-30 tablet, R-0Normal      metoprolol tartrate (LOPRESSOR) 25 MG tablet 1 tablet by Per NG tube route 2 times daily, Disp-60 tablet, R-3Normal      sennosides-docusate sodium (SENOKOT-S) 8.6-50 MG tablet Take 2 tablets by mouth every 72 hours, Disp-30 tablet, R-0Normal      QUEtiapine (SEROQUEL XR) 50 MG extended release tablet Take 100 mg by mouth 3 times daily 50mg in AM  100mg @ 1600  150mg @ 1900Historical Med      hydrocortisone (ANUSOL-HC) 25 MG suppository Place 1 suppository rectally every 12 hours, Disp-12 suppository,R-0Print      lactulose (CHRONULAC) 10 GM/15ML solution Take 20 g by mouth 2 times dailyHistorical Med acetaminophen (PHARBETOL) 325 MG tablet Take 2 tablets by mouth every 6 hours as needed for Pain, Disp-120 tablet, R-0Print      ibuprofen (ADVIL;MOTRIN) 400 MG tablet Take 1 tablet by mouth every 6 hours as needed for Pain, Disp-30 tablet, R-0Print      clotrimazole (LOTRIMIN) 1 % cream Apply topically 2 times daily Apply topically 2 times daily. , Topical, 2 TIMES DAILY, Historical Med             ALLERGIES     Benadryl [diphenhydramine]; Clonidine derivatives;  Penicillins; and Tetracyclines & related    FAMILY HISTORY       Family History   Family history unknown: Yes          SOCIAL HISTORY       Social History     Socioeconomic History    Marital status: Single     Spouse name: Not on file    Number of children: Not on file    Years of education: Not on file    Highest education level: Not on file   Occupational History    Not on file   Social Needs    Financial resource strain: Not on file    Food insecurity     Worry: Not on file     Inability: Not on file    Transportation needs     Medical: Not on file     Non-medical: Not on file   Tobacco Use    Smoking status: Never Smoker    Smokeless tobacco: Never Used   Substance and Sexual Activity    Alcohol use: No    Drug use: No    Sexual activity: Not Currently   Lifestyle    Physical activity     Days per week: Not on file     Minutes per session: Not on file    Stress: Not on file   Relationships    Social connections     Talks on phone: Not on file     Gets together: Not on file     Attends Congregational service: Not on file     Active member of club or organization: Not on file     Attends meetings of clubs or organizations: Not on file     Relationship status: Not on file    Intimate partner violence     Fear of current or ex partner: Not on file     Emotionally abused: Not on file     Physically abused: Not on file     Forced sexual activity: Not on file   Other Topics Concern    Not on file   Social History Narrative    Not on file Result   1. No acute abnormality in the abdomen or pelvis. 2. Bilateral nephrolithiasis and urinary bladder calculi. No obstructive   uropathy. 3. Small bilateral pleural effusions. 4. 7 mm nodule in the left lower lobe slightly increased in size from   07/29/2020 and new from 2019.   Recommend further evaluation with nonemergent   CT chest.               ED BEDSIDE ULTRASOUND:   Performed by ED Physician - none    LABS:  Labs Reviewed   CBC WITH AUTO DIFFERENTIAL - Abnormal; Notable for the following components:       Result Value    Hemoglobin 12.5 (*)     Hematocrit 38.1 (*)     Lymphocytes Absolute 0.2 (*)     All other components within normal limits    Narrative:     Performed at:  [place] [address] [employer]   Phone [phone]   COMPREHENSIVE METABOLIC PANEL W/ REFLEX TO MG FOR LOW K - Abnormal; Notable for the following components:    Sodium 129 (*)     Chloride 92 (*)     Glucose 188 (*)     CREATININE 0.7 (*)     Total Protein 6.3 (*)     All other components within normal limits    Narrative:     Performed at:  [place] [address] [employer]   Phone [phone]   URINE RT REFLEX TO CULTURE - Abnormal; Notable for the following components:    Clarity, UA CLOUDY (*)     Glucose, Ur >=1000 (*)     Blood, Urine SMALL (*)     Protein, UA 30 (*)     Nitrite, Urine POSITIVE (*)     Leukocyte Esterase, Urine SMALL (*)     All other components within normal limits    Narrative:     Performed at:  [place] [address] [employer]   Phone [phone]   MICROSCOPIC URINALYSIS - Abnormal; Notable for the following components:    WBC, UA  (*)     Bacteria, UA 4+ (*)     All other components within normal limits    Narrative:     Performed at:  [address], [facility]   Phone [phone]   CULTURE, URINE   LIPASE    Narrative:     Performed at:  [facility]) [address], [address]   Phone [phone]       All other labs were within normal range or not returned as of this dictation. EMERGENCY DEPARTMENT COURSE and DIFFERENTIAL DIAGNOSIS/MDM:   Vitals:    Vitals:    10/14/20 2129 10/14/20 2234 10/14/20 2340   BP: (!) 120/58 104/79 97/63   Pulse: 83 87 85   Resp: 16 24 18   Temp: 96.8 °F (36 °C)     TempSrc: Tympanic     SpO2: 96% 98% 97%       Adult male with a history of mental retardation who is nonverbal who was brought in by his caregiver with a rash and possible hemorrhoid. The patient appears to be constipated with possible fecal impaction. He does have abdominal distention. Basic laboratory studies are ordered as well as a CT scan of the abdomen pelvis. Patient is unable to stay still for the CAT scan and therefore he is given 2 mg of IV Ativan. I am especially concerned about this patient that he is unable to give me an adequate history and exam is limited concern for a bowel obstruction or perforation. Laboratory studies show mild hyponatremia. Patient is given a liter bolus of normal saline. He also has a urinary tract infection for which she is given ceftriaxone. CT scan does not reveal any obstruction at this time. Patient has a bowel movement here in the emergency room and therefore the plan to give an enema is aborted. I spoke to his caregiver about our findings and the treatment plan for the patient also has an incidental lung finding which is larger than prior. I recommend close follow-up in the primary care setting. Caregiver expresses understanding. CRITICAL CARE TIME   Total Critical Care time was 20 minutes, excluding separately reportable procedures.   There was a high probability of clinically significant/life threatening deterioration in the patient's condition which required my urgent

## 2020-10-15 NOTE — ED NOTES
[facility] paperwork reviewed with caregiver. Assisted to private vehicle.       Edison Shafer RN  10/15/20 1852

## 2020-10-17 ENCOUNTER — HOSPITAL ENCOUNTER (EMERGENCY)
Age: 60
Discharge: HOME | End: 2020-10-17
Attending: EMERGENCY MEDICINE
Payer: MEDICARE

## 2020-10-17 VITALS
RESPIRATION RATE: 17 BRPM | HEART RATE: 107 BPM | DIASTOLIC BLOOD PRESSURE: 85 MMHG | OXYGEN SATURATION: 100 % | TEMPERATURE: 96.8 F | SYSTOLIC BLOOD PRESSURE: 119 MMHG

## 2020-10-17 LAB
A/G RATIO: 1.2 (ref 1.1–2.2)
ALBUMIN SERPL-MCNC: 3.4 G/DL (ref 3.4–5)
ALP BLD-CCNC: 126 U/L (ref 40–129)
ALT SERPL-CCNC: 22 U/L (ref 10–40)
ANION GAP SERPL CALCULATED.3IONS-SCNC: 7 MMOL/L (ref 3–16)
AST SERPL-CCNC: 17 U/L (ref 15–37)
BASOPHILS ABSOLUTE: 0 K/UL (ref 0–0.2)
BASOPHILS RELATIVE PERCENT: 0.8 %
BILIRUB SERPL-MCNC: <0.2 MG/DL (ref 0–1)
BUN BLDV-MCNC: 7 MG/DL (ref 7–20)
CALCIUM SERPL-MCNC: 9.4 MG/DL (ref 8.3–10.6)
CHLORIDE BLD-SCNC: 96 MMOL/L (ref 99–110)
CO2: 31 MMOL/L (ref 21–32)
CREAT SERPL-MCNC: 0.8 MG/DL (ref 0.9–1.3)
EOSINOPHILS ABSOLUTE: 0 K/UL (ref 0–0.6)
EOSINOPHILS RELATIVE PERCENT: 0.6 %
GFR AFRICAN AMERICAN: >60
GFR NON-AFRICAN AMERICAN: >60
GLOBULIN: 2.9 G/DL
GLUCOSE BLD-MCNC: 99 MG/DL (ref 70–99)
HCT VFR BLD CALC: 37.3 % (ref 40.5–52.5)
HEMOGLOBIN: 12.7 G/DL (ref 13.5–17.5)
LACTIC ACID: 0.8 MMOL/L (ref 0.4–2)
LYMPHOCYTES ABSOLUTE: 0.4 K/UL (ref 1–5.1)
LYMPHOCYTES RELATIVE PERCENT: 14.5 %
MCH RBC QN AUTO: 28.3 PG (ref 26–34)
MCHC RBC AUTO-ENTMCNC: 33.9 G/DL (ref 31–36)
MCV RBC AUTO: 83.3 FL (ref 80–100)
MONOCYTES ABSOLUTE: 0.3 K/UL (ref 0–1.3)
MONOCYTES RELATIVE PERCENT: 9.6 %
NEUTROPHILS ABSOLUTE: 2.1 K/UL (ref 1.7–7.7)
NEUTROPHILS RELATIVE PERCENT: 74.5 %
ORGANISM: ABNORMAL
PDW BLD-RTO: 14.2 % (ref 12.4–15.4)
PLATELET # BLD: 126 K/UL (ref 135–450)
PMV BLD AUTO: 7.4 FL (ref 5–10.5)
POTASSIUM REFLEX MAGNESIUM: 4.3 MMOL/L (ref 3.5–5.1)
RBC # BLD: 4.47 M/UL (ref 4.2–5.9)
SODIUM BLD-SCNC: 134 MMOL/L (ref 136–145)
TOTAL PROTEIN: 6.3 G/DL (ref 6.4–8.2)
URINE CULTURE, ROUTINE: ABNORMAL
WBC # BLD: 2.8 K/UL (ref 4–11)

## 2020-10-17 PROCEDURE — 2580000003 HC RX 258: Performed by: NURSE PRACTITIONER

## 2020-10-17 PROCEDURE — 83605 ASSAY OF LACTIC ACID: CPT

## 2020-10-17 PROCEDURE — 80053 COMPREHEN METABOLIC PANEL: CPT

## 2020-10-17 PROCEDURE — 85025 COMPLETE CBC W/AUTO DIFF WBC: CPT

## 2020-10-17 PROCEDURE — 96365 THER/PROPH/DIAG IV INF INIT: CPT

## 2020-10-17 PROCEDURE — 99283 EMERGENCY DEPT VISIT LOW MDM: CPT

## 2020-10-17 PROCEDURE — 6360000002 HC RX W HCPCS: Performed by: NURSE PRACTITIONER

## 2020-10-17 RX ORDER — 0.9 % SODIUM CHLORIDE 0.9 %
1000 INTRAVENOUS SOLUTION INTRAVENOUS ONCE
Status: COMPLETED | OUTPATIENT
Start: 2020-10-17 | End: 2020-10-17

## 2020-10-17 RX ORDER — CEFDINIR 300 MG/1
300 CAPSULE ORAL 2 TIMES DAILY
Qty: 20 CAPSULE | Refills: 0 | Status: SHIPPED | OUTPATIENT
Start: 2020-10-17 | End: 2020-10-27

## 2020-10-17 RX ORDER — LORAZEPAM 2 MG/ML
1 INJECTION INTRAMUSCULAR ONCE
Status: DISCONTINUED | OUTPATIENT
Start: 2020-10-17 | End: 2020-10-17 | Stop reason: HOSPADM

## 2020-10-17 RX ADMIN — SODIUM CHLORIDE 1000 ML: 9 INJECTION, SOLUTION INTRAVENOUS at 16:31

## 2020-10-17 RX ADMIN — CEFTRIAXONE SODIUM 1 G: 1 INJECTION, POWDER, FOR SOLUTION INTRAMUSCULAR; INTRAVENOUS at 16:31

## 2020-10-17 NOTE — ED NOTES
Initially wasted 1mg lorazepam IVP with Raman Boss RN via [facility] in preparation of administering prior to PIV insertion. However, medication was not needed and caregiver refused. Wasted remaining 1mg lorazepam IVP with Robin Carlin RN at this time; was unable to document via [employer].       Glenda Yeung RN  10/17/20 6724

## 2020-10-17 NOTE — ED PROVIDER NOTES
Evaluated by Advanced Practice Provider    [address] [facility]  ED    CHIEF COMPLAINT  Urinary Tract Infection (dx uti earlier this week and placed on keflex. facility informed EMS that pt wasn't acting right/lethargic. pt awake and alert at this time. )    HISTORY OF PRESENT ILLNESS  Duane A Kerstin Reilly is a 62 y.o. male who presents to the ED complaining of UTI. Wednesday was seen here in the ER and diagnosed with UTI. Started on Keflex. When did CT there was a small nodule on his lung. On keflex, has been on this before. Last night would not eat dinner, has not ate much today. When they were trying to get him to eat and take his medications choking himself and not swallowing meds. Last night running a fever, oxygen level dropped a little today, BP low and he seems to be retaining fluids, legs are swollen. He was pretty lethargic today but now more alert than he has been. HPI obtained solely from the care giver at the bedside, patient is MR and non-verbal at baseline. The patient arrived to the ED via EMS transport. Patient is accompanied by care giver whois/are bedside for the visit. PAST MEDICAL HISTORY    Past Medical History:   Diagnosis Date    A-fib ([address]     Bipolar disorder ([address]     Brain herniation ([address]     Constipation     Cystitis     Developmental non-verbal disorder     Impulse control disorder     Influenza A 2/13/14    Mental retardation, profound (I.Q. < 20)     Osteopenia     Subdural hematoma (HCC)        SURGICAL HISTORY    Past Surgical History:   Procedure Laterality Date    CATARACT REMOVAL      COLONOSCOPY  06/20/2016    incomplete, poor prep    COLONOSCOPY  08/26/2020    COLON W/ANES.  COLONOSCOPY N/A 8/26/2020    COLON W/ANES. (9:00) COVID TEST 8/20-8/22. PT IS NON-VERBAL, IS NOT IN A FACILITY.  IS CARED FOR BY 2 CAREGIVERS IN HIS HOME. performed by Bailee Pagan MD at [address] Right 12/2/2019    Right Yanphine Craniotomy For Evacuation of Subdural Hematoma performed by Maru Almanza MD at [address] [facility] TURP N/A 2/28/2020    CYSTOSCOPY, TRANSURETHRAL RESECTION OF PROSTATE performed by Karson Barron MD at [address] [facility]    Current Outpatient Rx   Medication Sig Dispense Refill    cefdinir (OMNICEF) 300 MG capsule Take 1 capsule by mouth 2 times daily for 10 days 20 capsule 0    ketoconazole (NIZORAL) 2 % cream Apply topically daily. 30 g 1    LORazepam (ATIVAN) 0.5 MG tablet Take 0.5 mg by mouth every 6 hours as needed for Anxiety.  polyethylene glycol (GLYCOLAX) 17 g packet Take 17 g by mouth daily as needed for Constipation      Nutritional Supplements (ENSURE ENLIVE) LIQD Take by mouth 2 times daily In between meals      hydrocortisone (ANUSOL-HC) 25 MG suppository Place 1 suppository rectally every 12 hours 12 suppository 0    melatonin 3 MG TABS tablet Take 5 mg by mouth nightly       lactulose (CHRONULAC) 10 GM/15ML solution Take 20 g by mouth 2 times daily      acetaminophen (PHARBETOL) 325 MG tablet Take 2 tablets by mouth every 6 hours as needed for Pain 120 tablet 0    ibuprofen (ADVIL;MOTRIN) 400 MG tablet Take 1 tablet by mouth every 6 hours as needed for Pain 30 tablet 0    clotrimazole (LOTRIMIN) 1 % cream Apply topically 2 times daily Apply topically 2 times daily.       levETIRAcetam (KEPPRA) 250 MG tablet Take 250 mg by mouth 2 times daily      tamsulosin (FLOMAX) 0.4 MG capsule Take 0.8 mg by mouth daily      traZODone (DESYREL) 50 MG tablet 1 tablet by Per NG tube route nightly 30 tablet 0    metoprolol tartrate (LOPRESSOR) 25 MG tablet 1 tablet by Per NG tube route 2 times daily 60 tablet 3    sennosides-docusate sodium (SENOKOT-S) 8.6-50 MG tablet Take 2 tablets by mouth every 72 hours 30 tablet 0    QUEtiapine (SEROQUEL XR) 50 MG extended release tablet Take 100 mg by mouth 3 times daily 50mg in AM  100mg @ 1600  150mg @ 1900         ALLERGIES    Allergies   Allergen GFR Non-African American >60 >60    GFR African American >60 >60    Calcium 9.4 8.3 - 10.6 mg/dL    Total Protein 6.3 (L) 6.4 - 8.2 g/dL    Alb 3.4 3.4 - 5.0 g/dL    Albumin/Globulin Ratio 1.2 1.1 - 2.2    Total Bilirubin <0.2 0.0 - 1.0 mg/dL    Alkaline Phosphatase 126 40 - 129 U/L    ALT 22 10 - 40 U/L    AST 17 15 - 37 U/L    Globulin 2.9 g/dL   Lactic Acid, Plasma   Result Value Ref Range    Lactic Acid 0.8 0.4 - 2.0 mmol/L     RADIOLOGY    Ct Abdomen Pelvis Wo Contrast Additional Contrast? None    Result Date: 10/15/2020  EXAMINATION: CT OF THE ABDOMEN AND PELVIS WITHOUT CONTRAST 10/15/2020 12:26 am TECHNIQUE: CT of the abdomen and pelvis was performed without the administration of intravenous contrast. Multiplanar reformatted images are provided for review. Dose modulation, iterative reconstruction, and/or weight based adjustment of the mA/kV was utilized to reduce the radiation dose to as low as reasonably achievable. COMPARISON: CT abdomen and pelvis 07/29/2020. HISTORY: ORDERING SYSTEM PROVIDED HISTORY: ab distention TECHNOLOGIST PROVIDED HISTORY: Reason for exam:->ab distention Additional Contrast?->None Reason for Exam: ab distention Acuity: Acute Type of Exam: Initial FINDINGS: Lower Chest: Small bilateral pleural effusions. 7 mm subpleural nodule in the left lower lobe on axial image 4 slightly increased in size from 07/29/2020 and new from 2019. Organs: Lack of intravenous contrast limits evaluation of the solid organs, vascular structures, and bowel. The liver and gallbladder are unremarkable. No biliary ductal dilatation is identified. The pancreas, spleen, and bilateral adrenal glands are unremarkable. Bilateral nephrolithiasis. Obstructive uropathy. GI/Bowel: No evidence of acute appendicitis. The colon is unremarkable. No obstruction or wall thickening identified. Pelvis: Small calculi again seen in the urinary bladder. Prostate calcifications also noted. No free fluid.   No pelvic or inguinal lymphadenopathy. Peritoneum/Retroperitoneum: The abdominal aorta is normal in appearance. No retroperitoneal or mesenteric lymphadenopathy is identified. No free air or fluid is seen in the abdomen. Bones/Soft Tissues: No acute osseous or soft tissue abnormality. 1. No acute abnormality in the abdomen or pelvis. 2. Bilateral nephrolithiasis and urinary bladder calculi. No obstructive uropathy. 3. Small bilateral pleural effusions. 4. 7 mm nodule in the left lower lobe slightly increased in size from 07/29/2020 and new from 2019. Recommend further evaluation with nonemergent CT chest.     ED COURSE/MDM  Patient seen and evaluated. Old records reviewed. Diagnostic testing reviewed and results discussed. I have evaluated this patient. My supervising physician was available for consultation. Duane A Carota presented to the ED today with above noted complaints. Physical exam shows patient to be afebrile and hemodynamically stable except he was noted to be tachycardic. Patient is well known to this ER and myself. He has severe MR and is non-verbal. He was laying in the bed resting both times I saw the patient. He was cooperative with exam and moving all extremities appropriately. Blood work shows a leukopenia as WBC are low at 2.8. Absolute lymphocytes low at 0.4. Platelets low at 559 but when compared to prior results his platelets have run on the low side of normal.  There is an anemia that is stable. Patient was noted to be hyponatremic on his last visit at 129, sodium remains low but higher than previous at 134. No further significant electrolyte abnormality. No evidence of acute kidney injury or transaminitis. Lactic acid level is normal.  I do not feel that the patient needs repeat imaging, on 1015 the patient had a CT of his abdomen and pelvis that showed bilateral kidney stones but no obstructive uropathy.   I did review the patient's urinalysis and culture report, he is noted to have Serratia marcescens that is resistant to cefazolin and nitrofurantoin but sensitive to other cephalosporins. As he was sensitive to Rocephin I did give him an IV dose here in the ED. He will be switched from Keflex to KIRAN MAGDA. Patient's heart rate did normalize after IV fluids were given. As he has remained stable while here in the ED I do feel discharge at this time is reasonable. Caregiver was made aware of the above plan and in agreement. Pt was given the following medications or treatments in the ED:   Medications   LORazepam (ATIVAN) injection 1 mg (1 mg Intramuscular Not Given 10/17/20 1628)   cefTRIAXone (ROCEPHIN) 1 g IVPB in 50 mL D5W minibag (0 g Intravenous Stopped 10/17/20 1711)   0.9 % sodium chloride bolus (1,000 mLs Intravenous New Bag 10/17/20 1631)       At this point I do not feel the patient requires further work upand it is reasonable to discharge the patient. Please refer to AVS for further details regarding discharge instructions. A discussion was had with the patient regarding diagnosis, diagnostic testing results,treatment/ plan of care, and follow up. All questions were answered. Patient will follow up as directed for further evaluation/treatment. The patient was given strict return precautions as we discussed symptoms that wouldnecessitate return to the ED. Patient will return to ED for new/worsening symptoms. The patient verbalized their understanding and agreement with the above plan. Patient was sent home with a prescription for below medication/s. I did [place] patient on appropriate use of these medication. New Prescriptions    CEFDINIR (OMNICEF) 300 MG CAPSULE    Take 1 capsule by mouth 2 times daily for 10 days       I estimatethere is LOW risk for ACUTE APPENDICITIS, BOWEL OBSTRUCTION, CHOLECYSTITIS, DIVERTICULITIS, INCARCERATED HERNIA, PANCREATITIS, or PERFORATED BOWEL or ULCER, thus I consider the discharge disposition reasonable.  Also, thereis no evidence or peritonitis, sepsis, or toxicity. Duane A Carota and I have discussed the diagnosis and risks, and we agree with discharging home to follow-up with their primary doctor. We also discussed returning to the EmergencyDepartment immediately if new or worsening symptoms occur. We have discussed the symptoms which are most concerning (e.g., bloody stool, fever, changing or worsening pain, vomiting) that necessitate immediate return. CLINICAL IMPRESSION    1. Acute cystitis without hematuria    2. Tachycardia    3. Lethargy    4. Hyponatremia           Discharge Vitals:  Blood pressure 98/69, pulse 97, temperature 96.8 °F (36 °C), temperature source Oral, resp. rate 17, SpO2 98 %. FOLLOW UP  MD Carola Larsen  [facility] 2 [phone]    Call in 2 days  For further evaluation    [facility] [address] [phone]  Go to   If symptoms worsen      DISPOSITION  Patient was discharged to home in good condition. Comment: Pleasenote this report has been produced using speech recognition software and may contain errors related to that system including errors in grammar, punctuation, and spelling, as well as words and phrases that may beinappropriate. If there are any questions or concerns please feel free to contact the dictating provider for clarification.        MAYA Shearer CNP  10/17/20 7002

## 2020-10-30 ENCOUNTER — HOSPITAL ENCOUNTER (EMERGENCY)
Age: 60
Discharge: HOME | End: 2020-10-30
Payer: MEDICARE

## 2020-10-30 ENCOUNTER — APPOINTMENT (OUTPATIENT)
Dept: CT IMAGING | Age: 60
End: 2020-10-30
Payer: MEDICARE

## 2020-10-30 VITALS
RESPIRATION RATE: 20 BRPM | SYSTOLIC BLOOD PRESSURE: 125 MMHG | HEART RATE: 101 BPM | DIASTOLIC BLOOD PRESSURE: 77 MMHG | OXYGEN SATURATION: 95 %

## 2020-10-30 PROCEDURE — 72125 CT NECK SPINE W/O DYE: CPT

## 2020-10-30 PROCEDURE — 6360000002 HC RX W HCPCS: Performed by: NURSE PRACTITIONER

## 2020-10-30 PROCEDURE — 70450 CT HEAD/BRAIN W/O DYE: CPT

## 2020-10-30 PROCEDURE — 96372 THER/PROPH/DIAG INJ SC/IM: CPT

## 2020-10-30 PROCEDURE — 99284 EMERGENCY DEPT VISIT MOD MDM: CPT

## 2020-10-30 RX ORDER — HALOPERIDOL 5 MG/ML
2 INJECTION INTRAMUSCULAR ONCE
Status: COMPLETED | OUTPATIENT
Start: 2020-10-30 | End: 2020-10-30

## 2020-10-30 RX ORDER — LORAZEPAM 2 MG/ML
1 INJECTION INTRAMUSCULAR ONCE
Status: COMPLETED | OUTPATIENT
Start: 2020-10-30 | End: 2020-10-30

## 2020-10-30 RX ADMIN — LORAZEPAM 1 MG: 2 INJECTION, SOLUTION INTRAMUSCULAR; INTRAVENOUS at 18:11

## 2020-10-30 RX ADMIN — LORAZEPAM 1 MG: 2 INJECTION, SOLUTION INTRAMUSCULAR; INTRAVENOUS at 19:14

## 2020-10-30 RX ADMIN — HALOPERIDOL LACTATE 2 MG: 5 INJECTION, SOLUTION INTRAMUSCULAR at 19:14

## 2020-10-30 NOTE — ED NOTES

## 2020-10-30 NOTE — ED PROVIDER NOTES
% cream Apply topically daily. 30 g 1    LORazepam (ATIVAN) 0.5 MG tablet Take 0.5 mg by mouth every 6 hours as needed for Anxiety.  polyethylene glycol (GLYCOLAX) 17 g packet Take 17 g by mouth daily as needed for Constipation      Nutritional Supplements (ENSURE ENLIVE) LIQD Take by mouth 2 times daily In between meals      hydrocortisone (ANUSOL-HC) 25 MG suppository Place 1 suppository rectally every 12 hours 12 suppository 0    melatonin 3 MG TABS tablet Take 5 mg by mouth nightly       lactulose (CHRONULAC) 10 GM/15ML solution Take 20 g by mouth 2 times daily      acetaminophen (PHARBETOL) 325 MG tablet Take 2 tablets by mouth every 6 hours as needed for Pain 120 tablet 0    ibuprofen (ADVIL;MOTRIN) 400 MG tablet Take 1 tablet by mouth every 6 hours as needed for Pain 30 tablet 0    clotrimazole (LOTRIMIN) 1 % cream Apply topically 2 times daily Apply topically 2 times daily.       levETIRAcetam (KEPPRA) 250 MG tablet Take 250 mg by mouth 2 times daily      tamsulosin (FLOMAX) 0.4 MG capsule Take 0.8 mg by mouth daily      traZODone (DESYREL) 50 MG tablet 1 tablet by Per NG tube route nightly 30 tablet 0    metoprolol tartrate (LOPRESSOR) 25 MG tablet 1 tablet by Per NG tube route 2 times daily 60 tablet 3    sennosides-docusate sodium (SENOKOT-S) 8.6-50 MG tablet Take 2 tablets by mouth every 72 hours 30 tablet 0    QUEtiapine (SEROQUEL XR) 50 MG extended release tablet Take 100 mg by mouth 3 times daily 50mg in AM  100mg @ 1600  150mg @ 1900         ALLERGIES    Allergies   Allergen Reactions    Benadryl [Diphenhydramine] Other (See Comments)     Pt becomes agitated and aggressive with Benadryl    Clonidine Derivatives     Penicillins     Tetracyclines & Related        FAMILY HISTORY    Family History   Family history unknown: Yes       SOCIAL HISTORY    Social History     Socioeconomic History    Marital status: Single     Spouse name: None    Number of children: None    Years of education: None    Highest education level: None   Occupational History    None   Social Needs    Financial resource strain: None    Food insecurity     Worry: None     Inability: None    Transportation needs     Medical: None     Non-medical: None   Tobacco Use    Smoking status: Never Smoker    Smokeless tobacco: Never Used   Substance and Sexual Activity    Alcohol use: No    Drug use: No    Sexual activity: Not Currently   Lifestyle    Physical activity     Days per week: None     Minutes per session: None    Stress: None   Relationships    Social connections     Talks on phone: None     Gets together: None     Attends Anabaptism service: None     Active member of club or organization: None     Attends meetings of clubs or organizations: None     Relationship status: None    Intimate partner violence     Fear of current or ex partner: None     Emotionally abused: None     Physically abused: None     Forced sexual activity: None   Other Topics Concern    None   Social History Narrative    None       REVIEW OF SYSTEMS    10 systems reviewed, pertinent positives per HPI otherwise noted to be negative      PHYSICAL EXAM  Vitals:    10/30/20 1745   BP: 125/77   Pulse: 116   Resp: 20   SpO2: 95%       GENERAL: Patient is well-developed, well-nourished. Patient is non-verbal due to severe MR, behaving per his usual per caregiver. Non toxic appearing. HEENT:  Normocephalic. Small area of swelling and abrasion to the left forehead. There are no bony depressions, instability, step-off, or crepitus to palpation of the facial bones. There is no raccoon's eyes or battles sign observed. PERRL. EOMI. Conjunctiva appear normal.  External ears are with swelling to the helix but this is at baseline. NECK: Supple with normal ROM. Trachea midline. LUNGS:  Normal work of breathing. Breath sounds clear throughout all lung fields. CADIOVASCULAR: Tachycardic rate and rhythm.  S1/S2 normal, no murmurs, rubs, or gallops on exam. Peripheral pulses are symmetric and strong. GI: Nondistended. Soft, non-tender to palpation, bowel sounds active in all 4 quadrants, no hepatosplenomegaly. EXTREMITIES: Normal ROM, no edema. Distal pulses palpable. No gross deformities or trauma noted. Moving all extremities equally and appropriately. SKIN: Warm/dry. Small superficial abrasion to left forehead. No rashes/lesions noted. PSYCHIATRIC: Patient is alert, eyes open spontaneously. Patient is behaving per usual per caregiver. He is yelling out and screaming. This is his baseline. NEUROLOGIC: Alert, eyes open spontaneously. Behaving per his usual per caregiver. I am familiar with patient and he is behaving per usual.     Procedure  None    LABS  No results found for this visit on 10/30/20. RADIOLOGY    Ct Abdomen Pelvis Wo Contrast Additional Contrast? None    Result Date: 10/15/2020  EXAMINATION: CT OF THE ABDOMEN AND PELVIS WITHOUT CONTRAST 10/15/2020 12:26 am TECHNIQUE: CT of the abdomen and pelvis was performed without the administration of intravenous contrast. Multiplanar reformatted images are provided for review. Dose modulation, iterative reconstruction, and/or weight based adjustment of the mA/kV was utilized to reduce the radiation dose to as low as reasonably achievable. COMPARISON: CT abdomen and pelvis 07/29/2020. HISTORY: ORDERING SYSTEM PROVIDED HISTORY: ab distention TECHNOLOGIST PROVIDED HISTORY: Reason for exam:->ab distention Additional Contrast?->None Reason for Exam: ab distention Acuity: Acute Type of Exam: Initial FINDINGS: Lower Chest: Small bilateral pleural effusions. 7 mm subpleural nodule in the left lower lobe on axial image 4 slightly increased in size from 07/29/2020 and new from 2019. Organs: Lack of intravenous contrast limits evaluation of the solid organs, vascular structures, and bowel. The liver and gallbladder are unremarkable. No biliary ductal dilatation is identified.   The pancreas, spleen, and bilateral adrenal glands are unremarkable. Bilateral nephrolithiasis. Obstructive uropathy. GI/Bowel: No evidence of acute appendicitis. The colon is unremarkable. No obstruction or wall thickening identified. Pelvis: Small calculi again seen in the urinary bladder. Prostate calcifications also noted. No free fluid. No pelvic or inguinal lymphadenopathy. Peritoneum/Retroperitoneum: The abdominal aorta is normal in appearance. No retroperitoneal or mesenteric lymphadenopathy is identified. No free air or fluid is seen in the abdomen. Bones/Soft Tissues: No acute osseous or soft tissue abnormality. 1. No acute abnormality in the abdomen or pelvis. 2. Bilateral nephrolithiasis and urinary bladder calculi. No obstructive uropathy. 3. Small bilateral pleural effusions. 4. 7 mm nodule in the left lower lobe slightly increased in size from 07/29/2020 and new from 2019. Recommend further evaluation with nonemergent CT chest.     Ct Head Wo Contrast    Result Date: 10/30/2020  EXAMINATION: CT OF THE HEAD WITHOUT CONTRAST  10/30/2020 6:41 pm TECHNIQUE: CT of the head was performed without the administration of intravenous contrast. Dose modulation, iterative reconstruction, and/or weight based adjustment of the mA/kV was utilized to reduce the radiation dose to as low as reasonably achievable. COMPARISON: July 29, 2020 HISTORY: ORDERING SYSTEM PROVIDED HISTORY: fall, hit head TECHNOLOGIST PROVIDED HISTORY: Reason for exam:->fall, hit head Has a \"code stroke\" or \"stroke alert\" been called? ->No Reason for Exam: fell out of wheelchair and hit head. Cut on top left side of head Acuity: Acute Type of Exam: Initial FINDINGS: BRAIN/VENTRICLES: There is no acute intracranial hemorrhage, mass effect or midline shift. No abnormal extra-axial fluid collection. The gray-white differentiation is maintained without evidence of an acute infarct. There is no evidence of hydrocephalus.   Diffuse ORBITS: The visualized portion of the orbits demonstrate no acute abnormality. SINUSES: The visualized paranasal sinuses and mastoid air cells demonstrate no acute abnormality. SOFT TISSUES/SKULL:  No acute abnormality of the visualized skull or soft tissues. No acute intracranial abnormality. Stable compared to prior studies     Ct Cervical Spine Wo Contrast    Result Date: 10/30/2020  EXAMINATION: CT OF THE CERVICAL SPINE WITHOUT CONTRAST 10/30/2020 6:41 pm TECHNIQUE: CT of the cervical spine was performed without the administration of intravenous contrast. Multiplanar reformatted images are provided for review. Dose modulation, iterative reconstruction, and/or weight based adjustment of the mA/kV was utilized to reduce the radiation dose to as low as reasonably achievable. COMPARISON: 05/02/2020 HISTORY: ORDERING SYSTEM PROVIDED HISTORY: fall TECHNOLOGIST PROVIDED HISTORY: Reason for exam:->fall Reason for Exam: fell out of wheelchair and hit head. Cut on top left side of head Acuity: Acute Type of Exam: Initial FINDINGS: BONES/ALIGNMENT: There is no acute fracture or traumatic malalignment. DEGENERATIVE CHANGES: Multilevel mild degenerative disc disease and facet arthropathy. SOFT TISSUES: There is no prevertebral soft tissue swelling. No acute abnormality of the cervical spine. ED COURSE/MDM  Patient seen and evaluated. Old records reviewed. Diagnostic testing reviewed and results discussed. I have evaluated this patient. My supervising physician was available for consultation. Duane A Carota presented to the ED today with above noted complaints. Physical exam reveals small amount of swelling with a superficial abrasion to the left forehead. The patient is awake and alert but is nonverbal due to severe MR, patient is behaving per his usual per caregiver at the bedside and I have also cared for this patient frequently in the past and he is at his baseline as well in my opinion.  The patient is without signs of basilar skull fracture. CT scan of the head was performed and showed no acute findings, cervical spine CT also obtained and without acute findings. Patient abrasion does not require repair. I feel he can be discharged back to his group home. While in ED patient received   Medications   LORazepam (ATIVAN) injection 1 mg (1 mg Intramuscular Given 10/30/20 1811)   LORazepam (ATIVAN) injection 1 mg (1 mg Intramuscular Given 10/30/20 1914)   haloperidol lactate (HALDOL) injection 2 mg (2 mg Intramuscular Given 10/30/20 1914)       At this point I do not feel the patient requires further work up and it is reasonable to discharge the patient. A discussion was had with the patient regarding diagnosis, diagnostic testing results, treatment/ plan of care, and follow up. All questions were answered. Patient will follow up as directed for further evaluation/treatment. The patient was given strict return precautions as we discussed symptoms that would necessitate return to the ED. Patient will return to ED for new/worsening symptoms. The patient verbalized their understanding and agreement with the above plan. Please refer to AVS for further details of the discharge instructions. Patient was sent home with a prescription for below medication/s. I did [place] patient on appropriate use of these medication. New Prescriptions    No medications on file       I estimate there is LOW risk for SUBARACHNOID HEMORRHAGE, MENINGITIS, INTRACRANIAL HEMORRHAGE, SUBDURAL HEMATOMA, OR STROKE, thus I consider the discharge disposition reasonable. Duane A Carota and I have discussed the diagnosis and risks, and we agree with discharging home to follow-up with their primary doctor. We also discussed returning to the Emergency Department immediately if new or worsening symptoms occur.  We have discussed the symptoms which are most concerning (e.g., changing or worsening pain, weakness, vomiting, fever) that

## 2021-01-20 ENCOUNTER — APPOINTMENT (OUTPATIENT)
Dept: GENERAL RADIOLOGY | Age: 61
DRG: 177 | End: 2021-01-20
Payer: MEDICARE

## 2021-01-20 ENCOUNTER — HOSPITAL ENCOUNTER (INPATIENT)
Age: 61
LOS: 12 days | Discharge: HOME | DRG: 177 | End: 2021-02-01
Attending: EMERGENCY MEDICINE | Admitting: INTERNAL MEDICINE
Payer: MEDICARE

## 2021-01-20 DIAGNOSIS — J12.82 PNEUMONIA DUE TO COVID-19 VIRUS: Primary | ICD-10-CM

## 2021-01-20 DIAGNOSIS — U07.1 PNEUMONIA DUE TO COVID-19 VIRUS: Primary | ICD-10-CM

## 2021-01-20 DIAGNOSIS — R06.02 SHORTNESS OF BREATH: ICD-10-CM

## 2021-01-20 PROBLEM — J18.9 PNEUMONIA: Status: ACTIVE | Noted: 2021-01-20

## 2021-01-20 LAB
A/G RATIO: 1.2 (ref 1.1–2.2)
ALBUMIN SERPL-MCNC: 3.3 G/DL (ref 3.4–5)
ALP BLD-CCNC: 120 U/L (ref 40–129)
ALT SERPL-CCNC: 11 U/L (ref 10–40)
ANION GAP SERPL CALCULATED.3IONS-SCNC: 6 MMOL/L (ref 3–16)
AST SERPL-CCNC: 16 U/L (ref 15–37)
BASE EXCESS VENOUS: 2.2 MMOL/L (ref -3–3)
BASOPHILS ABSOLUTE: 0 K/UL (ref 0–0.2)
BASOPHILS RELATIVE PERCENT: 1.2 %
BILIRUB SERPL-MCNC: <0.2 MG/DL (ref 0–1)
BUN BLDV-MCNC: 19 MG/DL (ref 7–20)
CALCIUM SERPL-MCNC: 8.6 MG/DL (ref 8.3–10.6)
CARBOXYHEMOGLOBIN: 1.8 % (ref 0–1.5)
CHLORIDE BLD-SCNC: 98 MMOL/L (ref 99–110)
CO2: 25 MMOL/L (ref 21–32)
CREAT SERPL-MCNC: 1.3 MG/DL (ref 0.9–1.3)
D DIMER: <200 NG/ML DDU (ref 0–229)
EOSINOPHILS ABSOLUTE: 0.1 K/UL (ref 0–0.6)
EOSINOPHILS RELATIVE PERCENT: 4.3 %
FIBRINOGEN: 417 MG/DL (ref 200–397)
GFR AFRICAN AMERICAN: >60
GFR NON-AFRICAN AMERICAN: 56
GLOBULIN: 2.7 G/DL
GLUCOSE BLD-MCNC: 104 MG/DL (ref 70–99)
HCO3 VENOUS: 26.2 MMOL/L (ref 23–29)
HCT VFR BLD CALC: 33 % (ref 40.5–52.5)
HEMOGLOBIN: 10.9 G/DL (ref 13.5–17.5)
LACTATE DEHYDROGENASE: 139 U/L (ref 100–190)
LACTIC ACID: 1.2 MMOL/L (ref 0.4–2)
LYMPHOCYTES ABSOLUTE: 0.4 K/UL (ref 1–5.1)
LYMPHOCYTES RELATIVE PERCENT: 18.9 %
MCH RBC QN AUTO: 27.1 PG (ref 26–34)
MCHC RBC AUTO-ENTMCNC: 33.1 G/DL (ref 31–36)
MCV RBC AUTO: 81.7 FL (ref 80–100)
METHEMOGLOBIN VENOUS: 0.6 %
MONOCYTES ABSOLUTE: 0.3 K/UL (ref 0–1.3)
MONOCYTES RELATIVE PERCENT: 12.5 %
NEUTROPHILS ABSOLUTE: 1.4 K/UL (ref 1.7–7.7)
NEUTROPHILS RELATIVE PERCENT: 63.1 %
O2 CONTENT, VEN: 16 VOL %
O2 SAT, VEN: 98 %
O2 THERAPY: ABNORMAL
PCO2, VEN: 38.4 MMHG (ref 40–50)
PDW BLD-RTO: 14.9 % (ref 12.4–15.4)
PH VENOUS: 7.45 (ref 7.35–7.45)
PLATELET # BLD: 283 K/UL (ref 135–450)
PMV BLD AUTO: 7.1 FL (ref 5–10.5)
PO2, VEN: 134.2 MMHG (ref 25–40)
POTASSIUM REFLEX MAGNESIUM: 4.2 MMOL/L (ref 3.5–5.1)
PROCALCITONIN: 0.18 NG/ML (ref 0–0.15)
RBC # BLD: 4.04 M/UL (ref 4.2–5.9)
SARS-COV-2, NAAT: DETECTED
SODIUM BLD-SCNC: 129 MMOL/L (ref 136–145)
TCO2 CALC VENOUS: 27 MMOL/L
TOTAL PROTEIN: 6 G/DL (ref 6.4–8.2)
WBC # BLD: 2.2 K/UL (ref 4–11)

## 2021-01-20 PROCEDURE — 82803 BLOOD GASES ANY COMBINATION: CPT

## 2021-01-20 PROCEDURE — 87040 BLOOD CULTURE FOR BACTERIA: CPT

## 2021-01-20 PROCEDURE — 36415 COLL VENOUS BLD VENIPUNCTURE: CPT

## 2021-01-20 PROCEDURE — 85025 COMPLETE CBC W/AUTO DIFF WBC: CPT

## 2021-01-20 PROCEDURE — 83615 LACTATE (LD) (LDH) ENZYME: CPT

## 2021-01-20 PROCEDURE — 85379 FIBRIN DEGRADATION QUANT: CPT

## 2021-01-20 PROCEDURE — 84145 PROCALCITONIN (PCT): CPT

## 2021-01-20 PROCEDURE — 1200000000 HC SEMI PRIVATE

## 2021-01-20 PROCEDURE — 83605 ASSAY OF LACTIC ACID: CPT

## 2021-01-20 PROCEDURE — 86850 RBC ANTIBODY SCREEN: CPT

## 2021-01-20 PROCEDURE — 96372 THER/PROPH/DIAG INJ SC/IM: CPT

## 2021-01-20 PROCEDURE — U0002 COVID-19 LAB TEST NON-CDC: HCPCS

## 2021-01-20 PROCEDURE — 80053 COMPREHEN METABOLIC PANEL: CPT

## 2021-01-20 PROCEDURE — 86901 BLOOD TYPING SEROLOGIC RH(D): CPT

## 2021-01-20 PROCEDURE — 6360000002 HC RX W HCPCS: Performed by: EMERGENCY MEDICINE

## 2021-01-20 PROCEDURE — 85384 FIBRINOGEN ACTIVITY: CPT

## 2021-01-20 PROCEDURE — 96375 TX/PRO/DX INJ NEW DRUG ADDON: CPT

## 2021-01-20 PROCEDURE — 2500000003 HC RX 250 WO HCPCS: Performed by: EMERGENCY MEDICINE

## 2021-01-20 PROCEDURE — 82728 ASSAY OF FERRITIN: CPT

## 2021-01-20 PROCEDURE — 2580000003 HC RX 258: Performed by: EMERGENCY MEDICINE

## 2021-01-20 PROCEDURE — 99284 EMERGENCY DEPT VISIT MOD MDM: CPT

## 2021-01-20 PROCEDURE — 71045 X-RAY EXAM CHEST 1 VIEW: CPT

## 2021-01-20 PROCEDURE — 86900 BLOOD TYPING SEROLOGIC ABO: CPT

## 2021-01-20 PROCEDURE — 96374 THER/PROPH/DIAG INJ IV PUSH: CPT

## 2021-01-20 RX ORDER — LORAZEPAM 2 MG/ML
1 INJECTION INTRAMUSCULAR EVERY 4 HOURS PRN
Status: DISCONTINUED | OUTPATIENT
Start: 2021-01-20 | End: 2021-01-22

## 2021-01-20 RX ORDER — POTASSIUM CHLORIDE 7.45 MG/ML
10 INJECTION INTRAVENOUS PRN
Status: DISCONTINUED | OUTPATIENT
Start: 2021-01-20 | End: 2021-01-25

## 2021-01-20 RX ORDER — PROMETHAZINE HYDROCHLORIDE 25 MG/1
12.5 TABLET ORAL EVERY 6 HOURS PRN
Status: DISCONTINUED | OUTPATIENT
Start: 2021-01-20 | End: 2021-02-01 | Stop reason: HOSPADM

## 2021-01-20 RX ORDER — FAMOTIDINE 20 MG/1
20 TABLET, FILM COATED ORAL DAILY PRN
Status: DISPENSED | OUTPATIENT
Start: 2021-01-20 | End: 2021-01-25

## 2021-01-20 RX ORDER — ACETAMINOPHEN 650 MG/1
650 SUPPOSITORY RECTAL EVERY 6 HOURS PRN
Status: DISCONTINUED | OUTPATIENT
Start: 2021-01-20 | End: 2021-02-01 | Stop reason: HOSPADM

## 2021-01-20 RX ORDER — ONDANSETRON 2 MG/ML
4 INJECTION INTRAMUSCULAR; INTRAVENOUS EVERY 6 HOURS PRN
Status: DISCONTINUED | OUTPATIENT
Start: 2021-01-20 | End: 2021-02-01 | Stop reason: HOSPADM

## 2021-01-20 RX ORDER — LORAZEPAM 2 MG/ML
2 INJECTION INTRAMUSCULAR ONCE
Status: COMPLETED | OUTPATIENT
Start: 2021-01-20 | End: 2021-01-20

## 2021-01-20 RX ORDER — MAGNESIUM SULFATE IN WATER 40 MG/ML
2 INJECTION, SOLUTION INTRAVENOUS PRN
Status: DISCONTINUED | OUTPATIENT
Start: 2021-01-20 | End: 2021-01-25

## 2021-01-20 RX ORDER — SODIUM CHLORIDE 9 MG/ML
1000 INJECTION, SOLUTION INTRAVENOUS ONCE
Status: COMPLETED | OUTPATIENT
Start: 2021-01-20 | End: 2021-01-21

## 2021-01-20 RX ORDER — ACETAMINOPHEN 325 MG/1
650 TABLET ORAL EVERY 6 HOURS PRN
Status: DISCONTINUED | OUTPATIENT
Start: 2021-01-20 | End: 2021-02-01 | Stop reason: HOSPADM

## 2021-01-20 RX ORDER — METHYLPREDNISOLONE SODIUM SUCCINATE 40 MG/ML
40 INJECTION, POWDER, LYOPHILIZED, FOR SOLUTION INTRAMUSCULAR; INTRAVENOUS ONCE
Status: COMPLETED | OUTPATIENT
Start: 2021-01-20 | End: 2021-01-20

## 2021-01-20 RX ORDER — SODIUM CHLORIDE 0.9 % (FLUSH) 0.9 %
10 SYRINGE (ML) INJECTION PRN
Status: DISCONTINUED | OUTPATIENT
Start: 2021-01-20 | End: 2021-02-01 | Stop reason: HOSPADM

## 2021-01-20 RX ORDER — GUAIFENESIN/DEXTROMETHORPHAN 100-10MG/5
5 SYRUP ORAL EVERY 4 HOURS PRN
Status: DISCONTINUED | OUTPATIENT
Start: 2021-01-20 | End: 2021-02-01 | Stop reason: HOSPADM

## 2021-01-20 RX ORDER — SODIUM CHLORIDE, SODIUM LACTATE, POTASSIUM CHLORIDE, AND CALCIUM CHLORIDE .6; .31; .03; .02 G/100ML; G/100ML; G/100ML; G/100ML
500 INJECTION, SOLUTION INTRAVENOUS ONCE
Status: COMPLETED | OUTPATIENT
Start: 2021-01-20 | End: 2021-01-21

## 2021-01-20 RX ORDER — SODIUM CHLORIDE 0.9 % (FLUSH) 0.9 %
10 SYRINGE (ML) INJECTION EVERY 12 HOURS SCHEDULED
Status: DISCONTINUED | OUTPATIENT
Start: 2021-01-20 | End: 2021-02-01 | Stop reason: HOSPADM

## 2021-01-20 RX ADMIN — METHYLPREDNISOLONE SODIUM SUCCINATE 40 MG: 40 INJECTION, POWDER, FOR SOLUTION INTRAMUSCULAR; INTRAVENOUS at 23:02

## 2021-01-20 RX ADMIN — LORAZEPAM 1 MG: 2 INJECTION, SOLUTION INTRAMUSCULAR; INTRAVENOUS at 21:23

## 2021-01-20 RX ADMIN — CEFTRIAXONE SODIUM 1000 MG: 1 INJECTION, POWDER, FOR SOLUTION INTRAMUSCULAR; INTRAVENOUS at 23:03

## 2021-01-20 RX ADMIN — SODIUM CHLORIDE, POTASSIUM CHLORIDE, SODIUM LACTATE AND CALCIUM CHLORIDE 500 ML: 600; 310; 30; 20 INJECTION, SOLUTION INTRAVENOUS at 23:02

## 2021-01-20 RX ADMIN — LORAZEPAM 2 MG: 2 INJECTION INTRAMUSCULAR; INTRAVENOUS at 20:12

## 2021-01-20 RX ADMIN — METRONIDAZOLE 500 MG: 500 INJECTION, SOLUTION INTRAVENOUS at 23:33

## 2021-01-21 LAB
A/G RATIO: 1.3 (ref 1.1–2.2)
ABO/RH: NORMAL
ALBUMIN SERPL-MCNC: 3.3 G/DL (ref 3.4–5)
ALP BLD-CCNC: 107 U/L (ref 40–129)
ALT SERPL-CCNC: 10 U/L (ref 10–40)
ANION GAP SERPL CALCULATED.3IONS-SCNC: 6 MMOL/L (ref 3–16)
ANTIBODY SCREEN: NORMAL
AST SERPL-CCNC: 14 U/L (ref 15–37)
BASOPHILS ABSOLUTE: 0 K/UL (ref 0–0.2)
BASOPHILS RELATIVE PERCENT: 0.5 %
BILIRUB SERPL-MCNC: <0.2 MG/DL (ref 0–1)
BILIRUBIN URINE: NEGATIVE
BLOOD BANK DISPENSE STATUS: NORMAL
BLOOD BANK PRODUCT CODE: NORMAL
BLOOD, URINE: NEGATIVE
BPU ID: NORMAL
BUN BLDV-MCNC: 16 MG/DL (ref 7–20)
CALCIUM SERPL-MCNC: 8.5 MG/DL (ref 8.3–10.6)
CHLORIDE BLD-SCNC: 100 MMOL/L (ref 99–110)
CLARITY: CLEAR
CO2: 26 MMOL/L (ref 21–32)
COLOR: YELLOW
CREAT SERPL-MCNC: 1.1 MG/DL (ref 0.9–1.3)
DESCRIPTION BLOOD BANK: NORMAL
EOSINOPHILS ABSOLUTE: 0 K/UL (ref 0–0.6)
EOSINOPHILS RELATIVE PERCENT: 0.4 %
FERRITIN: 94.2 NG/ML (ref 30–400)
GFR AFRICAN AMERICAN: >60
GFR NON-AFRICAN AMERICAN: >60
GLOBULIN: 2.5 G/DL
GLUCOSE BLD-MCNC: 151 MG/DL (ref 70–99)
GLUCOSE URINE: NEGATIVE MG/DL
HCT VFR BLD CALC: 30.4 % (ref 40.5–52.5)
HEMATOLOGY PATH CONSULT: NORMAL
HEMATOLOGY PATH CONSULT: YES
HEMOGLOBIN: 10.1 G/DL (ref 13.5–17.5)
KETONES, URINE: NEGATIVE MG/DL
LACTIC ACID: 0.7 MMOL/L (ref 0.4–2)
LEUKOCYTE ESTERASE, URINE: NEGATIVE
LYMPHOCYTES ABSOLUTE: 0.3 K/UL (ref 1–5.1)
LYMPHOCYTES RELATIVE PERCENT: 22.7 %
MCH RBC QN AUTO: 27 PG (ref 26–34)
MCHC RBC AUTO-ENTMCNC: 33.2 G/DL (ref 31–36)
MCV RBC AUTO: 81.3 FL (ref 80–100)
MICROSCOPIC EXAMINATION: NORMAL
MONOCYTES ABSOLUTE: 0.1 K/UL (ref 0–1.3)
MONOCYTES RELATIVE PERCENT: 5.5 %
NEUTROPHILS ABSOLUTE: 0.9 K/UL (ref 1.7–7.7)
NEUTROPHILS RELATIVE PERCENT: 70.9 %
NITRITE, URINE: NEGATIVE
OCCULT BLOOD SCREENING: ABNORMAL
PDW BLD-RTO: 14.6 % (ref 12.4–15.4)
PH UA: 6.5 (ref 5–8)
PLATELET # BLD: 272 K/UL (ref 135–450)
PMV BLD AUTO: 7.1 FL (ref 5–10.5)
POTASSIUM REFLEX MAGNESIUM: 4.8 MMOL/L (ref 3.5–5.1)
PROCALCITONIN: 0.15 NG/ML (ref 0–0.15)
PROTEIN UA: NEGATIVE MG/DL
RBC # BLD: 3.74 M/UL (ref 4.2–5.9)
SODIUM BLD-SCNC: 132 MMOL/L (ref 136–145)
SPECIFIC GRAVITY UA: 1.01 (ref 1–1.03)
SPECIMEN STATUS: NORMAL
TOTAL PROTEIN: 5.8 G/DL (ref 6.4–8.2)
TROPONIN: <0.01 NG/ML
URINE REFLEX TO CULTURE: NORMAL
URINE TYPE: NORMAL
UROBILINOGEN, URINE: 0.2 E.U./DL
WBC # BLD: 1.3 K/UL (ref 4–11)

## 2021-01-21 PROCEDURE — XW13325 TRANSFUSION OF CONVALESCENT PLASMA (NONAUTOLOGOUS) INTO PERIPHERAL VEIN, PERCUTANEOUS APPROACH, NEW TECHNOLOGY GROUP 5: ICD-10-PCS | Performed by: PATHOLOGY

## 2021-01-21 PROCEDURE — 6360000002 HC RX W HCPCS: Performed by: PHYSICIAN ASSISTANT

## 2021-01-21 PROCEDURE — 87449 NOS EACH ORGANISM AG IA: CPT

## 2021-01-21 PROCEDURE — 81003 URINALYSIS AUTO W/O SCOPE: CPT

## 2021-01-21 PROCEDURE — 84484 ASSAY OF TROPONIN QUANT: CPT

## 2021-01-21 PROCEDURE — XW033E5 INTRODUCTION OF REMDESIVIR ANTI-INFECTIVE INTO PERIPHERAL VEIN, PERCUTANEOUS APPROACH, NEW TECHNOLOGY GROUP 5: ICD-10-PCS | Performed by: INTERNAL MEDICINE

## 2021-01-21 PROCEDURE — 80053 COMPREHEN METABOLIC PANEL: CPT

## 2021-01-21 PROCEDURE — 83605 ASSAY OF LACTIC ACID: CPT

## 2021-01-21 PROCEDURE — 1200000000 HC SEMI PRIVATE

## 2021-01-21 PROCEDURE — G0328 FECAL BLOOD SCRN IMMUNOASSAY: HCPCS

## 2021-01-21 PROCEDURE — 2500000003 HC RX 250 WO HCPCS: Performed by: INTERNAL MEDICINE

## 2021-01-21 PROCEDURE — 6360000002 HC RX W HCPCS: Performed by: EMERGENCY MEDICINE

## 2021-01-21 PROCEDURE — 36415 COLL VENOUS BLD VENIPUNCTURE: CPT

## 2021-01-21 PROCEDURE — 92610 EVALUATE SWALLOWING FUNCTION: CPT

## 2021-01-21 PROCEDURE — 84145 PROCALCITONIN (PCT): CPT

## 2021-01-21 PROCEDURE — 6360000002 HC RX W HCPCS: Performed by: INTERNAL MEDICINE

## 2021-01-21 PROCEDURE — 92526 ORAL FUNCTION THERAPY: CPT

## 2021-01-21 PROCEDURE — 87040 BLOOD CULTURE FOR BACTERIA: CPT

## 2021-01-21 PROCEDURE — 2580000003 HC RX 258: Performed by: PHYSICIAN ASSISTANT

## 2021-01-21 PROCEDURE — 2580000003 HC RX 258: Performed by: INTERNAL MEDICINE

## 2021-01-21 PROCEDURE — 85025 COMPLETE CBC W/AUTO DIFF WBC: CPT

## 2021-01-21 PROCEDURE — 2500000003 HC RX 250 WO HCPCS: Performed by: PHYSICIAN ASSISTANT

## 2021-01-21 RX ORDER — SODIUM CHLORIDE 9 MG/ML
INJECTION, SOLUTION INTRAVENOUS CONTINUOUS
Status: ACTIVE | OUTPATIENT
Start: 2021-01-21 | End: 2021-01-22

## 2021-01-21 RX ORDER — SODIUM CHLORIDE 9 MG/ML
INJECTION, SOLUTION INTRAVENOUS PRN
Status: DISCONTINUED | OUTPATIENT
Start: 2021-01-21 | End: 2021-01-26

## 2021-01-21 RX ORDER — METHYLPREDNISOLONE SODIUM SUCCINATE 40 MG/ML
40 INJECTION, POWDER, LYOPHILIZED, FOR SOLUTION INTRAMUSCULAR; INTRAVENOUS EVERY 12 HOURS
Status: DISCONTINUED | OUTPATIENT
Start: 2021-01-21 | End: 2021-01-25

## 2021-01-21 RX ADMIN — ENOXAPARIN SODIUM 30 MG: 30 INJECTION SUBCUTANEOUS at 10:16

## 2021-01-21 RX ADMIN — LORAZEPAM 1 MG: 2 INJECTION, SOLUTION INTRAMUSCULAR; INTRAVENOUS at 14:49

## 2021-01-21 RX ADMIN — METRONIDAZOLE 500 MG: 500 INJECTION, SOLUTION INTRAVENOUS at 17:03

## 2021-01-21 RX ADMIN — SODIUM CHLORIDE: 9 INJECTION, SOLUTION INTRAVENOUS at 21:27

## 2021-01-21 RX ADMIN — AZITHROMYCIN MONOHYDRATE 500 MG: 500 INJECTION, POWDER, LYOPHILIZED, FOR SOLUTION INTRAVENOUS at 01:37

## 2021-01-21 RX ADMIN — LORAZEPAM 1 MG: 2 INJECTION, SOLUTION INTRAMUSCULAR; INTRAVENOUS at 01:41

## 2021-01-21 RX ADMIN — LORAZEPAM 1 MG: 2 INJECTION, SOLUTION INTRAMUSCULAR; INTRAVENOUS at 05:43

## 2021-01-21 RX ADMIN — LORAZEPAM 1 MG: 2 INJECTION, SOLUTION INTRAMUSCULAR; INTRAVENOUS at 19:39

## 2021-01-21 RX ADMIN — METHYLPREDNISOLONE SODIUM SUCCINATE 40 MG: 40 INJECTION, POWDER, FOR SOLUTION INTRAMUSCULAR; INTRAVENOUS at 21:28

## 2021-01-21 RX ADMIN — SODIUM CHLORIDE, PRESERVATIVE FREE 10 ML: 5 INJECTION INTRAVENOUS at 19:40

## 2021-01-21 RX ADMIN — METHYLPREDNISOLONE SODIUM SUCCINATE 40 MG: 40 INJECTION, POWDER, FOR SOLUTION INTRAMUSCULAR; INTRAVENOUS at 10:16

## 2021-01-21 RX ADMIN — METRONIDAZOLE 500 MG: 500 INJECTION, SOLUTION INTRAVENOUS at 10:11

## 2021-01-21 RX ADMIN — REMDESIVIR 200 MG: 100 INJECTION, POWDER, LYOPHILIZED, FOR SOLUTION INTRAVENOUS at 11:28

## 2021-01-21 RX ADMIN — SODIUM CHLORIDE 1000 ML: 9 INJECTION, SOLUTION INTRAVENOUS at 01:36

## 2021-01-21 RX ADMIN — LORAZEPAM 1 MG: 2 INJECTION, SOLUTION INTRAMUSCULAR; INTRAVENOUS at 23:45

## 2021-01-21 NOTE — PROGRESS NOTES
Call placed to [facility] @ [facility] for phone consent to administer convalescent plasma. She informed me that [facility] is POA for consent. Call placed to [facility], message left for return call.

## 2021-01-21 NOTE — PROGRESS NOTES
Pt transferred from ED, handoff report given by Anders Buerger RN. All VSS on room air. Pt is MR and was extremely anxious and upset (screaming and thrashing) while getting oriented to room, but is improving with time. He is alert and disoriented X4. Lung sounds are diminished throughout with crackles at bases bilaterally. Bed is locked and in lowest position. Side rails are padded for safety.   Sonny contacted and stated there are no current cameras available, pt placed on wait list.

## 2021-01-21 NOTE — PROGRESS NOTES
Phone consent received from Walter Iglesias, 0 [facility]. Verified w/ this RN and Jey Daley RN. Will continue to monitor.

## 2021-01-21 NOTE — ED NOTES
Blood culture set #1 drawn from right hand with angio. Bottle tops scrubbed with alcohol pads. Site prepped with Prevantics swab, 15 seconds per side, and allowed to dry for 30 seconds prior to venipuncture. Red waste tube drawn prior to collection of specimen.          Jose Alejandro Briscoe RN  01/20/21 7950

## 2021-01-21 NOTE — ED NOTES
[id number] @ 0708  RE: covid pneumonia with poss aspiration, from group home, worsening sob per Dr. Lesly Romero called back @ [phone]       [facility]  01/20/21 0906

## 2021-01-21 NOTE — PROGRESS NOTES
Speech Language Pathology  Facility/Department: [facility] C3 TELE/MED SURG/ONC   CLINICAL BEDSIDE SWALLOW EVALUATION    NAME: Jj Mead  : 1960  MRN: [id number]    ADMISSION DATE: 2021  ADMITTING DIAGNOSIS: has Lipoma of head; Atrial fibrillation with RVR ([facility] [address] Acute cystitis with hematuria; Altered mental status; Brain herniation ([facility] [address] 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 [facility]); Constipation; Acute urinary retention; Atrial fibrillation, chronic ([facility] [address] History of subdural hematoma; and Pneumonia on their problem list.  ONSET DATE: admit date 21    Recent Chest Xray: (Date 21):   1. Multifocal pneumonia.  Recommend chest radiograph in 8 weeks to confirm   resolution. Date of Eval: 2021  Evaluating Therapist: Boubacar Valles    Current Diet level:  Current Diet : Dysphagia Minced and Moist (Dysphagia II)  Current Liquid Diet : Thin      Primary Complaint  Patient Complaint: n/a; pt non-verbal. Pt non-communicative, did not make purposeful eye contact with SLP throughout evaluation, did not follow commands. Pain:   CLARITZA; pt asleep and I no outward pain/distress upon SLP entering his room    Reason for Referral  Duane A Carota was referred for a bedside swallow evaluation to assess the efficiency of his swallow function, identify signs and symptoms of aspiration and make recommendations regarding safe dietary consistencies, effective compensatory strategies, and safe eating environment. Impression  Dysphagia Diagnosis: Moderate oral stage dysphagia; Moderate pharyngeal stage dysphagia  Dysphagia Outcome Severity Scale: Level 3: Moderate dysphagia- Total assisstance, supervision or strategies.  Two or more diet consistencies restricted     Treatment Plan  Requires SLP Intervention: Yes  Duration/Frequency of Treatment: 2-3x/wk for 1 week until 21          Recommended Diet and Intervention  Diet Solids Recommendation: Dysphagia Pureed (Dysphagia I) Liquid Consistency Recommendation: Mildly Thick (Nectar)  Recommended Form of Meds: Crushed in puree as able    If pt develops s/s of aspiration with po intake or if respiratory status worsens, please downgrade pt to NPO and notify SLP          Compensatory Swallowing Strategies  Compensatory Swallowing Strategies: Upright as possible for all oral intake;Eat/Feed slowly; No straws;Remain upright for 30-45 minutes after meals;Small bites/sips; Alternate solids and liquids; Total feed    Treatment/Goals  Short-term Goals  Timeframe for Short-term Goals: 2-3x/wk, 1 week  Long-term Goals  Timeframe for Long-term Goals: 1-2 weeks  Goal 1: Pt will tolerate ongoing assessment of swallowing. Dysphagia Goals: The patient will tolerate repeat BSE when able. ;The patient will tolerate mechanical soft foods 10/10. ;The patient will tolerate nectar thick liquids without signs and symptoms of aspiration 10/10 via cup. ;The patient will tolerate thin liquids without signs and symptoms of aspiration 10/10 via cup. ;The patient will tolerate puree foods 10/10. General  Chart Reviewed: Yes  Behavior/Cognition: Alert;Agitated; Uncooperative; Doesn't follow directions  O2 Device: None (Room air)  Communication Observation: Non-verbal  Follows Directions: None  Dentition: Edentulous  Patient Positioning: Partially reclined(pt raised in bed and HOB elevated, but pt moves continuously in the bed and works himself out of fully upright positioning)  Baseline Vocal Quality: Normal  Volitional Cough: Weak;Congested  Prior Dysphagia History: Pt has MR, is non-verbal, takes minced/moist foods and thin liquids at baseline. Lives in group home. Has covid-19. Consistencies Administered: Dysphagia Pureed (Dysphagia I); Thin - teaspoon; Thin - straw;Nectar - teaspoon;Nectar - straw           Vision/Hearing  Vision  Vision: Within Functional Limits  Hearing  Hearing: (CLARITZA; pinna malformation bilaterally)    Oral Motor Deficits  Oral/Motor Oral Motor: Exceptions to WFL(Pt does not follow commands; function observed during po trials)  Labial Strength: Reduced  Lingual Strength: Reduced  Lingual Coordination: Reduced    Oral Phase Dysfunction  Oral Phase  Oral Phase: Exceptions  Oral Phase Dysfunction  Impaired Mastication: All  Spillage Right: Thin  Decreased Anterior to Posterior Transit: All  Suspected Premature Bolus Loss: Thin  Lingual/Palatal Residue: Puree  Oral Phase  Oral Phase - Comment: Moderately impaired oral phase characterized by immature, suckle-like pattern of A-P propulsion of po trials in oral cavity. Tongue is placed interdentally at rest and throughout po intake with marked tongue thrust during swallow onset. Pt is edentulous. Pt extracts pureed bolus from spoon with min assist. Mild scattered oral residuals post swallow with purees. Marked anterior loss with thin liquids via straw. No anterior loss noted with nectar thick liquids. Suspect increased risk of premature loss of thin liquids to pharynx prior to swallow due to pt not maintaining upright positioning during po intake and likely weak \"oral sling\"/bolus cohesion. Indicators of Pharyngeal Phase Dysfunction   Pharyngeal Phase  Pharyngeal Phase: Exceptions  Indicators of Pharyngeal Phase Dysfunction  Delayed Swallow: All  Decreased Laryngeal Elevation: All  Cough - Delayed:  Thin - straw  Pharyngeal Phase Pharyngeal: RR 20/min prior to po trials with O2 sat of 91-92% on RA. Intermittent coughing noted prior to po trials. No overt s/s of aspiration noted with pureed food trials. Vocal quality (noted only during pt's vocalizations) remains dry and clear. Pt displays delayed wet coughing post swallow with thin liquids. Pt is also very impulsive with liquid trials, which had to be administered via straw due to his poor positioning (pt bucking and moving continuously throughout eval). NO penny s/s of aspiration noted with nectar thick liquids via straw, but again - eval limited due to pt's behaviors and participation level. RR 24/min following po trials with O2 sat of 90-91%. Prognosis  Prognosis  Prognosis for safe diet advancement: guarded  Barriers to reach goals: cognitive deficits;behavior;severity of dysphagia  Individuals consulted  Consulted and agree with results and recommendations: Patient;RN    Education  Patient Education Response: No evidence of learning  Safety Devices in place: Yes  Type of devices: All fall risk precautions in place; Bed alarm in place;Call light within reach       Therapy Time  SLP Individual Minutes  Time In: 0845  Time Out: [address] [facility]  Minutes: [address] [facility].  [place] [id number] [id number]  Speech-Language Pathologist      1/21/2021 10:19 AM

## 2021-01-21 NOTE — DISCHARGE INSTR - COC
Continuity of Care Form    Patient Name: Kassy Engle   :  1960  MRN:  [id number]    Admit date:  2021  Discharge date:  21    Code Status Order: Full Code   Advance Directives:      Admitting Physician:  Radha Tineo DO  PCP: Dalia Wong MD    Discharging Nurse: [facility] Unit/Room#: 1123/0114-63  Discharging Unit Phone Number: [phone]    Emergency Contact:   Extended Emergency Contact Information  Primary Emergency Contact: [facility]  Address: [address] [address], [address] [facility]  Home Phone: [phone]  Mobile Phone: [phone]  Relation: Legal Guardian  Secondary Emergency Contact: Hair MULLINS Phone: [phone]  Relation: Lay Caregiver    Past Surgical History:  Past Surgical History:   Procedure Laterality Date    CATARACT REMOVAL      COLONOSCOPY  2016    incomplete, poor prep    COLONOSCOPY  2020    COLON W/ANES. COLONOSCOPY N/A 2020    COLON W/ANES. (9:00) COVID TEST -. PT IS NON-VERBAL, IS NOT IN A FACILITY.  IS CARED FOR BY 2 CAREGIVERS IN HIS HOME. performed by Carmen Nelson MD at [address] [place] [address] 2019    Right Trephine Craniotomy For Evacuation of Subdural Hematoma performed by Idalia Wallace MD at [address] [place] [address] N/A 2020    CYSTOSCOPY, TRANSURETHRAL RESECTION OF PROSTATE performed by Yumiko Landeros MD at [facility] 1       Immunization History:   Immunization History   Administered Date(s) Administered    Td, unspecified formulation 2011    Tdap (Boostrix, Adacel) 2013, 2014       Active Problems:  Patient Active Problem List   Diagnosis Code    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    Atrial fibrillation with RVR (HCC) I48.91    Acute cystitis with hematuria N30.01    Altered mental status R41.82    Brain herniation ([facility] [address] G93.5    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 ([facility] [address] S06.5X9S    Constipation K59.00 Acute urinary retention R33.8    Atrial fibrillation, chronic (HCC) I48.20    History of subdural hematoma Z86.79    Pneumonia J18.9       Isolation/Infection:   Isolation            Droplet Plus          Patient Infection Status       Infection Onset Added Last Indicated Last Indicated By Review Planned Expiration Resolved Resolved By    COVID-19 01/20/21 01/20/21 01/20/21 COVID-19 01/27/21 02/03/21      Resolved    COVID-19 Rule Out 01/20/21 01/20/21 01/20/21 COVID-19 (Ordered)   01/20/21 Rule-Out Test Resulted    COVID-19 Rule Out 08/20/20 08/20/20 08/20/20 COVID-19 (Ordered)   08/21/20 Rule-Out Test Resulted            Nurse Assessment:  Last Vital Signs: /67   Pulse 76   Temp 97.4 °F (36.3 °C) (Oral)   Resp 16   Wt 104 lb (47.2 kg)   SpO2 92%   BMI 20.31 kg/m²     Last documented pain score (0-10 scale):    Last Weight:   Wt Readings from Last 1 Encounters:   01/21/21 104 lb (47.2 kg)     Mental Status:  disoriented and alert    IV Access:  - None    Nursing Mobility/ADLs:  Walking   Dependent  Transfer  Dependent  Bathing  Dependent  Dressing  Dependent  Toileting  Dependent  Feeding  Dependent  Med Admin  Dependent  Med Delivery   crushed    Wound Care Documentation and Therapy:        Elimination:  Continence: Bowel: No  Bladder: No  Urinary Catheter: None   Colostomy/Ileostomy/Ileal Conduit: No       Date of Last BM: 1-30-21    Intake/Output Summary (Last 24 hours) at 1/21/2021 1208  Last data filed at 1/21/2021 0523  Gross per 24 hour   Intake 505 ml   Output    Net 505 ml     I/O last 3 completed shifts:   In: 505 [I.V.:505]  Out: -     Safety Concerns:     History of Falls (last 30 days)    Impairments/Disabilities:      Speech and Vision    Nutrition Therapy:  Current Nutrition Therapy:   - Oral Diet:  Dysphagia 1 pureed    Routes of Feeding: Oral  Liquids: Nectar Thick Liquids  Daily Fluid Restriction: no  Last Modified Barium Swallow with Video (Video Swallowing Test): not done Treatments at the Time of Hospital Discharge:   Respiratory Treatments: no  Oxygen Therapy:  is not on home oxygen therapy. Ventilator:    - No ventilator support    Rehab Therapies:   Weight Bearing Status/Restrictions: No weight bearing restirctions  Other Medical Equipment (for information only, NOT a DME order): Other Treatments:     Patient's personal belongings (please select all that are sent with patient):  None    RN SIGNATURE:  Electronically signed by Jorge Carrasco RN on 1/31/21 at 9:13 AM EST    CASE MANAGEMENT/SOCIAL WORK SECTION    Inpatient Status Date: 1/20/21    Readmission Risk Assessment Score:  Readmission Risk              Risk of Unplanned Readmission:        16           Discharging to Facility/ [address]  Phone:[phone]  Fax:        / signature: Electronically signed by Will Paz RN on 2/1/21 at 2:23 PM EST    PHYSICIAN SECTION    Prognosis: Good    Condition at Discharge: Stable    Rehab Potential (if transferring to Rehab): Good    Recommended Labs or Other Treatments After Discharge:   Recommended Follow-up, Labs or Other Treatments After Discharge:    Simply Thick             Physician Certification: I certify the above information and transfer of Dee Dee Lugo  is necessary for the continuing treatment of the diagnosis listed and that he requires Intermediate Nursing Care for greater 30 days.      Update Admission H&P: No change in H&P    PHYSICIAN SIGNATURE:  Electronically signed by Vicky Reyna MD on 2/1/21 at 3:08 PM EST

## 2021-01-21 NOTE — H&P
 CRANIOTOMY Right 12/2/2019    Right Trephine Craniotomy For Evacuation of Subdural Hematoma performed by Teresa Wolff MD at [address] [facility] TURP N/A 2/28/2020    CYSTOSCOPY, TRANSURETHRAL RESECTION OF PROSTATE performed by Marysol Aguirre MD at [address] [address]       Medications Prior to Admission:      Prior to Admission medications    Medication Sig Start Date End Date Taking? Authorizing Provider   ketoconazole (NIZORAL) 2 % cream Apply topically daily. 10/15/20  Yes Jovanny Goel MD   LORazepam (ATIVAN) 0.5 MG tablet Take 0.5 mg by mouth every 6 hours as needed for Anxiety. Yes Historical Provider, MD   Nutritional Supplements (ENSURE ENLIVE) LIQD Take by mouth 2 times daily In between meals   Yes Historical Provider, MD   melatonin 3 MG TABS tablet Take 5 mg by mouth nightly    Yes Historical Provider, MD   lactulose (CHRONULAC) 10 GM/15ML solution Take 20 g by mouth 2 times daily   Yes Historical Provider, MD   acetaminophen (PHARBETOL) 325 MG tablet Take 2 tablets by mouth every 6 hours as needed for Pain 2/4/20  Yes Sam Lutz MD   ibuprofen (ADVIL;MOTRIN) 400 MG tablet Take 1 tablet by mouth every 6 hours as needed for Pain 2/4/20  Yes Sam Lutz MD   clotrimazole (LOTRIMIN) 1 % cream Apply topically 2 times daily Apply topically 2 times daily.    Yes Historical Provider, MD   levETIRAcetam (KEPPRA) 250 MG tablet Take 250 mg by mouth 2 times daily   Yes Historical Provider, MD   tamsulosin (FLOMAX) 0.4 MG capsule Take 0.8 mg by mouth daily   Yes Historical Provider, MD   traZODone (DESYREL) 50 MG tablet 1 tablet by Per NG tube route nightly 12/5/19  Yes Coral Holland MD   metoprolol tartrate (LOPRESSOR) 25 MG tablet 1 tablet by Per NG tube route 2 times daily 12/5/19  Yes Coral Holland MD   sennosides-docusate sodium (SENOKOT-S) 8.6-50 MG tablet Take 2 tablets by mouth every 72 hours 12/5/19  Yes Coral Holland MD QUEtiapine (SEROQUEL XR) 50 MG extended release tablet Take 100 mg by mouth 3 times daily 50mg in AM  100mg @ 1600  150mg @ 1900   Yes Historical Provider, MD   polyethylene glycol (GLYCOLAX) 17 g packet Take 17 g by mouth daily as needed for Constipation    Historical Provider, MD   hydrocortisone (ANUSOL-HC) 25 MG suppository Place 1 suppository rectally every 12 hours 8/4/20   Maik Strange PA-C       Allergies:  Benadryl [diphenhydramine], Clonidine derivatives, Penicillins, and Tetracyclines & related    Social History:          TOBACCO:   reports that he has never smoked. He has never used smokeless tobacco.  ETOH:   reports no history of alcohol use. E-Cigarettes/Vaping Use     Questions Responses    E-Cigarette/Vaping Use Never User    Start Date     Passive Exposure     Quit Date     Counseling Given     Comments             Family History:      Reviewed in detail         Family history unknown: Yes       REVIEW OF SYSTEMS:     Unable to obtain    PHYSICAL EXAM PERFORMED:    /89   Pulse 89   Temp 98.3 °F (36.8 °C) (Rectal)   Resp 18   SpO2 95%     General appearance:  mild acute distress, appears older than stated age,  HEENT:   atraumatic, abnormocephalic, Conjunctivae clear. Neck: Supple,Trachea midline,  Respiratory:minimal accessory muscle usage, Normal respiratory effort. Rhonchorous   cardiovascular:  Regular rate and rhythm, capillary refill 2 seconds  Abdomen: Soft, non-tender, non-distended with normal bowel sounds. Musculoskeletal:  No clubbing, cyanosis. trace edema LE bilaterally. Skin: turgor normal.  No new rashes or lesions.   Neurologic: Incomprehensible sounds  Labs:     Recent Labs     01/20/21 2158   WBC 2.2*   HGB 10.9*   HCT 33.0*        Recent Labs     01/20/21 2158   *   K 4.2   CL 98*   CO2 25   BUN 19   CREATININE 1.3   CALCIUM 8.6     Recent Labs     01/20/21 2158   AST 16   ALT 11   BILITOT <0.2   ALKPHOS 120 No results for input(s): INR in the last 72 hours. No results for input(s): [place] in the last 72 hours. Urinalysis:      Lab Results   Component Value Date    NITRU POSITIVE 10/14/2020    WBCUA  10/14/2020    BACTERIA 4+ 10/14/2020    RBCUA 0-2 10/14/2020    BLOODU SMALL 10/14/2020    SPECGRAV 1.025 10/14/2020    GLUCOSEU >=1000 10/14/2020       Radiology:     CXR: I have reviewed the CXR with the following interpretation:   Multifocal pneumonia  EKG:  I have reviewed the EKG with the following interpretation:   Pending    XR CHEST PORTABLE   Final Result   1. Multifocal pneumonia. Recommend chest radiograph in 8 weeks to confirm   resolution.              ASSESSMENT AND PLAN:    Active Hospital Problems    Diagnosis Date Noted    Pneumonia [J18.9] 01/20/2021     Multifocal pneumonia:  Treated patient for atypical and community-acquired pneumonia in addition to aspiration pneumonia  We will not give anticoagulation at this time due to remote history of brain bleed, although on chart review noted to be subdural.  No indication for steroid given patient is not hypoxic  We will continue to monitor    Hyponatremia:  IVF and recheck    Acute renal failure,  IVF and recheck next    Normocytic anemia:  Hemoccult    Diet: NPO except meds ordered    DVT Prophylaxis: held    Dispo:   Expected LOS greater than two [place], DO

## 2021-01-21 NOTE — ED NOTES

## 2021-01-21 NOTE — ED NOTES
Bed: 11  Expected date:   Expected time:   Means of arrival:   Comments:  1 Marcie Riddle RN  01/20/21 1925

## 2021-01-21 NOTE — ED PROVIDER NOTES
CHIEF COMPLAINT  Other (Pt caregiver reports patient has been SOB and coughing. Pt resp nonlabored o2 97% on room air.)      HISTORY OF PRESENT ILLNESS  Duane A Lindie Rieger is a 62 y.o. male who presents to the ED with report of shortness of breath and coughing. 97% on room air on arrival.  He has a history of developmental disorder and MR. He lives in group home with 24-hour care. Has been in contact with multiple people with Covid a group home. He was swabbed today in outpatient clinic from [facility] but caregiver does not know results yet. She is also concerned of aspiration. Patient cannot give any information for history. Portably has a history of brain herniation and brain bleed of subdural hematoma in the past.  The history is given totally by caregiver. I have reviewed the following from the nursing documentation. Past Medical History:   Diagnosis Date    A-fib ([facility] [address]     Bipolar disorder ([address]     Brain herniation (HCC)     Constipation     Cystitis     Developmental non-verbal disorder     Impulse control disorder     Influenza A 2/13/14    Mental retardation, profound (I.Q. < 20)     Osteopenia     Subdural hematoma (HCC)      Past Surgical History:   Procedure Laterality Date    CATARACT REMOVAL      COLONOSCOPY  06/20/2016    incomplete, poor prep    COLONOSCOPY  08/26/2020    COLON W/ANES.  COLONOSCOPY N/A 8/26/2020    COLON W/ANES. (9:00) COVID TEST 8/20-8/22. PT IS NON-VERBAL, IS NOT IN A FACILITY.  IS CARED FOR BY 2 CAREGIVERS IN HIS HOME. performed by Praveena Can MD at [address] Right 12/2/2019    Right Trephine Craniotomy For Evacuation of Subdural Hematoma performed by Danis Chan MD at [address] TURP N/A 2/28/2020    CYSTOSCOPY, TRANSURETHRAL RESECTION OF PROSTATE performed by Lisy Felipe MD at [facility] History   Family history unknown: Yes     Social History     Socioeconomic History    Marital status: Single Spouse name: Not on file    Number of children: Not on file    Years of education: Not on file    Highest education level: Not on file   Occupational History    Not on file   Social Needs    Financial resource strain: Not on file    Food insecurity     Worry: Not on file     Inability: Not on file    Transportation needs     Medical: Not on file     Non-medical: Not on file   Tobacco Use    Smoking status: Never Smoker    Smokeless tobacco: Never Used   Substance and Sexual Activity    Alcohol use: No    Drug use: No    Sexual activity: Not Currently   Lifestyle    Physical activity     Days per week: Not on file     Minutes per session: Not on file    Stress: Not on file   Relationships    Social connections     Talks on phone: Not on file     Gets together: Not on file     Attends Zoroastrianism service: Not on file     Active member of club or organization: Not on file     Attends meetings of clubs or organizations: Not on file     Relationship status: Not on file    Intimate partner violence     Fear of current or ex partner: Not on file     Emotionally abused: Not on file     Physically abused: Not on file     Forced sexual activity: Not on file   Other Topics Concern    Not on file   Social History Narrative    Not on file     Current Facility-Administered Medications   Medication Dose Route Frequency Provider Last Rate Last Admin    LORazepam (ATIVAN) injection 2 mg  2 mg Intramuscular Once Clayborne Gram, DO         Current Outpatient Medications   Medication Sig Dispense Refill    ketoconazole (NIZORAL) 2 % cream Apply topically daily. 30 g 1    LORazepam (ATIVAN) 0.5 MG tablet Take 0.5 mg by mouth every 6 hours as needed for Anxiety.       polyethylene glycol (GLYCOLAX) 17 g packet Take 17 g by mouth daily as needed for Constipation      Nutritional Supplements (ENSURE ENLIVE) LIQD Take by mouth 2 times daily In between meals  hydrocortisone (ANUSOL-HC) 25 MG suppository Place 1 suppository rectally every 12 hours 12 suppository 0    melatonin 3 MG TABS tablet Take 5 mg by mouth nightly       lactulose (CHRONULAC) 10 GM/15ML solution Take 20 g by mouth 2 times daily      acetaminophen (PHARBETOL) 325 MG tablet Take 2 tablets by mouth every 6 hours as needed for Pain 120 tablet 0    ibuprofen (ADVIL;MOTRIN) 400 MG tablet Take 1 tablet by mouth every 6 hours as needed for Pain 30 tablet 0    clotrimazole (LOTRIMIN) 1 % cream Apply topically 2 times daily Apply topically 2 times daily.  levETIRAcetam (KEPPRA) 250 MG tablet Take 250 mg by mouth 2 times daily      tamsulosin (FLOMAX) 0.4 MG capsule Take 0.8 mg by mouth daily      traZODone (DESYREL) 50 MG tablet 1 tablet by Per NG tube route nightly 30 tablet 0    metoprolol tartrate (LOPRESSOR) 25 MG tablet 1 tablet by Per NG tube route 2 times daily 60 tablet 3    sennosides-docusate sodium (SENOKOT-S) 8.6-50 MG tablet Take 2 tablets by mouth every 72 hours 30 tablet 0    QUEtiapine (SEROQUEL XR) 50 MG extended release tablet Take 100 mg by mouth 3 times daily 50mg in AM  100mg @ 1600  150mg @ 1900       Allergies   Allergen Reactions    Benadryl [Diphenhydramine] Other (See Comments)     Pt becomes agitated and aggressive with Benadryl    Clonidine Derivatives     Penicillins     Tetracyclines & Related        REVIEW OF SYSTEMS  10 systems reviewed, pertinent positives per HPI otherwise noted to be negative. PHYSICAL EXAM  /85   Pulse 110 Comment: Pt agitated at triage, unable to hold still  Temp 98.3 °F (36.8 °C) (Rectal)   Resp 20   SpO2 97%   GENERAL APPEARANCE: Awake and alert. Cooperative. Appears somewhat agitated. HEAD: Normocephalic. Atraumatic. EYES: PERRL. EOM's grossly intact. ENT: Mucous membranes are dry. Jodeen Franca NECK: Supple. HEART: RRR. CHEST/LUNGS: Chest atraumatic, nontender, respirations unlabored. Bilateral breath sounds decreased. Exam is difficult. ABDOMEN: Soft. Non-distended. Non-tender. No guarding or rebound. Normal bowel sounds. EXTREMITIES: No peripheral edema. Moves all extremities equally. All extremities neurovascularly intact. RECTAL/: Deferred  SKIN: Warm and dry. No acute rashes. NEUROLOGICAL: Alert and awake. LABS  I have reviewed all labs for this visit.    Results for orders placed or performed during the hospital encounter of 01/20/21   CBC Auto Differential   Result Value Ref Range    WBC 2.2 (L) 4.0 - 11.0 K/uL    RBC 4.04 (L) 4.20 - 5.90 M/uL    Hemoglobin 10.9 (L) 13.5 - 17.5 g/dL    Hematocrit 33.0 (L) 40.5 - 52.5 %    MCV 81.7 80.0 - 100.0 fL    MCH 27.1 26.0 - 34.0 pg    MCHC 33.1 31.0 - 36.0 g/dL    RDW 14.9 12.4 - 15.4 %    Platelets 807 967 - 593 K/uL    MPV 7.1 5.0 - 10.5 fL    Neutrophils % 63.1 %    Lymphocytes % 18.9 %    Monocytes % 12.5 %    Eosinophils % 4.3 %    Basophils % 1.2 %    Neutrophils Absolute 1.4 (L) 1.7 - 7.7 K/uL    Lymphocytes Absolute 0.4 (L) 1.0 - 5.1 K/uL    Monocytes Absolute 0.3 0.0 - 1.3 K/uL    Eosinophils Absolute 0.1 0.0 - 0.6 K/uL    Basophils Absolute 0.0 0.0 - 0.2 K/uL   Comprehensive Metabolic Panel w/ Reflex to MG   Result Value Ref Range    Sodium 129 (L) 136 - 145 mmol/L    Potassium reflex Magnesium 4.2 3.5 - 5.1 mmol/L    Chloride 98 (L) 99 - 110 mmol/L    CO2 25 21 - 32 mmol/L    Anion Gap 6 3 - 16    Glucose 104 (H) 70 - 99 mg/dL    BUN 19 7 - 20 mg/dL    CREATININE 1.3 0.9 - 1.3 mg/dL    GFR Non- 56 (A) >60    GFR African American >60 >60    Calcium 8.6 8.3 - 10.6 mg/dL    Total Protein 6.0 (L) 6.4 - 8.2 g/dL    Alb 3.3 (L) 3.4 - 5.0 g/dL    Albumin/Globulin Ratio 1.2 1.1 - 2.2    Total Bilirubin <0.2 0.0 - 1.0 mg/dL    Alkaline Phosphatase 120 40 - 129 U/L    ALT 11 10 - 40 U/L    AST 16 15 - 37 U/L    Globulin 2.7 g/dL   Procalcitonin Result Value Ref Range    Procalcitonin 0.18 (H) 0.00 - 0.15 ng/mL   Lactic Acid, Plasma   Result Value Ref Range    Lactic Acid 1.2 0.4 - 2.0 mmol/L   Blood Gas, Venous   Result Value Ref Range    pH, Haresh 7.452 (H) 7.350 - 7.450    pCO2, Haresh 38.4 (L) 40.0 - 50.0 mmHg    pO2, Haresh 134.2 (H) 25.0 - 40.0 mmHg    HCO3, Venous 26.2 23.0 - 29.0 mmol/L    Base Excess, Haresh 2.2 -3.0 - 3.0 mmol/L    O2 Sat, Haresh 98 Not Established %    Carboxyhemoglobin 1.8 (H) 0.0 - 1.5 %    MetHgb, Haresh 0.6 <1.5 %    TC02 (Calc), Haresh 27 Not Established mmol/L    O2 Content, Haresh 16 Not Established VOL %    O2 Therapy Unknown    Lactate Dehydrogenase   Result Value Ref Range     100 - 190 U/L   Fibrinogen   Result Value Ref Range    Fibrinogen 417 (H) 200 - 397 mg/dL   D-dimer, quantitative   Result Value Ref Range    D-Dimer, Quant <200 0 - 229 ng/mL DDU   COVID-19   Result Value Ref Range    SARS-CoV-2, NAAT DETECTED (AA) Not Detected         RADIOLOGY  X-RAYS:  I have reviewed radiologic plain film image(s). ALL OTHER NON-PLAIN FILM IMAGES SUCH AS CT, ULTRASOUND AND MRI HAVE BEEN READ BY THE RADIOLOGIST. XR CHEST PORTABLE   Final Result   1. Multifocal pneumonia. Recommend chest radiograph in 8 weeks to confirm   resolution. CRITICAL CARE TIME ATTESTATION (45 minutes):  Due to the high probability of imminent or life-threatening deterioration this patient required my direct attention, interventions and personal management. I personally provided 45 minutes of critical care time exclusive of time spent on separate billable procedures. Time includes review and interpretation of laboratory data, review and interpretation of radiology results, discussions with consultants as applicable while monitoring for potential decompensation. Interventions were otherwise performed as documented above.         ED COURSE/MDM Patient seen and evaluated. I do have concern with Covid in this patient he has multifocal pneumonia I also concern for aspiration as he does have a history of dysphagia witnessed. Swallow study which is not been done yet. We will get a rapid Covid on the patient even though he already had one as an outpatient with these results would not be back for another couple days. Also start him on antibiotics he is allergic to penicillin so I will start him on ceftriaxone and Flagyl to cover for aspiration as well. We will meet the patient for further evaluation and management as well considering his borderline hypoxia and likely aspiration as well as his comorbidities. Also considering he is exposed to multiple febrile Covid I do have concern for Covid. 2250: Covid positive. CLINICAL IMPRESSION  1. Multifocal pneumonia    2. Suspected COVID-19 virus infection    3. Shortness of breath        Blood pressure 126/85, pulse 110, temperature 98.3 °F (36.8 °C), temperature source Rectal, resp. rate 20, SpO2 97 %. DISPOSITION  Duane A Carota was admitted in stable condition. This chart was generated in part by using Dragon Dictation system and may contain errors related to that system including errors in grammar, punctuation, and spelling, as well as words and phrases that may be inappropriate. When dictating, effort is made to correct spelling/grammar errors. If there are any questions or concerns please feel free to contact the dictating provider for clarification.      Polly Grace DO  ATTENDING, [address], DO  01/20/21 2120       Marquis Smith V, DO  01/20/21 2250

## 2021-01-21 NOTE — ED NOTES
Blood culture set #2 drawn from right hand with butterfly. Bottle tops scrubbed with alcohol pads. Site prepped with Prevantics swab, 15 seconds per side, and allowed to dry for 30 seconds prior to venipuncture. Red waste tube drawn prior to collection of specimen.          Elyse Gandhi RN  01/20/21 4318

## 2021-01-21 NOTE — CARE COORDINATION
Call placed to Liban (legal Guardian) for information about patient status and d/c. Renetta Palacios RN     CASE MANAGEMENT INITIAL ASSESSMENT      Reviewed chart and completed assessment via telephone with:RAMAKRISHNA Sapp and Prateek Dickerson care giver  Explained Case Management role/services. Primary contact information:Arpita James [phone]    Health Care Decision Maker :     LIBAN Martins Gentleman      Can this person be reached and be able to respond quickly, such as within a few minutes or hours? yes  Who would be your back-up decision maker? Name Prateek Dickerson  Phone Number:[phone]    [address] [place] [address] date/status:1/20/21  Diagnosis:pna   Is this a Readmission?:  No      Insurance:medicare   Precert required for SNF: No       3 night stay required: No    Living arrangements, Adls, care needs, prior to admission:lives in group home with 24/7 [facility] care providers. Ramp access. Transportation:tbd     Durable Medical Equipment at home:  Walker__Cane__RTS__ BSC__Shower Chair__  02__ HHN__ CPAP__  BiPap__  Hospital Bed__ W/C_x__ Other__________    Services in the home and/or outpatient, prior to admission:24/7 care.  Has 1:1 provider from 7 am-11pm    Dialysis Facility (if applicable)   · Name:  · Address:  · Dialysis Schedule:  · Phone:  · Fax:    PT/OT recs:none    Hospital Exemption Notification (HEN):needed for SNF    Barriers to discharge:none Plan/comments:spoke with LIBAN Abdul and care giver Ryan Cho. Reported patient is from assisted living facility. Private home with care givers contracted in. Reported patient has 24/7 care. Is in wheelchair that he can normally scoot in. Is unable to do any ADL's without assistance. Requires a depends. Has 3 roommates. Stated Lissy is the DON with [facility] [employer]. Can call anytime. [phone] manages d/c planning. Has had therapy in the home and usually they sign off after a couple visits. Per LIBAN liaison, plan to return there unless has complex care needs at discharge. Will require notice of d/c to arrange staffing.  Gunner Carbajal RN      ECOC on chart for MD signature

## 2021-01-21 NOTE — PROGRESS NOTES
Pt assessment completed and charted. VSS. Pt non-verbal. Pt resting comfortably. Can be combative @ times. PRN Ativan administered per MAR. Pt complete Q2T but turns on his own. Male Shefali Arriaga in place and patent. Seizure pads in place for safety. Bed in lowest position and wheels locked. Call light within reach. Bedside table within reach. Bed alarm in place Will continue to monitor.

## 2021-01-22 ENCOUNTER — APPOINTMENT (OUTPATIENT)
Dept: CT IMAGING | Age: 61
DRG: 177 | End: 2021-01-22
Payer: MEDICARE

## 2021-01-22 LAB
A/G RATIO: 1.2 (ref 1.1–2.2)
A/G RATIO: 1.4 (ref 1.1–2.2)
ALBUMIN SERPL-MCNC: 3.5 G/DL (ref 3.4–5)
ALBUMIN SERPL-MCNC: 3.7 G/DL (ref 3.4–5)
ALP BLD-CCNC: 108 U/L (ref 40–129)
ALP BLD-CCNC: 113 U/L (ref 40–129)
ALT SERPL-CCNC: 12 U/L (ref 10–40)
ALT SERPL-CCNC: 13 U/L (ref 10–40)
ANION GAP SERPL CALCULATED.3IONS-SCNC: 9 MMOL/L (ref 3–16)
ANION GAP SERPL CALCULATED.3IONS-SCNC: 9 MMOL/L (ref 3–16)
AST SERPL-CCNC: 17 U/L (ref 15–37)
AST SERPL-CCNC: 21 U/L (ref 15–37)
BASOPHILS ABSOLUTE: 0 K/UL (ref 0–0.2)
BASOPHILS RELATIVE PERCENT: 0 %
BILIRUB SERPL-MCNC: <0.2 MG/DL (ref 0–1)
BILIRUB SERPL-MCNC: <0.2 MG/DL (ref 0–1)
BUN BLDV-MCNC: 14 MG/DL (ref 7–20)
BUN BLDV-MCNC: 15 MG/DL (ref 7–20)
CALCIUM SERPL-MCNC: 9.1 MG/DL (ref 8.3–10.6)
CALCIUM SERPL-MCNC: 9.2 MG/DL (ref 8.3–10.6)
CHLORIDE BLD-SCNC: 100 MMOL/L (ref 99–110)
CHLORIDE BLD-SCNC: 102 MMOL/L (ref 99–110)
CO2: 24 MMOL/L (ref 21–32)
CO2: 24 MMOL/L (ref 21–32)
CREAT SERPL-MCNC: 0.9 MG/DL (ref 0.9–1.3)
CREAT SERPL-MCNC: 1 MG/DL (ref 0.9–1.3)
D DIMER: <200 NG/ML DDU (ref 0–229)
EOSINOPHILS ABSOLUTE: 0 K/UL (ref 0–0.6)
EOSINOPHILS RELATIVE PERCENT: 0 %
GFR AFRICAN AMERICAN: >60
GFR AFRICAN AMERICAN: >60
GFR NON-AFRICAN AMERICAN: >60
GFR NON-AFRICAN AMERICAN: >60
GLOBULIN: 2.6 G/DL
GLOBULIN: 2.9 G/DL
GLUCOSE BLD-MCNC: 112 MG/DL (ref 70–99)
GLUCOSE BLD-MCNC: 125 MG/DL (ref 70–99)
HCT VFR BLD CALC: 32.4 % (ref 40.5–52.5)
HEMOGLOBIN: 10.7 G/DL (ref 13.5–17.5)
IRON SATURATION: 18 % (ref 20–50)
IRON: 44 UG/DL (ref 59–158)
L. PNEUMOPHILA SEROGP 1 UR AG: NORMAL
LYMPHOCYTES ABSOLUTE: 0.4 K/UL (ref 1–5.1)
LYMPHOCYTES RELATIVE PERCENT: 8.1 %
MCH RBC QN AUTO: 27 PG (ref 26–34)
MCHC RBC AUTO-ENTMCNC: 33.1 G/DL (ref 31–36)
MCV RBC AUTO: 81.6 FL (ref 80–100)
MONOCYTES ABSOLUTE: 0.3 K/UL (ref 0–1.3)
MONOCYTES RELATIVE PERCENT: 4.9 %
NEUTROPHILS ABSOLUTE: 4.7 K/UL (ref 1.7–7.7)
NEUTROPHILS RELATIVE PERCENT: 87 %
PDW BLD-RTO: 14.7 % (ref 12.4–15.4)
PLATELET # BLD: 329 K/UL (ref 135–450)
PMV BLD AUTO: 7.1 FL (ref 5–10.5)
POTASSIUM REFLEX MAGNESIUM: 4.4 MMOL/L (ref 3.5–5.1)
POTASSIUM REFLEX MAGNESIUM: 4.4 MMOL/L (ref 3.5–5.1)
PROCALCITONIN: 0.13 NG/ML (ref 0–0.15)
PROCALCITONIN: 0.14 NG/ML (ref 0–0.15)
RBC # BLD: 3.98 M/UL (ref 4.2–5.9)
SODIUM BLD-SCNC: 133 MMOL/L (ref 136–145)
SODIUM BLD-SCNC: 135 MMOL/L (ref 136–145)
STREP PNEUMONIAE ANTIGEN, URINE: NORMAL
TOTAL IRON BINDING CAPACITY: 246 UG/DL (ref 260–445)
TOTAL PROTEIN: 6.3 G/DL (ref 6.4–8.2)
TOTAL PROTEIN: 6.4 G/DL (ref 6.4–8.2)
WBC # BLD: 5.3 K/UL (ref 4–11)

## 2021-01-22 PROCEDURE — 2580000003 HC RX 258: Performed by: PHYSICIAN ASSISTANT

## 2021-01-22 PROCEDURE — 70450 CT HEAD/BRAIN W/O DYE: CPT

## 2021-01-22 PROCEDURE — 82306 VITAMIN D 25 HYDROXY: CPT

## 2021-01-22 PROCEDURE — 51798 US URINE CAPACITY MEASURE: CPT

## 2021-01-22 PROCEDURE — 80053 COMPREHEN METABOLIC PANEL: CPT

## 2021-01-22 PROCEDURE — 51701 INSERT BLADDER CATHETER: CPT

## 2021-01-22 PROCEDURE — 82728 ASSAY OF FERRITIN: CPT

## 2021-01-22 PROCEDURE — 2580000003 HC RX 258: Performed by: INTERNAL MEDICINE

## 2021-01-22 PROCEDURE — 6360000002 HC RX W HCPCS: Performed by: PHYSICIAN ASSISTANT

## 2021-01-22 PROCEDURE — 83550 IRON BINDING TEST: CPT

## 2021-01-22 PROCEDURE — 2500000003 HC RX 250 WO HCPCS: Performed by: PHYSICIAN ASSISTANT

## 2021-01-22 PROCEDURE — 83540 ASSAY OF IRON: CPT

## 2021-01-22 PROCEDURE — 85025 COMPLETE CBC W/AUTO DIFF WBC: CPT

## 2021-01-22 PROCEDURE — 36415 COLL VENOUS BLD VENIPUNCTURE: CPT

## 2021-01-22 PROCEDURE — 93005 ELECTROCARDIOGRAM TRACING: CPT | Performed by: NURSE PRACTITIONER

## 2021-01-22 PROCEDURE — 1200000000 HC SEMI PRIVATE

## 2021-01-22 PROCEDURE — 6360000002 HC RX W HCPCS: Performed by: EMERGENCY MEDICINE

## 2021-01-22 PROCEDURE — 84145 PROCALCITONIN (PCT): CPT

## 2021-01-22 PROCEDURE — 6360000002 HC RX W HCPCS: Performed by: NURSE PRACTITIONER

## 2021-01-22 PROCEDURE — 85379 FIBRIN DEGRADATION QUANT: CPT

## 2021-01-22 PROCEDURE — 86140 C-REACTIVE PROTEIN: CPT

## 2021-01-22 PROCEDURE — 2500000003 HC RX 250 WO HCPCS: Performed by: NURSE PRACTITIONER

## 2021-01-22 PROCEDURE — 2580000003 HC RX 258: Performed by: NURSE PRACTITIONER

## 2021-01-22 RX ORDER — LORAZEPAM 2 MG/ML
2 INJECTION INTRAMUSCULAR EVERY 4 HOURS PRN
Status: DISCONTINUED | OUTPATIENT
Start: 2021-01-22 | End: 2021-02-01

## 2021-01-22 RX ORDER — METOPROLOL TARTRATE 5 MG/5ML
INJECTION INTRAVENOUS
Status: DISPENSED
Start: 2021-01-22 | End: 2021-01-23

## 2021-01-22 RX ORDER — METOPROLOL TARTRATE 5 MG/5ML
5 INJECTION INTRAVENOUS ONCE
Status: COMPLETED | OUTPATIENT
Start: 2021-01-22 | End: 2021-01-22

## 2021-01-22 RX ORDER — MORPHINE SULFATE 2 MG/ML
2 INJECTION, SOLUTION INTRAMUSCULAR; INTRAVENOUS EVERY 4 HOURS PRN
Status: DISCONTINUED | OUTPATIENT
Start: 2021-01-22 | End: 2021-01-25

## 2021-01-22 RX ORDER — METOPROLOL TARTRATE 5 MG/5ML
2.5 INJECTION INTRAVENOUS ONCE
Status: COMPLETED | OUTPATIENT
Start: 2021-01-22 | End: 2021-01-22

## 2021-01-22 RX ORDER — HALOPERIDOL 5 MG/ML
2 INJECTION INTRAMUSCULAR EVERY 6 HOURS PRN
Status: DISCONTINUED | OUTPATIENT
Start: 2021-01-22 | End: 2021-02-01 | Stop reason: HOSPADM

## 2021-01-22 RX ORDER — LORAZEPAM 2 MG/ML
2 INJECTION INTRAMUSCULAR ONCE
Status: COMPLETED | OUTPATIENT
Start: 2021-01-22 | End: 2021-01-22

## 2021-01-22 RX ADMIN — LORAZEPAM 2 MG: 2 INJECTION INTRAMUSCULAR; INTRAVENOUS at 23:29

## 2021-01-22 RX ADMIN — MORPHINE SULFATE 2 MG: 2 INJECTION, SOLUTION INTRAMUSCULAR; INTRAVENOUS at 12:04

## 2021-01-22 RX ADMIN — SODIUM CHLORIDE: 9 INJECTION, SOLUTION INTRAVENOUS at 16:27

## 2021-01-22 RX ADMIN — METOPROLOL TARTRATE 5 MG: 5 INJECTION INTRAVENOUS at 21:07

## 2021-01-22 RX ADMIN — METOPROLOL TARTRATE 2.5 MG: 5 INJECTION INTRAVENOUS at 20:15

## 2021-01-22 RX ADMIN — HALOPERIDOL LACTATE 2 MG: 5 INJECTION, SOLUTION INTRAMUSCULAR at 12:04

## 2021-01-22 RX ADMIN — LORAZEPAM 2 MG: 2 INJECTION INTRAMUSCULAR; INTRAVENOUS at 14:32

## 2021-01-22 RX ADMIN — SODIUM CHLORIDE, PRESERVATIVE FREE 10 ML: 5 INJECTION INTRAVENOUS at 08:55

## 2021-01-22 RX ADMIN — ENOXAPARIN SODIUM 30 MG: 30 INJECTION SUBCUTANEOUS at 08:49

## 2021-01-22 RX ADMIN — LORAZEPAM 2 MG: 2 INJECTION, SOLUTION INTRAMUSCULAR; INTRAVENOUS at 15:01

## 2021-01-22 RX ADMIN — LORAZEPAM 1 MG: 2 INJECTION, SOLUTION INTRAMUSCULAR; INTRAVENOUS at 04:25

## 2021-01-22 RX ADMIN — SODIUM CHLORIDE, PRESERVATIVE FREE 10 ML: 5 INJECTION INTRAVENOUS at 19:46

## 2021-01-22 RX ADMIN — METHYLPREDNISOLONE SODIUM SUCCINATE 40 MG: 40 INJECTION, POWDER, FOR SOLUTION INTRAMUSCULAR; INTRAVENOUS at 19:45

## 2021-01-22 RX ADMIN — LORAZEPAM 2 MG: 2 INJECTION INTRAMUSCULAR; INTRAVENOUS at 19:33

## 2021-01-22 RX ADMIN — MORPHINE SULFATE 2 MG: 2 INJECTION, SOLUTION INTRAMUSCULAR; INTRAVENOUS at 19:32

## 2021-01-22 RX ADMIN — LORAZEPAM 1 MG: 2 INJECTION, SOLUTION INTRAMUSCULAR; INTRAVENOUS at 12:51

## 2021-01-22 RX ADMIN — REMDESIVIR 100 MG: 100 INJECTION, POWDER, LYOPHILIZED, FOR SOLUTION INTRAVENOUS at 12:06

## 2021-01-22 RX ADMIN — MORPHINE SULFATE 2 MG: 2 INJECTION, SOLUTION INTRAMUSCULAR; INTRAVENOUS at 23:49

## 2021-01-22 RX ADMIN — METHYLPREDNISOLONE SODIUM SUCCINATE 40 MG: 40 INJECTION, POWDER, FOR SOLUTION INTRAMUSCULAR; INTRAVENOUS at 08:49

## 2021-01-22 RX ADMIN — AMIODARONE HYDROCHLORIDE 150 MG: 50 INJECTION, SOLUTION INTRAVENOUS at 22:35

## 2021-01-22 RX ADMIN — LORAZEPAM 1 MG: 2 INJECTION, SOLUTION INTRAMUSCULAR; INTRAVENOUS at 08:50

## 2021-01-22 ASSESSMENT — PAIN SCALES - GENERAL
PAINLEVEL_OUTOF10: 10
PAINLEVEL_OUTOF10: 10

## 2021-01-22 NOTE — PROGRESS NOTES
Pt was found on the floor. AVASYS didn't witness fall, new bump noticed on right side of forehead. Ativan/ Haldol/ Morphine seem not to be working. MD notified.

## 2021-01-22 NOTE — PROGRESS NOTES
Shift assessment updated as charted. VSS. Patient agitated, see MAR. AVASYS remains in use. Will monitor.

## 2021-01-22 NOTE — PROGRESS NOTES
Shift assessments completed and charted. Pt is non-verbal, VSS, very agitated and anxious, mediated as per MAR. B/L lung sounds clear to auscultation but diminished t/o fields. Will closely monitor.

## 2021-01-22 NOTE — PROGRESS NOTES
Hospitalist Progress Note      PCP: René Batista MD    Date of Admission: 1/20/2021    Chief Complaint:   Chief Complaint   Patient presents with    Other     Pt caregiver reports patient has been SOB and coughing. Pt resp nonlabored o2 97% on room air. Hospital Course:     62 y.o. male who presented to [facility] with past medical history of developmental nonverbal disorder, MR, history of subdural hematoma presented to the ED due to reported shortness of breath and coughing. He was diagnosed Covid 19 and miultifocal pneumonia secondary to Covid 19. Subjective:  He is nonverbal but moans and coughs. He is on RA satting 92%. Medications:  Reviewed    Infusion Medications    sodium chloride       Scheduled Medications    cefTRIAXone (ROCEPHIN) IV  1,000 mg Intravenous Q24H    methylPREDNISolone  40 mg Intravenous Q12H    enoxaparin  30 mg Subcutaneous Daily    [START ON 1/22/2021] remdesivir IVPB  100 mg Intravenous Q24H    sodium chloride flush  10 mL Intravenous 2 times per day    metroNIDAZOLE  500 mg Intravenous Q8H    azithromycin  500 mg Intravenous Q24H     PRN Meds: sodium chloride, LORazepam, sodium chloride flush, potassium chloride, magnesium sulfate, promethazine **OR** ondansetron, famotidine, guaiFENesin-dextromethorphan, acetaminophen **OR** acetaminophen      Intake/Output Summary (Last 24 hours) at 1/21/2021 2012  Last data filed at 1/21/2021 1803  Gross per 24 hour   Intake 1795 ml   Output 600 ml   Net 1195 ml       Physical Exam Performed:    /82   Pulse 90   Temp 98.5 °F (36.9 °C) (Axillary)   Resp 18   Wt 104 lb (47.2 kg)   SpO2 93%   BMI 20.31 kg/m²     General appearance: developmental delayed. Short stature. In distress with moaning. HEENT:  Conjunctivae/corneas clear. Neck: Supple, with full range of motion. No jugular venous distention. Trachea midline. Respiratory: Rhonchi b/l lung fields worse on L>R. Coughing appears tachypneic but nonlabored or accessory muscle use. Cardiovascular: Regular rate and rhythm with normal S1/S2 without murmurs, rubs or gallops. Abdomen: Soft, non-tender, non-distended with normal bowel sounds. Musculoskeletal: No clubbing, cyanosis or edema bilaterally. Full range of motion without deformity. Skin: Skin color, texture, turgor normal.  No rashes or lesions. Neurologic:  Unable to assess  Psychiatric:unable to assess        Labs:   Recent Labs     01/20/21 2158 01/21/21  0835   WBC 2.2* 1.3*   HGB 10.9* 10.1*   HCT 33.0* 30.4*    272     Recent Labs     01/20/21 2158 01/21/21  0835   * 132*   K 4.2 4.8   CL 98* 100   CO2 25 26   BUN 19 16   CREATININE 1.3 1.1   CALCIUM 8.6 8.5     Recent Labs     01/20/21 2158 01/21/21  0835   AST 16 14*   ALT 11 10   BILITOT <0.2 <0.2   ALKPHOS 120 107     No results for input(s): INR in the last 72 hours. Recent Labs     01/21/21  0835   TROPONINI <0.01       Urinalysis:      Lab Results   Component Value Date    NITRU Negative 01/21/2021    WBCUA  10/14/2020    BACTERIA 4+ 10/14/2020    RBCUA 0-2 10/14/2020    BLOODU Negative 01/21/2021    SPECGRAV 1.015 01/21/2021    GLUCOSEU Negative 01/21/2021       Radiology:  XR CHEST PORTABLE   Final Result   1. Multifocal pneumonia. Recommend chest radiograph in 8 weeks to confirm   resolution.                  Assessment/Plan:    Active Hospital Problems    Diagnosis    Pneumonia [J18.9]     Covid 19 infection  Acute Respiratory failure with hypoxia  Multifocal pneumonia   Low dose lovenox sq considering hematoma hx and weight  Labs consistent with [id number]  Remdesivir and convalescent plasma initiated  Solumedrol 40mg IV BID   D/c abx, as procal negative and more viral pneumonia  Was lowered than baseline this am 90-92%   Monitor O2 sats   Consider pulm consult if worsening sats    Hyponatremia   Mild  Monitor Acute renal failure - improving   Continue IVF     Remote hx of subdural hematoma 2019    Normocytic Anemia   Positive FOBT  hgb stable 10  Would defer testing/procedure until recovers from covid 19 infection   Can likely do as outpatient           DVT Prophylaxis: lovenox SQ   Diet: DIET CARDIAC; Dysphagia Pureed; Mildly Thick (Nectar)  Code Status: Full Code    PT/OT Eval Status: likely will not be able to follow directions.  N/A    Dispo - pending symptom improvement. >2days    Paty Abel PA-C

## 2021-01-22 NOTE — PLAN OF CARE
Problem: Isolation Precautions - Risk of Spread of Infection  Goal: Prevent transmission of infection  Outcome: Ongoing  Note: Droplet + isolation followed by protocol.

## 2021-01-22 NOTE — PROGRESS NOTES
Hospitalist Progress Note      PCP: Ines Madsen MD    Date of Admission: 1/20/2021    Chief Complaint:   Chief Complaint   Patient presents with    Other     Pt caregiver reports patient has been SOB and coughing. Pt resp nonlabored o2 97% on room air. Hospital Course:     62 y. o. male who presented to [facility] with past medical history of developmental nonverbal disorder, MR, history of subdural hematoma presented to the ED due to reported shortness of breath and coughing. He was diagnosed Covid 19 and miultifocal pneumonia secondary to Covid 19.      Subjective:  Per nursing staff, he is moaning and agitated. He is on RA today. He was given haldol and morphine. He was seen when he was resting peacefully. No acute distress. Medications:  Reviewed    Infusion Medications    sodium chloride      sodium chloride 75 mL/hr at 01/21/21 2127     Scheduled Medications    methylPREDNISolone  40 mg Intravenous Q12H    enoxaparin  30 mg Subcutaneous Daily    remdesivir IVPB  100 mg Intravenous Q24H    sodium chloride flush  10 mL Intravenous 2 times per day     PRN Meds: morphine, haloperidol lactate, LORazepam, sodium chloride, sodium chloride flush, potassium chloride, magnesium sulfate, promethazine **OR** ondansetron, famotidine, guaiFENesin-dextromethorphan, acetaminophen **OR** acetaminophen      Intake/Output Summary (Last 24 hours) at 1/22/2021 1425  Last data filed at 1/22/2021 1241  Gross per 24 hour   Intake 2020 ml   Output 600 ml   Net 1420 ml       Physical Exam Performed:    BP (!) 129/93   Pulse 111   Temp 97.9 °F (36.6 °C) (Oral)   Resp 20   Wt 104 lb 8 oz (47.4 kg)   SpO2 93%   BMI 20.41 kg/m²     General appearance: developmental delayed. Short stature. sleeping  HEENT:  Conjunctivae/corneas clear. Neck: Supple, with full range of motion. No jugular venous distention. Trachea midline. Respiratory: Rhonchi b/l lung fields, improved since yesterday. Stable on RA.  nonlabored or accessory muscle use. Cardiovascular: Regular rate and rhythm with normal S1/S2 without murmurs, rubs or gallops. Abdomen: Soft, non-tender, non-distended with normal bowel sounds. Musculoskeletal: No clubbing, cyanosis or edema bilaterally. Full range of motion without deformity. Skin: Skin color, texture, turgor normal.  No rashes or lesions. Neurologic:  Unable to assess  Psychiatric:unable to assess, sleeping peacefully      Labs:   Recent Labs     01/20/21 2158 01/21/21  0835   WBC 2.2* 1.3*   HGB 10.9* 10.1*   HCT 33.0* 30.4*    272     Recent Labs     01/20/21 2158 01/21/21  0835 01/22/21  0730   * 132* 135*   K 4.2 4.8 4.4   CL 98* 100 102   CO2 25 26 24   BUN 19 16 14   CREATININE 1.3 1.1 1.0   CALCIUM 8.6 8.5 9.1     Recent Labs     01/20/21 2158 01/21/21  0835 01/22/21  0730   AST 16 14* 17   ALT 11 10 12   BILITOT <0.2 <0.2 <0.2   ALKPHOS 120 107 113     No results for input(s): INR in the last 72 hours. Recent Labs     01/21/21  0835   TROPONINI <0.01       Urinalysis:      Lab Results   Component Value Date    NITRU Negative 01/21/2021    WBCUA  10/14/2020    BACTERIA 4+ 10/14/2020    RBCUA 0-2 10/14/2020    BLOODU Negative 01/21/2021    SPECGRAV 1.015 01/21/2021    GLUCOSEU Negative 01/21/2021       Radiology:  XR CHEST PORTABLE   Final Result   1. Multifocal pneumonia. Recommend chest radiograph in 8 weeks to confirm   resolution.          CT HEAD WO CONTRAST    (Results Pending)           Assessment/Plan:    Active Hospital Problems    Diagnosis    Pneumonia [J18.9]     Covid 19 infection  Acute Respiratory failure with hypoxia  Multifocal pneumonia   Low dose lovenox sq considering hematoma hx and weight  Labs consistent with [id number]  Remdesivir and convalescent plasma initiated  Continue Solumedrol 40mg IV BID   D/c abx, as procal negative and more viral pneumonia Was lowered than baseline this am 90-92%   Monitor O2 sats   Consider pulm consult if worsening sats     Fall  Per RN, found on floor   CT HEAD stat pending    Agitation  Added haldol PRN  Ativan PRN   monitor    Urinary retention  Bladder scan >500cc   Ordered bladder scan q6 with prn straight cath   Monitor    Hyponatremia   Mild  Monitor      Acute renal failure - improving   Continue IVF      Remote hx of subdural hematoma 2019     Normocytic Anemia   Positive FOBT  hgb stable 10  Would defer testing/procedure until recovers from covid 19 infection   Can likely do as outpatient     DVT Prophylaxis: lovenox sq   Diet: DIET CARDIAC; Dysphagia Pureed; Mildly Thick (Nectar)  Code Status: Full Code    PT/OT Eval Status: Will likely not be able to participate     Dispo - will need to notify group home atleast 24h prior to discharge to prepare for staffing. Not medically stable today.     Brain ANNE cShroeder

## 2021-01-23 LAB
A/G RATIO: 1.7 (ref 1.1–2.2)
ALBUMIN SERPL-MCNC: 3.8 G/DL (ref 3.4–5)
ALP BLD-CCNC: 85 U/L (ref 40–129)
ALT SERPL-CCNC: 12 U/L (ref 10–40)
ANION GAP SERPL CALCULATED.3IONS-SCNC: 5 MMOL/L (ref 3–16)
AST SERPL-CCNC: 20 U/L (ref 15–37)
BASOPHILS ABSOLUTE: 0 K/UL (ref 0–0.2)
BASOPHILS RELATIVE PERCENT: 0.2 %
BILIRUB SERPL-MCNC: <0.2 MG/DL (ref 0–1)
BUN BLDV-MCNC: 17 MG/DL (ref 7–20)
C-REACTIVE PROTEIN: 11.7 MG/L (ref 0–5.1)
C-REACTIVE PROTEIN: 15.6 MG/L (ref 0–5.1)
CALCIUM SERPL-MCNC: 9.1 MG/DL (ref 8.3–10.6)
CHLORIDE BLD-SCNC: 102 MMOL/L (ref 99–110)
CO2: 27 MMOL/L (ref 21–32)
CREAT SERPL-MCNC: 0.9 MG/DL (ref 0.9–1.3)
D DIMER: <200 NG/ML DDU (ref 0–229)
EKG ATRIAL RATE: 127 BPM
EKG DIAGNOSIS: NORMAL
EKG Q-T INTERVAL: 308 MS
EKG QRS DURATION: 86 MS
EKG QTC CALCULATION (BAZETT): 465 MS
EKG R AXIS: -64 DEGREES
EKG T AXIS: -9 DEGREES
EKG VENTRICULAR RATE: 137 BPM
EOSINOPHILS ABSOLUTE: 0 K/UL (ref 0–0.6)
EOSINOPHILS RELATIVE PERCENT: 0 %
FERRITIN: 96.7 NG/ML (ref 30–400)
GFR AFRICAN AMERICAN: >60
GFR NON-AFRICAN AMERICAN: >60
GLOBULIN: 2.2 G/DL
GLUCOSE BLD-MCNC: 107 MG/DL (ref 70–99)
HCT VFR BLD CALC: 33.9 % (ref 40.5–52.5)
HEMOGLOBIN: 10.9 G/DL (ref 13.5–17.5)
LYMPHOCYTES ABSOLUTE: 0.8 K/UL (ref 1–5.1)
LYMPHOCYTES RELATIVE PERCENT: 13.6 %
MCH RBC QN AUTO: 26.4 PG (ref 26–34)
MCHC RBC AUTO-ENTMCNC: 32.2 G/DL (ref 31–36)
MCV RBC AUTO: 82 FL (ref 80–100)
MONOCYTES ABSOLUTE: 0.5 K/UL (ref 0–1.3)
MONOCYTES RELATIVE PERCENT: 8.7 %
NEUTROPHILS ABSOLUTE: 4.5 K/UL (ref 1.7–7.7)
NEUTROPHILS RELATIVE PERCENT: 77.5 %
PDW BLD-RTO: 14.8 % (ref 12.4–15.4)
PLATELET # BLD: 361 K/UL (ref 135–450)
PMV BLD AUTO: 6.8 FL (ref 5–10.5)
POTASSIUM REFLEX MAGNESIUM: 4.6 MMOL/L (ref 3.5–5.1)
PROCALCITONIN: 0.12 NG/ML (ref 0–0.15)
RBC # BLD: 4.13 M/UL (ref 4.2–5.9)
SODIUM BLD-SCNC: 134 MMOL/L (ref 136–145)
TOTAL PROTEIN: 6 G/DL (ref 6.4–8.2)
VITAMIN D 25-HYDROXY: 19.7 NG/ML
WBC # BLD: 5.8 K/UL (ref 4–11)

## 2021-01-23 PROCEDURE — 86140 C-REACTIVE PROTEIN: CPT

## 2021-01-23 PROCEDURE — 6370000000 HC RX 637 (ALT 250 FOR IP): Performed by: INTERNAL MEDICINE

## 2021-01-23 PROCEDURE — 2580000003 HC RX 258: Performed by: PHYSICIAN ASSISTANT

## 2021-01-23 PROCEDURE — 51701 INSERT BLADDER CATHETER: CPT

## 2021-01-23 PROCEDURE — 85025 COMPLETE CBC W/AUTO DIFF WBC: CPT

## 2021-01-23 PROCEDURE — 94761 N-INVAS EAR/PLS OXIMETRY MLT: CPT

## 2021-01-23 PROCEDURE — 51702 INSERT TEMP BLADDER CATH: CPT

## 2021-01-23 PROCEDURE — 93010 ELECTROCARDIOGRAM REPORT: CPT | Performed by: INTERNAL MEDICINE

## 2021-01-23 PROCEDURE — 36415 COLL VENOUS BLD VENIPUNCTURE: CPT

## 2021-01-23 PROCEDURE — 84145 PROCALCITONIN (PCT): CPT

## 2021-01-23 PROCEDURE — 2500000003 HC RX 250 WO HCPCS: Performed by: PHYSICIAN ASSISTANT

## 2021-01-23 PROCEDURE — 6360000002 HC RX W HCPCS: Performed by: PHYSICIAN ASSISTANT

## 2021-01-23 PROCEDURE — 51798 US URINE CAPACITY MEASURE: CPT

## 2021-01-23 PROCEDURE — 1200000000 HC SEMI PRIVATE

## 2021-01-23 PROCEDURE — 92526 ORAL FUNCTION THERAPY: CPT

## 2021-01-23 PROCEDURE — 6370000000 HC RX 637 (ALT 250 FOR IP): Performed by: NURSE PRACTITIONER

## 2021-01-23 PROCEDURE — 2700000000 HC OXYGEN THERAPY PER DAY

## 2021-01-23 PROCEDURE — 85379 FIBRIN DEGRADATION QUANT: CPT

## 2021-01-23 PROCEDURE — 80053 COMPREHEN METABOLIC PANEL: CPT

## 2021-01-23 PROCEDURE — 2580000003 HC RX 258: Performed by: INTERNAL MEDICINE

## 2021-01-23 RX ORDER — TAMSULOSIN HYDROCHLORIDE 0.4 MG/1
0.8 CAPSULE ORAL DAILY
Status: DISCONTINUED | OUTPATIENT
Start: 2021-01-23 | End: 2021-01-23

## 2021-01-23 RX ORDER — TAMSULOSIN HYDROCHLORIDE 0.4 MG/1
0.8 CAPSULE ORAL DAILY
Status: DISCONTINUED | OUTPATIENT
Start: 2021-01-23 | End: 2021-02-01 | Stop reason: HOSPADM

## 2021-01-23 RX ORDER — QUETIAPINE FUMARATE 50 MG/1
100 TABLET, EXTENDED RELEASE ORAL 3 TIMES DAILY
Status: DISCONTINUED | OUTPATIENT
Start: 2021-01-23 | End: 2021-02-01

## 2021-01-23 RX ORDER — TRAZODONE HYDROCHLORIDE 50 MG/1
50 TABLET ORAL NIGHTLY
Status: DISCONTINUED | OUTPATIENT
Start: 2021-01-23 | End: 2021-02-01 | Stop reason: HOSPADM

## 2021-01-23 RX ORDER — LACTULOSE 10 G/15ML
20 SOLUTION ORAL 2 TIMES DAILY
Status: DISCONTINUED | OUTPATIENT
Start: 2021-01-23 | End: 2021-02-01 | Stop reason: HOSPADM

## 2021-01-23 RX ORDER — LEVETIRACETAM 250 MG/1
250 TABLET ORAL 2 TIMES DAILY
Status: DISCONTINUED | OUTPATIENT
Start: 2021-01-23 | End: 2021-01-25

## 2021-01-23 RX ORDER — LORAZEPAM 0.5 MG/1
0.5 TABLET ORAL EVERY 6 HOURS PRN
Status: DISCONTINUED | OUTPATIENT
Start: 2021-01-23 | End: 2021-02-01

## 2021-01-23 RX ADMIN — LORAZEPAM 2 MG: 2 INJECTION INTRAMUSCULAR; INTRAVENOUS at 21:32

## 2021-01-23 RX ADMIN — METOPROLOL TARTRATE 25 MG: 25 TABLET, FILM COATED ORAL at 21:38

## 2021-01-23 RX ADMIN — QUETIAPINE FUMARATE 100 MG: 50 TABLET, EXTENDED RELEASE ORAL at 10:59

## 2021-01-23 RX ADMIN — LORAZEPAM 2 MG: 2 INJECTION INTRAMUSCULAR; INTRAVENOUS at 10:55

## 2021-01-23 RX ADMIN — ENOXAPARIN SODIUM 30 MG: 30 INJECTION SUBCUTANEOUS at 10:55

## 2021-01-23 RX ADMIN — SODIUM CHLORIDE, PRESERVATIVE FREE 10 ML: 5 INJECTION INTRAVENOUS at 10:56

## 2021-01-23 RX ADMIN — LORAZEPAM 2 MG: 2 INJECTION INTRAMUSCULAR; INTRAVENOUS at 16:40

## 2021-01-23 RX ADMIN — MORPHINE SULFATE 2 MG: 2 INJECTION, SOLUTION INTRAMUSCULAR; INTRAVENOUS at 16:40

## 2021-01-23 RX ADMIN — METOPROLOL TARTRATE 25 MG: 25 TABLET, FILM COATED ORAL at 10:53

## 2021-01-23 RX ADMIN — LEVETIRACETAM 250 MG: 250 TABLET ORAL at 21:37

## 2021-01-23 RX ADMIN — TAMSULOSIN HYDROCHLORIDE 0.8 MG: 0.4 CAPSULE ORAL at 10:53

## 2021-01-23 RX ADMIN — QUETIAPINE FUMARATE 100 MG: 50 TABLET, EXTENDED RELEASE ORAL at 21:35

## 2021-01-23 RX ADMIN — REMDESIVIR 100 MG: 100 INJECTION, POWDER, LYOPHILIZED, FOR SOLUTION INTRAVENOUS at 10:55

## 2021-01-23 RX ADMIN — LEVETIRACETAM 250 MG: 250 TABLET ORAL at 10:53

## 2021-01-23 RX ADMIN — METHYLPREDNISOLONE SODIUM SUCCINATE 40 MG: 40 INJECTION, POWDER, FOR SOLUTION INTRAMUSCULAR; INTRAVENOUS at 10:55

## 2021-01-23 RX ADMIN — LORAZEPAM 2 MG: 2 INJECTION INTRAMUSCULAR; INTRAVENOUS at 03:51

## 2021-01-23 RX ADMIN — LACTULOSE 20 G: 20 SOLUTION ORAL at 10:50

## 2021-01-23 RX ADMIN — MORPHINE SULFATE 2 MG: 2 INJECTION, SOLUTION INTRAMUSCULAR; INTRAVENOUS at 23:50

## 2021-01-23 RX ADMIN — MORPHINE SULFATE 2 MG: 2 INJECTION, SOLUTION INTRAMUSCULAR; INTRAVENOUS at 03:51

## 2021-01-23 RX ADMIN — QUETIAPINE FUMARATE 100 MG: 50 TABLET, EXTENDED RELEASE ORAL at 16:43

## 2021-01-23 RX ADMIN — FAMOTIDINE 20 MG: 20 TABLET ORAL at 10:53

## 2021-01-23 RX ADMIN — SODIUM CHLORIDE, PRESERVATIVE FREE 10 ML: 5 INJECTION INTRAVENOUS at 21:38

## 2021-01-23 RX ADMIN — TRAZODONE HYDROCHLORIDE 50 MG: 50 TABLET ORAL at 21:37

## 2021-01-23 RX ADMIN — METHYLPREDNISOLONE SODIUM SUCCINATE 40 MG: 40 INJECTION, POWDER, FOR SOLUTION INTRAMUSCULAR; INTRAVENOUS at 21:31

## 2021-01-23 RX ADMIN — LACTULOSE 20 G: 20 SOLUTION ORAL at 21:33

## 2021-01-23 ASSESSMENT — PAIN SCALES - GENERAL: PAINLEVEL_OUTOF10: 10

## 2021-01-23 NOTE — PROGRESS NOTES
Hospitalist Progress Note      PCP: Jeanne Mcconnell MD    Date of Admission: 1/20/2021    Chief Complaint:   Chief Complaint   Patient presents with    Other     Pt caregiver reports patient has been SOB and coughing. Pt resp nonlabored o2 97% on room air. Hospital Course:     62 y. o. male who presented to [facility] with past medical history of developmental nonverbal disorder, MR, history of subdural hematoma presented to the ED due to reported shortness of breath and coughing. He was diagnosed Covid 19 and miultifocal pneumonia secondary to Covid 19.      Subjective:      On 2L o2  Home meds reviewed and d/w nursing. Medications:  Reviewed    Infusion Medications    sodium chloride       Scheduled Medications    tamsulosin  0.8 mg Oral Daily    lactulose  20 g Oral BID    levETIRAcetam  250 mg Oral BID    metoprolol tartrate  25 mg Per NG tube BID    QUEtiapine  100 mg Oral TID    traZODone  50 mg Per NG tube Nightly    methylPREDNISolone  40 mg Intravenous Q12H    enoxaparin  30 mg Subcutaneous Daily    remdesivir IVPB  100 mg Intravenous Q24H    sodium chloride flush  10 mL Intravenous 2 times per day     PRN Meds: LORazepam, morphine, haloperidol lactate, LORazepam, sodium chloride, sodium chloride flush, potassium chloride, magnesium sulfate, promethazine **OR** ondansetron, famotidine, guaiFENesin-dextromethorphan, acetaminophen **OR** acetaminophen      Intake/Output Summary (Last 24 hours) at 1/23/2021 0930  Last data filed at 1/23/2021 0446  Gross per 24 hour   Intake 2085 ml   Output 1150 ml   Net 935 ml       Physical Exam Performed:    BP (!) 151/85   Pulse 85   Temp 97.5 °F (36.4 °C) (Axillary)   Resp 17   Wt 105 lb 2.6 oz (47.7 kg)   SpO2 95%   BMI 20.54 kg/m²     General appearance: developmental delayed. Short stature. sleeping  HEENT:  Conjunctivae/corneas clear. Neck: Supple, with full range of motion. No jugular venous distention. Trachea midline. Respiratory: Rhonchi b/l lung fields, improved since yesterday. Stable on RA.  nonlabored or accessory muscle use. Cardiovascular: Regular rate and rhythm with normal S1/S2 without murmurs, rubs or gallops. Abdomen: Soft, non-tender, non-distended with normal bowel sounds. Musculoskeletal: No clubbing, cyanosis or edema bilaterally. Full range of motion without deformity. Skin: Skin color, texture, turgor normal.  No rashes or lesions. Neurologic:  Unable to assess  Psychiatric:unable to assess, sleeping peacefully      Labs:   Recent Labs     01/21/21  0835 01/22/21  1651 01/23/21  0716   WBC 1.3* 5.3 5.8   HGB 10.1* 10.7* 10.9*   HCT 30.4* 32.4* 33.9*    329 361     Recent Labs     01/22/21  0730 01/22/21  1650 01/23/21  0716   * 133* 134*   K 4.4 4.4 4.6    100 102   CO2 24 24 27   BUN 14 15 17   CREATININE 1.0 0.9 0.9   CALCIUM 9.1 9.2 9.1     Recent Labs     01/22/21  0730 01/22/21  1650 01/23/21  0716   AST 17 21 20   ALT 12 13 12   BILITOT <0.2 <0.2 <0.2   ALKPHOS 113 108 85     No results for input(s): INR in the last 72 hours. Recent Labs     01/21/21  0835   TROPONINI <0.01       Urinalysis:      Lab Results   Component Value Date    NITRU Negative 01/21/2021    WBCUA  10/14/2020    BACTERIA 4+ 10/14/2020    RBCUA 0-2 10/14/2020    BLOODU Negative 01/21/2021    SPECGRAV 1.015 01/21/2021    GLUCOSEU Negative 01/21/2021       Radiology:  CT HEAD WO CONTRAST   Final Result   No acute intracranial abnormality. XR CHEST PORTABLE   Final Result   1. Multifocal pneumonia. Recommend chest radiograph in 8 weeks to confirm   resolution.                  Assessment/Plan:    Active Hospital Problems    Diagnosis    Pneumonia [J18.9]     Covid 19 infection  Acute Respiratory failure with hypoxia  Multifocal pneumonia   Low dose lovenox sq considering hematoma hx and weight  Labs consistent with covid19  Remdesivir and convalescent plasma initiated Continue Solumedrol 40mg IV BID   D/c abx, as procal negative and more viral pneumonia  Was lowered than baseline this am 90-92%   Monitor O2 sats   Consider pulm consult if worsening sats     Fall  Per RN, found on floor   CT HEAD stat pending    Agitation  Resume home seroquel  Ativan PRN   Monitor- avasyst    Urinary retention  Bladder scan >500cc , resume flomax  Ordered bladder scan q6 with prn straight cath   Monitor    Hyponatremia   Mild  Monitor      Acute renal failure - improving   Continue IVF      Remote hx of subdural hematoma 2019     Normocytic Anemia   Positive FOBT  hgb stable   Would defer testing/procedure until recovers from covid 19 infection   Can likely do as outpatient     DVT Prophylaxis: lovenox sq   Diet: DIET CARDIAC; Dysphagia Pureed; Mildly Thick (Nectar)  Code Status: Full Code    PT/OT Eval Status: Will likely not be able to participate     Dispo - will need to notify group home atleast 24h prior to discharge to prepare for staffing. Not medically stable today.     Gabriela Robbins, APRN - CNP

## 2021-01-23 NOTE — PROGRESS NOTES
Speech Language Pathology  Facility/Department: [facility] C3 TELE/MED SURG/ONC  Dysphagia Daily Treatment Note    NAME: Erick Hayes  : 1960  MRN: [id number]     Recommendations:  Solids: Dysphagia Pureed (Dysphagia I)  Liquids: Mildly Thick (Nectar)  Meds: Crushed in puree as able  If pt develops s/s of aspiration with po intake or if respiratory status worsens, please downgrade pt to NPO and notify SLP    Patient Diagnosis(es):   Patient Active Problem List    Diagnosis Date Noted    Pneumonia 2021    Acute urinary retention 2020    Atrial fibrillation, chronic ([address] 2020    History of subdural hematoma 2020    Constipation     Atrial fibrillation with RVR ([address] 2019    Acute cystitis with hematuria 2019    Altered mental status 2019    Brain herniation ([facility] [address] 2019    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 ([facility] [address] 2019    Lipoma of head      Allergies: Allergies   Allergen Reactions    Benadryl [Diphenhydramine] Other (See Comments)     Pt becomes agitated and aggressive with Benadryl    Clonidine Derivatives     Penicillins     Tetracyclines & Related      Onset Date: 2021  Current Diet Level:  Dysphagia I Pureed, Mildly (nectar) thick liquids, meds crushed    Subjective: Pt seen upright in bed with RN and PCA in room. RN provided med pass of meds crushed in pudding. Pt consumed nectar thick liquids via cup and Puree (pudding) with and without meds crushed with no clinical overt s/s of aspiration. Pt seen on 2L O2 via NC    Pain: no c/o pain    Current Diet: DIET CARDIAC; Dysphagia Pureed; Mildly Thick (Nectar)    Diet Tolerance:  Patient tolerating current diet level without signs/symptoms of penetration / aspiration. P.O. Trials:   Thin       Nectar / Mildly Thick       Honey / Moderately Thick       Pudding / Extremely Thick       Puree   x Pudding  Pudding with meds crushed in puree   Solid Dysphagia Treatment and Impressions:  Pt seen upright in bed with RN and PCA present. Pt is non-verbal and requires total feed. RN reports pt has reduced PO intake, however, tolerates med pass OK with meds crushed in pudding. RN providing total feed of meds crushed in puree and plain puree (applesauce) upon entry. Pt follows limited commands and continues to require a total feed for med pass, meals and liquids. Pt with no clinical s/s of aspiration noted with all PO trials given. Pt placed lying down following PO d/t requiring a catheter placement by RN and PCA, SLP assisted with comforting pt per RN request. Pt with increased pain and moaning out loud with pt's face turning red. Pt projectile vomited white thick liquids (mucous/pudding) x3 possibly d/t increased pain or being frustrated from catheter placement. RN/PCA edu on importance of sitting upright following PO intake. PCA/RN v/u and report always keep pt upright following meals. Recommending continue current diet and downgrade to NPO if pt presents with clinical overt s/s of aspiration or respiratory status worsens. Dysphagia Goals:  Timeframe for Long-term Goals: 1-2 weeks  Goal 1: Pt will tolerate ongoing assessment of swallowing. 1/23 Goal addressed; ongoing, see above     Short-term Goals  Timeframe for Short-term Goals: 2-3x/wk, 1 week  Dysphagia Goals:   Goal 1: The patient will tolerate repeat BSE when able. 1/23 Goal addressed; ongoing, see above  Goal 2: The patient will tolerate mechanical soft foods 10/10.  1/23 Goal not addressed; ongoing, see above  Goal 3: The patient will tolerate nectar thick liquids without signs and symptoms of aspiration 10/10 via cup  1/23 Goal addressed; ongoing, see above  Goal 4: The patient will tolerate thin liquids without signs and symptoms of aspiration 10/10 via cup.  1/23 Goal not addressed; ongoing, see above  Goal 5:  The patient will tolerate puree foods 10/10. 1/23 Goal addressed; ongoing, see above Speech/Language/Cog Goals: N/A    Recommendations:  Solid Consistency: Dysphagia I Puree  Liquid Consistency: Mildly Thick (nectar)  Medication: crushed in puree    If pt develops s/s of aspiration with po intake or if respiratory status worsens, please downgrade pt to NPO and notify SLP    Patient/Family/Caregiver Education: RN/PCA edu safe swallow strategies and need for total feed. RN and PCA v/u. Compensatory Strategies:Upright as possible for all oral intake;Eat/Feed slowly; No straws;Remain upright for 30-45 minutes after meals;Small bites/sips; Alternate solids and liquids; Total feed    Plan:    Continued Dysphagia treatment with goals per plan of care. Discharge Recommendations: TBD    If pt discharges from hospital prior to Speech/Swallowing discharge, this note serves as tx and discharge summary.      Total Treatment Time / Charges     Time in Time out Total Time / units   Cognitive Tx         Speech Tx      Dysphagia Tx 1102 1128 26 min/ 1 unit     Signature:    Phil Aleman M.S., [id number] [facility]   Speech Language Pathologist  [id number]

## 2021-01-23 NOTE — PROGRESS NOTES
Perfect serve message sent to Nelly Bonilla CNP, \"Question about pt home meds - has been retaining urine, restarted Flomax - home meds not ordered - lactulose, Seroquel, Keppra, trazadone, lactulose? ?\", awaiting response. Call placed to caregiver - Ariana Rosenbaum  Takes meds whole in applesauce/pudding. States pt gets out of control w/o seroquel, keppra, trazadone. Has issues w/ constipation and needs his senokot and lactulose to keep him regular.

## 2021-01-23 NOTE — PROGRESS NOTES
Secure message to Claudell Albe, CNP:    EKG completed - Afib RVR. Amiodarone administered. HR remains 130-150s. /73. Awaiting response.       Electronically signed by Keila Ardon RN on 1/22/2021 at 11:57 PM

## 2021-01-23 NOTE — PROGRESS NOTES
Secure message to Erica Gonzalez CNP:    Amiodarone administered. HR remains 120s-130s. EKG Afib RVR. Awaiting response.

## 2021-01-23 NOTE — PROGRESS NOTES
Pt remains in normal sinus rhythm on telemetry with HR in 60s-70s. Pt becomes agitated and screams uncontrollably and moves around in bed. He attempts to remove his brief and grabs at his abdomen. He strikes at staff and fights against our care. PRN Ativan and morphine administered as ordered, see eMAR. Pt currently resting in bed. 2L oxygen administered for O2 sat <90%. Continues with AVASYS. Bed in low position, seizure pads in place, bed alarm on.

## 2021-01-23 NOTE — PROGRESS NOTES
Perfect Serve to Tres Colbert CNP:    2.5 mg metoprolol administered at 2015. HR remains 140-170. Additional med? New orders for 5 mg metoprolol IV one time. Administered as ordered.

## 2021-01-23 NOTE — PROGRESS NOTES
Message to Gray Pacheco CNP    Pt is MRDD. Hx A fib. Currently heart rate is sustaining in 150s-170s A fib on tele. Just had a crying episode. PRN morphine and Ativan administered. He is currently resting in bed after straight cath & PRN meds. Would you like to order PRN for HR? Thanks! Awaiting response.       Electronically signed by Jolie Lucero RN on 1/22/2021 at 7:57 PM

## 2021-01-23 NOTE — PROGRESS NOTES
Pt assessment completed and charted. VSS. Pt nonverbal. Unable to communicate. Pt screaming out. Bladder scan showed 279 ml. Straight cath per order. 325 ml out. Pt did NOT tolerate well. PRN/scheduled medication administered per MAR. Pt took meds in pudding tolerated well. HOB 45 degrees. Pt resting comfortably now. Bed in lowest position/wheels locked. Bedside table within reach, pt unable to reach. Call light within reach, pt unable to use. Bed alarm in place. Avasys in place. Will continue to monitor.

## 2021-01-23 NOTE — PROGRESS NOTES
Pt bladder scan shows 360 ml. Perfect serve sent to Amita Adler CNP. Pt did not tolerate straight cath earlier today. See new orders. Colindres catheter placed using sterile technique. Clear yellow urine returned. Pt tolerated fairly well. Unsecured Freedom mitts placed to deter pt from pulling on lines/catheter. Pt resting comfortably w/ PRN medication. Avasys in place. Will continue to monitor.

## 2021-01-23 NOTE — PROGRESS NOTES
Secure message to Kacie Santiago CNP:    Pt rhythm is now normal sinus on telemetry with HR in 70s.       Electronically signed by Herberth Rodriguez RN on 1/23/2021 at 12:09 AM

## 2021-01-24 ENCOUNTER — APPOINTMENT (OUTPATIENT)
Dept: CT IMAGING | Age: 61
DRG: 177 | End: 2021-01-24
Payer: MEDICARE

## 2021-01-24 LAB
A/G RATIO: 1.3 (ref 1.1–2.2)
ALBUMIN SERPL-MCNC: 3.4 G/DL (ref 3.4–5)
ALP BLD-CCNC: 98 U/L (ref 40–129)
ALT SERPL-CCNC: 15 U/L (ref 10–40)
ANION GAP SERPL CALCULATED.3IONS-SCNC: 6 MMOL/L (ref 3–16)
AST SERPL-CCNC: 20 U/L (ref 15–37)
BASE EXCESS ARTERIAL: -0.2 MMOL/L (ref -3–3)
BASOPHILS ABSOLUTE: 0 K/UL (ref 0–0.2)
BASOPHILS RELATIVE PERCENT: 0.1 %
BILIRUB SERPL-MCNC: <0.2 MG/DL (ref 0–1)
BLOOD CULTURE, ROUTINE: NORMAL
BUN BLDV-MCNC: 20 MG/DL (ref 7–20)
C-REACTIVE PROTEIN: 10.5 MG/L (ref 0–5.1)
CALCIUM SERPL-MCNC: 9.2 MG/DL (ref 8.3–10.6)
CARBOXYHEMOGLOBIN ARTERIAL: 0.1 % (ref 0–1.5)
CHLORIDE BLD-SCNC: 105 MMOL/L (ref 99–110)
CO2: 25 MMOL/L (ref 21–32)
CREAT SERPL-MCNC: 0.8 MG/DL (ref 0.9–1.3)
D DIMER: <200 NG/ML DDU (ref 0–229)
EKG ATRIAL RATE: 58 BPM
EKG DIAGNOSIS: NORMAL
EKG P AXIS: 53 DEGREES
EKG P-R INTERVAL: 136 MS
EKG Q-T INTERVAL: 428 MS
EKG QRS DURATION: 90 MS
EKG QTC CALCULATION (BAZETT): 420 MS
EKG R AXIS: -56 DEGREES
EKG T AXIS: 4 DEGREES
EKG VENTRICULAR RATE: 58 BPM
EOSINOPHILS ABSOLUTE: 0 K/UL (ref 0–0.6)
EOSINOPHILS RELATIVE PERCENT: 0 %
GFR AFRICAN AMERICAN: >60
GFR NON-AFRICAN AMERICAN: >60
GLOBULIN: 2.7 G/DL
GLUCOSE BLD-MCNC: 137 MG/DL (ref 70–99)
HCO3 ARTERIAL: 26.1 MMOL/L (ref 21–29)
HCT VFR BLD CALC: 36.2 % (ref 40.5–52.5)
HEMOGLOBIN, ART, EXTENDED: 13 G/DL (ref 13.5–17.5)
HEMOGLOBIN: 11.7 G/DL (ref 13.5–17.5)
LACTIC ACID, SEPSIS: 0.7 MMOL/L (ref 0.4–1.9)
LACTIC ACID, SEPSIS: 0.9 MMOL/L (ref 0.4–1.9)
LYMPHOCYTES ABSOLUTE: 0.5 K/UL (ref 1–5.1)
LYMPHOCYTES RELATIVE PERCENT: 15.3 %
MCH RBC QN AUTO: 26.6 PG (ref 26–34)
MCHC RBC AUTO-ENTMCNC: 32.5 G/DL (ref 31–36)
MCV RBC AUTO: 81.9 FL (ref 80–100)
METHEMOGLOBIN ARTERIAL: 0.5 %
MONOCYTES ABSOLUTE: 0.1 K/UL (ref 0–1.3)
MONOCYTES RELATIVE PERCENT: 3.4 %
NEUTROPHILS ABSOLUTE: 2.6 K/UL (ref 1.7–7.7)
NEUTROPHILS RELATIVE PERCENT: 81.2 %
O2 CONTENT ARTERIAL: 18 ML/DL
O2 SAT, ARTERIAL: 97.6 %
O2 THERAPY: ABNORMAL
PCO2 ARTERIAL: 49.4 MMHG (ref 35–45)
PDW BLD-RTO: 15.1 % (ref 12.4–15.4)
PH ARTERIAL: 7.34 (ref 7.35–7.45)
PLATELET # BLD: 303 K/UL (ref 135–450)
PMV BLD AUTO: 7.1 FL (ref 5–10.5)
PO2 ARTERIAL: 109.3 MMHG (ref 75–108)
POTASSIUM REFLEX MAGNESIUM: 4.4 MMOL/L (ref 3.5–5.1)
RBC # BLD: 4.42 M/UL (ref 4.2–5.9)
SODIUM BLD-SCNC: 136 MMOL/L (ref 136–145)
TCO2 ARTERIAL: 27.6 MMOL/L
TOTAL PROTEIN: 6.1 G/DL (ref 6.4–8.2)
WBC # BLD: 3.2 K/UL (ref 4–11)

## 2021-01-24 PROCEDURE — 94761 N-INVAS EAR/PLS OXIMETRY MLT: CPT

## 2021-01-24 PROCEDURE — 93010 ELECTROCARDIOGRAM REPORT: CPT | Performed by: INTERNAL MEDICINE

## 2021-01-24 PROCEDURE — 80053 COMPREHEN METABOLIC PANEL: CPT

## 2021-01-24 PROCEDURE — 85025 COMPLETE CBC W/AUTO DIFF WBC: CPT

## 2021-01-24 PROCEDURE — 82803 BLOOD GASES ANY COMBINATION: CPT

## 2021-01-24 PROCEDURE — 2580000003 HC RX 258: Performed by: PHYSICIAN ASSISTANT

## 2021-01-24 PROCEDURE — 6360000002 HC RX W HCPCS: Performed by: PHYSICIAN ASSISTANT

## 2021-01-24 PROCEDURE — 85379 FIBRIN DEGRADATION QUANT: CPT

## 2021-01-24 PROCEDURE — 99223 1ST HOSP IP/OBS HIGH 75: CPT | Performed by: INTERNAL MEDICINE

## 2021-01-24 PROCEDURE — 86140 C-REACTIVE PROTEIN: CPT

## 2021-01-24 PROCEDURE — 70450 CT HEAD/BRAIN W/O DYE: CPT

## 2021-01-24 PROCEDURE — 2580000003 HC RX 258: Performed by: NURSE PRACTITIONER

## 2021-01-24 PROCEDURE — 2580000003 HC RX 258: Performed by: INTERNAL MEDICINE

## 2021-01-24 PROCEDURE — 2060000000 HC ICU INTERMEDIATE R&B

## 2021-01-24 PROCEDURE — 93005 ELECTROCARDIOGRAM TRACING: CPT | Performed by: NURSE PRACTITIONER

## 2021-01-24 PROCEDURE — 87040 BLOOD CULTURE FOR BACTERIA: CPT

## 2021-01-24 PROCEDURE — 36600 WITHDRAWAL OF ARTERIAL BLOOD: CPT

## 2021-01-24 PROCEDURE — 2500000003 HC RX 250 WO HCPCS: Performed by: PHYSICIAN ASSISTANT

## 2021-01-24 PROCEDURE — 6360000002 HC RX W HCPCS: Performed by: NURSE PRACTITIONER

## 2021-01-24 PROCEDURE — 2700000000 HC OXYGEN THERAPY PER DAY

## 2021-01-24 PROCEDURE — 36415 COLL VENOUS BLD VENIPUNCTURE: CPT

## 2021-01-24 PROCEDURE — 83605 ASSAY OF LACTIC ACID: CPT

## 2021-01-24 RX ADMIN — MORPHINE SULFATE 2 MG: 2 INJECTION, SOLUTION INTRAMUSCULAR; INTRAVENOUS at 05:43

## 2021-01-24 RX ADMIN — LORAZEPAM 2 MG: 2 INJECTION INTRAMUSCULAR; INTRAVENOUS at 19:28

## 2021-01-24 RX ADMIN — LORAZEPAM 2 MG: 2 INJECTION INTRAMUSCULAR; INTRAVENOUS at 03:33

## 2021-01-24 RX ADMIN — SODIUM CHLORIDE, PRESERVATIVE FREE 10 ML: 5 INJECTION INTRAVENOUS at 23:12

## 2021-01-24 RX ADMIN — HALOPERIDOL LACTATE 2 MG: 5 INJECTION, SOLUTION INTRAMUSCULAR at 21:15

## 2021-01-24 RX ADMIN — LEVETIRACETAM 250 MG: 100 INJECTION, SOLUTION INTRAVENOUS at 23:14

## 2021-01-24 ASSESSMENT — PAIN SCALES - GENERAL: PAINLEVEL_OUTOF10: 10

## 2021-01-24 NOTE — CONSULTS
Pulmonary & Critical Care Consultation Note   Silvano Elliott MD    REASONFOR CONSULTATION/CC: COVID, bradycardia, assistance with management    Consult at request of  Brenda Eller MD  PCP: Ronny Arambula MD    HISTORYOF PRESENT ILLNESS:    62y.o. year old male chronic cognitive issues and direct history limited, he resides at group home. Brought in to ED due to concern for aspiration, he was mildly hypoxic requiring supplemental oxygen. COVID testing did return positive and he was exposed to other individuals with COVID and his rapid returned positive  Was admitted for further treatment  Has been on steroids and given remdesivir for suspected acute COVID   Medically treated for chronic confusion issues with multiple medications and developed subsequent worsening somnolence and bradycardia without hypotension. Was also hypothermic, transferred to  for krishan hugger warming      Past Medical History:   Diagnosis Date    A-fib ([address]     Bipolar disorder ([address]     Brain herniation ([address]     Constipation     COVID-19 01/20/2021    Cystitis     Developmental non-verbal disorder     Impulse control disorder     Influenza A 02/13/2014    Mental retardation, profound (I.Q. < 20)     Osteopenia     Subdural hematoma (HCC)        Past Surgical History:   Procedure Laterality Date    CATARACT REMOVAL      COLONOSCOPY  06/20/2016    incomplete, poor prep    COLONOSCOPY  08/26/2020    COLON W/ANES.  COLONOSCOPY N/A 8/26/2020    COLON W/ANES. (9:00) COVID TEST 8/20-8/22. PT IS NON-VERBAL, IS NOT IN A FACILITY. IS CARED FOR BY 2 CAREGIVERS IN HIS HOME. performed by Sharmila Urban MD at [address] Right 12/2/2019    Right Trephine Craniotomy For Evacuation of Subdural Hematoma performed by Hina Salgado MD at [address] TURP N/A 2/28/2020    CYSTOSCOPY, TRANSURETHRAL RESECTION OF PROSTATE performed by Markos Vasques MD at [facility] history is unknown by patient. Social History     Tobacco Use    Smoking status: Never Smoker    Smokeless tobacco: Never Used   Substance Use Topics    Alcohol use: No        Scheduled Meds:   tamsulosin  0.8 mg Oral Daily    lactulose  20 g Oral BID    levETIRAcetam  250 mg Oral BID    metoprolol tartrate  25 mg Per NG tube BID    [Held by provider] QUEtiapine  100 mg Oral TID    traZODone  50 mg Per NG tube Nightly    methylPREDNISolone  40 mg Intravenous Q12H    enoxaparin  30 mg Subcutaneous Daily    remdesivir IVPB  100 mg Intravenous Q24H    sodium chloride flush  10 mL Intravenous 2 times per day       Continuous Infusions:   sodium chloride         PRN Meds:   LORazepam, morphine, haloperidol lactate, LORazepam, sodium chloride, sodium chloride flush, potassium chloride, magnesium sulfate, promethazine **OR** ondansetron, famotidine, guaiFENesin-dextromethorphan, acetaminophen **OR** acetaminophen   Inpatient consult to Critical Care  Consult performed by: Niru Brennan MD  Consult ordered by: MAYA Cardenas - CNP        ALLERGIES:  Patient is allergic to benadryl [diphenhydramine]; clonidine derivatives; penicillins; and tetracyclines & related. REVIEW OF SYSTEMS:  Review of Systems   Unable to perform ROS: Patient nonverbal       Objective:   PHYSICAL EXAM:  /83   Pulse 55   Temp 94.2 °F (34.6 °C) (Rectal)   Resp 16   Wt 105 lb 2.6 oz (47.7 kg)   SpO2 (!) 89%   BMI 20.54 kg/m²    Physical Exam  Constitutional:       General: He is not in acute distress. Appearance: He is well-developed. He is not diaphoretic. HENT:      Head: Normocephalic and atraumatic. Mouth/Throat:      Pharynx: No oropharyngeal exudate. Eyes:      Pupils: Pupils are equal, round, and reactive to light. Neck:      Musculoskeletal: Neck supple. Vascular: No JVD. Cardiovascular:      Heart sounds: Normal heart sounds. No murmur. No friction rub. No gallop.     Pulmonary: Effort: Pulmonary effort is normal.      Breath sounds: Rhonchi present. No wheezing or rales. Abdominal:      General: Bowel sounds are normal. There is no distension. Palpations: Abdomen is soft. Tenderness: There is no abdominal tenderness. Lymphadenopathy:      Cervical: No cervical adenopathy. Skin:     General: Skin is warm and dry. Findings: No rash. Neurological:      Mental Status: He is alert. Cranial Nerves: No cranial nerve deficit. Comments: Poorly follows commands            Data Reviewed:   LABS:  CBC:   Recent Labs     01/22/21  1651 01/23/21  0716 01/24/21  0711   WBC 5.3 5.8 3.2*   HGB 10.7* 10.9* 11.7*   HCT 32.4* 33.9* 36.2*   MCV 81.6 82.0 81.9    361 303     BMP:   Recent Labs     01/22/21  1650 01/23/21  0716 01/24/21  0711   * 134* 136   K 4.4 4.6 4.4    102 105   CO2 24 27 25   BUN 15 17 20   CREATININE 0.9 0.9 0.8*     LIVER PROFILE:   Recent Labs     01/22/21  1650 01/23/21  0716 01/24/21  0711   AST 21 20 20   ALT 13 12 15   BILITOT <0.2 <0.2 <0.2   ALKPHOS 108 85 98     PT/INR: No results for input(s): PROTIME, INR in the last 72 hours. APTT:No results for input(s): APTT in the last 72 hours. UA:No results for input(s): NITRITE, COLORU, PHUR, LABCAST, WBCUA, RBCUA, MUCUS, TRICHOMONAS, YEAST, BACTERIA, CLARITYU, SPECGRAV, LEUKOCYTESUR, UROBILINOGEN, BILIRUBINUR, BLOODU, GLUCOSEU, AMORPHOUS in the last 72 hours. Invalid input(s): Cristy Parker  Recent Labs     01/24/21  1218   PHART 7.341*   DHY8IQU 49.4*   PO2ART 109.3*       Vent Information  SpO2: (!) 89 %    CXR personally reviewed, bilateral pneumonia worse on right, central distribution          Assessment:     1. Acute hypoxic resp failure, mild  2. COVID positive on testing with exposures  3. Aspiration pneumonia, multifocal   -dysphagia  4. Chronic cognitive impairment  5.  Bradycardia     Plan: -Favor aspiration pneumonia over acute covid as etiology to his respiratory issues but cannot exclude  -Seems reasonable to continue covid treatment but would favor discontinuation of steroids if mentation issues are an ongoing issue  -Negative pct and without leukocytosis, monitor off atb for now but low threshold to implement  -Hypothermia/bradycardia etiology unclear, may be related to medication effect and regulatory issue with his prior SDH. External warming for now.  Add atb if this persists as well   -Wean oxygen  -Dysphagia diet  -CXR in AM     Will follow     Eulalio Garg MD

## 2021-01-24 NOTE — PROGRESS NOTES
Perfect serve message sent to [facility] NP, \"Pt HR got down to 39 at lowest. Current VS are HR 69, /91, RR 22, T 97.5, SpO2 97%. \", awaiting response. Message was read at [phone], no new orders.

## 2021-01-24 NOTE — PROGRESS NOTES
MRI checklist reviewed/completed with patient's caregiver, Nisha Ricardoopal, over the phone ([phone]). Caregiver updated on patient status.

## 2021-01-24 NOTE — PROGRESS NOTES
Hospitalist Progress Note      PCP: Harper Souza MD    Date of Admission: 1/20/2021    Chief Complaint:   Chief Complaint   Patient presents with    Other     Pt caregiver reports patient has been SOB and coughing. Pt resp nonlabored o2 97% on room air. Hospital Course:     62 y. o. male who presented to [facility] with past medical history of developmental nonverbal disorder, MR, history of subdural hematoma presented to the ED due to reported shortness of breath and coughing. He was diagnosed Covid 19 and miultifocal pneumonia secondary to Covid 19.      Subjective:      Pt is obtunded this AM.   Core temp is 92. Medications:  Reviewed    Infusion Medications    sodium chloride       Scheduled Medications    tamsulosin  0.8 mg Oral Daily    lactulose  20 g Oral BID    levETIRAcetam  250 mg Oral BID    metoprolol tartrate  25 mg Per NG tube BID    QUEtiapine  100 mg Oral TID    traZODone  50 mg Per NG tube Nightly    methylPREDNISolone  40 mg Intravenous Q12H    enoxaparin  30 mg Subcutaneous Daily    remdesivir IVPB  100 mg Intravenous Q24H    sodium chloride flush  10 mL Intravenous 2 times per day     PRN Meds: LORazepam, morphine, haloperidol lactate, LORazepam, sodium chloride, sodium chloride flush, potassium chloride, magnesium sulfate, promethazine **OR** ondansetron, famotidine, guaiFENesin-dextromethorphan, acetaminophen **OR** acetaminophen      Intake/Output Summary (Last 24 hours) at 1/24/2021 1116  Last data filed at 1/24/2021 0806  Gross per 24 hour   Intake 120 ml   Output 1225 ml   Net -1105 ml       Physical Exam Performed:    BP (!) 142/86   Pulse 57   Temp 92.8 °F (33.8 °C) (Rectal)   Resp 16   Wt 105 lb 2.6 oz (47.7 kg)   SpO2 100%   BMI 20.54 kg/m²     General appearance: obtunded  HEENT:  Conjunctivae/corneas clear. Neck: Supple, with full range of motion. No jugular venous distention. Trachea midline. Respiratory: Rhonchi b/l lung fields, improved since yesterday. Stable on 2L  nonlabored or accessory muscle use. Cardiovascular: Regular rate and rhythm with normal S1/S2 without murmurs, rubs or gallops. Abdomen: Soft, non-tender, non-distended with normal bowel sounds. Musculoskeletal: No clubbing, cyanosis or edema bilaterally. Full range of motion without deformity. Skin: Skin color, texture, turgor normal.  No rashes or lesions. Neurologic:  Unable to assess  Psychiatric:unable to assess, sleeping peacefully      Labs:   Recent Labs     01/22/21 1651 01/23/21  0716 01/24/21  0711   WBC 5.3 5.8 3.2*   HGB 10.7* 10.9* 11.7*   HCT 32.4* 33.9* 36.2*    361 303     Recent Labs     01/22/21  1650 01/23/21  0716 01/24/21  0711   * 134* 136   K 4.4 4.6 4.4    102 105   CO2 24 27 25   BUN 15 17 20   CREATININE 0.9 0.9 0.8*   CALCIUM 9.2 9.1 9.2     Recent Labs     01/22/21  1650 01/23/21  0716 01/24/21  0711   AST 21 20 20   ALT 13 12 15   BILITOT <0.2 <0.2 <0.2   ALKPHOS 108 85 98     No results for input(s): INR in the last 72 hours. No results for input(s): Esther Barry in the last 72 hours. Urinalysis:      Lab Results   Component Value Date    NITRU Negative 01/21/2021    WBCUA  10/14/2020    BACTERIA 4+ 10/14/2020    RBCUA 0-2 10/14/2020    BLOODU Negative 01/21/2021    SPECGRAV 1.015 01/21/2021    GLUCOSEU Negative 01/21/2021       Radiology:  CT HEAD WO CONTRAST   Final Result   No acute intracranial abnormality. XR CHEST PORTABLE   Final Result   1. Multifocal pneumonia. Recommend chest radiograph in 8 weeks to confirm   resolution.                  Assessment/Plan:    Active Hospital Problems    Diagnosis    Pneumonia [J18.9]     Covid 19 infection  Acute Respiratory failure with hypoxia  Multifocal pneumonia   Low dose lovenox sq considering hematoma hx and weight  Labs consistent with covid19  Remdesivir and convalescent plasma initiated Continue Solumedrol 40mg IV BID   D/c abx, as procal negative and more viral pneumonia  Was lowered than baseline this am 90-92%   Monitor O2 sats   Consider pulm consult if worsening sats     Obtundation  Unclear cause, hypothermic with bradycardia overnight. Continue bear hugger  CT Head ABG  Transfer to PCU     Fall  Per RN, found on floor   CT HEAD stat pending    Agitation  hold home seroquel given obtundation   Ativan PRN   Monitor- avasyst    Urinary retention  Bladder scan >500cc , resume flomax  Ordered bladder scan q6 with prn straight cath   Monitor    Hyponatremia   Mild  Monitor      Acute renal failure - improving   Continue IVF      Remote hx of subdural hematoma 2019     Normocytic Anemia   Positive FOBT  hgb stable   Would defer testing/procedure until recovers from covid 19 infection   Can likely do as outpatient     DVT Prophylaxis: lovenox sq   Diet: DIET CARDIAC; Dysphagia Pureed; Mildly Thick (Nectar)  Code Status: Full Code    PT/OT Eval Status: Will likely not be able to participate     Dispo - will need to notify group home atleast 24h prior to discharge to prepare for staffing. Not medically stable today.     Edilma Russell, APRN - CNP

## 2021-01-24 NOTE — PROGRESS NOTES
Pt assessment completed and charted. VSS. Pt nonverbal and unable to communicate needs. Pt crying/ agitated throughout night. PRN medications given per mar. Pt is currently asleep and resting comfortably. Bed in lowest position and wheels locked. Call light within reach. Bedside table within reach, pt unable to use. Non-skid footwear in place. Bed alarm, Avasys, and mitts (not tied) are in place for safety. Will continue to monitor.

## 2021-01-24 NOTE — PROGRESS NOTES
Pt to CT. All pt belongings, medication, mobile avasys and bear hugger taken to C4. Report given to [facility]. Pt T. 94.1 rectal before leaving unit. C4 RN aware. Pt arrived to 424 upon giving report. 4 eye skin assessment completed w/ Stephie Whitaker. Pt continues to be unresponsive.

## 2021-01-24 NOTE — PROGRESS NOTES
Perfect serve message sent to Puma Bo CNP, *Pt rectal temp 92.5. HR in 40's/50's. Bear Taryn placed. , awaiting response.

## 2021-01-24 NOTE — PROGRESS NOTES
Pt lethargic, does not wake up for long. HR 40's/50's T. 92.5 rectal. /86. Pt is non-verbal. Bear Taryn placed. Perfect serve sent to Celestino Araujo CNP, who is now @ bedside. Hold all meds for now. See new orders. Colindres catheter in place and patent. Will continue to monitor.

## 2021-01-24 NOTE — PROGRESS NOTES
Patient becoming more responsive, opening eyes and and attempting to chew on oral swab during mouth care. Patient then goes back to sleep.

## 2021-01-24 NOTE — PROGRESS NOTES
[phone] Hospital or Facility: [facility] From: Adrienne Beaulieu RE: Lori Boateng 1960 RM: [id number] patient was transferred to  for mental status change, CT of head is resulted in [facility]. Patient is still too lethargic to take anything oral, previous nurse stated to hold ALL medications until CT resulted. Can I give him any meds now? Thank you Need Callback: NEEDS CALLBACK C4 PROGRESSIVE CARE      Sent to Lewis Peacock NP, via perfect serve. 0'\"   Is he awake now ? 0'\"   1/24/21 3:53 PM   no, but he has remdesivir that he was to have earlier today but previous nurse said to hold ALL meds until CT was resulted. Should I go ahead and give that to him? 0'\"   Of yes. Not sure why all. Remdesivir is ok   0'\"   1/24/21 3:57 PM   Sorry about that       Received back from Lewis Peacock NP, via perfect serve.

## 2021-01-25 ENCOUNTER — APPOINTMENT (OUTPATIENT)
Dept: GENERAL RADIOLOGY | Age: 61
DRG: 177 | End: 2021-01-25
Payer: MEDICARE

## 2021-01-25 PROBLEM — J06.9 2019-NCOV ACUTE RESPIRATORY DISEASE: Status: ACTIVE | Noted: 2021-01-25

## 2021-01-25 PROBLEM — R91.8 PULMONARY INFILTRATES: Status: ACTIVE | Noted: 2021-01-25

## 2021-01-25 PROBLEM — U07.1 2019-NCOV ACUTE RESPIRATORY DISEASE: Status: ACTIVE | Noted: 2021-01-25

## 2021-01-25 LAB
A/G RATIO: 1.5 (ref 1.1–2.2)
ALBUMIN SERPL-MCNC: 3.7 G/DL (ref 3.4–5)
ALP BLD-CCNC: 94 U/L (ref 40–129)
ALT SERPL-CCNC: 24 U/L (ref 10–40)
ANION GAP SERPL CALCULATED.3IONS-SCNC: 12 MMOL/L (ref 3–16)
AST SERPL-CCNC: 38 U/L (ref 15–37)
BASOPHILS ABSOLUTE: 0 K/UL (ref 0–0.2)
BASOPHILS RELATIVE PERCENT: 0.2 %
BILIRUB SERPL-MCNC: <0.2 MG/DL (ref 0–1)
BUN BLDV-MCNC: 24 MG/DL (ref 7–20)
CALCIUM SERPL-MCNC: 9.3 MG/DL (ref 8.3–10.6)
CHLORIDE BLD-SCNC: 106 MMOL/L (ref 99–110)
CO2: 24 MMOL/L (ref 21–32)
CREAT SERPL-MCNC: 0.8 MG/DL (ref 0.9–1.3)
CULTURE, BLOOD 2: NORMAL
EOSINOPHILS ABSOLUTE: 0 K/UL (ref 0–0.6)
EOSINOPHILS RELATIVE PERCENT: 0 %
GFR AFRICAN AMERICAN: >60
GFR NON-AFRICAN AMERICAN: >60
GLOBULIN: 2.5 G/DL
GLUCOSE BLD-MCNC: 88 MG/DL (ref 70–99)
HCT VFR BLD CALC: 36.7 % (ref 40.5–52.5)
HEMOGLOBIN: 11.8 G/DL (ref 13.5–17.5)
LYMPHOCYTES ABSOLUTE: 0.3 K/UL (ref 1–5.1)
LYMPHOCYTES RELATIVE PERCENT: 4.8 %
MCH RBC QN AUTO: 26.3 PG (ref 26–34)
MCHC RBC AUTO-ENTMCNC: 32 G/DL (ref 31–36)
MCV RBC AUTO: 82.1 FL (ref 80–100)
MONOCYTES ABSOLUTE: 0.2 K/UL (ref 0–1.3)
MONOCYTES RELATIVE PERCENT: 2.4 %
NEUTROPHILS ABSOLUTE: 6.7 K/UL (ref 1.7–7.7)
NEUTROPHILS RELATIVE PERCENT: 92.6 %
PDW BLD-RTO: 15.1 % (ref 12.4–15.4)
PLATELET # BLD: 369 K/UL (ref 135–450)
PMV BLD AUTO: 7.1 FL (ref 5–10.5)
POTASSIUM REFLEX MAGNESIUM: 4 MMOL/L (ref 3.5–5.1)
RBC # BLD: 4.47 M/UL (ref 4.2–5.9)
SODIUM BLD-SCNC: 142 MMOL/L (ref 136–145)
TOTAL PROTEIN: 6.2 G/DL (ref 6.4–8.2)
WBC # BLD: 7.2 K/UL (ref 4–11)

## 2021-01-25 PROCEDURE — 2060000000 HC ICU INTERMEDIATE R&B

## 2021-01-25 PROCEDURE — 2580000003 HC RX 258: Performed by: INTERNAL MEDICINE

## 2021-01-25 PROCEDURE — 99232 SBSQ HOSP IP/OBS MODERATE 35: CPT | Performed by: INTERNAL MEDICINE

## 2021-01-25 PROCEDURE — 92526 ORAL FUNCTION THERAPY: CPT

## 2021-01-25 PROCEDURE — 6360000002 HC RX W HCPCS: Performed by: PHYSICIAN ASSISTANT

## 2021-01-25 PROCEDURE — 85025 COMPLETE CBC W/AUTO DIFF WBC: CPT

## 2021-01-25 PROCEDURE — 6370000000 HC RX 637 (ALT 250 FOR IP): Performed by: NURSE PRACTITIONER

## 2021-01-25 PROCEDURE — 6360000002 HC RX W HCPCS: Performed by: REGISTERED NURSE

## 2021-01-25 PROCEDURE — 71045 X-RAY EXAM CHEST 1 VIEW: CPT

## 2021-01-25 PROCEDURE — 2500000003 HC RX 250 WO HCPCS: Performed by: PHYSICIAN ASSISTANT

## 2021-01-25 PROCEDURE — 2580000003 HC RX 258: Performed by: REGISTERED NURSE

## 2021-01-25 PROCEDURE — 80053 COMPREHEN METABOLIC PANEL: CPT

## 2021-01-25 PROCEDURE — 36415 COLL VENOUS BLD VENIPUNCTURE: CPT

## 2021-01-25 PROCEDURE — 6370000000 HC RX 637 (ALT 250 FOR IP): Performed by: REGISTERED NURSE

## 2021-01-25 PROCEDURE — 2580000003 HC RX 258: Performed by: PHYSICIAN ASSISTANT

## 2021-01-25 RX ORDER — MORPHINE SULFATE 2 MG/ML
2 INJECTION, SOLUTION INTRAMUSCULAR; INTRAVENOUS
Status: DISCONTINUED | OUTPATIENT
Start: 2021-01-25 | End: 2021-02-01 | Stop reason: HOSPADM

## 2021-01-25 RX ORDER — DEXAMETHASONE 4 MG/1
6 TABLET ORAL DAILY
Status: DISCONTINUED | OUTPATIENT
Start: 2021-01-26 | End: 2021-01-25

## 2021-01-25 RX ORDER — ZIPRASIDONE MESYLATE 20 MG/ML
10 INJECTION, POWDER, LYOPHILIZED, FOR SOLUTION INTRAMUSCULAR ONCE
Status: COMPLETED | OUTPATIENT
Start: 2021-01-25 | End: 2021-01-25

## 2021-01-25 RX ADMIN — REMDESIVIR 100 MG: 100 INJECTION, POWDER, LYOPHILIZED, FOR SOLUTION INTRAVENOUS at 12:28

## 2021-01-25 RX ADMIN — ZIPRASIDONE MESYLATE 10 MG: 20 INJECTION, POWDER, LYOPHILIZED, FOR SOLUTION INTRAMUSCULAR at 13:32

## 2021-01-25 RX ADMIN — LORAZEPAM 2 MG: 2 INJECTION INTRAMUSCULAR; INTRAVENOUS at 17:59

## 2021-01-25 RX ADMIN — LORAZEPAM 2 MG: 2 INJECTION INTRAMUSCULAR; INTRAVENOUS at 00:41

## 2021-01-25 RX ADMIN — SODIUM CHLORIDE, PRESERVATIVE FREE 10 ML: 5 INJECTION INTRAVENOUS at 20:36

## 2021-01-25 RX ADMIN — MORPHINE SULFATE 2 MG: 2 INJECTION, SOLUTION INTRAMUSCULAR; INTRAVENOUS at 17:59

## 2021-01-25 RX ADMIN — METHYLPREDNISOLONE SODIUM SUCCINATE 40 MG: 40 INJECTION, POWDER, FOR SOLUTION INTRAMUSCULAR; INTRAVENOUS at 08:20

## 2021-01-25 RX ADMIN — LORAZEPAM 2 MG: 2 INJECTION INTRAMUSCULAR; INTRAVENOUS at 08:20

## 2021-01-25 RX ADMIN — QUETIAPINE FUMARATE 100 MG: 50 TABLET, EXTENDED RELEASE ORAL at 13:32

## 2021-01-25 RX ADMIN — MORPHINE SULFATE 2 MG: 2 INJECTION, SOLUTION INTRAMUSCULAR; INTRAVENOUS at 09:22

## 2021-01-25 RX ADMIN — ENOXAPARIN SODIUM 30 MG: 30 INJECTION SUBCUTANEOUS at 20:35

## 2021-01-25 RX ADMIN — METHYLPREDNISOLONE SODIUM SUCCINATE 40 MG: 40 INJECTION, POWDER, FOR SOLUTION INTRAMUSCULAR; INTRAVENOUS at 00:41

## 2021-01-25 RX ADMIN — HALOPERIDOL LACTATE 2 MG: 5 INJECTION, SOLUTION INTRAMUSCULAR at 10:50

## 2021-01-25 RX ADMIN — LEVETIRACETAM 250 MG: 100 INJECTION, SOLUTION INTRAVENOUS at 10:51

## 2021-01-25 RX ADMIN — MORPHINE SULFATE 2 MG: 2 INJECTION, SOLUTION INTRAMUSCULAR; INTRAVENOUS at 12:27

## 2021-01-25 RX ADMIN — LEVETIRACETAM 250 MG: 100 INJECTION, SOLUTION INTRAVENOUS at 20:33

## 2021-01-25 RX ADMIN — ENOXAPARIN SODIUM 30 MG: 30 INJECTION SUBCUTANEOUS at 08:20

## 2021-01-25 ASSESSMENT — PAIN SCALES - GENERAL
PAINLEVEL_OUTOF10: 10
PAINLEVEL_OUTOF10: 7

## 2021-01-25 NOTE — PROGRESS NOTES
\"1/24/21 10:08 PM   [phone] Hospital or Facility: [facility] From: Vilma Reese RE: Leopold Hollingshead 1960 RM: [id number] COVID, MRDD. Pt too agitated to take po meds at this time. Can we switch his Keppra to IV so he doesn't miss it? Thanks Need Callback: NO CALLBACK REQ C4 PROGRESSIVE CARE ROUTINE  Read 10:15 PM\"    One-time order for Keppra IV ordered. Will administer.

## 2021-01-25 NOTE — PROGRESS NOTES
Patient awake, very agitated, crying, throwing legs over bed and trying to get up. Bedside report given to oncoming RN, 2 mg ativan IV given.

## 2021-01-25 NOTE — CARE COORDINATION
Spoke with RN who states patient is in restraints and asking if this will be a barrier to patient returning to group home. Placed call to Khang Mcdonald'krissy REEVES, and inquired as to the above. She states that patient has PTSD and will do better the sooner he can get back and that he does not need to be restraint free prior to returning. When asked about transportation, [facility] states that they would like for [id number] to arrange. Will continue to follow.

## 2021-01-25 NOTE — PROGRESS NOTES
Hospitalist Progress Note      PCP: Ines Madsen MD    Date of Admission: 1/20/2021    Chief Complaint: [facility] Course:   Leopold Hollingshead is a 61 yo male with PMH of non-verbal MRDD who lives in a group home who presented to [facility] due to SOB/cough, found to have COVID pneumonia. He was admitted for further evaluation and management. Subjective:   Pt is on RA. Afebrile. VSS. Pt agitated/restless. Unable to obtain ROS due his nonverbal status. Medications:  Reviewed    Infusion Medications    sodium chloride       Scheduled Medications    levetiracetam  250 mg Intravenous Q12H    enoxaparin  30 mg Subcutaneous BID    tamsulosin  0.8 mg Oral Daily    lactulose  20 g Oral BID    metoprolol tartrate  25 mg Per NG tube BID    QUEtiapine  100 mg Oral TID    traZODone  50 mg Per NG tube Nightly    remdesivir IVPB  100 mg Intravenous Q24H    sodium chloride flush  10 mL Intravenous 2 times per day     PRN Meds: morphine, LORazepam, haloperidol lactate, LORazepam, sodium chloride, sodium chloride flush, promethazine **OR** ondansetron, famotidine, guaiFENesin-dextromethorphan, acetaminophen **OR** acetaminophen      Intake/Output Summary (Last 24 hours) at 1/25/2021 1441  Last data filed at 1/25/2021 1403  Gross per 24 hour   Intake 240 ml   Output 700 ml   Net -460 ml       Physical Exam Performed:    /70   Pulse 106   Temp 98.2 °F (36.8 °C) (Axillary)   Resp 20   Wt 101 lb 13.6 oz (46.2 kg)   SpO2 94%   BMI 19.89 kg/m²     I performed an audiovisual assessment, based on new provisions and guidance offered by [facility] on March 18, 2020 in setting of COVID-19 outbreak and in order to preserve personal protective equipment in accordance with the flexibilities announced by CMS on March 30, 2020. References:   https://med. [place].gov/Portals/0/Resources/COVID-19/3_18%20Telemed%20Guidance%20Updated%20March%2018. pdf?vew=[email or web address] http://[employer].com/. pdf     Bedside physical examination deferred. However, I did visually inspect the patient and observed the following:     General appearance: Mild distress. Neck: Deferred. Respiratory:  Normal respiratory effort. Cardiovascular: Deferred. Abdomen: Deferred. Musculoskelatal: Deferred. Neurologic:  Deferred. Psychiatric: Deferred. Skin: Deferred. Labs:   Recent Labs     01/23/21  0716 01/24/21  0711 01/25/21  0525   WBC 5.8 3.2* 7.2   HGB 10.9* 11.7* 11.8*   HCT 33.9* 36.2* 36.7*    303 369     Recent Labs     01/23/21  0716 01/24/21  0711 01/25/21  0525   * 136 142   K 4.6 4.4 4.0    105 106   CO2 27 25 24   BUN 17 20 24*   CREATININE 0.9 0.8* 0.8*   CALCIUM 9.1 9.2 9.3     Recent Labs     01/23/21  0716 01/24/21  0711 01/25/21  0525   AST 20 20 38*   ALT 12 15 24   BILITOT <0.2 <0.2 <0.2   ALKPHOS 85 98 94     Urinalysis:      Lab Results   Component Value Date    NITRU Negative 01/21/2021    WBCUA  10/14/2020    BACTERIA 4+ 10/14/2020    RBCUA 0-2 10/14/2020    BLOODU Negative 01/21/2021    SPECGRAV 1.015 01/21/2021    GLUCOSEU Negative 01/21/2021       Radiology:  XR CHEST PORTABLE   Final Result   There is notable improved aeration of lungs bilaterally with a few scattered   opacities remaining         CT HEAD WO CONTRAST   Final Result   No acute intracranial abnormality. CT HEAD WO CONTRAST   Final Result   No acute intracranial abnormality. XR CHEST PORTABLE   Final Result   1. Multifocal pneumonia. Recommend chest radiograph in 8 weeks to confirm   resolution. Assessment/Plan:    Active Hospital Problems    Diagnosis    Pneumonia [J18.9]       Acute hypoxic respiratory failure due to COVID19 pneumonia:  - Wean supplemental O2 as tolerated. He is currently on RA.

## 2021-01-25 NOTE — PROGRESS NOTES
Speech Language Pathology  Facility/Department: [facility] C3 TELE/MED SURG/ONC  Dysphagia Daily Treatment Note    NAME: Alyx Morgan  : 1960  MRN: [id number]     Recommendations:  Solids: Dysphagia Pureed (Dysphagia I)  Liquids: Mildly Thick (Nectar)  Meds: Crushed in puree as able  If pt develops s/s of aspiration with po intake or if respiratory status worsens, please downgrade pt to NPO and notify SLP    Patient Diagnosis(es):   Patient Active Problem List    Diagnosis Date Noted    Pneumonia 2021    Acute urinary retention 2020    Atrial fibrillation, chronic ([address] 2020    History of subdural hematoma 2020    Constipation     Atrial fibrillation with RVR ([address] 2019    Acute cystitis with hematuria 2019    Altered mental status 2019    Brain herniation ([address] 2019    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 ([address] 2019    Lipoma of head      Allergies: Allergies   Allergen Reactions    Benadryl [Diphenhydramine] Other (See Comments)     Pt becomes agitated and aggressive with Benadryl    Clonidine Derivatives     Penicillins     Tetracyclines & Related      Onset Date: 2021  Current Diet Level:  Dysphagia I Pureed, Mildly (nectar) thick liquids, meds crushed    Subjective: Pt positioned upright in bed, however pt unable to maintain optimal positioning for po intake as he is in almost constant motion. Sherron/ RN reports pt coughed and turned red with nectar thick liquids by tsp this am. Pt on RA. Pt nonverbal and unable to follow commands. Pt remains in droplet plus isolation. Pain: no c/o pain    Current Diet: DIET CARDIAC; Dysphagia Pureed; Mildly Thick (Nectar)    Diet Tolerance:  Patient tolerating current diet level without signs/symptoms of penetration / aspiration. P.O. Trials:   Thin   x Ice chip x1  1/2 tsp water x1   Nectar / Mildly Thick   x 1/2 tsp apple juice x6   Honey / Moderately Thick Pudding / Extremely Thick       Puree   x 1/2 tsp Pudding x4     Solid         Dysphagia Treatment and Impressions:  ·  Pt is non-verbal and dependent for feeding and oral care. · Pt in almost constant motion and appears uncomfortable with vocalizations. · Oral care completed with toothbrush. · Pt assessed with ice chip for which no bolus manipulation is attempted and ice chip moved posteriorly solely by effect of gravity as pt maintains open mouth posture. No pharyngeal swallow is observed. · Pt assessed with 1/2 tsp thin water which rolls to posterior oral cavity and pt immediately coughs and turns red. Pt recovers in less than 10 seconds and appears in no distress. · Pt assessed with mildly-thick liquids by 1/2 tsp. Decreased lingual motion/bolus manipulation noted. Pt appear to initiate pharyngeal swallow immediately. Pt with no clinical s/s of aspiration noted in 6 of 6 presentations. · Pt assessed with 1/2 tsp pudding. Decreased coordination of oral swallow notes with suck pattern observed for anterior to posterior bolus transit. Suspect pharyngeal swallow delay. Verbal cues provided by SLP to swallow, though uncertain of benefit for pt given decreased ability to follow commands. No overt clinical signs of aspiration observed. · Limited trials completed d/t pt's overall discomfort. RN present and administering pain meds and getting vitals. Recommend continue Dysphagia Pureed Solids/Mildly-thick (nectar) liquids by 1/2 tsp amounts by only; meds crushed in puree as able. Recommend upright position as able during and 30 minutes after meals, small bites/sips, slow rate of po intake, oral care before and after meals. Pt will need total assist for feeding and oral care. Attend to pt's cues re: interest and readiness for po intake to increase coordination of breathing and swallowing. ST to continue to follow per POC.     Dysphagia Goals:  Timeframe for Long-term Goals: 1-2 weeks Goal 1: Pt will tolerate ongoing assessment of swallowing. 1/25 Goal addressed; ongoing, see above     Short-term Goals  Timeframe for Short-term Goals: 2-3x/wk, 1 week  Dysphagia Goals:   Goal 1: The patient will tolerate repeat BSE when able. 1/25 Goal addressed; ongoing, see above  Goal 2: The patient will tolerate mechanical soft foods 10/10.  1/25 Goal not addressed; ongoing, see above  Goal 3: The patient will tolerate nectar thick liquids without signs and symptoms of aspiration 10/10 via cup  1/25 Goal addressed; ongoing, see above  Goal 4: The patient will tolerate thin liquids without signs and symptoms of aspiration 10/10 via cup.  1/25 Goal addressed; ongoing, see above  Goal 5: The patient will tolerate puree foods 10/10. 1/25 Goal addressed; ongoing, see above    Speech/Language/Cog Goals: N/A    Recommendations:  Solid Consistency: Dysphagia I Puree  Liquid Consistency: Mildly Thick (nectar)  Medication: crushed in puree    If pt develops s/s of aspiration with po intake or if respiratory status worsens, please downgrade pt to NPO and notify SLP    Patient/Family/Caregiver Education: RN educated  safe swallow strategies and need for total feed. RN verbalizes understanding    Compensatory Strategies:Upright as possible for all oral intake;Eat/Feed slowly; No straws;Remain upright for 30-45 minutes after meals;Small bites/sips; Total feed    Plan:    Continued Dysphagia treatment with goals per plan of care. Discharge Recommendations: TBD    If pt discharges from hospital prior to Speech/Swallowing discharge, this note serves as tx and discharge summary. Total Treatment Time / Charges     Time in Time out Total Time / units   Cognitive Tx         Speech Tx      Dysphagia Tx 1200 1235 35 min/ 1 unit     Signature:    Mary Page. Leonel Davis M.A.  [id number] [facility]  Speech-Language Pathologist

## 2021-01-25 NOTE — PROGRESS NOTES
Pt still extremely agitated and thrashing despite 2 mg Haldol at 2115. Repositioned in bed to minimize ability to kick legs over rails. Avasys remains activated. Charge RN and all other staff aware of need to listen out for pt as well.

## 2021-01-26 LAB
A/G RATIO: 1.6 (ref 1.1–2.2)
ALBUMIN SERPL-MCNC: 3.6 G/DL (ref 3.4–5)
ALP BLD-CCNC: 89 U/L (ref 40–129)
ALT SERPL-CCNC: 24 U/L (ref 10–40)
AMMONIA: 20 UMOL/L (ref 16–60)
ANION GAP SERPL CALCULATED.3IONS-SCNC: 10 MMOL/L (ref 3–16)
AST SERPL-CCNC: 24 U/L (ref 15–37)
BASOPHILS ABSOLUTE: 0.1 K/UL (ref 0–0.2)
BASOPHILS RELATIVE PERCENT: 0.7 %
BILIRUB SERPL-MCNC: 0.3 MG/DL (ref 0–1)
BUN BLDV-MCNC: 20 MG/DL (ref 7–20)
CALCIUM SERPL-MCNC: 9.4 MG/DL (ref 8.3–10.6)
CHLORIDE BLD-SCNC: 102 MMOL/L (ref 99–110)
CO2: 27 MMOL/L (ref 21–32)
CREAT SERPL-MCNC: 0.9 MG/DL (ref 0.9–1.3)
EOSINOPHILS ABSOLUTE: 0 K/UL (ref 0–0.6)
EOSINOPHILS RELATIVE PERCENT: 0.3 %
GFR AFRICAN AMERICAN: >60
GFR NON-AFRICAN AMERICAN: >60
GLOBULIN: 2.3 G/DL
GLUCOSE BLD-MCNC: 83 MG/DL (ref 70–99)
HCT VFR BLD CALC: 35.5 % (ref 40.5–52.5)
HEMOGLOBIN: 11.6 G/DL (ref 13.5–17.5)
LYMPHOCYTES ABSOLUTE: 1 K/UL (ref 1–5.1)
LYMPHOCYTES RELATIVE PERCENT: 12.5 %
MAGNESIUM: 1.9 MG/DL (ref 1.8–2.4)
MCH RBC QN AUTO: 26.7 PG (ref 26–34)
MCHC RBC AUTO-ENTMCNC: 32.8 G/DL (ref 31–36)
MCV RBC AUTO: 81.3 FL (ref 80–100)
MONOCYTES ABSOLUTE: 0.5 K/UL (ref 0–1.3)
MONOCYTES RELATIVE PERCENT: 6.7 %
NEUTROPHILS ABSOLUTE: 6.3 K/UL (ref 1.7–7.7)
NEUTROPHILS RELATIVE PERCENT: 79.8 %
PDW BLD-RTO: 15 % (ref 12.4–15.4)
PLATELET # BLD: 349 K/UL (ref 135–450)
PMV BLD AUTO: 6.8 FL (ref 5–10.5)
POTASSIUM REFLEX MAGNESIUM: 3.4 MMOL/L (ref 3.5–5.1)
RBC # BLD: 4.37 M/UL (ref 4.2–5.9)
SODIUM BLD-SCNC: 139 MMOL/L (ref 136–145)
TOTAL PROTEIN: 5.9 G/DL (ref 6.4–8.2)
WBC # BLD: 7.9 K/UL (ref 4–11)

## 2021-01-26 PROCEDURE — 82140 ASSAY OF AMMONIA: CPT

## 2021-01-26 PROCEDURE — 80053 COMPREHEN METABOLIC PANEL: CPT

## 2021-01-26 PROCEDURE — 2580000003 HC RX 258: Performed by: REGISTERED NURSE

## 2021-01-26 PROCEDURE — 36415 COLL VENOUS BLD VENIPUNCTURE: CPT

## 2021-01-26 PROCEDURE — 6360000002 HC RX W HCPCS: Performed by: PHYSICIAN ASSISTANT

## 2021-01-26 PROCEDURE — 6370000000 HC RX 637 (ALT 250 FOR IP): Performed by: NURSE PRACTITIONER

## 2021-01-26 PROCEDURE — 99231 SBSQ HOSP IP/OBS SF/LOW 25: CPT | Performed by: INTERNAL MEDICINE

## 2021-01-26 PROCEDURE — 2580000003 HC RX 258: Performed by: INTERNAL MEDICINE

## 2021-01-26 PROCEDURE — 6370000000 HC RX 637 (ALT 250 FOR IP): Performed by: REGISTERED NURSE

## 2021-01-26 PROCEDURE — 85025 COMPLETE CBC W/AUTO DIFF WBC: CPT

## 2021-01-26 PROCEDURE — 83735 ASSAY OF MAGNESIUM: CPT

## 2021-01-26 PROCEDURE — 6360000002 HC RX W HCPCS: Performed by: REGISTERED NURSE

## 2021-01-26 PROCEDURE — 2060000000 HC ICU INTERMEDIATE R&B

## 2021-01-26 RX ADMIN — LORAZEPAM 2 MG: 2 INJECTION INTRAMUSCULAR; INTRAVENOUS at 04:33

## 2021-01-26 RX ADMIN — QUETIAPINE FUMARATE 100 MG: 50 TABLET, EXTENDED RELEASE ORAL at 08:14

## 2021-01-26 RX ADMIN — MORPHINE SULFATE 2 MG: 2 INJECTION, SOLUTION INTRAMUSCULAR; INTRAVENOUS at 08:14

## 2021-01-26 RX ADMIN — TRAZODONE HYDROCHLORIDE 50 MG: 50 TABLET ORAL at 20:46

## 2021-01-26 RX ADMIN — METOPROLOL TARTRATE 25 MG: 25 TABLET, FILM COATED ORAL at 08:21

## 2021-01-26 RX ADMIN — LEVETIRACETAM 250 MG: 100 INJECTION, SOLUTION INTRAVENOUS at 08:20

## 2021-01-26 RX ADMIN — METOPROLOL TARTRATE 25 MG: 25 TABLET, FILM COATED ORAL at 20:46

## 2021-01-26 RX ADMIN — LEVETIRACETAM 250 MG: 100 INJECTION, SOLUTION INTRAVENOUS at 20:41

## 2021-01-26 RX ADMIN — QUETIAPINE FUMARATE 100 MG: 50 TABLET, EXTENDED RELEASE ORAL at 20:46

## 2021-01-26 RX ADMIN — TAMSULOSIN HYDROCHLORIDE 0.8 MG: 0.4 CAPSULE ORAL at 08:21

## 2021-01-26 RX ADMIN — POTASSIUM BICARBONATE 20 MEQ: 782 TABLET, EFFERVESCENT ORAL at 11:50

## 2021-01-26 RX ADMIN — ENOXAPARIN SODIUM 30 MG: 30 INJECTION SUBCUTANEOUS at 08:20

## 2021-01-26 RX ADMIN — SODIUM CHLORIDE, PRESERVATIVE FREE 10 ML: 5 INJECTION INTRAVENOUS at 20:50

## 2021-01-26 RX ADMIN — LORAZEPAM 2 MG: 2 INJECTION INTRAMUSCULAR; INTRAVENOUS at 00:35

## 2021-01-26 RX ADMIN — QUETIAPINE FUMARATE 100 MG: 50 TABLET, EXTENDED RELEASE ORAL at 13:44

## 2021-01-26 ASSESSMENT — PAIN SCALES - GENERAL
PAINLEVEL_OUTOF10: 0
PAINLEVEL_OUTOF10: 10
PAINLEVEL_OUTOF10: 10

## 2021-01-26 ASSESSMENT — PAIN SCALES - WONG BAKER: WONGBAKER_NUMERICALRESPONSE: 0

## 2021-01-26 NOTE — PROGRESS NOTES
Hospitalist Progress Note      PCP: Jasper Branham MD    Date of Admission: 1/20/2021    Chief Complaint: [facility] Course:   Aaliyah Kumar is a 63 yo male with PMH of non-verbal MRDD who lives in a group home who presented to [facility] due to SOB/cough, found to have COVID pneumonia. He was admitted for further evaluation and management. Subjective:   Pt is on RA. Afebrile. VSS. Pt agitated/restless this morning. Now improved after he received his morning seroquel. Unable to obtain ROS. Medications:  Reviewed    Infusion Medications    sodium chloride       Scheduled Medications    levetiracetam  250 mg Intravenous Q12H    enoxaparin  30 mg Subcutaneous BID    tamsulosin  0.8 mg Oral Daily    lactulose  20 g Oral BID    metoprolol tartrate  25 mg Per NG tube BID    QUEtiapine  100 mg Oral TID    traZODone  50 mg Per NG tube Nightly    sodium chloride flush  10 mL Intravenous 2 times per day     PRN Meds: morphine, LORazepam, haloperidol lactate, LORazepam, sodium chloride, sodium chloride flush, promethazine **OR** ondansetron, guaiFENesin-dextromethorphan, acetaminophen **OR** acetaminophen      Intake/Output Summary (Last 24 hours) at 1/26/2021 1502  Last data filed at 1/26/2021 1335  Gross per 24 hour   Intake 120 ml   Output 650 ml   Net -530 ml       Physical Exam Performed:    /85   Pulse 72   Temp 97.4 °F (36.3 °C) (Axillary)   Resp 16   Wt 102 lb 8.2 oz (46.5 kg)   SpO2 95%   BMI 20.02 kg/m²     I performed an audiovisual assessment, based on new provisions and guidance offered by [facility] on March 18, 2020 in setting of COVID-19 outbreak and in order to preserve personal protective equipment in accordance with the flexibilities announced by CMS on March 30, 2020. References:   https://med. [place].gov/Portals/0/Resources/COVID-19/3_18%20Telemed%20Guidance%20Updated%20March%2018. pdf?btt=[email or web address] http://[email or web address] pdf     Bedside physical examination deferred. However, I did visually inspect the patient and observed the following:     General appearance: No apparent distress. Neck: Deferred. Respiratory:  Normal respiratory effort. Cardiovascular: Deferred. Abdomen: Deferred. Musculoskelatal: Deferred. Neurologic:  Deferred. Psychiatric: Deferred. Skin: Deferred. Labs:   Recent Labs     01/24/21  0711 01/25/21  0525 01/26/21 0429   WBC 3.2* 7.2 7.9   HGB 11.7* 11.8* 11.6*   HCT 36.2* 36.7* 35.5*    369 349     Recent Labs     01/24/21  0711 01/25/21  0525 01/26/21  0429    142 139   K 4.4 4.0 3.4*    106 102   CO2 25 24 27   BUN 20 24* 20   CREATININE 0.8* 0.8* 0.9   CALCIUM 9.2 9.3 9.4     Recent Labs     01/24/21  0711 01/25/21  0525 01/26/21  0429   AST 20 38* 24   ALT 15 24 24   BILITOT <0.2 <0.2 0.3   ALKPHOS 98 94 89     Urinalysis:      Lab Results   Component Value Date    NITRU Negative 01/21/2021    WBCUA  10/14/2020    BACTERIA 4+ 10/14/2020    RBCUA 0-2 10/14/2020    BLOODU Negative 01/21/2021    SPECGRAV 1.015 01/21/2021    GLUCOSEU Negative 01/21/2021       Radiology:  XR CHEST PORTABLE   Final Result   There is notable improved aeration of lungs bilaterally with a few scattered   opacities remaining         CT HEAD WO CONTRAST   Final Result   No acute intracranial abnormality. CT HEAD WO CONTRAST   Final Result   No acute intracranial abnormality. XR CHEST PORTABLE   Final Result   1. Multifocal pneumonia. Recommend chest radiograph in 8 weeks to confirm   resolution. Assessment/Plan:    Active Hospital Problems    Diagnosis    2019-nCoV acute respiratory disease [U07.1, J06.9]    Pulmonary infiltrates [R91.8]       Acute hypoxic respiratory failure due to COVID19 pneumonia:  - Wean supplemental O2 as tolerated. He is currently on RA. - S/p convalescent plasma and 5 days of IV remdesivir. Dc'd steroids on 1/25 given pt is on RA now and developed worsening agitation.   - Procalcitonin was negative. No leukocytosis and remains afebrile. Blood cx's are NGTD. No indication for abx as of now. Agitation in pt with non-verbal MR-DD:  - Pt noted to be obtunded on recent days therefore his home seroquel was held. Pt extremely agitated/restless on 1/25. Per nursing staff at [facility] does not sound like he is much off from his baseline.   - CT head was negative on 1/24.   - Continue prn ativan and haldol. His home seroquel was resumed on 1/25 with hold parameters in place. Dysphagia:  - SLP consulted with recs for dysphagia I.     Urinary retention:  - Chronic issue at [facility], pt requires prn straight cath. [place] cath currently in place. Continue flomax. Normocytic Anemia   - Positive FOBT. Hgb stable. - Would defer testing/procedure until recovers from covid 19 infection. Can likely do as outpatient.     Remote history of subdural hematoma:  - Noted in history. DVT Prophylaxis: Lovenox   Diet: DIET CARDIAC;  Dysphagia Pureed; Mildly Thick (Nectar)  Code Status: Full Code    PT/OT Eval Status: Not indicated     Dispo - ~2 days     [address], APRN - CNP

## 2021-01-26 NOTE — PROGRESS NOTES
Physician Progress Note      Mag Carranza  JIA #:                  [id number]  :                       1960  ADMIT DATE:       2021 7:25 PM  DISCH DATE:  RESPONDING  PROVIDER #:        Jeor Sweeney CNP          QUERY TEXT:    Pt admitted with COVID-19/pneumonia. If possible, please document in the   progress notes and discharge summary if you are evaluating and /or treating   any of the following: The medical record reflects the following:  Risk Factors: Covid, aspiration pneumonia, acute respiratory failure, afib,   subdural hematoma  Clinical Indicators:  Temp 92.8, HR , WBC  1.3, procalcitonin   1.8, obtunded. Per pulmonary consult note \"Favor aspiration pneumonia over   acute covid as etiology to his respiratory issues but cannot exclude-Seems   reasonable to continue covid treatment but would favor discontinuation of   steroids if mentation issues are an ongoing issue\"  Treatment: Convalescent plasma, Remdisivir, warming blanket, moved to higher   level of care for hypothermia and bradycardia, swallow eval, Pulmonology   consult, serial labs, supportive care. Options provided:  -- Evolving Sepsis,(viral/bacterial) present on arrival  -- Sepsis confirmed,  not present on arrival  -- No Sepsis, covid and pneumonia only  -- Other - I will add my own diagnosis  -- Disagree - Not applicable / Not valid  -- Disagree - Clinically unable to determine / Unknown  -- Refer to Clinical Documentation Reviewer    PROVIDER RESPONSE TEXT:    This patient has covid and pneumonia only, patient is not septic.     Query created by: Kenyon Perkins on 2021 11:31 AM      Electronically signed by:  Jero Sweeney CNP 2021 2:06 PM

## 2021-01-26 NOTE — PROGRESS NOTES
INPATIENT PULMONARY CRITICAL CARE PROGRESS NOTE      Reason for visit    COVID, bradycardia, assistance with management    SUBJECTIVE: Patient when evaluated this morning was clinically better, patient was not on any supplemental oxygen and patient saturation was 95%, patient has sinus rhythm on the monitor  patient has been afebrile, patient has documented borderline urine output with cumulative fluid balance of +250 mL, patient's p.o. intake is still on the lower side, patient's blood sugars are acceptable, no other pertinent review of system of concern        Physical Exam:  Blood pressure 109/72, pulse 95, temperature 98.1 °F (36.7 °C), temperature source Axillary, resp. rate 18, weight 102 lb 8.2 oz (46.5 kg), SpO2 95 %.'        In-person bedside physical examination deferred. Pursuant to the emergency declaration under the [address] [place] [address], [address] [place] [address] and the [employer] and [employer] Act, this clinical encounter was conducted to provide necessary medical care. (Also consistent with new provisions and guidance offered by [facility] on March 18, 2020 in setting of COVID 19 outbreak and in order to preserve personal protective equipment in accordance with the flexibilities announced by CMS on March 30, 2020)   References: https://[facility]. [facility]/Portals/0/Resources/COVID-19/3_18%20Telemed%20Guidance%20Updated%20March%2018. pdf?jjj=[email or web address]                      https://[facility]. [facility]/Portals/0/Resources/COVID-19/3_18%20Telemed%20Guidance%20Updated%20March%2018. pdf?ojq=[email or web address]                      http://imbookin ([place])/. pdf               Results:  CBC:   Recent Labs     01/24/21  0711 01/25/21  0525 01/26/21  0429   WBC 3.2* 7.2 7.9   HGB 11.7* 11.8* 11.6*   HCT 36.2* 36.7* 35.5*   MCV 81.9 82.1 81.3    369 349     BMP:   Recent Labs 01/24/21  0711 01/25/21  0525 01/26/21  0429    142 139   K 4.4 4.0 3.4*    106 102   CO2 25 24 27   BUN 20 24* 20   CREATININE 0.8* 0.8* 0.9     LIVER PROFILE:   Recent Labs     01/24/21  0711 01/25/21  0525 01/26/21  0429   AST 20 38* 24   ALT 15 24 24   BILITOT <0.2 <0.2 0.3   ALKPHOS 88 59 76       Imaging:  I have reviewed radiology images personally. XR CHEST PORTABLE   Final Result   There is notable improved aeration of lungs bilaterally with a few scattered   opacities remaining         CT HEAD WO CONTRAST   Final Result   No acute intracranial abnormality. CT HEAD WO CONTRAST   Final Result   No acute intracranial abnormality. XR CHEST PORTABLE   Final Result   1. Multifocal pneumonia. Recommend chest radiograph in 8 weeks to confirm   resolution.            Ct Head Wo Contrast    Result Date: 1/24/2021 EXAMINATION: CT OF THE HEAD WITHOUT CONTRAST  1/24/2021 1:06 pm TECHNIQUE: CT of the head was performed without the administration of intravenous contrast. Dose modulation, iterative reconstruction, and/or weight based adjustment of the mA/kV was utilized to reduce the radiation dose to as low as reasonably achievable. COMPARISON: January 22nd HISTORY: ORDERING SYSTEM PROVIDED HISTORY: change in mental status TECHNOLOGIST PROVIDED HISTORY: Reason for exam:->change in mental status Has a \"code stroke\" or \"stroke alert\" been called? ->No Reason for Exam: increased lethargy,AMS Acuity: Acute Type of Exam: Initial FINDINGS: BRAIN/VENTRICLES: There is no acute intracranial hemorrhage, mass effect or midline shift. No abnormal extra-axial fluid collection. The gray-white differentiation is maintained without evidence of an acute infarct. There is no evidence of hydrocephalus. ORBITS: Negative SINUSES: Paranasal sinus disease with mucosal thickening most severe in the left maxillary sinus is again noted SOFT TISSUES/SKULL:  No acute abnormality of the visualized skull or soft tissues. No acute intracranial abnormality. Results for Deanna Talbert (MRN [id number]) as of 1/26/2021 11:07   Ref.  Range 1/25/2021 05:25 1/26/2021 04:29   Sodium Latest Ref Range: 136 - 145 mmol/L 142 139   Potassium Latest Ref Range: 3.5 - 5.1 mmol/L 4.0 3.4 (L)   Chloride Latest Ref Range: 99 - 110 mmol/L 106 102   CO2 Latest Ref Range: 21 - 32 mmol/L 24 27   BUN Latest Ref Range: 7 - 20 mg/dL 24 (H) 20   Creatinine Latest Ref Range: 0.9 - 1.3 mg/dL 0.8 (L) 0.9   Anion Gap Latest Ref Range: 3 - 16  12 10   GFR Non- Latest Ref Range: >60  >60 >60   GFR  Latest Ref Range: >60  >60 >60   Magnesium Latest Ref Range: 1.80 - 2.40 mg/dL  1.90   Glucose Latest Ref Range: 70 - 99 mg/dL 88 83   Calcium Latest Ref Range: 8.3 - 10.6 mg/dL 9.3 9.4   Total Protein Latest Ref Range: 6.4 - 8.2 g/dL 6.2 (L) 5.9 (L) Albumin Latest Ref Range: 3.4 - 5.0 g/dL 3.7 3.6   Globulin Latest Units: g/dL 2.5 2.3   Albumin/Globulin Ratio Latest Ref Range: 1.1 - 2.2  1.5 1.6   Alk Phos Latest Ref Range: 40 - 129 U/L 94 89   ALT Latest Ref Range: 10 - 40 U/L 24 24   Ammonia Latest Ref Range: 16 - 60 umol/L  20   AST Latest Ref Range: 15 - 37 U/L 38 (H) 24   Bilirubin Latest Ref Range: 0.0 - 1.0 mg/dL <0.2 0.3   WBC Latest Ref Range: 4.0 - 11.0 K/uL 7.2 7.9   RBC Latest Ref Range: 4.20 - 5.90 M/uL 4.47 4.37   Hemoglobin Quant Latest Ref Range: 13.5 - 17.5 g/dL 11.8 (L) 11.6 (L)   Hematocrit Latest Ref Range: 40.5 - 52.5 % 36.7 (L) 35.5 (L)   MCV Latest Ref Range: 80.0 - 100.0 fL 82.1 81.3   MCH Latest Ref Range: 26.0 - 34.0 pg 26.3 26.7   MCHC Latest Ref Range: 31.0 - 36.0 g/dL 32.0 32.8   MPV Latest Ref Range: 5.0 - 10.5 fL 7.1 6.8   RDW Latest Ref Range: 12.4 - 15.4 % 15.1 15.0   Platelet Count Latest Ref Range: 135 - 450 K/uL 369 349   Neutrophils % Latest Units: % 92.6 79.8   Lymphocyte % Latest Units: % 4.8 12.5     Results for Herman Kussmaul (MRN [id number]) as of 1/26/2021 11:07   Ref. Range 1/26/2021 04:29   Ammonia Latest Ref Range: 16 - 60 umol/L 20     Assessment:  Active Problems:    2019-nCoV acute respiratory disease    Pulmonary infiltrates  Resolved Problems:    * No resolved hospital problems. *          Plan:   · Oxygen supplementation, if required, to keep saturation between 90 to 94%  · Pulmonary toilet  · Patient is getting remdesivir which can be continued  · Patient's steroids were discontinued by Dr. Garry Fournier secondary to increased confusion/ agitation-if better can consider low-dose of p.o.  Decadron  · Lovenox twice a day to continue  · Patient does not have any elevated D-dimers  · Patient's pulmonary infiltrates are improving  · Patient's Keppra and trazodone as per internal medicine team  · Lactulose at home dose to continue -ammonia levels are normal  · Neurochecks   · PUD prophylaxis as per IM Case d/w case management    ? Discharge planning     No other recommendation from pulmonary/critical care standpoint of view- will sign off-please call on whenever necessary basis if the patient is not discharged          Electronically signed by:  Lady Marquez MD    1/26/2021    11:06 AM.

## 2021-01-26 NOTE — PROGRESS NOTES
INPATIENT PULMONARY CRITICAL CARE PROGRESS NOTE      Reason for visit    COVID, bradycardia, assistance with management    SUBJECTIVE: Patient when evaluated this morning was clinically better, patient was not on any supplemental oxygen and patient saturation was 94%, patient has sinus rhythm on the monitor which ranges from sinus to cardiac to normal sinus rhythm, patient has been afebrile, patient has documented borderline urine output with cumulative fluid balance of +480 mL, patient's p.o. intake is still on the lower side, patient's blood sugars are acceptable, no other pertinent review of system of concern        Physical Exam:  Blood pressure 126/76, pulse 60, temperature 97.3 °F (36.3 °C), resp. rate 18, weight 101 lb 13.6 oz (46.2 kg), SpO2 94 %.'        In-person bedside physical examination deferred. Pursuant to the emergency declaration under the [address] [place] [address], [address] [place] [address] and the [employer] Act, this clinical encounter was conducted to provide necessary medical care. (Also consistent with new provisions and guidance offered by [facility] on March 18, 2020 in setting of COVID 19 outbreak and in order to preserve personal protective equipment in accordance with the flexibilities announced by CMS on March 30, 2020)   References: https://[facility]. [facility]/Portals/0/Resources/COVID-19/3_18%20Telemed%20Guidance%20Updated%20March%2018. pdf?owm=[email or web address]                      https://[facility]. [facility]/Portals/0/Resources/COVID-19/3_18%20Telemed%20Guidance%20Updated%20March%2018. pdf?[email or web address]                      [email or web address] pdf               Results:  CBC:   Recent Labs     01/23/21  0716 01/24/21  0711 01/25/21  0525   WBC 5.8 3.2* 7.2   HGB 10.9* 11.7* 11.8*   HCT 33.9* 36.2* 36.7*   MCV 82.0 81.9 82.1    303 369 BMP:   Recent Labs     01/23/21  0716 01/24/21  0711 01/25/21  0525   * 136 142   K 4.6 4.4 4.0    105 106   CO2 27 25 24   BUN 17 20 24*   CREATININE 0.9 0.8* 0.8*     LIVER PROFILE:   Recent Labs     01/23/21  0716 01/24/21  0711 01/25/21  0525   AST 20 20 38*   ALT 12 15 24   BILITOT <0.2 <0.2 <0.2   ALKPHOS 85 98 94       Imaging:  I have reviewed radiology images personally. XR CHEST PORTABLE   Final Result   There is notable improved aeration of lungs bilaterally with a few scattered   opacities remaining         CT HEAD WO CONTRAST   Final Result   No acute intracranial abnormality. CT HEAD WO CONTRAST   Final Result   No acute intracranial abnormality. XR CHEST PORTABLE   Final Result   1. Multifocal pneumonia. Recommend chest radiograph in 8 weeks to confirm   resolution.            Ct Head Wo Contrast    Result Date: 1/24/2021 EXAMINATION: CT OF THE HEAD WITHOUT CONTRAST  1/24/2021 1:06 pm TECHNIQUE: CT of the head was performed without the administration of intravenous contrast. Dose modulation, iterative reconstruction, and/or weight based adjustment of the mA/kV was utilized to reduce the radiation dose to as low as reasonably achievable. COMPARISON: January 22nd HISTORY: ORDERING SYSTEM PROVIDED HISTORY: change in mental status TECHNOLOGIST PROVIDED HISTORY: Reason for exam:->change in mental status Has a \"code stroke\" or \"stroke alert\" been called? ->No Reason for Exam: increased lethargy,AMS Acuity: Acute Type of Exam: Initial FINDINGS: BRAIN/VENTRICLES: There is no acute intracranial hemorrhage, mass effect or midline shift. No abnormal extra-axial fluid collection. The gray-white differentiation is maintained without evidence of an acute infarct. There is no evidence of hydrocephalus. ORBITS: Negative SINUSES: Paranasal sinus disease with mucosal thickening most severe in the left maxillary sinus is again noted SOFT TISSUES/SKULL:  No acute abnormality of the visualized skull or soft tissues. No acute intracranial abnormality. Results for Herman Kussmaul (MRN [id number]) as of 1/25/2021 21:50   Ref.  Range 1/24/2021 19:37 1/25/2021 05:20 1/25/2021 05:25   Sodium Latest Ref Range: 136 - 145 mmol/L   142   Potassium Latest Ref Range: 3.5 - 5.1 mmol/L   4.0   Chloride Latest Ref Range: 99 - 110 mmol/L   106   CO2 Latest Ref Range: 21 - 32 mmol/L   24   BUN Latest Ref Range: 7 - 20 mg/dL   24 (H)   Creatinine Latest Ref Range: 0.9 - 1.3 mg/dL   0.8 (L)   Anion Gap Latest Ref Range: 3 - 16    12   GFR Non- Latest Ref Range: >60    >60   GFR  Latest Ref Range: >60    >60   Lactic Acid, Sepsis Latest Ref Range: 0.4 - 1.9 mmol/L 0.9     Glucose Latest Ref Range: 70 - 99 mg/dL   88   Calcium Latest Ref Range: 8.3 - 10.6 mg/dL   9.3   Total Protein Latest Ref Range: 6.4 - 8.2 g/dL   6.2 (L) Albumin Latest Ref Range: 3.4 - 5.0 g/dL   3.7   Globulin Latest Units: g/dL   2.5   Albumin/Globulin Ratio Latest Ref Range: 1.1 - 2.2    1.5   Alk Phos Latest Ref Range: 40 - 129 U/L   94   ALT Latest Ref Range: 10 - 40 U/L   24   AST Latest Ref Range: 15 - 37 U/L   38 (H)   Bilirubin Latest Ref Range: 0.0 - 1.0 mg/dL   <0.2   WBC Latest Ref Range: 4.0 - 11.0 K/uL   7.2   RBC Latest Ref Range: 4.20 - 5.90 M/uL   4.47   Hemoglobin Quant Latest Ref Range: 13.5 - 17.5 g/dL   11.8 (L)   Hematocrit Latest Ref Range: 40.5 - 52.5 %   36.7 (L)   MCV Latest Ref Range: 80.0 - 100.0 fL   82.1   MCH Latest Ref Range: 26.0 - 34.0 pg   26.3   MCHC Latest Ref Range: 31.0 - 36.0 g/dL   32.0   MPV Latest Ref Range: 5.0 - 10.5 fL   7.1   RDW Latest Ref Range: 12.4 - 15.4 %   15.1   Platelet Count Latest Ref Range: 135 - 450 K/uL   369   Neutrophils % Latest Units: %   92.6   Lymphocyte % Latest Units: %   4.8   Monocytes % Latest Units: %   2.4   Eosinophils % Latest Units: %   0.0   Basophils % Latest Units: %   0.2     Results for Ivonne Bronson (MRN [id number]) as of 1/25/2021 21:50   Ref. Range 1/20/2021 21:58 1/22/2021 16:50 1/23/2021 07:16 1/24/2021 07:11   Fibrinogen Latest Ref Range: 200 - 397 mg/dL 417 (H)      D-Dimer, Quant Latest Ref Range: 0 - 229 ng/mL DDU <200 <200 <200 <200     Results for Ivonne Dobson (MRN [id number]) as of 1/25/2021 21:50   Ref. Range 1/20/2021 22:00 1/21/2021 08:22 1/21/2021 10:25 1/21/2021 16:20 1/24/2021 12:31   CULTURE, BLOOD 1 Unknown     Rpt   CULTURE, BLOOD 2 Unknown  Rpt      Culture, Blood 2 Unknown  No Growth after 4 days of incubation. Urine Reflex to Culture Unknown   Not Indicated     Occult Blood Screening Unknown    Result: POSITIVE. .. (A)    BLOOD OCCULT STOOL SCREEN #1 Unknown    Rpt (A)    COVID-19 Unknown Rpt (A)       SARS-CoV-2, NAAT Latest Ref Range: Not Detected  DETECTED (AA)       L. pneumophila Serogp 1 Ur Ag Unknown   Presumptive Negat. .. LEGIONELLA ANTIGEN, URINE Unknown   Rpt       Results for Hiral Shoemaker (MRN [id number]) as of 1/25/2021 21:50   Ref. Range 12/1/2019 12:13 1/20/2021 21:58 1/24/2021 12:18   Carboxyhemoglobin Latest Ref Range: 0.0 - 1.5 % 2.3 (H) 1.8 (H)    O2 Therapy Unknown Unknown Unknown See comment   Hemoglobin, Art, Extended Latest Ref Range: 13.5 - 17.5 g/dL   13.0 (L)   pH, Arterial Latest Ref Range: 7.350 - 7.450    7.341 (L)   pCO2, Arterial Latest Ref Range: 35.0 - 45.0 mmHg   49.4 (H)   pO2, Arterial Latest Ref Range: 75.0 - 108.0 mmHg   109.3 (H)   HCO3, Arterial Latest Ref Range: 21.0 - 29.0 mmol/L   26.1   TCO2 (calc), Art Latest Ref Range: Not Established mmol/L   27.6   Base Excess, Arterial Latest Ref Range: -3.0 - 3.0 mmol/L   -0.2   O2 Sat, Arterial Latest Ref Range: >92 %   97.6   O2 Content, Arterial Latest Ref Range: Not Established mL/dL   18   Methemoglobin, Arterial Latest Ref Range: <1.5 %   0.5   Carboxyhgb, Arterial Latest Ref Range: 0.0 - 1.5 %   0.1   pH, Haresh Latest Ref Range: 7.350 - 7.450  7.394 7.452 (H)    pCO2, Haresh Latest Ref Range: 40.0 - 50.0 mmHg 51.2 (H) 38.4 (L)    pO2, Haresh Latest Ref Range: 25.0 - 40.0 mmHg 40.4 (H) 134.2 (H)    HCO3, Venous Latest Ref Range: 23.0 - 29.0 mmol/L 30.6 (H) 26.2    TC02 (Calc), Haresh Latest Ref Range: Not Established mmol/L 32 27    Base Excess, Haresh Latest Ref Range: -3.0 - 3.0 mmol/L 4.3 (H) 2.2    O2 Content, Haresh Latest Ref Range: Not Established VOL % 17 16    MetHgb, Haresh Latest Ref Range: <1.5 % 0.6 0.6    O2 Sat, Haresh Latest Ref Range: Not Established % 76 98          Assessment:  Active Problems:    2019-nCoV acute respiratory disease    Pulmonary infiltrates  Resolved Problems:    * No resolved hospital problems.  *          Plan:   · Oxygen supplementation, if required, to keep saturation between 90 to 94%  · Pulmonary toilet  · Patient is getting remdesivir which can be continued · Patient's steroids were discontinued by Dr. Safia Adamson secondary to increased confusion/ agitation-if better can consider low-dose of p.o.  Decadron  · Lovenox twice a day to continue  · Patient does not have any elevated D-dimers  · Patient's pulmonary infiltrates are improving  · Patient's Keppra and trazodone as per internal medicine team  · Lactulose at home dose to continue -will get ammonia levels in AM   · Neurochecks   · PUD prophylaxis as per IM           Electronically signed by:  [facility] FIRST COLONY, MD    1/25/2021    9:55 PM.

## 2021-01-27 PROCEDURE — 6370000000 HC RX 637 (ALT 250 FOR IP): Performed by: NURSE PRACTITIONER

## 2021-01-27 PROCEDURE — 2580000003 HC RX 258: Performed by: INTERNAL MEDICINE

## 2021-01-27 PROCEDURE — 2580000003 HC RX 258: Performed by: REGISTERED NURSE

## 2021-01-27 PROCEDURE — 6360000002 HC RX W HCPCS: Performed by: REGISTERED NURSE

## 2021-01-27 PROCEDURE — 6370000000 HC RX 637 (ALT 250 FOR IP): Performed by: REGISTERED NURSE

## 2021-01-27 PROCEDURE — 2060000000 HC ICU INTERMEDIATE R&B

## 2021-01-27 RX ADMIN — SODIUM CHLORIDE, PRESERVATIVE FREE 10 ML: 5 INJECTION INTRAVENOUS at 21:50

## 2021-01-27 RX ADMIN — LACTULOSE 20 G: 20 SOLUTION ORAL at 08:54

## 2021-01-27 RX ADMIN — MORPHINE SULFATE 2 MG: 2 INJECTION, SOLUTION INTRAMUSCULAR; INTRAVENOUS at 00:50

## 2021-01-27 RX ADMIN — TAMSULOSIN HYDROCHLORIDE 0.8 MG: 0.4 CAPSULE ORAL at 08:53

## 2021-01-27 RX ADMIN — SODIUM CHLORIDE, PRESERVATIVE FREE 10 ML: 5 INJECTION INTRAVENOUS at 14:50

## 2021-01-27 RX ADMIN — LACTULOSE 20 G: 20 SOLUTION ORAL at 21:54

## 2021-01-27 RX ADMIN — QUETIAPINE FUMARATE 100 MG: 50 TABLET, EXTENDED RELEASE ORAL at 08:54

## 2021-01-27 RX ADMIN — LEVETIRACETAM 250 MG: 100 INJECTION, SOLUTION INTRAVENOUS at 14:50

## 2021-01-27 RX ADMIN — QUETIAPINE FUMARATE 100 MG: 50 TABLET, EXTENDED RELEASE ORAL at 14:49

## 2021-01-27 RX ADMIN — TRAZODONE HYDROCHLORIDE 50 MG: 50 TABLET ORAL at 21:50

## 2021-01-27 RX ADMIN — ENOXAPARIN SODIUM 30 MG: 30 INJECTION SUBCUTANEOUS at 08:54

## 2021-01-27 RX ADMIN — QUETIAPINE FUMARATE 100 MG: 50 TABLET, EXTENDED RELEASE ORAL at 21:50

## 2021-01-27 RX ADMIN — MORPHINE SULFATE 2 MG: 2 INJECTION, SOLUTION INTRAMUSCULAR; INTRAVENOUS at 14:59

## 2021-01-27 RX ADMIN — METOPROLOL TARTRATE 25 MG: 25 TABLET, FILM COATED ORAL at 21:50

## 2021-01-27 RX ADMIN — METOPROLOL TARTRATE 25 MG: 25 TABLET, FILM COATED ORAL at 08:53

## 2021-01-27 ASSESSMENT — PAIN SCALES - GENERAL
PAINLEVEL_OUTOF10: 8
PAINLEVEL_OUTOF10: 6

## 2021-01-27 NOTE — PROGRESS NOTES
Hospitalist Progress Note      PCP: Abelardo Greer MD    Date of Admission: 1/20/2021    Chief Complaint: [facility] Course:   Nara Arrington is a 63 yo male with PMH of non-verbal MRDD who lives in a group home who presented to [facility] due to SOB/cough, found to have COVID pneumonia. He was admitted for further evaluation and management. Subjective:   Pt is on RA. Afebrile. VSS. No acute issues. Medications:  Reviewed    Infusion Medications     Scheduled Medications    enoxaparin  30 mg Subcutaneous Daily    levetiracetam  250 mg Intravenous Q12H    tamsulosin  0.8 mg Oral Daily    lactulose  20 g Oral BID    metoprolol tartrate  25 mg Per NG tube BID    QUEtiapine  100 mg Oral TID    traZODone  50 mg Per NG tube Nightly    sodium chloride flush  10 mL Intravenous 2 times per day     PRN Meds: morphine, LORazepam, haloperidol lactate, LORazepam, sodium chloride flush, promethazine **OR** ondansetron, guaiFENesin-dextromethorphan, acetaminophen **OR** acetaminophen      Intake/Output Summary (Last 24 hours) at 1/27/2021 1416  Last data filed at 1/27/2021 1248  Gross per 24 hour   Intake 180 ml   Output 750 ml   Net -570 ml       Physical Exam Performed:    /76   Pulse 77   Temp 97.6 °F (36.4 °C) (Axillary)   Resp 16   Ht 5' (1.524 m)   Wt 105 lb 13.1 oz (48 kg)   SpO2 92%   BMI 20.67 kg/m²     I performed an audiovisual assessment, based on new provisions and guidance offered by [facility] on March 18, 2020 in setting of COVID-19 outbreak and in order to preserve personal protective equipment in accordance with the flexibilities announced by CMS on March 30, 2020. References:   https://med. [place].gov/Portals/0/Resources/COVID-19/3_18%20Telemed%20Guidance%20Updated%20March%2018. pdf?jrk=[email or web address]   [email or web address] pdf Bedside physical examination deferred. However, I did visually inspect the patient and observed the following:     General appearance: No apparent distress. Neck: Deferred. Respiratory:  Normal respiratory effort. Cardiovascular: Deferred. Abdomen: Deferred. Musculoskelatal: Deferred. Neurologic:  Deferred. Psychiatric: Deferred. Skin: Deferred. Labs:   Recent Labs     01/25/21 0525 01/26/21 0429   WBC 7.2 7.9   HGB 11.8* 11.6*   HCT 36.7* 35.5*    349     Recent Labs     01/25/21 0525 01/26/21 0429    139   K 4.0 3.4*    102   CO2 24 27   BUN 24* 20   CREATININE 0.8* 0.9   CALCIUM 9.3 9.4     Recent Labs     01/25/21 0525 01/26/21 0429   AST 38* 24   ALT 24 24   BILITOT <0.2 0.3   ALKPHOS 94 89     Urinalysis:      Lab Results   Component Value Date    NITRU Negative 01/21/2021    WBCUA  10/14/2020    BACTERIA 4+ 10/14/2020    RBCUA 0-2 10/14/2020    BLOODU Negative 01/21/2021    SPECGRAV 1.015 01/21/2021    GLUCOSEU Negative 01/21/2021       Radiology:  XR CHEST PORTABLE   Final Result   There is notable improved aeration of lungs bilaterally with a few scattered   opacities remaining         CT HEAD WO CONTRAST   Final Result   No acute intracranial abnormality. CT HEAD WO CONTRAST   Final Result   No acute intracranial abnormality. XR CHEST PORTABLE   Final Result   1. Multifocal pneumonia. Recommend chest radiograph in 8 weeks to confirm   resolution. Assessment/Plan:    Active Hospital Problems    Diagnosis    2019-nCoV acute respiratory disease [U07.1, J06.9]    Pulmonary infiltrates [R91.8]       Acute hypoxic respiratory failure due to COVID19 pneumonia:  - Wean supplemental O2 as tolerated. He is currently on RA.   - S/p convalescent plasma and 5 days of IV remdesivir. Dc'd steroids on 1/25 given pt is on RA now and developed worsening agitation.

## 2021-01-27 NOTE — PROGRESS NOTES
Comprehensive Nutrition Assessment    Type and Reason for Visit:  Initial    Nutrition Recommendations/Plan:   1. Continue diet per SLP  2. Trial magic cups with meals. Pudding as desired. Do not have ensure pudding in hospital  3. Monitor nutrition adequacy, pertinent labs, bowel habits, wt changes, and clinical progress      Nutrition Assessment:  LOS assessment: Pt is a 61 y/o female admitted with COVID-19 infection. Hx of MRDD, resides at group home. Currently ordered on pureed diet with nectar thick liquids. PO intakes variable- consuming 1-100% of meals. Likes ensure puddings, will trial puddings and magic cups at meals. Wt appears stable per EMR, 102-105 lbs x past year. Malnutrition Assessment:  Malnutrition Status: At risk for malnutrition (Comment)    Context:  Acute Illness         Estimated Daily Nutrient Needs:  Energy (kcal):  5294-3151; Weight Used for Energy Requirements:  Current(48 kg)     Protein (g):  48-58 g; Weight Used for Protein Requirements:  Current(1-1.2)        Fluid (ml/day):   ; Method Used for Fluid Requirements:  1 ml/kcal      Nutrition Related Findings:  +BM 1/26      Wounds:  None       Current Nutrition Therapies:    DIET CARDIAC; Dysphagia Pureed; Mildly Thick (Nectar)  Dietary Nutrition Supplements: Frozen Oral Supplement    Anthropometric Measures:  · Height: 5' (152.4 cm)  · Current Body Weight: 105 lb (47.6 kg)   · Admission Body Weight:      · Usual Body Weight: (102-105 lbs x past yr)     · Ideal Body Weight: 106 lbs; % Ideal Body Weight 99.1 %   · BMI: 20.5  · BMI Categories: Normal Weight (BMI 18.5-24. 9)       Nutrition Diagnosis:   · Inadequate oral intake related to early satiety as evidenced by (variable PO intake 0-100%)      Nutrition Interventions:   Food and/or Nutrient Delivery:  Start Oral Nutrition Supplement, Continue Current Diet  Nutrition Education/Counseling:  No recommendation at this time Coordination of Nutrition Care:  Continue to monitor while inpatient    Goals: Tolerate diet and consume greater than 50% of meals and ONS this admission       Nutrition Monitoring and Evaluation:   Behavioral-Environmental Outcomes:  None Identified   Food/Nutrient Intake Outcomes:  Food and Nutrient Intake, Supplement Intake  Physical Signs/Symptoms Outcomes:  None Identified     Discharge Planning:    Continue Oral Nutrition Supplement, Continue current diet     Electronically signed by Zhou Fernández.  Marah Cheatham RD, LD on 1/27/21 at 9:39 AM EST    Contact: [phone]

## 2021-01-27 NOTE — CARE COORDINATION
Call placed to Tamara REEVES at [facility] at [phone] to get update on their staffing and when they will be able to take patient back. She states they will be able to take patient back by Sunday 1/31/21 . They are limited due to multiple staff having covid and patient requiring 1 on 1 care there.

## 2021-01-27 NOTE — PLAN OF CARE
Problem: Falls - Risk of:  Goal: Will remain free from falls  Description: Will remain free from falls  Outcome: Ongoing     Problem: Falls - Risk of:  Goal: Absence of physical injury  Description: Absence of physical injury  Outcome: Ongoing     Problem: Airway Clearance - Ineffective  Goal: Achieve or maintain patent airway  Outcome: Ongoing     Problem: Gas Exchange - Impaired  Goal: Absence of hypoxia  Outcome: Ongoing

## 2021-01-28 LAB — BLOOD CULTURE, ROUTINE: NORMAL

## 2021-01-28 PROCEDURE — 2580000003 HC RX 258: Performed by: REGISTERED NURSE

## 2021-01-28 PROCEDURE — 2580000003 HC RX 258: Performed by: INTERNAL MEDICINE

## 2021-01-28 PROCEDURE — 2060000000 HC ICU INTERMEDIATE R&B

## 2021-01-28 PROCEDURE — 6360000002 HC RX W HCPCS: Performed by: REGISTERED NURSE

## 2021-01-28 PROCEDURE — 6360000002 HC RX W HCPCS: Performed by: PHYSICIAN ASSISTANT

## 2021-01-28 PROCEDURE — 6370000000 HC RX 637 (ALT 250 FOR IP): Performed by: NURSE PRACTITIONER

## 2021-01-28 PROCEDURE — 6370000000 HC RX 637 (ALT 250 FOR IP): Performed by: REGISTERED NURSE

## 2021-01-28 RX ADMIN — LEVETIRACETAM 250 MG: 100 INJECTION, SOLUTION INTRAVENOUS at 00:20

## 2021-01-28 RX ADMIN — MORPHINE SULFATE 2 MG: 2 INJECTION, SOLUTION INTRAMUSCULAR; INTRAVENOUS at 14:36

## 2021-01-28 RX ADMIN — LEVETIRACETAM 250 MG: 100 INJECTION, SOLUTION INTRAVENOUS at 21:47

## 2021-01-28 RX ADMIN — LORAZEPAM 2 MG: 2 INJECTION INTRAMUSCULAR; INTRAVENOUS at 22:16

## 2021-01-28 RX ADMIN — QUETIAPINE FUMARATE 100 MG: 50 TABLET, EXTENDED RELEASE ORAL at 21:47

## 2021-01-28 RX ADMIN — SODIUM CHLORIDE, PRESERVATIVE FREE 10 ML: 5 INJECTION INTRAVENOUS at 21:48

## 2021-01-28 RX ADMIN — METOPROLOL TARTRATE 25 MG: 25 TABLET, FILM COATED ORAL at 21:47

## 2021-01-28 RX ADMIN — TRAZODONE HYDROCHLORIDE 50 MG: 50 TABLET ORAL at 21:47

## 2021-01-28 RX ADMIN — QUETIAPINE FUMARATE 100 MG: 50 TABLET, EXTENDED RELEASE ORAL at 14:14

## 2021-01-28 RX ADMIN — Medication 10 ML: at 03:25

## 2021-01-28 RX ADMIN — LACTULOSE 20 G: 20 SOLUTION ORAL at 14:17

## 2021-01-28 RX ADMIN — LEVETIRACETAM 250 MG: 100 INJECTION, SOLUTION INTRAVENOUS at 14:33

## 2021-01-28 RX ADMIN — LORAZEPAM 2 MG: 2 INJECTION INTRAMUSCULAR; INTRAVENOUS at 00:21

## 2021-01-28 RX ADMIN — ENOXAPARIN SODIUM 30 MG: 30 INJECTION SUBCUTANEOUS at 10:20

## 2021-01-28 RX ADMIN — METOPROLOL TARTRATE 25 MG: 25 TABLET, FILM COATED ORAL at 14:14

## 2021-01-28 RX ADMIN — Medication 10 ML: at 22:16

## 2021-01-28 RX ADMIN — MORPHINE SULFATE 2 MG: 2 INJECTION, SOLUTION INTRAMUSCULAR; INTRAVENOUS at 03:25

## 2021-01-28 RX ADMIN — LACTULOSE 20 G: 20 SOLUTION ORAL at 21:48

## 2021-01-28 NOTE — PROGRESS NOTES
Bedside physical examination deferred. However, I did visually inspect the patient and observed the following:     General appearance: No apparent distress. Neck: Deferred. Respiratory:  Normal respiratory effort. Cardiovascular: Deferred. Abdomen: Deferred. Musculoskelatal: Deferred. Neurologic:  Deferred. Psychiatric: Deferred. Skin: Deferred. Labs:   Recent Labs     01/26/21 0429   WBC 7.9   HGB 11.6*   HCT 35.5*        Recent Labs     01/26/21 0429      K 3.4*      CO2 27   BUN 20   CREATININE 0.9   CALCIUM 9.4     Recent Labs     01/26/21 0429   AST 24   ALT 24   BILITOT 0.3   ALKPHOS 89     Urinalysis:      Lab Results   Component Value Date    NITRU Negative 01/21/2021    WBCUA  10/14/2020    BACTERIA 4+ 10/14/2020    RBCUA 0-2 10/14/2020    BLOODU Negative 01/21/2021    SPECGRAV 1.015 01/21/2021    GLUCOSEU Negative 01/21/2021       Radiology:  XR CHEST PORTABLE   Final Result   There is notable improved aeration of lungs bilaterally with a few scattered   opacities remaining         CT HEAD WO CONTRAST   Final Result   No acute intracranial abnormality. CT HEAD WO CONTRAST   Final Result   No acute intracranial abnormality. XR CHEST PORTABLE   Final Result   1. Multifocal pneumonia. Recommend chest radiograph in 8 weeks to confirm   resolution. Assessment/Plan:    Active Hospital Problems    Diagnosis    2019-nCoV acute respiratory disease [U07.1, J06.9]    Pulmonary infiltrates [R91.8]       Acute hypoxic respiratory failure due to COVID19 pneumonia:  - Wean supplemental O2 as tolerated. He is currently on RA.   - S/p convalescent plasma and 5 days of IV remdesivir. Dc'd steroids on 1/25 given pt is on RA now and developed worsening agitation.   - Procalcitonin was negative. No leukocytosis and remains afebrile. Blood cx's are NGTD. No indication for abx as of now.      Agitation in pt with non-verbal MR-DD: - Pt noted to be obtunded on recent days therefore his home seroquel was held. Pt extremely agitated/restless on 1/25. Per nursing staff at [facility] does not sound like he is much off from his baseline.   - CT head was negative on 1/24.   - Continue prn ativan and haldol. His home seroquel was resumed on 1/25 with hold parameters in place. Dysphagia:  - SLP consulted with recs for dysphagia I.     Urinary retention:  - Chronic issue at [facility], pt requires prn straight cath. Colindres cath currently in place. Continue flomax. Normocytic Anemia   - Positive FOBT. Hgb stable. - Would defer testing/procedure until recovers from covid 19 infection. Can likely do as outpatient.     Remote history of subdural hematoma:  - Noted in history. DVT Prophylaxis: Lovenox   Diet: DIET CARDIAC; Dysphagia Pureed; Mildly Thick (Nectar)  Dietary Nutrition Supplements: Frozen Oral Supplement  Code Status: Full Code    PT/OT Eval Status: Not indicated     Dispo - Pt is medically ready for dc. Pt lives at [facility]. Unfortunately they do not have enough staffing capability until 1/31. Will plan for dc on 1/31.      [facility], APRN - CNP

## 2021-01-28 NOTE — CARE COORDINATION
Patient from group home. Tamara REEVES at group home can be reached at [phone]. Fabrice Francoiser caregiver at group home can be reached at [phone]. LIBAN ( legal guardian) can be reached at [phone]. Patient gets 24 hour 1 on 1 care at group home. Due to multiple staff getting covid they are having staffing issues and cannot take patient back until Sunday 1/31. Tamara REEVES states he will need ambulance transport home to be arranged by writer or covering  day of discharge. Patient is medically ready for discharge per attending. Risk outweigh benefits of trying to get alternate placement at a SNF. Patient is agitated at baseline and does better in his own environment at group home. Will plan for discharge home on Sunday back to group [facility] once staffing is adequate to accommodate patient's needs.

## 2021-01-29 LAB
ANION GAP SERPL CALCULATED.3IONS-SCNC: 8 MMOL/L (ref 3–16)
BUN BLDV-MCNC: 18 MG/DL (ref 7–20)
CALCIUM SERPL-MCNC: 9.3 MG/DL (ref 8.3–10.6)
CHLORIDE BLD-SCNC: 107 MMOL/L (ref 99–110)
CO2: 30 MMOL/L (ref 21–32)
CREAT SERPL-MCNC: 0.8 MG/DL (ref 0.9–1.3)
GFR AFRICAN AMERICAN: >60
GFR NON-AFRICAN AMERICAN: >60
GLUCOSE BLD-MCNC: 103 MG/DL (ref 70–99)
GLUCOSE BLD-MCNC: 97 MG/DL (ref 70–99)
HCT VFR BLD CALC: 39.4 % (ref 40.5–52.5)
HEMOGLOBIN: 12.6 G/DL (ref 13.5–17.5)
MCH RBC QN AUTO: 26.3 PG (ref 26–34)
MCHC RBC AUTO-ENTMCNC: 32 G/DL (ref 31–36)
MCV RBC AUTO: 82.3 FL (ref 80–100)
PDW BLD-RTO: 14.6 % (ref 12.4–15.4)
PERFORMED ON: NORMAL
PLATELET # BLD: 308 K/UL (ref 135–450)
PMV BLD AUTO: 7.4 FL (ref 5–10.5)
POTASSIUM REFLEX MAGNESIUM: 3.7 MMOL/L (ref 3.5–5.1)
RBC # BLD: 4.79 M/UL (ref 4.2–5.9)
SODIUM BLD-SCNC: 145 MMOL/L (ref 136–145)
WBC # BLD: 7 K/UL (ref 4–11)

## 2021-01-29 PROCEDURE — 80048 BASIC METABOLIC PNL TOTAL CA: CPT

## 2021-01-29 PROCEDURE — 85027 COMPLETE CBC AUTOMATED: CPT

## 2021-01-29 PROCEDURE — 6360000002 HC RX W HCPCS: Performed by: REGISTERED NURSE

## 2021-01-29 PROCEDURE — 36415 COLL VENOUS BLD VENIPUNCTURE: CPT

## 2021-01-29 PROCEDURE — 6370000000 HC RX 637 (ALT 250 FOR IP): Performed by: NURSE PRACTITIONER

## 2021-01-29 PROCEDURE — 2580000003 HC RX 258: Performed by: INTERNAL MEDICINE

## 2021-01-29 PROCEDURE — 2580000003 HC RX 258: Performed by: REGISTERED NURSE

## 2021-01-29 PROCEDURE — 6370000000 HC RX 637 (ALT 250 FOR IP): Performed by: REGISTERED NURSE

## 2021-01-29 PROCEDURE — 6360000002 HC RX W HCPCS: Performed by: PHYSICIAN ASSISTANT

## 2021-01-29 PROCEDURE — 2060000000 HC ICU INTERMEDIATE R&B

## 2021-01-29 RX ADMIN — LACTULOSE 20 G: 20 SOLUTION ORAL at 09:02

## 2021-01-29 RX ADMIN — METOPROLOL TARTRATE 25 MG: 25 TABLET, FILM COATED ORAL at 09:02

## 2021-01-29 RX ADMIN — LEVETIRACETAM 250 MG: 100 INJECTION, SOLUTION INTRAVENOUS at 21:47

## 2021-01-29 RX ADMIN — LEVETIRACETAM 250 MG: 100 INJECTION, SOLUTION INTRAVENOUS at 11:08

## 2021-01-29 RX ADMIN — LACTULOSE 20 G: 20 SOLUTION ORAL at 21:29

## 2021-01-29 RX ADMIN — QUETIAPINE FUMARATE 100 MG: 50 TABLET, EXTENDED RELEASE ORAL at 14:05

## 2021-01-29 RX ADMIN — LORAZEPAM 2 MG: 2 INJECTION INTRAMUSCULAR; INTRAVENOUS at 07:58

## 2021-01-29 RX ADMIN — Medication 10 ML: at 01:08

## 2021-01-29 RX ADMIN — QUETIAPINE FUMARATE 100 MG: 50 TABLET, EXTENDED RELEASE ORAL at 21:29

## 2021-01-29 RX ADMIN — TAMSULOSIN HYDROCHLORIDE 0.8 MG: 0.4 CAPSULE ORAL at 09:02

## 2021-01-29 RX ADMIN — MORPHINE SULFATE 2 MG: 2 INJECTION, SOLUTION INTRAMUSCULAR; INTRAVENOUS at 01:06

## 2021-01-29 RX ADMIN — SODIUM CHLORIDE, PRESERVATIVE FREE 10 ML: 5 INJECTION INTRAVENOUS at 10:14

## 2021-01-29 RX ADMIN — QUETIAPINE FUMARATE 100 MG: 50 TABLET, EXTENDED RELEASE ORAL at 09:02

## 2021-01-29 RX ADMIN — SODIUM CHLORIDE, PRESERVATIVE FREE 10 ML: 5 INJECTION INTRAVENOUS at 21:29

## 2021-01-29 RX ADMIN — Medication 10 ML: at 23:13

## 2021-01-29 RX ADMIN — METOPROLOL TARTRATE 25 MG: 25 TABLET, FILM COATED ORAL at 21:39

## 2021-01-29 RX ADMIN — TRAZODONE HYDROCHLORIDE 50 MG: 50 TABLET ORAL at 21:29

## 2021-01-29 RX ADMIN — ENOXAPARIN SODIUM 30 MG: 30 INJECTION SUBCUTANEOUS at 09:02

## 2021-01-29 RX ADMIN — SODIUM CHLORIDE, PRESERVATIVE FREE 10 ML: 5 INJECTION INTRAVENOUS at 09:03

## 2021-01-29 ASSESSMENT — PAIN SCALES - GENERAL: PAINLEVEL_OUTOF10: 2

## 2021-01-29 NOTE — CARE COORDINATION
Spoke with Issa REEVES at [facility]. Her contact number is [phone]. She states they can accept patient back on Sunday. She wants orders faxed to her directly at [phone]. She stated if patient is coming back on thickened liquids he needs to come on something called Simply Thick and the order has to be written for [employer] Thick in order for insurance to cover it. Plan for discharge back to [facility] on Sunday.

## 2021-01-29 NOTE — PROGRESS NOTES
Hospitalist Progress Note      PCP: Beckie Leventhal, MD    Date of Admission: 1/20/2021    Chief Complaint: [facility] Course:   Laquita Gonzales is a 63 yo male with PMH of non-verbal MRDD who lives in a group home who presented to [facility] due to SOB/cough, found to have COVID pneumonia. He was admitted for further evaluation and management. Subjective:   Pt is on RA. Afebrile. VSS. No acute issues. Medications:  Reviewed    Infusion Medications     Scheduled Medications    enoxaparin  30 mg Subcutaneous Daily    levetiracetam  250 mg Intravenous Q12H    tamsulosin  0.8 mg Oral Daily    lactulose  20 g Oral BID    metoprolol tartrate  25 mg Per NG tube BID    QUEtiapine  100 mg Oral TID    traZODone  50 mg Per NG tube Nightly    sodium chloride flush  10 mL Intravenous 2 times per day     PRN Meds: morphine, LORazepam, haloperidol lactate, LORazepam, sodium chloride flush, promethazine **OR** ondansetron, guaiFENesin-dextromethorphan, acetaminophen **OR** acetaminophen      Intake/Output Summary (Last 24 hours) at 1/29/2021 1412  Last data filed at 1/29/2021 1312  Gross per 24 hour   Intake    Output 825 ml   Net -825 ml       Physical Exam Performed:    /79   Pulse 67   Temp 97.6 °F (36.4 °C) (Axillary)   Resp 16   Ht 5' (1.524 m)   Wt 98 lb 1.7 oz (44.5 kg)   SpO2 92%   BMI 19.16 kg/m²     I performed an audiovisual assessment, based on new provisions and guidance offered by [facility] on March 18, 2020 in setting of COVID-19 outbreak and in order to preserve personal protective equipment in accordance with the flexibilities announced by CMS on March 30, 2020. References:   https://med. [place].gov/Portals/0/Resources/COVID-19/3_18%20Telemed%20Guidance%20Updated%20March%2018. pdf?fuf=[email or web address]   http://[employer]/. pdf Bedside physical examination deferred. However, I did visually inspect the patient and observed the following:     General appearance: No apparent distress. Neck: Deferred. Respiratory:  Normal respiratory effort. Cardiovascular: Deferred. Abdomen: Deferred. Musculoskelatal: Deferred. Neurologic:  Deferred. Psychiatric: Deferred. Skin: Deferred. Labs:   Recent Labs     01/29/21  0457   WBC 7.0   HGB 12.6*   HCT 39.4*        Recent Labs     01/29/21  0457      K 3.7      CO2 30   BUN 18   CREATININE 0.8*   CALCIUM 9.3     No results for input(s): AST, ALT, BILIDIR, BILITOT, ALKPHOS in the last 72 hours. Urinalysis:      Lab Results   Component Value Date    NITRU Negative 01/21/2021    WBCUA  10/14/2020    BACTERIA 4+ 10/14/2020    RBCUA 0-2 10/14/2020    BLOODU Negative 01/21/2021    SPECGRAV 1.015 01/21/2021    GLUCOSEU Negative 01/21/2021       Radiology:  XR CHEST PORTABLE   Final Result   There is notable improved aeration of lungs bilaterally with a few scattered   opacities remaining         CT HEAD WO CONTRAST   Final Result   No acute intracranial abnormality. CT HEAD WO CONTRAST   Final Result   No acute intracranial abnormality. XR CHEST PORTABLE   Final Result   1. Multifocal pneumonia. Recommend chest radiograph in 8 weeks to confirm   resolution. Assessment/Plan:    Active Hospital Problems    Diagnosis    2019-nCoV acute respiratory disease [U07.1, J06.9]    Pulmonary infiltrates [R91.8]       Acute hypoxic respiratory failure due to COVID19 pneumonia (resolved):  - Wean supplemental O2 as tolerated. He is currently on RA.   - S/p convalescent plasma and 5 days of IV remdesivir. Dc'd steroids on 1/25 given pt is on RA now and developed worsening agitation.   - Procalcitonin was negative. No leukocytosis and remains afebrile. Blood cx's are NGTD. No indication for abx as of now.      Agitation in pt with non-verbal MR-DD:

## 2021-01-30 LAB
ANION GAP SERPL CALCULATED.3IONS-SCNC: 9 MMOL/L (ref 3–16)
BUN BLDV-MCNC: 16 MG/DL (ref 7–20)
CALCIUM SERPL-MCNC: 9.1 MG/DL (ref 8.3–10.6)
CHLORIDE BLD-SCNC: 108 MMOL/L (ref 99–110)
CO2: 30 MMOL/L (ref 21–32)
CREAT SERPL-MCNC: 0.9 MG/DL (ref 0.9–1.3)
GFR AFRICAN AMERICAN: >60
GFR NON-AFRICAN AMERICAN: >60
GLUCOSE BLD-MCNC: 114 MG/DL (ref 70–99)
GLUCOSE BLD-MCNC: 126 MG/DL (ref 70–99)
HCT VFR BLD CALC: 38.5 % (ref 40.5–52.5)
HEMOGLOBIN: 12.6 G/DL (ref 13.5–17.5)
MCH RBC QN AUTO: 26.9 PG (ref 26–34)
MCHC RBC AUTO-ENTMCNC: 32.8 G/DL (ref 31–36)
MCV RBC AUTO: 82.2 FL (ref 80–100)
PDW BLD-RTO: 15 % (ref 12.4–15.4)
PERFORMED ON: ABNORMAL
PLATELET # BLD: 281 K/UL (ref 135–450)
PMV BLD AUTO: 7.6 FL (ref 5–10.5)
POTASSIUM REFLEX MAGNESIUM: 3.6 MMOL/L (ref 3.5–5.1)
RBC # BLD: 4.69 M/UL (ref 4.2–5.9)
SODIUM BLD-SCNC: 147 MMOL/L (ref 136–145)
WBC # BLD: 5.3 K/UL (ref 4–11)

## 2021-01-30 PROCEDURE — 6370000000 HC RX 637 (ALT 250 FOR IP): Performed by: REGISTERED NURSE

## 2021-01-30 PROCEDURE — 85027 COMPLETE CBC AUTOMATED: CPT

## 2021-01-30 PROCEDURE — 36415 COLL VENOUS BLD VENIPUNCTURE: CPT

## 2021-01-30 PROCEDURE — 94761 N-INVAS EAR/PLS OXIMETRY MLT: CPT

## 2021-01-30 PROCEDURE — 2580000003 HC RX 258: Performed by: REGISTERED NURSE

## 2021-01-30 PROCEDURE — 2700000000 HC OXYGEN THERAPY PER DAY

## 2021-01-30 PROCEDURE — 80048 BASIC METABOLIC PNL TOTAL CA: CPT

## 2021-01-30 PROCEDURE — 6360000002 HC RX W HCPCS: Performed by: REGISTERED NURSE

## 2021-01-30 PROCEDURE — 6370000000 HC RX 637 (ALT 250 FOR IP): Performed by: NURSE PRACTITIONER

## 2021-01-30 PROCEDURE — 84443 ASSAY THYROID STIM HORMONE: CPT

## 2021-01-30 PROCEDURE — 2060000000 HC ICU INTERMEDIATE R&B

## 2021-01-30 PROCEDURE — 6360000002 HC RX W HCPCS: Performed by: PHYSICIAN ASSISTANT

## 2021-01-30 PROCEDURE — 2580000003 HC RX 258: Performed by: INTERNAL MEDICINE

## 2021-01-30 RX ORDER — DEXTROSE MONOHYDRATE 50 MG/ML
INJECTION, SOLUTION INTRAVENOUS CONTINUOUS
Status: DISCONTINUED | OUTPATIENT
Start: 2021-01-30 | End: 2021-02-01 | Stop reason: HOSPADM

## 2021-01-30 RX ADMIN — DEXTROSE MONOHYDRATE: 50 INJECTION, SOLUTION INTRAVENOUS at 13:21

## 2021-01-30 RX ADMIN — Medication 10 ML: at 03:08

## 2021-01-30 RX ADMIN — LACTULOSE 20 G: 20 SOLUTION ORAL at 21:42

## 2021-01-30 RX ADMIN — ENOXAPARIN SODIUM 30 MG: 30 INJECTION SUBCUTANEOUS at 08:01

## 2021-01-30 RX ADMIN — LEVETIRACETAM 250 MG: 100 INJECTION, SOLUTION INTRAVENOUS at 21:41

## 2021-01-30 RX ADMIN — TAMSULOSIN HYDROCHLORIDE 0.8 MG: 0.4 CAPSULE ORAL at 08:01

## 2021-01-30 RX ADMIN — LEVETIRACETAM 250 MG: 100 INJECTION, SOLUTION INTRAVENOUS at 08:43

## 2021-01-30 RX ADMIN — LORAZEPAM 2 MG: 2 INJECTION INTRAMUSCULAR; INTRAVENOUS at 09:02

## 2021-01-30 RX ADMIN — TRAZODONE HYDROCHLORIDE 50 MG: 50 TABLET ORAL at 21:41

## 2021-01-30 RX ADMIN — METOPROLOL TARTRATE 25 MG: 25 TABLET, FILM COATED ORAL at 08:01

## 2021-01-30 RX ADMIN — QUETIAPINE FUMARATE 100 MG: 50 TABLET, EXTENDED RELEASE ORAL at 13:54

## 2021-01-30 RX ADMIN — LORAZEPAM 2 MG: 2 INJECTION INTRAMUSCULAR; INTRAVENOUS at 03:08

## 2021-01-30 RX ADMIN — LACTULOSE 20 G: 20 SOLUTION ORAL at 08:01

## 2021-01-30 RX ADMIN — QUETIAPINE FUMARATE 100 MG: 50 TABLET, EXTENDED RELEASE ORAL at 21:41

## 2021-01-30 RX ADMIN — LORAZEPAM 2 MG: 2 INJECTION INTRAMUSCULAR; INTRAVENOUS at 18:34

## 2021-01-30 RX ADMIN — SODIUM CHLORIDE, PRESERVATIVE FREE 10 ML: 5 INJECTION INTRAVENOUS at 08:53

## 2021-01-30 RX ADMIN — METOPROLOL TARTRATE 25 MG: 25 TABLET, FILM COATED ORAL at 21:42

## 2021-01-30 RX ADMIN — QUETIAPINE FUMARATE 100 MG: 50 TABLET, EXTENDED RELEASE ORAL at 08:01

## 2021-01-30 ASSESSMENT — PAIN SCALES - GENERAL
PAINLEVEL_OUTOF10: 5
PAINLEVEL_OUTOF10: 1
PAINLEVEL_OUTOF10: 0

## 2021-01-30 NOTE — PROGRESS NOTES
Warming blanket applied @ 0350 AM. Initial temp: 94.9 rectal, 94.0 oral. [place] set at 43 degrees celsius.  Will recheck temp rectally every 30 mins until temp starts rising, when every 2 hrs per NP.

## 2021-01-30 NOTE — PROGRESS NOTES
NP paged regarding pt's decreased body temp. Awaiting response. 1/30/21 3:37 AM   [phone] Hospital or Facility: [facility] From: Clara Kuhn RE: Mery Portillo 1960 RM: [phone] Pt's temp reading 94.9 rectal and 94.0 oral. Blood glucose read 126. BP, HR, O2 are stable. Need Callback: NO CALLBACK REQ C4 PROGRESSIVE CARE  -RN  Read 3:38 AM     -Order for Warming blanket placed by NP.    1/30/21 3:45 AM   Would you suggest hourly temp checks? or how frequent?  -RN  Read 3:46 AM       1/30/21 3:46 AM   Every hour times 2 just to make sure its going up.  Then 2 hours  -NP

## 2021-01-30 NOTE — FLOWSHEET NOTE
01/30/21 0312   Vital Signs   Temp 94.9 °F (34.9 °C)   Temp Source Rectal   Pulse 80   Heart Rate Source Monitor   Resp 16   /81   BP Location Right upper arm   MAP (mmHg) 92   Patient Position Semi fowlers   Level of Consciousness Alert (0)   MEWS Score 3   Patient Currently in Pain Other (comment)  (FLACC)   Height and Weight   Weight 98 lb 1.7 oz (44.5 kg)   BMI (Calculated) 19.2   Pain Assessment   Pain Assessment FLACC   Pain Level 5   Pain Assessment/FLACC   Pain Rating: FLACC (rest) - Face 1   Pain Rating: FLACC (rest) - Legs 1   Pain Rating: FLACC (rest) - Activity 1   Pain Rating: FLACC (rest) - Cry 1   Pain Rating: FLACC (rest) - Consolability 1   Score: FLACC (rest) 5   Oxygen Therapy   SpO2 95 %   Pulse Oximeter Device Mode Intermittent   Pulse Oximeter Device Location Finger   O2 Device None (Room air)   O2 Flow Rate (L/min) 0 L/min

## 2021-01-30 NOTE — PROGRESS NOTES
Ativan 2mg IV given for agitation. Pt. Throwing his legs over side rails, crying and restless. Fed pt. Breakfast. Poor po appetite.

## 2021-01-30 NOTE — PROGRESS NOTES
Hospitalist Progress Note      PCP: Lani Fabian MD    Date of Admission: 1/20/2021    Chief Complaint: [facility] Course:   Michael Wakefield is a 63 yo male with PMH of non-verbal MRDD who lives in a group home who presented to [facility] due to SOB/cough, found to have COVID pneumonia. He was admitted for further evaluation and management. Subjective:   Pt is on RA. Afebrile. VSS. Hypothermic overnight requiring bearhugger, now resolved. Medications:  Reviewed    Infusion Medications    dextrose       Scheduled Medications    enoxaparin  30 mg Subcutaneous Daily    levetiracetam  250 mg Intravenous Q12H    tamsulosin  0.8 mg Oral Daily    lactulose  20 g Oral BID    metoprolol tartrate  25 mg Per NG tube BID    QUEtiapine  100 mg Oral TID    traZODone  50 mg Per NG tube Nightly    sodium chloride flush  10 mL Intravenous 2 times per day     PRN Meds: morphine, LORazepam, haloperidol lactate, LORazepam, sodium chloride flush, promethazine **OR** ondansetron, guaiFENesin-dextromethorphan, acetaminophen **OR** acetaminophen      Intake/Output Summary (Last 24 hours) at 1/30/2021 1307  Last data filed at 1/30/2021 1252  Gross per 24 hour   Intake 400.76 ml   Output 575 ml   Net -174.24 ml       Physical Exam Performed:    /65   Pulse 82   Temp 98.5 °F (36.9 °C) (Axillary)   Resp 18   Ht 5' (1.524 m)   Wt 98 lb 1.7 oz (44.5 kg)   SpO2 98%   BMI 19.16 kg/m²     I performed an audiovisual assessment, based on new provisions and guidance offered by [facility] on March 18, 2020 in setting of COVID-19 outbreak and in order to preserve personal protective equipment in accordance with the flexibilities announced by CMS on March 30, 2020. References:   https://med. [place].gov/Portals/0/Resources/COVID-19/3_18%20Telemed%20Guidance%20Updated%20March%2018. pdf?ubq=[email or web address]   http://nelDiamond Microwave DevicesdonnaCinemur/. pdf Bedside physical examination deferred. However, I did visually inspect the patient and observed the following:     General appearance: No apparent distress. Neck: Deferred. Respiratory:  Normal respiratory effort. Cardiovascular: Deferred. Abdomen: Deferred. Musculoskelatal: Deferred. Neurologic:  Deferred. Psychiatric: Deferred. Skin: Deferred. Labs:   Recent Labs     01/29/21  0457 01/30/21  1144   WBC 7.0 5.3   HGB 12.6* 12.6*   HCT 39.4* 38.5*    281     Recent Labs     01/29/21  0457 01/30/21  1144    147*   K 3.7 3.6    108   CO2 30 30   BUN 18 16   CREATININE 0.8* 0.9   CALCIUM 9.3 9.1     Urinalysis:      Lab Results   Component Value Date    NITRU Negative 01/21/2021    WBCUA  10/14/2020    BACTERIA 4+ 10/14/2020    RBCUA 0-2 10/14/2020    BLOODU Negative 01/21/2021    SPECGRAV 1.015 01/21/2021    GLUCOSEU Negative 01/21/2021       Radiology:  XR CHEST PORTABLE   Final Result   There is notable improved aeration of lungs bilaterally with a few scattered   opacities remaining         CT HEAD WO CONTRAST   Final Result   No acute intracranial abnormality. CT HEAD WO CONTRAST   Final Result   No acute intracranial abnormality. XR CHEST PORTABLE   Final Result   1. Multifocal pneumonia. Recommend chest radiograph in 8 weeks to confirm   resolution. Assessment/Plan:    Active Hospital Problems    Diagnosis    2019-nCoV acute respiratory disease [U07.1, J06.9]    Pulmonary infiltrates [R91.8]       Acute hypoxic respiratory failure due to COVID19 pneumonia (resolved):  - Wean supplemental O2 as tolerated. He is currently on RA.   - S/p convalescent plasma and 5 days of IV remdesivir. Dc'd steroids on 1/25 given was weaned to RA and developed worsening agitation.   - Procalcitonin was negative. No leukocytosis and remains afebrile. Blood cx's are NGTD. No indication for abx as of now.      Agitation in pt with non-verbal MR-DD: - Pt noted to be obtunded on recent days therefore his home seroquel was held. Pt extremely agitated/restless on 1/25. Per nursing staff at [facility] does not sound like he is much off from his baseline.   - CT head was negative on 1/24.   - Continue prn ativan and haldol. His home seroquel was resumed on 1/25 with hold parameters in place. Dysphagia:  - SLP consulted with recs for dysphagia I.     Urinary retention:  - Chronic issue at [facility], pt requires prn straight cath. Colindres cath currently in place. Continue flomax. Normocytic Anemia   - Positive FOBT. Hgb stable. - Would defer testing/procedure until recovers from covid 19 infection. Can likely do as outpatient.     Remote history of subdural hematoma:  - Noted in history. Mild hypernatremia:  - Could be related to poor oral hydration.   - Start D5 IV fluids.   - Repeat BMP in am.   - Encourage oral hydration. Hypothermia:  - Unclear etiology. Pt unable to vocalize when he is feeling cold. Resolved with application of warm blankets. Check TSH. No evidence of infection currently. DVT Prophylaxis: Lovenox   Diet: DIET CARDIAC; Dysphagia Pureed; Mildly Thick (Nectar)  Dietary Nutrition Supplements: Frozen Oral Supplement  Code Status: Full Code    PT/OT Eval Status: Not indicated     Dispo - Pt is medically ready for dc. Pt lives at [facility]. Unfortunately they do not have enough staffing capability until 1/31. Will plan for dc on 1/31.      [address] [facility], APRN - CNP

## 2021-01-30 NOTE — PROGRESS NOTES
Paged NP requesting new order for restraints     21 9:17 PM   [address] or Facility: [facility] From: Becky Notice RE: Chalinokirill Dottie 1960 RM: [phone] Requesting new order for restraints, previous order has . Pt is in 2pt restraints - Right wrist and Left ankle. Pt is restless in bed at all times and pulls at brief and IV site.  Need Callback: NO CALLBACK REQ C4 PROGRESSIVE CARE  Unread

## 2021-01-30 NOTE — FLOWSHEET NOTE
01/30/21 0350   Assessment   Charting Type Shift assessment   Neurological   Neuro (WDL) X  (MRDD, Infantile @ baseline)   Level of Consciousness Alert (0)   Orientation Level CLARITZA   Cognition Impulsive; Unable to follow commands;Poor judgement;Poor safety awareness   Language CLARITZA  (Non-verbal, cries )   Size R Pupil (mm) 3   R Pupil Shape Oval   R Pupil Reaction Sluggish   Size L Pupil (mm) 3   L Pupil Shape Round   L Pupil Reaction Sluggish   R Hand  CLARITZA   L Hand  CLARITZA   R Foot Dorsiflexion CLARITZA   L Foot Dorsiflexion CLARITZA   R Foot Plantar Flexion CLARITZA   L Foot Plantar Flexion CLARITZA   RUE Motor Response CLARITZA   Sensation RUE CLARITZA   LUE Motor Response CLARITZA   Sensation LUE CLARITZA   RLE Motor Response CLARITZA   Sensation RLE CLARITZA   LLE Motor Response CLARITZA   Sensation LLE CLARITZA   Gag Present   Tongue Deviation None  (Enlarged tongue)   [place]ao Coma Scale   Eye Opening 4   Best Verbal Response 2   Best Motor Response 5   [place] Coma Scale Score 11   HEENT   HEENT (WDL) X   Right Eye Impaired vision   Left Eye Impaired vision   Nose Intact   Throat Intact   Neck Trachea midline;Symmetrical;No trauma/injury   Tongue Swollen;Pink & moist   Voice   (CLARITZA; non-verbal, cries)   Mucous Membrane Moist;Pink; Intact   Teeth Missing teeth   Respiratory   Respiratory (WDL) X  (Covid-19 PNA)   Respiratory Pattern Regular   Respiratory Depth Deep;Normal   Respiratory Quality/Effort Unlabored   Chest Assessment Trachea midline; Chest expansion symmetrical   L Breath Sounds Diminished   R Breath Sounds Diminished   Breath Sounds   Right Upper Lobe Diminished   Right Middle Lobe Diminished   Right Lower Lobe Diminished   Left Upper Lobe Diminished   Left Lower Lobe Diminished   Cough/Sputum   Cough Dry;Strong;Non-productive   Frequency Infrequent   Sputum Amount CLARITZA   Cardiac   Cardiac (WDL) WDL   Cardiac Regularity Regular   Heart Sounds S1, S2   Cardiac Rhythm NSR   Cardiac Monitor   Telemetry Monitor On No   Gastrointestinal Abdominal (WDL) WDL   Last BM (including prior to admit) 01/30/21   Peripheral Vascular   Peripheral Vascular (WDL) WDL   Edema None   Skin Color/Condition   Skin Color/Condition (WDL) X   Skin Color Pale; Appropriate for ethnicity; Ecchymosis; Red  (Redness on buttocks, scrotum,  left posterior shoulder)   Skin Condition/Temp Cool;Dry   Skin Integrity   Skin Integrity (WDL) X   Skin Integrity Redness;Rash   Location Scrotum, Buttocks, Left posterior shoulder   Preventative Dressing   (unable to keep on d/t frequent BM's)   Skin Fold Management No   Assessed this shift? Yes   Musculoskeletal   Musculoskeletal (WDL) X   RUE Full movement; Other (Comment)  (Rigid when turning pt, unable to follow commands)   LUE Full movement; Other (Comment)  (Rigid when turning pt, unable to follow commands)   RL Extremity Full movement; Other (Comment)  (Rigid when turning pt, unable to follow commands)   LL Extremity Full movement;CLARITZA  (Rigid when turning pt, unable to follow commands)   Genitourinary   Genitourinary (WDL) X  (f/c in place)   Flank Tenderness CLARITZA   Suprapubic Tenderness CLARITZA   Dysuria CLARITZA   Anus/Rectum   Anus/Rectum (WDL) X  (Incontinent of stool, redness around rectum)   Urethral Catheter Non-latex 16 fr   Placement Date/Time: 01/23/21 1650   Inserted by: Alka Bernal RN  Catheter Type: Non-latex  Tube Size (fr): 16 fr  Catheter Balloon Size: 10 mL  Collection Container: Standard  Securement Method: Securing device (Describe)  Urine Returned: Yes   Catheter Indications Acute urinary retention/obstruction   Site Assessment No urethral drainage;Pink   Urine Color Yellow   Urine Appearance Clear   Psychosocial   Psychosocial (WDL) X   Patient Behaviors Anxious; Non-Compliant   Needs Expressed   (CLARITZA)   Rest/Sleep for Patient Ritu Méndez

## 2021-01-30 NOTE — FLOWSHEET NOTE
01/29/21 2111   Vital Signs   Temp 96.6 °F (35.9 °C)   Temp Source Axillary   Pulse 65   Heart Rate Source Monitor   Resp 16   /73   BP Location Right upper arm   MAP (mmHg) 88   Patient Position Semi fowlers   Level of Consciousness Alert (0)   MEWS Score 1   Patient Currently in Pain Other (comment)   Pain Assessment   Pain Assessment FLACC   Pain Level 3   Pain Assessment/FLACC   Pain Rating: FLACC (rest) - Face 1   Pain Rating: FLACC (rest) - Legs 0   Pain Rating: FLACC (rest) - Activity 0   Pain Rating: FLACC (rest) - Cry 1   Pain Rating: FLACC (rest) - Consolability 1   Score: FLACC (rest) 3   Oxygen Therapy   SpO2 94 %   Pulse Oximeter Device Mode Intermittent   Pulse Oximeter Device Location Finger   O2 Device Non-rebreather mask   O2 Flow Rate (L/min) 0 L/min

## 2021-01-30 NOTE — PROGRESS NOTES
Pt. Appears less agitated. Moaning, no crying or throwing legs over siderails. Turned and re-positioned pt.

## 2021-01-30 NOTE — PROGRESS NOTES
Temp now 98.6 rectal. Warming blanket remains in place, setting decreased to 37 degrees celsius. Several cloth blankets removed from atop the warming blanket.  Will now assess temperature every 2 hours as advised by NP.

## 2021-01-30 NOTE — PROGRESS NOTES
Pt's SpO2 dropping to 89% on room air. 2L O2 via nasal cannula applied to pt. SpO2 now 94%, pt tolerating well.

## 2021-01-31 LAB
ANION GAP SERPL CALCULATED.3IONS-SCNC: 8 MMOL/L (ref 3–16)
BUN BLDV-MCNC: 15 MG/DL (ref 7–20)
CALCIUM SERPL-MCNC: 9.8 MG/DL (ref 8.3–10.6)
CHLORIDE BLD-SCNC: 107 MMOL/L (ref 99–110)
CO2: 34 MMOL/L (ref 21–32)
CREAT SERPL-MCNC: 0.8 MG/DL (ref 0.9–1.3)
GFR AFRICAN AMERICAN: >60
GFR NON-AFRICAN AMERICAN: >60
GLUCOSE BLD-MCNC: 98 MG/DL (ref 70–99)
POTASSIUM REFLEX MAGNESIUM: 3.6 MMOL/L (ref 3.5–5.1)
SODIUM BLD-SCNC: 149 MMOL/L (ref 136–145)
TSH REFLEX: 0.95 UIU/ML (ref 0.27–4.2)

## 2021-01-31 PROCEDURE — 2060000000 HC ICU INTERMEDIATE R&B

## 2021-01-31 PROCEDURE — 36415 COLL VENOUS BLD VENIPUNCTURE: CPT

## 2021-01-31 PROCEDURE — 6360000002 HC RX W HCPCS: Performed by: REGISTERED NURSE

## 2021-01-31 PROCEDURE — 6370000000 HC RX 637 (ALT 250 FOR IP): Performed by: NURSE PRACTITIONER

## 2021-01-31 PROCEDURE — 6370000000 HC RX 637 (ALT 250 FOR IP): Performed by: REGISTERED NURSE

## 2021-01-31 PROCEDURE — 6360000002 HC RX W HCPCS: Performed by: PHYSICIAN ASSISTANT

## 2021-01-31 PROCEDURE — 80048 BASIC METABOLIC PNL TOTAL CA: CPT

## 2021-01-31 PROCEDURE — 2580000003 HC RX 258: Performed by: REGISTERED NURSE

## 2021-01-31 RX ADMIN — DEXTROSE MONOHYDRATE: 50 INJECTION, SOLUTION INTRAVENOUS at 21:25

## 2021-01-31 RX ADMIN — METOPROLOL TARTRATE 25 MG: 25 TABLET, FILM COATED ORAL at 21:31

## 2021-01-31 RX ADMIN — ENOXAPARIN SODIUM 30 MG: 30 INJECTION SUBCUTANEOUS at 07:33

## 2021-01-31 RX ADMIN — LACTULOSE 20 G: 20 SOLUTION ORAL at 07:33

## 2021-01-31 RX ADMIN — QUETIAPINE FUMARATE 100 MG: 50 TABLET, EXTENDED RELEASE ORAL at 21:31

## 2021-01-31 RX ADMIN — LEVETIRACETAM 250 MG: 100 INJECTION, SOLUTION INTRAVENOUS at 09:42

## 2021-01-31 RX ADMIN — TAMSULOSIN HYDROCHLORIDE 0.8 MG: 0.4 CAPSULE ORAL at 07:33

## 2021-01-31 RX ADMIN — QUETIAPINE FUMARATE 100 MG: 50 TABLET, EXTENDED RELEASE ORAL at 13:41

## 2021-01-31 RX ADMIN — TRAZODONE HYDROCHLORIDE 50 MG: 50 TABLET ORAL at 21:31

## 2021-01-31 RX ADMIN — METOPROLOL TARTRATE 25 MG: 25 TABLET, FILM COATED ORAL at 07:33

## 2021-01-31 RX ADMIN — QUETIAPINE FUMARATE 100 MG: 50 TABLET, EXTENDED RELEASE ORAL at 07:33

## 2021-01-31 RX ADMIN — LEVETIRACETAM 250 MG: 100 INJECTION, SOLUTION INTRAVENOUS at 21:25

## 2021-01-31 RX ADMIN — LORAZEPAM 2 MG: 2 INJECTION INTRAMUSCULAR; INTRAVENOUS at 15:24

## 2021-01-31 ASSESSMENT — PAIN SCALES - GENERAL: PAINLEVEL_OUTOF10: 0

## 2021-01-31 NOTE — PROGRESS NOTES
Ativan 2mg IV given for agitation. Pt. Yelling out and crying. Pulling at ly cath tubing and throwing legs over siderails.

## 2021-01-31 NOTE — PROGRESS NOTES
Hospitalist Progress Note      PCP: Dasia Conner MD    Date of Admission: 1/20/2021    Chief Complaint: [facility] Course:   Moe Jean Baptiste is a 63 yo male with PMH of non-verbal MRDD who lives in a group home who presented to [facility] due to SOB/cough, found to have COVID pneumonia. He was admitted for further evaluation and management. Subjective:   Pt is on RA. Afebrile. VSS. No acute issues overnight. Medications:  Reviewed    Infusion Medications    dextrose 75 mL/hr at 01/31/21 0941     Scheduled Medications    enoxaparin  30 mg Subcutaneous Daily    levetiracetam  250 mg Intravenous Q12H    tamsulosin  0.8 mg Oral Daily    lactulose  20 g Oral BID    metoprolol tartrate  25 mg Per NG tube BID    QUEtiapine  100 mg Oral TID    traZODone  50 mg Per NG tube Nightly    sodium chloride flush  10 mL Intravenous 2 times per day     PRN Meds: morphine, LORazepam, haloperidol lactate, LORazepam, sodium chloride flush, promethazine **OR** ondansetron, guaiFENesin-dextromethorphan, acetaminophen **OR** acetaminophen      Intake/Output Summary (Last 24 hours) at 1/31/2021 1250  Last data filed at 1/31/2021 0856  Gross per 24 hour   Intake 440 ml   Output 625 ml   Net -185 ml       Physical Exam Performed:    /74   Pulse 93   Temp 97.9 °F (36.6 °C) (Axillary)   Resp 18   Ht 5' (1.524 m)   Wt 89 lb 1.1 oz (40.4 kg)   SpO2 91%   BMI 17.39 kg/m²     I performed an audiovisual assessment, based on new provisions and guidance offered by [facility] on March 18, 2020 in setting of COVID-19 outbreak and in order to preserve personal protective equipment in accordance with the flexibilities announced by CMS on March 30, 2020. References:   https://med. [place].gov/Portals/0/Resources/COVID-19/3_18%20Telemed%20Guidance%20Updated%20March%2018. pdf?jke=[email or web address]   http://nel-donna.[email or web address] pdf Bedside physical examination deferred. However, I did visually inspect the patient and observed the following:     General appearance: No apparent distress. Neck: Deferred. Respiratory:  Normal respiratory effort. Cardiovascular: Deferred. Abdomen: Deferred. Musculoskelatal: Deferred. Neurologic:  Deferred. Psychiatric: Deferred. Skin: Deferred. Labs:   Recent Labs     01/29/21  0457 01/30/21  1144   WBC 7.0 5.3   HGB 12.6* 12.6*   HCT 39.4* 38.5*    281     Recent Labs     01/29/21  0457 01/30/21  1144 01/31/21  0430    147* 149*   K 3.7 3.6 3.6    108 107   CO2 30 30 34*   BUN 18 16 15   CREATININE 0.8* 0.9 0.8*   CALCIUM 9.3 9.1 9.8     Urinalysis:      Lab Results   Component Value Date    NITRU Negative 01/21/2021    WBCUA  10/14/2020    BACTERIA 4+ 10/14/2020    RBCUA 0-2 10/14/2020    BLOODU Negative 01/21/2021    SPECGRAV 1.015 01/21/2021    GLUCOSEU Negative 01/21/2021       Radiology:  XR CHEST PORTABLE   Final Result   There is notable improved aeration of lungs bilaterally with a few scattered   opacities remaining         CT HEAD WO CONTRAST   Final Result   No acute intracranial abnormality. CT HEAD WO CONTRAST   Final Result   No acute intracranial abnormality. XR CHEST PORTABLE   Final Result   1. Multifocal pneumonia. Recommend chest radiograph in 8 weeks to confirm   resolution. Assessment/Plan:    Active Hospital Problems    Diagnosis    2019-nCoV acute respiratory disease [U07.1, J06.9]    Pulmonary infiltrates [R91.8]       Acute hypoxic respiratory failure due to COVID19 pneumonia (resolved):  - Wean supplemental O2 as tolerated. He is currently on RA.   - S/p convalescent plasma and 5 days of IV remdesivir. Dc'd steroids on 1/25 given pt was weaned to RA and developed worsening agitation.   - Procalcitonin was negative. No leukocytosis and remains afebrile. Blood cx's are NGTD. No indication for abx as of now. Agitation in pt with non-verbal MR-DD:  - Pt noted to be obtunded on recent days therefore his home seroquel was held. Pt extremely agitated/restless on 1/25. [facility] does not sound like he is much off from his baseline.   - CT head was negative on 1/24.   - Continue prn ativan and haldol. His home seroquel was resumed on 1/25 with hold parameters in place. Dysphagia:  - SLP consulted with recs for dysphagia I.     Urinary retention:  - Chronic issue at [facility], pt requires prn straight cath. Colindres cath currently in place. Continue flomax. Normocytic Anemia   - Positive FOBT. Hgb stable. - Would defer testing/procedure until recovers from covid 19 infection. Can likely do as outpatient.     Remote history of subdural hematoma:  - Noted in history. Mild hypernatremia:  - Could be related to poor oral hydration.   - Continue D5 IV fluids, rate increased. - Repeat BMP in am.   - Encourage oral hydration. Hypothermia:  - Unclear etiology. Pt unable to vocalize when he is feeling cold. Resolved with application of warm blankets. TSH is normal. No evidence of infection currently. DVT Prophylaxis: Lovenox   Diet: DIET CARDIAC; Dysphagia Pureed; Mildly Thick (Nectar)  Dietary Nutrition Supplements: Frozen Oral Supplement  Code Status: Full Code    PT/OT Eval Status: Not indicated     Dispo - 1-2 days, plan to return to group home when medically ready.      [address] [facility], APRN - CNP

## 2021-02-01 ENCOUNTER — TELEPHONE (OUTPATIENT)
Dept: OTHER | Facility: CLINIC | Age: 61
End: 2021-02-01

## 2021-02-01 ENCOUNTER — APPOINTMENT (OUTPATIENT)
Dept: CT IMAGING | Age: 61
End: 2021-02-01
Payer: MEDICARE

## 2021-02-01 ENCOUNTER — HOSPITAL ENCOUNTER (EMERGENCY)
Age: 61
Discharge: HOME | End: 2021-02-02
Payer: MEDICARE

## 2021-02-01 ENCOUNTER — APPOINTMENT (OUTPATIENT)
Dept: CT IMAGING | Age: 61
DRG: 177 | End: 2021-02-01
Payer: MEDICARE

## 2021-02-01 VITALS
HEART RATE: 70 BPM | DIASTOLIC BLOOD PRESSURE: 89 MMHG | BODY MASS INDEX: 17.97 KG/M2 | SYSTOLIC BLOOD PRESSURE: 108 MMHG | WEIGHT: 92 LBS | RESPIRATION RATE: 16 BRPM | OXYGEN SATURATION: 99 % | TEMPERATURE: 97.4 F

## 2021-02-01 VITALS
HEART RATE: 80 BPM | HEIGHT: 60 IN | SYSTOLIC BLOOD PRESSURE: 130 MMHG | DIASTOLIC BLOOD PRESSURE: 77 MMHG | RESPIRATION RATE: 26 BRPM | OXYGEN SATURATION: 100 % | BODY MASS INDEX: 18.22 KG/M2 | TEMPERATURE: 97 F | WEIGHT: 92.81 LBS

## 2021-02-01 DIAGNOSIS — U07.1 COVID-19: ICD-10-CM

## 2021-02-01 DIAGNOSIS — S00.83XA FACIAL HEMATOMA, INITIAL ENCOUNTER: ICD-10-CM
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
## 2021-02-01 DIAGNOSIS — S09.90XA CLOSED HEAD INJURY, INITIAL ENCOUNTER: Primary | ICD-10-CM

## 2021-02-01 LAB
AMMONIA: 44 UMOL/L (ref 16–60)
ANION GAP SERPL CALCULATED.3IONS-SCNC: 6 MMOL/L (ref 3–16)
ANION GAP SERPL CALCULATED.3IONS-SCNC: 6 MMOL/L (ref 3–16)
BASE EXCESS ARTERIAL: 3 (ref -3–3)
BASOPHILS ABSOLUTE: 0 K/UL (ref 0–0.2)
BASOPHILS RELATIVE PERCENT: 0.4 %
BUN BLDV-MCNC: 10 MG/DL (ref 7–20)
BUN BLDV-MCNC: 14 MG/DL (ref 7–20)
CALCIUM IONIZED: 1.27 MMOL/L (ref 1.12–1.32)
CALCIUM SERPL-MCNC: 8.8 MG/DL (ref 8.3–10.6)
CALCIUM SERPL-MCNC: 9.1 MG/DL (ref 8.3–10.6)
CHLORIDE BLD-SCNC: 105 MMOL/L (ref 99–110)
CHLORIDE BLD-SCNC: 105 MMOL/L (ref 99–110)
CO2: 29 MMOL/L (ref 21–32)
CO2: 30 MMOL/L (ref 21–32)
CREAT SERPL-MCNC: 0.6 MG/DL (ref 0.9–1.3)
CREAT SERPL-MCNC: 0.7 MG/DL (ref 0.9–1.3)
EOSINOPHILS ABSOLUTE: 0.1 K/UL (ref 0–0.6)
EOSINOPHILS RELATIVE PERCENT: 2.9 %
GFR AFRICAN AMERICAN: >60
GFR AFRICAN AMERICAN: >60
GFR NON-AFRICAN AMERICAN: >60
GFR NON-AFRICAN AMERICAN: >60
GLUCOSE BLD-MCNC: 149 MG/DL (ref 70–99)
GLUCOSE BLD-MCNC: 151 MG/DL (ref 70–99)
GLUCOSE BLD-MCNC: 165 MG/DL (ref 70–99)
GLUCOSE BLD-MCNC: 99 MG/DL (ref 70–99)
HCO3 ARTERIAL: 28.1 MMOL/L (ref 21–29)
HCT VFR BLD CALC: 34.6 % (ref 40.5–52.5)
HEMOGLOBIN: 11.4 G/DL (ref 13.5–17.5)
HEMOGLOBIN: 16 GM/DL (ref 13.5–17.5)
LACTATE: 2.1 MMOL/L (ref 0.4–2)
LYMPHOCYTES ABSOLUTE: 0.8 K/UL (ref 1–5.1)
LYMPHOCYTES RELATIVE PERCENT: 16.8 %
MCH RBC QN AUTO: 27 PG (ref 26–34)
MCHC RBC AUTO-ENTMCNC: 33 G/DL (ref 31–36)
MCV RBC AUTO: 82 FL (ref 80–100)
MONOCYTES ABSOLUTE: 0.7 K/UL (ref 0–1.3)
MONOCYTES RELATIVE PERCENT: 13.2 %
NEUTROPHILS ABSOLUTE: 3.3 K/UL (ref 1.7–7.7)
NEUTROPHILS RELATIVE PERCENT: 66.7 %
O2 SAT, ARTERIAL: 100 % (ref 93–100)
PCO2 ARTERIAL: 45.2 MM HG (ref 35–45)
PDW BLD-RTO: 15 % (ref 12.4–15.4)
PERFORMED ON: ABNORMAL
PERFORMED ON: ABNORMAL
PH ARTERIAL: 7.4 (ref 7.35–7.45)
PLATELET # BLD: 236 K/UL (ref 135–450)
PMV BLD AUTO: 7.8 FL (ref 5–10.5)
PO2 ARTERIAL: 494.6 MM HG (ref 75–108)
POC HEMATOCRIT: 47 % (ref 40.5–52.5)
POC POTASSIUM: 2.9 MMOL/L (ref 3.5–5.1)
POC SAMPLE TYPE: ABNORMAL
POC SODIUM: 143 MMOL/L (ref 136–145)
POTASSIUM REFLEX MAGNESIUM: 3.8 MMOL/L (ref 3.5–5.1)
POTASSIUM SERPL-SCNC: 4 MMOL/L (ref 3.5–5.1)
RBC # BLD: 4.22 M/UL (ref 4.2–5.9)
SODIUM BLD-SCNC: 140 MMOL/L (ref 136–145)
SODIUM BLD-SCNC: 141 MMOL/L (ref 136–145)
TCO2 ARTERIAL: 29 MMOL/L
WBC # BLD: 5 K/UL (ref 4–11)

## 2021-02-01 PROCEDURE — 80048 BASIC METABOLIC PNL TOTAL CA: CPT

## 2021-02-01 PROCEDURE — 6370000000 HC RX 637 (ALT 250 FOR IP): Performed by: INTERNAL MEDICINE

## 2021-02-01 PROCEDURE — 84295 ASSAY OF SERUM SODIUM: CPT

## 2021-02-01 PROCEDURE — 83605 ASSAY OF LACTIC ACID: CPT

## 2021-02-01 PROCEDURE — 2580000003 HC RX 258: Performed by: INTERNAL MEDICINE

## 2021-02-01 PROCEDURE — 82803 BLOOD GASES ANY COMBINATION: CPT

## 2021-02-01 PROCEDURE — 84132 ASSAY OF SERUM POTASSIUM: CPT

## 2021-02-01 PROCEDURE — 2580000003 HC RX 258: Performed by: REGISTERED NURSE

## 2021-02-01 PROCEDURE — 82947 ASSAY GLUCOSE BLOOD QUANT: CPT

## 2021-02-01 PROCEDURE — 36415 COLL VENOUS BLD VENIPUNCTURE: CPT

## 2021-02-01 PROCEDURE — 6370000000 HC RX 637 (ALT 250 FOR IP): Performed by: NURSE PRACTITIONER

## 2021-02-01 PROCEDURE — 6360000002 HC RX W HCPCS: Performed by: REGISTERED NURSE

## 2021-02-01 PROCEDURE — 85014 HEMATOCRIT: CPT

## 2021-02-01 PROCEDURE — 82330 ASSAY OF CALCIUM: CPT

## 2021-02-01 PROCEDURE — 6360000002 HC RX W HCPCS: Performed by: INTERNAL MEDICINE

## 2021-02-01 PROCEDURE — 99282 EMERGENCY DEPT VISIT SF MDM: CPT

## 2021-02-01 PROCEDURE — 85025 COMPLETE CBC W/AUTO DIFF WBC: CPT

## 2021-02-01 PROCEDURE — 82140 ASSAY OF AMMONIA: CPT

## 2021-02-01 PROCEDURE — 70450 CT HEAD/BRAIN W/O DYE: CPT

## 2021-02-01 PROCEDURE — 6370000000 HC RX 637 (ALT 250 FOR IP): Performed by: REGISTERED NURSE

## 2021-02-01 RX ORDER — POTASSIUM CHLORIDE 7.45 MG/ML
10 INJECTION INTRAVENOUS
Status: COMPLETED | OUTPATIENT
Start: 2021-02-01 | End: 2021-02-01

## 2021-02-01 RX ORDER — LORAZEPAM 2 MG/ML
1 INJECTION INTRAMUSCULAR ONCE
Status: DISCONTINUED | OUTPATIENT
Start: 2021-02-01 | End: 2021-02-02 | Stop reason: HOSPADM

## 2021-02-01 RX ORDER — QUETIAPINE FUMARATE 100 MG/1
100 TABLET, FILM COATED ORAL 3 TIMES DAILY
Status: DISCONTINUED | OUTPATIENT
Start: 2021-02-01 | End: 2021-02-01 | Stop reason: HOSPADM

## 2021-02-01 RX ADMIN — POTASSIUM CHLORIDE 10 MEQ: 7.46 INJECTION, SOLUTION INTRAVENOUS at 07:01

## 2021-02-01 RX ADMIN — ENOXAPARIN SODIUM 30 MG: 30 INJECTION SUBCUTANEOUS at 08:36

## 2021-02-01 RX ADMIN — LEVETIRACETAM 250 MG: 100 INJECTION, SOLUTION INTRAVENOUS at 11:36

## 2021-02-01 RX ADMIN — QUETIAPINE FUMARATE 100 MG: 50 TABLET, EXTENDED RELEASE ORAL at 08:41

## 2021-02-01 RX ADMIN — DEXTROSE MONOHYDRATE: 50 INJECTION, SOLUTION INTRAVENOUS at 11:35

## 2021-02-01 RX ADMIN — QUETIAPINE FUMARATE 100 MG: 100 TABLET ORAL at 14:35

## 2021-02-01 RX ADMIN — TAMSULOSIN HYDROCHLORIDE 0.8 MG: 0.4 CAPSULE ORAL at 08:36

## 2021-02-01 RX ADMIN — POTASSIUM CHLORIDE 10 MEQ: 7.46 INJECTION, SOLUTION INTRAVENOUS at 05:18

## 2021-02-01 RX ADMIN — MUPIROCIN: 20 OINTMENT TOPICAL at 08:36

## 2021-02-01 RX ADMIN — SODIUM CHLORIDE, PRESERVATIVE FREE 10 ML: 5 INJECTION INTRAVENOUS at 08:37

## 2021-02-01 RX ADMIN — POTASSIUM CHLORIDE 10 MEQ: 7.46 INJECTION, SOLUTION INTRAVENOUS at 03:50

## 2021-02-01 RX ADMIN — METOPROLOL TARTRATE 25 MG: 25 TABLET, FILM COATED ORAL at 08:36

## 2021-02-01 RX ADMIN — POTASSIUM CHLORIDE 10 MEQ: 7.46 INJECTION, SOLUTION INTRAVENOUS at 05:17

## 2021-02-01 ASSESSMENT — PAIN SCALES - WONG BAKER
WONGBAKER_NUMERICALRESPONSE: 0
WONGBAKER_NUMERICALRESPONSE: 0

## 2021-02-01 ASSESSMENT — PAIN SCALES - GENERAL
PAINLEVEL_OUTOF10: 0

## 2021-02-01 NOTE — CARE COORDINATION
Writer spoke to Dr Félix Dacosta during rounds. Pt could be ready to discharge today as he is doing well and stable. Call placed to Tamara REEVES at [facility] and they are ok to accept patient home today. They need the d/c orders to reflect \"simply thick\" needs. They can assist with transport after 3pm or we can set up squad before this. Hodan Magaña just needs notification of discharge prior. CM sent perfect serve to Dr Félix Dacosta for orders.  Vanessa Osorio RN

## 2021-02-01 NOTE — CARE COORDINATION
CASE MANAGEMENT DISCHARGE SUMMARY      Discharge to: return to [facility]    Transportation: group home at [facility]     Confirmed discharge plan with:   Patient: yes   Family, name and contact number: Tamara REEVES at facility [phone]   Facility/Agency, name:  [facility] faxed to [facility]   Phone number for report to facility:  Moi Guy [phone]   RN, name: Lynne Perdue RN aware of the above. Note: Discharging nurse to complete LEELEE, reconcile AVS, and place final copy with patient's discharge packet. RN to ensure that written prescriptions for  Level II medications are sent with patient to the facility as per protocol.     Jovany Rivera RN

## 2021-02-01 NOTE — PLAN OF CARE
Patient arrived to ICU via bed with 2 RNs, on lifepack, 2L nc, malachi, on D5 and KCl replacement via peripheral IVs, and a Dwayne Hugger. Bedside report received from Maliha Estrada, patient placed on ICU monitor, no orders to release, assessment completed - see Epic. Patient tolerated transfer well.

## 2021-02-01 NOTE — CODE DOCUMENTATION
Pt appears near baseline mentation. ABG showed K+ 2.9; replacement orders received along with tele monitoring, placed by writer. EKG in process. Orders rec'd for stat CT head to rule out intracranial event. Three RNs preparing to transport pt to CT.

## 2021-02-01 NOTE — CODE DOCUMENTATION
Code blue called for agonal breathing and non-responsiveness, staff unable to definitively palpate a pulse.

## 2021-02-01 NOTE — PROGRESS NOTES
Comprehensive Nutrition Assessment    Type and Reason for Visit:  Reassess    Nutrition Recommendations/Plan:   1. Cardiac, Dysphagia Pureed with mildly thick liquids  2. Magic cup supplements with meals, encourage at meal time and with medications to provide extra protein  3. Will monitor nutritional adequacy, nutrition-related labs, weights, BMs, and clinical progress   4. Total feed    Nutrition Assessment:  Follow up:  Code blue overnight found unresponsive and worsening hypotension. Transferred to ICU. Patient from Group home. Calls out and moves around bed. Seroquel and Ativan doses discussed and adjusted during rounds. Currently on a cardiac, dysphagia pureed mildly thick liquids with Magic cup supplements. Nurse reported total feed and was looking for lunch tray. RD called kitchen to get lunch tray up quickly, nurse concerned with hypoglycemia. Eating 25% meals recently. D5% at 75 ml/hr. Malnutrition Assessment:  Malnutrition Status: At risk for malnutrition (Comment)    Context:  Acute Illness       Estimated Daily Nutrient Needs:  Energy (kcal):  0356-1092; Weight Used for Energy Requirements:  Current(48 kg)     Protein (g):  48-58 g; Weight Used for Protein Requirements:  Current(1-1.2)        Fluid (ml/day):   ; Method Used for Fluid Requirements:  1 ml/kcal      Nutrition Related Findings:  BM 3-7 daily, on Lactulose      Wounds:  None       Current Nutrition Therapies:    DIET CARDIAC; Dysphagia Pureed; Mildly Thick (Nectar)  Dietary Nutrition Supplements: Frozen Oral Supplement    Anthropometric Measures:  · Height: 5' (152.4 cm)  · Current Body Weight: 92 lb 13 oz (42.1 kg)   · Usual Body Weight: (102-105 lbs x past yr)     · Ideal Body Weight: 106 lbs; % Ideal Body Weight 99.1 %   · BMI: 18.1  · BMI Categories: Normal Weight (BMI 18.5-24. 9)       Nutrition Diagnosis: · Increased nutrient needs related to increase demand for energy/nutrients, impaired respiratory function as evidenced by intake 51-75%, intake 26-50%(extended hospital stay)    Nutrition Interventions:   Food and/or Nutrient Delivery:  Continue Current Diet, Continue Oral Nutrition Supplement  Nutrition Education/Counseling:  No recommendation at this time   Coordination of Nutrition Care:  Continue to monitor while inpatient    Goals:   Tolerate diet and consume greater than 50% of meals and ONS this admission       Nutrition Monitoring and Evaluation:   Behavioral-Environmental Outcomes:  None Identified   Food/Nutrient Intake Outcomes:  Food and Nutrient Intake, Supplement Intake  Physical Signs/Symptoms Outcomes:  None Identified     Discharge Planning:    Continue Oral Nutrition Supplement, Continue current diet     Electronically signed by Kevin Solis RD, LD on 2/1/21 at 1:30 PM EST    Contact: Office: [phone]; [facility]: [phone]

## 2021-02-02 ENCOUNTER — CARE COORDINATION (OUTPATIENT)
Dept: CASE MANAGEMENT | Age: 61
End: 2021-02-02

## 2021-02-02 NOTE — DISCHARGE SUMMARY
Hospital Medicine Discharge Summary    Patient ID: Richrd Dakin      Patient's PCP: Ines Madsen MD    Admit Date: 1/20/2021     Discharge Date: 2/1/2021      Admitting Physician: Adan Fatima DO     Discharge Physician: Britt Walter MD     Discharge Diagnoses: Active Hospital Problems    Diagnosis    2019-nCoV acute respiratory disease [U07.1, J06.9]    Pulmonary infiltrates [R91.8]       The patient was seen and examined on day of discharge and this discharge summary is in conjunction with any daily progress note from day of discharge. Hospital Course:   62 y.o. male who presented to [facility] with past medical history of developmental nonverbal disorder, MR, history of subdural hematoma presented to the ED due to reported shortness of breath and coughing. History cannot be obtained from the patient due to developmental disorder. POA is [facility], caregiver is [phone]     Patient lives at a group home with 24-hour care has been in contact with Covid patients has been is Pap today as outpatient and does not know the result but due to concern of aspiration patient was came to the ED for further evaluation. In the ED patient had a chest x-ray shows multifocal pneumonia and was admitted for pneumonia with needing for SLP for speech eval.    Acute hypoxic respiratory failure due to COVID19 pneumonia (resolved):  - weaned to room air  - S/p convalescent plasma and 5 days of IV remdesivir. Dc'd steroids on 1/25 given pt was weaned to RA and developed worsening agitation.   - Procalcitonin was negative. No leukocytosis and remains afebrile. Blood cx's are NGTD. No indication for abx as of now.      Agitation in pt with non-verbal MR-DD:  - Pt noted to be obtunded on recent days therefore his home seroquel was held. Pt extremely agitated/restless on 1/25. Per nursing staff at nursing home does not sound like he is much off from his baseline.   - CT head was negative on 1/24. K 3.8 02/01/2021     02/01/2021    CO2 29 02/01/2021    BUN 10 02/01/2021    CREATININE 0.7 02/01/2021    CALCIUM 8.8 02/01/2021         Significant Diagnostic Studies    Radiology:   CT HEAD WO CONTRAST   Final Result   Chronic microvascular disease without acute intracranial abnormality. XR CHEST PORTABLE   Final Result   There is notable improved aeration of lungs bilaterally with a few scattered   opacities remaining         CT HEAD WO CONTRAST   Final Result   No acute intracranial abnormality. CT HEAD WO CONTRAST   Final Result   No acute intracranial abnormality. XR CHEST PORTABLE   Final Result   1. Multifocal pneumonia. Recommend chest radiograph in 8 weeks to confirm   resolution. Consults:     IP CONSULT TO HOSPITALIST  IP CONSULT TO PHARMACY  IP CONSULT TO CRITICAL CARE    Disposition:  Group home     Condition at Discharge: Stable    Discharge Instructions/Follow-up:  Follow up with PCP within 1-2 weeks    Code Status:  Full code    Activity: activity as tolerated    Diet: regular diet      Discharge Medications:     Discharge Medication List as of 2/1/2021  4:01 PM           Details   ketoconazole (NIZORAL) 2 % cream Apply topically daily. , Disp-30 g,R-1, Print      LORazepam (ATIVAN) 0.5 MG tablet Take 0.5 mg by mouth every 6 hours as needed for Anxiety. Historical Med      polyethylene glycol (GLYCOLAX) 17 g packet Take 17 g by mouth daily as needed for ConstipationHistorical Med      Nutritional Supplements (ENSURE ENLIVE) LIQD Take by mouth 2 times daily In between mealsHistorical Med      hydrocortisone (ANUSOL-HC) 25 MG suppository Place 1 suppository rectally every 12 hours, Disp-12 suppository,R-0Print      melatonin 3 MG TABS tablet Take 5 mg by mouth nightly Historical Med      lactulose (CHRONULAC) 10 GM/15ML solution Take 20 g by mouth 2 times dailyHistorical Med acetaminophen (PHARBETOL) 325 MG tablet Take 2 tablets by mouth every 6 hours as needed for Pain, Disp-120 tablet, R-0Print      ibuprofen (ADVIL;MOTRIN) 400 MG tablet Take 1 tablet by mouth every 6 hours as needed for Pain, Disp-30 tablet, R-0Print      clotrimazole (LOTRIMIN) 1 % cream Apply topically 2 times daily Apply topically 2 times daily. , Topical, 2 TIMES DAILY, Historical Med      levETIRAcetam (KEPPRA) 250 MG tablet Take 250 mg by mouth 2 times dailyHistorical Med      tamsulosin (FLOMAX) 0.4 MG capsule Take 0.8 mg by mouth dailyHistorical Med      traZODone (DESYREL) 50 MG tablet 1 tablet by Per NG tube route nightly, Disp-30 tablet, R-0Normal      metoprolol tartrate (LOPRESSOR) 25 MG tablet 1 tablet by Per NG tube route 2 times daily, Disp-60 tablet, R-3Normal      sennosides-docusate sodium (SENOKOT-S) 8.6-50 MG tablet Take 2 tablets by mouth every 72 hours, Disp-30 tablet, R-0Normal      QUEtiapine (SEROQUEL XR) 50 MG extended release tablet Take 100 mg by mouth 3 times daily 50mg in AM  100mg @ 1600  150mg @ 1900Historical Med             Time Spent on discharge is more than 30 minutes in the examination, evaluation, counseling and review of medications and discharge plan. Signed:    Torito Yuen MD   2/1/2021      Thank you Lito Sierra MD for the opportunity to be involved in this patient's care. If you have any questions or concerns please feel free to contact me at [phone].

## 2021-02-02 NOTE — TELEPHONE ENCOUNTER
Writer contacted  Cade Carbone ,ED provider to inform of 30 day readmission risk. ED provider informed writer admit or discharge has not been determined. Continue to follow-up.

## 2021-02-02 NOTE — ED TRIAGE NOTES
Three hours after pt was D/C from hospital with COVID, he fell in the bathroom and hit his head on the door knob with caretaker present

## 2021-02-02 NOTE — ED PROVIDER NOTES
Evaluated by Advanced Practice Provider    [facility]  ED      CHIEF COMPLAINT    Fall    HISTORY OF PRESENT ILLNESS  Duane A Josiephine Kubas is a 62 y.o. male who presents to the ED with reports of fall/head injury. Patient is well-known to me in this ER for multiple visits for head injury. Patient has severe developmental disorder and is nonverbal at baseline. Per caregiver at the bedside patient was being toileted by the staff and he fell forward striking his forehead on a doorknob. He did not fall onto the floor. There were no reports of him losing consciousness and he has not had any vomiting. Patient apparently was just discharged from this hospital earlier today for Covid pneumonia. He apparently had been given Ativan at some point during the day. Patient does require this for most testing and procedures when he comes to the ER. Caregiver stated that he overall is acting typical for himself but is somewhat different and states he cannot tell if this is due to the head injury or him receiving Ativan. Caregiver at the bedside states that the facility that the patient is in will likely require a CT scan. Patient does require sedation with Ativan to be able to have this testing done. The patient arrived to the ED via private car. PAST MEDICAL HISTORY    Past Medical History:   Diagnosis Date    A-fib ([address]     Bipolar disorder ([address]     Brain herniation ([address]     Constipation     COVID-19 01/20/2021    Cystitis     Developmental non-verbal disorder     Impulse control disorder     Influenza A 02/13/2014    Mental retardation, profound (I.Q. < 20)     Osteopenia     Subdural hematoma (HCC)        SURGICAL HISTORY    Past Surgical History:   Procedure Laterality Date    CATARACT REMOVAL      COLONOSCOPY  06/20/2016    incomplete, poor prep    COLONOSCOPY  08/26/2020    COLON W/ANES.     COLONOSCOPY N/A 8/26/2020 COLON W/ANES. (9:00) COVID TEST 8/20-8/22. PT IS NON-VERBAL, IS NOT IN A FACILITY. IS CARED FOR BY 2 CAREGIVERS IN HIS HOME. performed by Alma Hayes MD at [address] Right 12/2/2019    Right Trephine Craniotomy For Evacuation of Subdural Hematoma performed by Octavia Munoz MD at [address] TURP N/A 2/28/2020    CYSTOSCOPY, TRANSURETHRAL RESECTION OF PROSTATE performed by Blaise Blank MD at [facility]    Current Outpatient Rx   Medication Sig Dispense Refill    ketoconazole (NIZORAL) 2 % cream Apply topically daily. 30 g 1    LORazepam (ATIVAN) 0.5 MG tablet Take 0.5 mg by mouth every 6 hours as needed for Anxiety.  polyethylene glycol (GLYCOLAX) 17 g packet Take 17 g by mouth daily as needed for Constipation      Nutritional Supplements (ENSURE ENLIVE) LIQD Take by mouth 2 times daily In between meals      hydrocortisone (ANUSOL-HC) 25 MG suppository Place 1 suppository rectally every 12 hours 12 suppository 0    melatonin 3 MG TABS tablet Take 5 mg by mouth nightly       lactulose (CHRONULAC) 10 GM/15ML solution Take 20 g by mouth 2 times daily      acetaminophen (PHARBETOL) 325 MG tablet Take 2 tablets by mouth every 6 hours as needed for Pain 120 tablet 0    ibuprofen (ADVIL;MOTRIN) 400 MG tablet Take 1 tablet by mouth every 6 hours as needed for Pain 30 tablet 0    clotrimazole (LOTRIMIN) 1 % cream Apply topically 2 times daily Apply topically 2 times daily.       levETIRAcetam (KEPPRA) 250 MG tablet Take 250 mg by mouth 2 times daily      tamsulosin (FLOMAX) 0.4 MG capsule Take 0.8 mg by mouth daily      traZODone (DESYREL) 50 MG tablet 1 tablet by Per NG tube route nightly 30 tablet 0    metoprolol tartrate (LOPRESSOR) 25 MG tablet 1 tablet by Per NG tube route 2 times daily 60 tablet 3    sennosides-docusate sodium (SENOKOT-S) 8.6-50 MG tablet Take 2 tablets by mouth every 72 hours 30 tablet 0  QUEtiapine (SEROQUEL XR) 50 MG extended release tablet Take 100 mg by mouth 3 times daily 50mg in AM  100mg @ 1600  150mg @ 1900         ALLERGIES    Allergies   Allergen Reactions    Penicillins      Other reaction(s): Unknown    Benadryl [Diphenhydramine] Other (See Comments)     Pt becomes agitated and aggressive with Benadryl    Clonidine Derivatives      Other reaction(s): Unknown    Tetracyclines & Related      Other reaction(s): Unknown       FAMILY HISTORY    Family History   Family history unknown: Yes       SOCIAL HISTORY    Social History     Socioeconomic History    Marital status: Single     Spouse name: None    Number of children: None    Years of education: None    Highest education level: None   Occupational History    None   Social Needs    Financial resource strain: None    Food insecurity     Worry: None     Inability: None    Transportation needs     Medical: None     Non-medical: None   Tobacco Use    Smoking status: Never Smoker    Smokeless tobacco: Never Used   Substance and Sexual Activity    Alcohol use: No    Drug use: No    Sexual activity: Not Currently   Lifestyle    Physical activity     Days per week: None     Minutes per session: None    Stress: None   Relationships    Social connections     Talks on phone: None     Gets together: None     Attends Cheondoism service: None     Active member of club or organization: None     Attends meetings of clubs or organizations: None     Relationship status: None    Intimate partner violence     Fear of current or ex partner: None     Emotionally abused: None     Physically abused: None     Forced sexual activity: None   Other Topics Concern    None   Social History Narrative    None       REVIEW OF SYSTEMS    10 systems reviewed, pertinent positives per HPI otherwise noted to be negative      PHYSICAL EXAM  Vitals:    02/01/21 2133   BP: 108/89   Pulse: 70   Resp: 16   Temp: 97.4 °F (36.3 °C)   SpO2: 99% GENERAL: Patient is chronically ill-appearing, short in stature. Patient is anxious, yelling and moaning out occasionally. This is typical behavior for him. Does not appear toxic in appearance. HEENT:  Normocephalic. Swelling to the right infraorbital ridge and along the right lateral orbit. There are no bony depressions, instability, step-off, or crepitus to palpation of the facial bones. There is no raccoon's eyes or battles sign observed. PERRL. EOMI. Conjunctiva appear normal.  External ears are normal.  Bilateral TM are without erythema and are not bulging. There is no evidence of rupture or hemotympanum. There is no facial asymmetry. Tongue is midline, uvula is midline. Posterior oropharynx is without edema, erythema, or exudate. NECK: Supple with normal ROM. Trachea midline. LUNGS:  Normal work of breathing. Chest rises equal and symmetric. CADIOVASCULAR:  Regular rate and rhythm. GI: Nondistended. EXTREMITIES: Normal ROM. No gross deformities or trauma noted. Moving all extremities equally and appropriately. SKIN: Warm/dry. Skin is intact. No rashes/lesions noted. NEUROLOGIC: Patient is nonverbal at baseline, alert, eyes open spontaneously. Procedure  None    LABS  No results found for this visit on 02/01/21.     RADIOLOGY    Ct Head Wo Contrast    Result Date: 2/2/2021 EXAMINATION: CT OF THE HEAD WITHOUT CONTRAST  2/1/2021 11:48 pm TECHNIQUE: CT of the head was performed without the administration of intravenous contrast. Dose modulation, iterative reconstruction, and/or weight based adjustment of the mA/kV was utilized to reduce the radiation dose to as low as reasonably achievable. COMPARISON: CT head 02/01/2021, 01/24/2021, and 10/30/2020. HISTORY: ORDERING SYSTEM PROVIDED HISTORY: fall, hit head on doorknob TECHNOLOGIST PROVIDED HISTORY: If patient is on cardiac monitor and/or pulse ox, they may be taken off cardiac monitor and pulse ox, left on O2 if currently on. All monitors reattached when patient returns to room. Has a \"code stroke\" or \"stroke alert\" been called? ->No Reason for exam:->fall, hit head on doorknob Reason for Exam: fell forward off toilet  hit forehead on doorknob Acuity: Acute Type of Exam: Initial FINDINGS: BRAIN/VENTRICLES: Thickening of the falx of again visualized. No acute hemorrhage. Periventricular and subcortical hypoattenuation is nonspecific and may be related to microvascular disease. Encephalomalacia in the left frontal lobe again visualized. Terrye Weir white differentiation appears maintained given artifact near the skull base and through the posterior fossa. Cerebral volume loss and ventriculomegaly disproportionate to sulcal size again visualized. There is no midline shift. Basal cisterns appear patent. ORBITS: Thinning of the right lens may be postsurgical. SINUSES: Opacification of the left maxillary sinus and partial opacification of the remainder of the paranasal sinuses noted. Visualized mastoid air cells appear clear. SOFT TISSUES/SKULL:   No depressed calvarial fracture identified. Postsurgical changes in the right scalp. Lipoma in the subcutaneous posterior neck again visualized. Pinna calcifications again visualized. No acute hemorrhage or midline shift. Other findings as described.      Ct Head Wo Contrast Result Date: 2/1/2021  EXAMINATION: CT OF THE HEAD WITHOUT CONTRAST  2/1/2021 3:20 am TECHNIQUE: CT of the head was performed without the administration of intravenous contrast. Dose modulation, iterative reconstruction, and/or weight based adjustment of the mA/kV was utilized to reduce the radiation dose to as low as reasonably achievable. COMPARISON: None. HISTORY: ORDERING SYSTEM PROVIDED HISTORY: AMS TECHNOLOGIST PROVIDED HISTORY: Reason for exam:->AMS Has a \"code stroke\" or \"stroke alert\" been called? ->No Reason for Exam: ams Acuity: Acute Type of Exam: Initial FINDINGS: BRAIN/VENTRICLES: There is no acute intracranial hemorrhage, mass effect, or midline shift. There is satisfactory overall gray-white matter differentiation. There is chronic microvascular disease. The ventricular structures are symmetric and unremarkable. The infratentorial structures are unremarkable. ORBITS: The visualized portion of the orbits demonstrate no acute abnormality. SINUSES: The visualized paranasal sinuses and mastoid air cells demonstrate no acute abnormality. SOFT TISSUES/SKULL:  No acute abnormality of the visualized skull or soft tissues. Chronic microvascular disease without acute intracranial abnormality. ED COURSE/MDM  Patient seen and evaluated. Old records reviewed. Diagnostic testing reviewed and results discussed. I have evaluated this patient. My supervising physician was available for consultation. Duane A Carota presented to the ED today with above noted complaints. Physical exam reveals swelling to the right orbit. The patient is behaving per his baseline, patient is well-known to me in this ER for multiple visits for same complaint. He is nonverbal at baseline. The patient is without signs of basilar skull fracture. Based on [facility] [address] CT criteria a CT scan of the head was performed and showed no acute findings. Fabienne Iglesias I feel patient can reasonably be discharged at this time. I do not feel that he needs further work-up. While in ED patient received   Medications   LORazepam (ATIVAN) injection 1 mg (1 mg Intramuscular Not Given 2/2/21 0021)       At this point I do not feel the patient requires further work up and it is reasonable to discharge the patient. A discussion was had with the patient regarding diagnosis, diagnostic testing results, treatment/ plan of care, and follow up. All questions were answered. Patient will follow up as directed for further evaluation/treatment. The patient was given strict return precautions as we discussed symptoms that would necessitate return to the ED. Patient will return to ED for new/worsening symptoms. The patient verbalized their understanding and agreement with the above plan. Please refer to AVS for further details of the discharge instructions. Patient was sent home with a prescription for below medication/s. I did [place] patient on appropriate use of these medication. New Prescriptions    No medications on file       I estimate there is LOW risk for SUBARACHNOID HEMORRHAGE, MENINGITIS, INTRACRANIAL HEMORRHAGE, SUBDURAL HEMATOMA, OR STROKE, thus I consider the discharge disposition reasonable. Duane A Carota and I have discussed the diagnosis and risks, and we agree with discharging home to follow-up with their primary doctor. We also discussed returning to the Emergency Department immediately if new or worsening symptoms occur. We have discussed the symptoms which are most concerning (e.g., changing or worsening pain, weakness, vomiting, fever) that necessitate immediate return. Clinical Impression  1. Closed head injury, initial encounter    2. Facial hematoma, initial encounter    3. Fall, initial encounter    4.  COVID-19 Discharge Vital Signs:  Blood pressure 108/89, pulse 70, temperature 97.4 °F (36.3 °C), temperature source Oral, resp. rate 16, weight 92 lb (41.7 kg), SpO2 99 %. FOLLOW UP  MD Carola Schneider 2  [phone]    Call in 1 day  For further evaluation    [facility]  ED  [facility] [address] [phone]  Go to   If symptoms worsen      DISPOSITION  Patient was discharged to home in good condition. Comment: Please note this report has been produced using speech recognition software and may contain errors related to that system including errors in grammar, punctuation, and spelling, as well as words and phrases that may be inappropriate. If there are any questions or concerns please feel free to contact the dictating provider for clarification.         MAYA Atkinson - CNP  02/02/21 4729

## 2021-02-05 ENCOUNTER — HOSPITAL ENCOUNTER (EMERGENCY)
Age: 61
Discharge: HOME | End: 2021-02-05
Payer: MEDICARE

## 2021-02-05 ENCOUNTER — APPOINTMENT (OUTPATIENT)
Dept: CT IMAGING | Age: 61
End: 2021-02-05
Payer: MEDICARE

## 2021-02-05 ENCOUNTER — TELEPHONE (OUTPATIENT)
Dept: OTHER | Facility: CLINIC | Age: 61
End: 2021-02-05

## 2021-02-05 VITALS
WEIGHT: 92 LBS | DIASTOLIC BLOOD PRESSURE: 57 MMHG | SYSTOLIC BLOOD PRESSURE: 130 MMHG | HEART RATE: 76 BPM | OXYGEN SATURATION: 96 % | BODY MASS INDEX: 17.97 KG/M2 | RESPIRATION RATE: 20 BRPM

## 2021-02-05 DIAGNOSIS — S00.81XA ABRASION OF FACE, INITIAL ENCOUNTER: ICD-10-CM
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
## 2021-02-05 DIAGNOSIS — N30.00 ACUTE CYSTITIS WITHOUT HEMATURIA: ICD-10-CM

## 2021-02-05 DIAGNOSIS — S09.90XA INJURY OF HEAD, INITIAL ENCOUNTER: Primary | ICD-10-CM

## 2021-02-05 LAB
BACTERIA: ABNORMAL /HPF
BILIRUBIN URINE: NEGATIVE
BLOOD, URINE: ABNORMAL
CLARITY: CLEAR
COLOR: YELLOW
EPITHELIAL CELLS, UA: ABNORMAL /HPF (ref 0–5)
GLUCOSE URINE: NEGATIVE MG/DL
KETONES, URINE: NEGATIVE MG/DL
LEUKOCYTE ESTERASE, URINE: ABNORMAL
MICROSCOPIC EXAMINATION: YES
NITRITE, URINE: NEGATIVE
PH UA: 6.5 (ref 5–8)
PROTEIN UA: NEGATIVE MG/DL
RBC UA: ABNORMAL /HPF (ref 0–4)
SPECIFIC GRAVITY UA: 1.01 (ref 1–1.03)
URINE TYPE: ABNORMAL
UROBILINOGEN, URINE: 0.2 E.U./DL
WBC UA: ABNORMAL /HPF (ref 0–5)

## 2021-02-05 PROCEDURE — 81001 URINALYSIS AUTO W/SCOPE: CPT

## 2021-02-05 PROCEDURE — 87077 CULTURE AEROBIC IDENTIFY: CPT

## 2021-02-05 PROCEDURE — 99284 EMERGENCY DEPT VISIT MOD MDM: CPT

## 2021-02-05 PROCEDURE — 6370000000 HC RX 637 (ALT 250 FOR IP): Performed by: NURSE PRACTITIONER

## 2021-02-05 PROCEDURE — 87086 URINE CULTURE/COLONY COUNT: CPT

## 2021-02-05 PROCEDURE — 87186 SC STD MICRODIL/AGAR DIL: CPT

## 2021-02-05 PROCEDURE — 6360000002 HC RX W HCPCS: Performed by: NURSE PRACTITIONER

## 2021-02-05 PROCEDURE — 96372 THER/PROPH/DIAG INJ SC/IM: CPT

## 2021-02-05 PROCEDURE — 72125 CT NECK SPINE W/O DYE: CPT

## 2021-02-05 PROCEDURE — 70450 CT HEAD/BRAIN W/O DYE: CPT

## 2021-02-05 RX ORDER — CEFDINIR 300 MG/1
300 CAPSULE ORAL ONCE
Status: COMPLETED | OUTPATIENT
Start: 2021-02-05 | End: 2021-02-05

## 2021-02-05 RX ORDER — CEFDINIR 300 MG/1
300 CAPSULE ORAL 2 TIMES DAILY
Qty: 20 CAPSULE | Refills: 0 | Status: SHIPPED | OUTPATIENT
Start: 2021-02-05 | End: 2021-02-15

## 2021-02-05 RX ORDER — LORAZEPAM 2 MG/ML
1 INJECTION INTRAMUSCULAR ONCE
Status: COMPLETED | OUTPATIENT
Start: 2021-02-05 | End: 2021-02-05

## 2021-02-05 RX ADMIN — CEFDINIR 300 MG: 300 CAPSULE ORAL at 15:44

## 2021-02-05 RX ADMIN — LORAZEPAM 1 MG: 2 INJECTION INTRAMUSCULAR; INTRAVENOUS at 13:43

## 2021-02-05 NOTE — ED PROVIDER NOTES
CYSTOSCOPY, TRANSURETHRAL RESECTION OF PROSTATE performed by Fredis Means MD at [facility]    Current Outpatient Rx   Medication Sig Dispense Refill    cefdinir (OMNICEF) 300 MG capsule Take 1 capsule by mouth 2 times daily for 10 days 20 capsule 0    ketoconazole (NIZORAL) 2 % cream Apply topically daily. 30 g 1    LORazepam (ATIVAN) 0.5 MG tablet Take 0.5 mg by mouth every 6 hours as needed for Anxiety.  polyethylene glycol (GLYCOLAX) 17 g packet Take 17 g by mouth daily as needed for Constipation      Nutritional Supplements (ENSURE ENLIVE) LIQD Take by mouth 2 times daily In between meals      hydrocortisone (ANUSOL-HC) 25 MG suppository Place 1 suppository rectally every 12 hours 12 suppository 0    melatonin 3 MG TABS tablet Take 5 mg by mouth nightly       lactulose (CHRONULAC) 10 GM/15ML solution Take 20 g by mouth 2 times daily      acetaminophen (PHARBETOL) 325 MG tablet Take 2 tablets by mouth every 6 hours as needed for Pain 120 tablet 0    ibuprofen (ADVIL;MOTRIN) 400 MG tablet Take 1 tablet by mouth every 6 hours as needed for Pain 30 tablet 0    clotrimazole (LOTRIMIN) 1 % cream Apply topically 2 times daily Apply topically 2 times daily.       levETIRAcetam (KEPPRA) 250 MG tablet Take 250 mg by mouth 2 times daily      tamsulosin (FLOMAX) 0.4 MG capsule Take 0.8 mg by mouth daily      traZODone (DESYREL) 50 MG tablet 1 tablet by Per NG tube route nightly 30 tablet 0    metoprolol tartrate (LOPRESSOR) 25 MG tablet 1 tablet by Per NG tube route 2 times daily 60 tablet 3    sennosides-docusate sodium (SENOKOT-S) 8.6-50 MG tablet Take 2 tablets by mouth every 72 hours 30 tablet 0    QUEtiapine (SEROQUEL XR) 50 MG extended release tablet Take 100 mg by mouth 3 times daily 50mg in AM  100mg @ 1600  150mg @ 1900         ALLERGIES    Allergies   Allergen Reactions    Penicillins      Other reaction(s): Unknown    Benadryl [Diphenhydramine] Other (See Comments) Pt becomes agitated and aggressive with Benadryl    Clonidine Derivatives      Other reaction(s): Unknown    Tetracyclines & Related      Other reaction(s): Unknown       FAMILY HISTORY    Family History   Family history unknown: Yes       SOCIAL HISTORY    Social History     Socioeconomic History    Marital status: Single     Spouse name: None    Number of children: None    Years of education: None    Highest education level: None   Occupational History    None   Social Needs    Financial resource strain: None    Food insecurity     Worry: None     Inability: None    Transportation needs     Medical: None     Non-medical: None   Tobacco Use    Smoking status: Never Smoker    Smokeless tobacco: Never Used   Substance and Sexual Activity    Alcohol use: No    Drug use: No    Sexual activity: Not Currently   Lifestyle    Physical activity     Days per week: None     Minutes per session: None    Stress: None   Relationships    Social connections     Talks on phone: None     Gets together: None     Attends Baptism service: None     Active member of club or organization: None     Attends meetings of clubs or organizations: None     Relationship status: None    Intimate partner violence     Fear of current or ex partner: None     Emotionally abused: None     Physically abused: None     Forced sexual activity: None   Other Topics Concern    None   Social History Narrative    None       REVIEW OF SYSTEMS    10 systems reviewed, pertinent positives per HPI otherwise noted to be negative      PHYSICAL EXAM  Vitals:    02/05/21 1516   BP: (!) 130/57   Pulse: 76   Resp: 20   SpO2: 96%     GENERAL: Patient is well-developed, well-nourished,  no acute distress. No apparent discomfort. Non toxic appearing. HEENT:  Normocephalic. Several small lacerations/abrasions to the right eyebrow, chin, upper lip, these all appear superficial, no drainge. There are no bony depressions, instability, step-off, or crepitus to palpation of the facial bones. There is no raccoon's eyes or battles sign observed. PERRL. EOMI. Conjunctiva appear normal.  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 Bilateral TM are without erythema and are not bulging. There is no evidence of rupture or hemotympanum. There is no facial asymmetry. NECK: Supple with normal ROM. Trachea midline. There is no tenderness to palpation of the cervical midline spine. LUNGS:  Normal work of breathing. Breath sounds clear throughout all lung fields. CADIOVASCULAR:  Regular rate and rhythm. S1/S2 normal, no murmurs, rubs, or gallops on exam. Peripheral pulses are symmetric and strong. GI: Nondistended. Soft, non-tender to palpation, bowel sounds active in all 4 quadrants, no hepatosplenomegaly. EXTREMITIES: Normal ROM, no edema, no tenderness. Distal pulses palpable. No gross deformities or trauma noted. Moving all extremities equally and appropriately. SKIN: Warm/dry. Several small lacerations/abrasions to the face, superficial in depth, no drainage, no surrounding edema, erythema, exudate. No rashes/lesions noted. NEUROLOGIC: Alert, opens eyes spontaneously, making noises, consistent with his normal baseline. Moves all extremities with equal strength.      Procedure  None    LABS  Results for orders placed or performed during the hospital encounter of 02/05/21   Urinalysis, reflex to microscopic   Result Value Ref Range    Color, UA Yellow Straw/Yellow    Clarity, UA Clear Clear    Glucose, Ur Negative Negative mg/dL    Bilirubin Urine Negative Negative    Ketones, Urine Negative Negative mg/dL    Specific Gravity, UA 1.015 1.005 - 1.030    Blood, Urine TRACE-INTACT (A) Negative    pH, UA 6.5 5.0 - 8.0 Protein, UA Negative Negative mg/dL    Urobilinogen, Urine 0.2 <2.0 E.U./dL    Nitrite, Urine Negative Negative    Leukocyte Esterase, Urine LARGE (A) Negative    Microscopic Examination YES     Urine Type NotGiven    Microscopic Urinalysis   Result Value Ref Range    WBC, UA 21-50 (A) 0 - 5 /HPF    RBC, UA 3-4 0 - 4 /HPF    Epithelial Cells, UA 0-1 0 - 5 /HPF    Bacteria, UA 3+ (A) None Seen /HPF       RADIOLOGY    Ct Head Wo Contrast    Result Date: 2/5/2021  EXAMINATION: CT OF THE HEAD WITHOUT CONTRAST  2/5/2021 2:15 pm TECHNIQUE: CT of the head was performed without the administration of intravenous contrast. Dose modulation, iterative reconstruction, and/or weight based adjustment of the mA/kV was utilized to reduce the radiation dose to as low as reasonably achievable. COMPARISON: 02/02/2021 HISTORY: ORDERING SYSTEM PROVIDED HISTORY: fall TECHNOLOGIST PROVIDED HISTORY: Reason for exam:->fall Has a \"code stroke\" or \"stroke alert\" been called? ->No Decision Support Exception->Emergency Medical Condition (MA) Reason for Exam: fall last night, bump on right eyebrow Acuity: Acute Type of Exam: Initial FINDINGS: BRAIN/VENTRICLES: Motion artifact degrades image quality. There is no acute intracerebral hemorrhage or extra-axial fluid collection. There is mild cerebral atrophy with mild periventricular, subcortical and deep white matter small vessel ischemic disease. No change in the small to moderate cavum septum pellucidum. ORBITS: Status post right cataract removal. SINUSES: Moderate mucosal hypertrophy involves the visualized paranasal sinuses. SOFT TISSUES/SKULL:  The calvarium is intact. There is mild right periorbital soft tissue swelling. No change in the dystrophic pinna calcifications. 1. No acute gross intracranial abnormality.      Ct Head Wo Contrast    Result Date: 2/2/2021 Result Date: 2/1/2021  EXAMINATION: CT OF THE HEAD WITHOUT CONTRAST  2/1/2021 3:20 am TECHNIQUE: CT of the head was performed without the administration of intravenous contrast. Dose modulation, iterative reconstruction, and/or weight based adjustment of the mA/kV was utilized to reduce the radiation dose to as low as reasonably achievable. COMPARISON: None. HISTORY: ORDERING SYSTEM PROVIDED HISTORY: AMS TECHNOLOGIST PROVIDED HISTORY: Reason for exam:->AMS Has a \"code stroke\" or \"stroke alert\" been called? ->No Reason for Exam: ams Acuity: Acute Type of Exam: Initial FINDINGS: BRAIN/VENTRICLES: There is no acute intracranial hemorrhage, mass effect, or midline shift. There is satisfactory overall gray-white matter differentiation. There is chronic microvascular disease. The ventricular structures are symmetric and unremarkable. The infratentorial structures are unremarkable. ORBITS: The visualized portion of the orbits demonstrate no acute abnormality. SINUSES: The visualized paranasal sinuses and mastoid air cells demonstrate no acute abnormality. SOFT TISSUES/SKULL:  No acute abnormality of the visualized skull or soft tissues. Chronic microvascular disease without acute intracranial abnormality.      Ct Head Wo Contrast    Result Date: 1/24/2021 EXAMINATION: CT OF THE HEAD WITHOUT CONTRAST  1/22/2021 3:25 pm TECHNIQUE: CT of the head was performed without the administration of intravenous contrast. Dose modulation, iterative reconstruction, and/or weight based adjustment of the mA/kV was utilized to reduce the radiation dose to as low as reasonably achievable. COMPARISON: None. HISTORY: ORDERING SYSTEM PROVIDED HISTORY: fell on floor TECHNOLOGIST PROVIDED HISTORY: Reason for exam:->fell on floor Has a \"code stroke\" or \"stroke alert\" been called? ->No Reason for Exam: pt fell in room today Acuity: Acute Type of Exam: Initial FINDINGS: BRAIN/VENTRICLES: There is no acute intracranial hemorrhage, mass effect or midline shift. No abnormal extra-axial fluid collection. The gray-white differentiation is maintained without evidence of an acute infarct. There is no evidence of hydrocephalus. ORBITS: The visualized portion of the orbits demonstrate no acute abnormality. SINUSES: Left maxillary sinus is opacified. SOFT TISSUES/SKULL:  No acute abnormality of the visualized skull or soft tissues. No acute intracranial abnormality.      Ct Cervical Spine Wo Contrast    Result Date: 2/5/2021 EXAMINATION: CT OF THE CERVICAL SPINE WITHOUT CONTRAST 2/5/2021 2:15 pm TECHNIQUE: CT of the cervical spine was performed without the administration of intravenous contrast. Multiplanar reformatted images are provided for review. Dose modulation, iterative reconstruction, and/or weight based adjustment of the mA/kV was utilized to reduce the radiation dose to as low as reasonably achievable. COMPARISON: None. HISTORY: ORDERING SYSTEM PROVIDED HISTORY: fall TECHNOLOGIST PROVIDED HISTORY: Reason for exam:->fall Decision Support Exception->Emergency Medical Condition (MA) Reason for Exam: fall last night, bump on right eyebrow Acuity: Acute Type of Exam: Initial FINDINGS: BONES/ALIGNMENT: There is no acute fracture or traumatic malalignment. DEGENERATIVE CHANGES: Multilevel degenerative changes. SOFT TISSUES: There is no prevertebral soft tissue swelling. No acute abnormality of the cervical spine. Xr Chest Portable    Result Date: 1/25/2021  EXAMINATION: ONE XRAY VIEW OF THE CHEST 1/25/2021 5:20 am COMPARISON: 01/20/2021 HISTORY: ORDERING SYSTEM PROVIDED HISTORY: pneumonia TECHNOLOGIST PROVIDED HISTORY: Reason for exam:->pneumonia Reason for Exam: pneumonia Type of Exam: Subsequent/Follow-up FINDINGS: Lung volumes are low accentuating heart size and bronchovascular markings at the lung bases. Heart size is stable.   There is notable improved aeration of lungs bilaterally with a few scattered opacities remaining     There is notable improved aeration of lungs bilaterally with a few scattered opacities remaining     Xr Chest Portable    Result Date: 1/20/2021 EXAMINATION: ONE XRAY VIEW OF THE CHEST 1/20/2021 8:10 pm COMPARISON: 04/17/2020 HISTORY: ORDERING SYSTEM PROVIDED HISTORY: SOB, possible COVID TECHNOLOGIST PROVIDED HISTORY: Reason for exam:->SOB, possible COVID Reason for Exam: cough Acuity: Acute Type of Exam: Initial FINDINGS: There is diffuse bilateral airspace disease. The cardiac silhouette is within normal limits. There is no pneumothorax or pleural effusion. 1. Multifocal pneumonia. Recommend chest radiograph in 8 weeks to confirm resolution. ED COURSE/MDM  Patient seen and evaluated. Old records reviewed. Diagnostic testing reviewed and results discussed. I have evaluated this patient. My supervising physician was available for consultation. Duane A Carota presented to the ED today with above noted complaints. Physical exam reveals small, superficial abrasions to the face. These are superficial and do not require repair. The patient is without signs of basilar skull fracture. He is neurologically intact to his baseline. I am familiar with the patient as he has had several ED visits for the same complaint. He is severe MR and nonverbal.  Based on [place] CT criteria a CT scan of the head was performed and showed no acute findings. CT cervical spine also obtained and without acute findings. UA does show large amount of leukocytes, 21-50 white blood cells on microscopic, 3+ bacteria. This will be sent for culture and patient will be started on Omnicef. While in ED patient received   Medications   cefdinir (OMNICEF) capsule 300 mg (has no administration in time range)   LORazepam (ATIVAN) injection 1 mg (1 mg Intramuscular Given 2/5/21 1343)       At this point I do not feel the patient requires further work up and it is reasonable to discharge the patient. A discussion was had with the patient regarding diagnosis, diagnostic testing results, treatment/ plan of care, and follow up. All questions were answered. Patient will follow up as directed for further evaluation/treatment. The patient was given strict return precautions as we discussed symptoms that would necessitate return to the ED. Patient will return to ED for new/worsening symptoms. The patient verbalized their understanding and agreement with the above plan. Please refer to AVS for further details of the discharge instructions. Patient was sent home with a prescription for below medication/s. I did [place] patient on appropriate use of these medication. New Prescriptions    CEFDINIR (OMNICEF) 300 MG CAPSULE    Take 1 capsule by mouth 2 times daily for 10 days       I estimate there is LOW risk for SUBARACHNOID HEMORRHAGE, MENINGITIS, INTRACRANIAL HEMORRHAGE, SUBDURAL HEMATOMA, OR STROKE, thus I consider the discharge disposition reasonable. Duane A Carota and I have discussed the diagnosis and risks, and we agree with discharging home to follow-up with their primary doctor. We also discussed returning to the Emergency Department immediately if new or worsening symptoms occur. We have discussed the symptoms which are most concerning (e.g., changing or worsening pain, weakness, vomiting, fever) that necessitate immediate return. Clinical Impression  1. Injury of head, initial encounter    2. Fall, initial encounter    3. Abrasion of face, initial encounter    4. Acute cystitis without hematuria        Discharge Vital Signs:  Blood pressure (!) 130/57, pulse 76, resp. rate 20, weight 92 lb (41.7 kg), SpO2 96 %. FOLLOW UP  MD Carola Lucia 2  [phone]    Call   As needed    [facility]  ED  [address]  Go to   If symptoms worsen      DISPOSITION  Patient was discharged to home in good condition. Comment: Please note this report has been produced using speech recognition software and may contain errors related to that system including errors in grammar, punctuation, and spelling, as well as words and phrases that may be inappropriate. If there are any questions or concerns please feel free to contact the dictating provider for clarification.         MAYA Dougherty - CNP  02/05/21 [id number]

## 2021-02-05 NOTE — TELEPHONE ENCOUNTER
Writer contacted ED provider to inform of 30 day readmission risk. No Decision on disposition at this time.

## 2021-02-05 NOTE — ED NOTES
Patient is a fall risk. Caregiver at bedside, pt non ambulatory.      Charma Barthel, RN  02/05/21 0788

## 2021-02-05 NOTE — CARE COORDINATION
Chart review completed re dx and readmission. Patient from group home with 24 hour care givers. Patient WC level at baseline, staff assist with most ADLs. Patient with recent ED run re fall. MEAGHAN Malik

## 2021-02-07 LAB
ORGANISM: ABNORMAL
URINE CULTURE, ROUTINE: ABNORMAL

## 2021-05-27 ENCOUNTER — APPOINTMENT (OUTPATIENT)
Dept: GENERAL RADIOLOGY | Age: 61
End: 2021-05-27
Payer: MEDICARE

## 2021-05-27 ENCOUNTER — HOSPITAL ENCOUNTER (EMERGENCY)
Age: 61
Discharge: OTHER INSTITUTION | End: 2021-05-27
Attending: EMERGENCY MEDICINE
Payer: MEDICARE

## 2021-05-27 ENCOUNTER — APPOINTMENT (OUTPATIENT)
Dept: CT IMAGING | Age: 61
End: 2021-05-27
Payer: MEDICARE

## 2021-05-27 VITALS
RESPIRATION RATE: 18 BRPM | WEIGHT: 92 LBS | BODY MASS INDEX: 17.97 KG/M2 | DIASTOLIC BLOOD PRESSURE: 71 MMHG | SYSTOLIC BLOOD PRESSURE: 90 MMHG | HEART RATE: 98 BPM | OXYGEN SATURATION: 96 % | TEMPERATURE: 96.8 F

## 2021-05-27 DIAGNOSIS — N30.00 ACUTE CYSTITIS WITHOUT HEMATURIA: Primary | ICD-10-CM

## 2021-05-27 LAB
A/G RATIO: 1.2 (ref 1.1–2.2)
ALBUMIN SERPL-MCNC: 3.7 G/DL (ref 3.4–5)
ALP BLD-CCNC: 128 U/L (ref 40–129)
ALT SERPL-CCNC: 13 U/L (ref 10–40)
ANION GAP SERPL CALCULATED.3IONS-SCNC: 8 MMOL/L (ref 3–16)
AST SERPL-CCNC: 22 U/L (ref 15–37)
BACTERIA: ABNORMAL /HPF
BASOPHILS ABSOLUTE: 0.1 K/UL (ref 0–0.2)
BASOPHILS RELATIVE PERCENT: 1 %
BILIRUB SERPL-MCNC: <0.2 MG/DL (ref 0–1)
BILIRUBIN URINE: NEGATIVE
BLOOD, URINE: NEGATIVE
BUN BLDV-MCNC: 16 MG/DL (ref 7–20)
CALCIUM SERPL-MCNC: 9.7 MG/DL (ref 8.3–10.6)
CHLORIDE BLD-SCNC: 98 MMOL/L (ref 99–110)
CLARITY: CLEAR
CO2: 28 MMOL/L (ref 21–32)
COLOR: YELLOW
CREAT SERPL-MCNC: 1.2 MG/DL (ref 0.8–1.3)
EOSINOPHILS ABSOLUTE: 0.2 K/UL (ref 0–0.6)
EOSINOPHILS RELATIVE PERCENT: 3.3 %
EPITHELIAL CELLS, UA: ABNORMAL /HPF (ref 0–5)
GFR AFRICAN AMERICAN: >60
GFR NON-AFRICAN AMERICAN: >60
GLOBULIN: 3.2 G/DL
GLUCOSE BLD-MCNC: 76 MG/DL (ref 70–99)
GLUCOSE BLD-MCNC: 83 MG/DL (ref 70–99)
GLUCOSE URINE: NEGATIVE MG/DL
HCT VFR BLD CALC: 37.5 % (ref 40.5–52.5)
HEMOGLOBIN: 12.3 G/DL (ref 13.5–17.5)
KETONES, URINE: NEGATIVE MG/DL
LACTIC ACID, SEPSIS: 1.1 MMOL/L (ref 0.4–1.9)
LEUKOCYTE ESTERASE, URINE: ABNORMAL
LYMPHOCYTES ABSOLUTE: 1.2 K/UL (ref 1–5.1)
LYMPHOCYTES RELATIVE PERCENT: 19.1 %
MCH RBC QN AUTO: 27.9 PG (ref 26–34)
MCHC RBC AUTO-ENTMCNC: 32.7 G/DL (ref 31–36)
MCV RBC AUTO: 85.1 FL (ref 80–100)
MICROSCOPIC EXAMINATION: YES
MONOCYTES ABSOLUTE: 0.6 K/UL (ref 0–1.3)
MONOCYTES RELATIVE PERCENT: 9.4 %
NEUTROPHILS ABSOLUTE: 4 K/UL (ref 1.7–7.7)
NEUTROPHILS RELATIVE PERCENT: 67.2 %
NITRITE, URINE: POSITIVE
PDW BLD-RTO: 15.1 % (ref 12.4–15.4)
PERFORMED ON: NORMAL
PH UA: 6 (ref 5–8)
PLATELET # BLD: 234 K/UL (ref 135–450)
PMV BLD AUTO: 7.4 FL (ref 5–10.5)
POTASSIUM SERPL-SCNC: 5.4 MMOL/L (ref 3.5–5.1)
PROTEIN UA: NEGATIVE MG/DL
RBC # BLD: 4.4 M/UL (ref 4.2–5.9)
RBC UA: ABNORMAL /HPF (ref 0–4)
SODIUM BLD-SCNC: 134 MMOL/L (ref 136–145)
SPECIFIC GRAVITY UA: 1.02 (ref 1–1.03)
TOTAL PROTEIN: 6.9 G/DL (ref 6.4–8.2)
URINE REFLEX TO CULTURE: YES
URINE TYPE: ABNORMAL
UROBILINOGEN, URINE: 0.2 E.U./DL
WBC # BLD: 6 K/UL (ref 4–11)
WBC UA: ABNORMAL /HPF (ref 0–5)

## 2021-05-27 PROCEDURE — 2580000003 HC RX 258: Performed by: EMERGENCY MEDICINE

## 2021-05-27 PROCEDURE — 85025 COMPLETE CBC W/AUTO DIFF WBC: CPT

## 2021-05-27 PROCEDURE — 71045 X-RAY EXAM CHEST 1 VIEW: CPT

## 2021-05-27 PROCEDURE — 87086 URINE CULTURE/COLONY COUNT: CPT

## 2021-05-27 PROCEDURE — 83605 ASSAY OF LACTIC ACID: CPT

## 2021-05-27 PROCEDURE — 36415 COLL VENOUS BLD VENIPUNCTURE: CPT

## 2021-05-27 PROCEDURE — 6370000000 HC RX 637 (ALT 250 FOR IP): Performed by: EMERGENCY MEDICINE

## 2021-05-27 PROCEDURE — 87186 SC STD MICRODIL/AGAR DIL: CPT

## 2021-05-27 PROCEDURE — 81001 URINALYSIS AUTO W/SCOPE: CPT

## 2021-05-27 PROCEDURE — 80053 COMPREHEN METABOLIC PANEL: CPT

## 2021-05-27 PROCEDURE — 6360000004 HC RX CONTRAST MEDICATION: Performed by: EMERGENCY MEDICINE

## 2021-05-27 PROCEDURE — 96365 THER/PROPH/DIAG IV INF INIT: CPT

## 2021-05-27 PROCEDURE — 87077 CULTURE AEROBIC IDENTIFY: CPT

## 2021-05-27 PROCEDURE — 6360000002 HC RX W HCPCS: Performed by: EMERGENCY MEDICINE

## 2021-05-27 PROCEDURE — 74177 CT ABD & PELVIS W/CONTRAST: CPT

## 2021-05-27 PROCEDURE — 99285 EMERGENCY DEPT VISIT HI MDM: CPT

## 2021-05-27 RX ORDER — BISACODYL 10 MG
10 SUPPOSITORY, RECTAL RECTAL ONCE
Status: COMPLETED | OUTPATIENT
Start: 2021-05-27 | End: 2021-05-27

## 2021-05-27 RX ORDER — LEVOFLOXACIN 5 MG/ML
500 INJECTION, SOLUTION INTRAVENOUS ONCE
Status: COMPLETED | OUTPATIENT
Start: 2021-05-27 | End: 2021-05-27

## 2021-05-27 RX ORDER — 0.9 % SODIUM CHLORIDE 0.9 %
1000 INTRAVENOUS SOLUTION INTRAVENOUS ONCE
Status: COMPLETED | OUTPATIENT
Start: 2021-05-27 | End: 2021-05-27

## 2021-05-27 RX ORDER — LEVOFLOXACIN 500 MG/1
500 TABLET, FILM COATED ORAL DAILY
Qty: 7 TABLET | Refills: 0 | Status: SHIPPED | OUTPATIENT
Start: 2021-05-27 | End: 2021-06-03

## 2021-05-27 RX ADMIN — BISACODYL 10 MG: 10 SUPPOSITORY RECTAL at 19:50

## 2021-05-27 RX ADMIN — SODIUM CHLORIDE 1000 ML: 9 INJECTION, SOLUTION INTRAVENOUS at 17:38

## 2021-05-27 RX ADMIN — IOPAMIDOL 75 ML: 755 INJECTION, SOLUTION INTRAVENOUS at 17:18

## 2021-05-27 RX ADMIN — LEVOFLOXACIN 500 MG: 5 INJECTION, SOLUTION INTRAVENOUS at 17:38

## 2021-05-27 NOTE — ED NOTES
Administered suppository per caregiver and MD order. Pt tolerated procedure. Removed PIV, helped dress patient. Rolled pt out in wheelchair and assisted pt into personal transport.        Indira Srivastava RN  05/27/21 [id number]

## 2021-05-27 NOTE — ED PROVIDER NOTES
[facility]  ED  EMERGENCY DEPARTMENT ENCOUNTER      Pt Name: Marcelo Castro  MRN: [id number]  Barbaragfkatiana 1960  Date of evaluation: 5/27/2021  Provider: Chip Donato MD    CHIEF COMPLAINT       Chief Complaint   Patient presents with    Fatigue     caregiver had difficult time arousing pt today,         HISTORY OF PRESENT ILLNESS   (Location/Symptom, Timing/Onset, Context/Setting, Quality, Duration, Modifying Factors, Severity)  Note limiting factors. Marcelo Castro is a 62 y.o. male with past medical history of remote subdural hematoma, developmental disorder with nonverbal state, profound mental retardation here today because of fatigue weakness    Patient lives at a care facility and staff there today noticed that he was less arousable than usual.  They state he was more sleepy and less interactive. Was not talking as much. He has had no reported vomiting or diarrhea. No reported cough. No known fever. They state he was just simply not his normal self. There is been no report of trauma or injury. Patient himself provides no insight to his current clinical condition    HPI    Nursing Notes were reviewed. REVIEW OF SYSTEMS    (2-9 systems for level 4, 10 or more for level 5)     Review of Systems    Please see HPI for pertinent positive and negative review of system findings. A full 10 system ROS was performed and otherwise negative.         PAST MEDICAL HISTORY     Past Medical History:   Diagnosis Date    A-fib ([address]     Bipolar disorder ([address]     Brain herniation ([address]     Constipation     COVID-19 01/20/2021    Cystitis     Developmental non-verbal disorder     Impulse control disorder     Influenza A 02/13/2014    Mental retardation, profound (I.Q. < 20)     Osteopenia     Subdural hematoma (HCC)          SURGICAL HISTORY       Past Surgical History:   Procedure Laterality Date    CATARACT REMOVAL      COLONOSCOPY  06/20/2016    incomplete, poor prep    COLONOSCOPY 08/26/2020    COLON W/ANES.  COLONOSCOPY N/A 8/26/2020    COLON W/ANES. (9:00) COVID TEST 8/20-8/22. PT IS NON-VERBAL, IS NOT IN A FACILITY. IS CARED FOR BY 2 CAREGIVERS IN HIS HOME. performed by Aroldo Howe MD at [address] Right 12/2/2019    Right Trephine Craniotomy For Evacuation of Subdural Hematoma performed by Ines Nice MD at [address] TURP N/A 2/28/2020    CYSTOSCOPY, TRANSURETHRAL RESECTION OF PROSTATE performed by Debi Davis MD at [facility]       Previous Medications    LACTULOSE ([facility]) 10 GM/15ML SOLUTION    Take 20 g by mouth 2 times daily    LEVETIRACETAM (KEPPRA) 250 MG TABLET    Take 250 mg by mouth 2 times daily    LORAZEPAM (ATIVAN) 0.5 MG TABLET    Take 0.5 mg by mouth every 6 hours as needed for Anxiety.     MELATONIN 3 MG TABS TABLET    Take 5 mg by mouth nightly     METOPROLOL TARTRATE (LOPRESSOR) 25 MG TABLET    1 tablet by Per NG tube route 2 times daily    NUTRITIONAL SUPPLEMENTS (ENSURE ENLIVE) LIQD    Take by mouth 2 times daily In between meals    POLYETHYLENE GLYCOL (GLYCOLAX) 17 G PACKET    Take 17 g by mouth daily as needed for Constipation    QUETIAPINE (SEROQUEL XR) 50 MG EXTENDED RELEASE TABLET    Take 100 mg by mouth 3 times daily 50mg in AM  100mg @ 1600  150mg @ 1900    SENNOSIDES-DOCUSATE SODIUM (SENOKOT-S) 8.6-50 MG TABLET    Take 2 tablets by mouth every 72 hours    TAMSULOSIN (FLOMAX) 0.4 MG CAPSULE    Take 0.8 mg by mouth daily    TRAZODONE (DESYREL) 50 MG TABLET    1 tablet by Per NG tube route nightly       ALLERGIES     Penicillins, Benadryl [diphenhydramine], Clonidine derivatives, and Tetracyclines & related    FAMILY HISTORY       Family History   Family history unknown: Yes          SOCIAL HISTORY       Social History     Socioeconomic History    Marital status: Single     Spouse name: None    Number of children: None    Years of education: None    Highest education level: None Occupational History    None   Tobacco Use    Smoking status: Never Smoker    Smokeless tobacco: Never Used   Vaping Use    Vaping Use: Never used   Substance and Sexual Activity    Alcohol use: No    Drug use: No    Sexual activity: Not Currently   Other Topics Concern    None   Social History Narrative    None     Social Determinants of Health     Financial Resource Strain:     Difficulty of Paying Living Expenses:    Food Insecurity:     Worried About Running Out of Food in the Last Year:     Ran Out of Food in the Last Year:    Transportation Needs:     Lack of Transportation (Medical):  Lack of Transportation (Non-Medical):    Physical Activity:     Days of Exercise per Week:     Minutes of Exercise per Session:    Stress:     Feeling of Stress :    Social Connections:     Frequency of Communication with Friends and Family:     Frequency of Social Gatherings with Friends and Family:     Attends Restorationist Services:     Active Member of Clubs or Organizations:     Attends Club or Organization Meetings:     Marital Status:    Intimate Partner Violence:     Fear of Current or Ex-Partner:     Emotionally Abused:     Physically Abused:     Sexually Abused:        SCREENINGS               PHYSICAL EXAM    (up to 7 for level 4, 8 or more for level 5)     ED Triage Vitals [05/27/21 1547]   BP Temp Temp Source Pulse Resp SpO2 Height Weight   (!) 144/90 94 °F (34.4 °C) Rectal 82 20 97 % -- 92 lb (41.7 kg)       Physical Exam    General appearance:  Cooperative. No acute distress. Skin:  Warm. Dry. Eye:  Extraocular movements intact. Ears, nose, mouth and throat:  Oral mucosa moist, dysmorphic facial features  Neck:  Trachea midline. Heart:  Regular rate and rhythm  Perfusion:  intact  Respiratory:  Lungs clear to auscultation bilaterally. Respirations nonlabored. Abdominal:   Non distended. Nontender  Neurological: Awake and alert but does not follow commands.   Does not answer questions. Moves all 4 extremities equally.   Moves all extremities spontaneously  Musculoskeletal:   Normal ROM, no deformities        DIAGNOSTIC RESULTS       Labs Reviewed   URINE RT REFLEX TO CULTURE - Abnormal; Notable for the following components:       Result Value    Nitrite, Urine POSITIVE (*)     Leukocyte Esterase, Urine SMALL (*)     All other components within normal limits    Narrative:     Performed at:  [place] [address] [email or web address]   Phone [phone]   CBC WITH AUTO DIFFERENTIAL - Abnormal; Notable for the following components:    Hemoglobin 12.3 (*)     Hematocrit 37.5 (*)     All other components within normal limits    Narrative:     Performed at:  [place] [address] [email or web address]   Phone [phone]   COMPREHENSIVE METABOLIC PANEL - Abnormal; Notable for the following components:    Sodium 134 (*)     Potassium 5.4 (*)     Chloride 98 (*)     All other components within normal limits    Narrative:     Performed at:  [place] [address] [email or web address]   Phone [phone]   MICROSCOPIC URINALYSIS - Abnormal; Notable for the following components:    WBC, UA 10-20 (*)     Bacteria, UA Rare (*)     All other components within normal limits    Narrative:     Performed at:  [address], [facility] [email or web address]   Phone [phone]   CULTURE, URINE   LACTATE, SEPSIS    Narrative:     Performed at:  [place] [address] [email or web address]   Phone [phone]   POCT GLUCOSE    Narrative:     Performed at:  [place] [address] [email or web address]   Phone [phone]       Interpretation per the Radiologist below, if obtained/available at the time of this note:    CT ABDOMEN PELVIS W IV CONTRAST Additional Contrast? None   Final Result   No ileus, obstruction, or acute inflammatory process seen in the abdomen or   pelvis. Moderate stool volume is seen which may be related to constipation. Bilateral nonobstructive nephrolithiasis. Trace bilateral effusions with bibasilar atelectasis. XR CHEST PORTABLE   Final Result   No acute process. All other labs/imaging were within normal range or not returned as of this dictation. EMERGENCY DEPARTMENT COURSE and DIFFERENTIAL DIAGNOSIS/MDM:   Vitals:    Vitals:    05/27/21 1547 05/27/21 1659 05/27/21 1743 05/27/21 1818   BP: (!) 144/90  115/84 98/71   Pulse: 82 65 77 66   Resp: 20 20 20 20   Temp: 94 °F (34.4 °C) 96.6 °F (35.9 °C) 96.7 °F (35.9 °C)    TempSrc: Rectal Temporal Temporal    SpO2: 97% 96% 98% 96%   Weight: 92 lb (41.7 kg)          Patient presents emergency department today for reported fatigue weakness. Less interactive. Physical exam unremarkable here. Patient provides no insight to his current clinical condition. Initially found to be somewhat hypothermic but this is improved with a bear hugger. Laboratory studies were unremarkable aside from evidence of likely urinary tract infection. Given antibiotic orally here but otherwise may be discharged home with the same. Appears stable. Tolerating oral intake. Is now back to his baseline awake and alert staff at his care facility are at bedside and agree that he seems to be back to normal.  No concern for intracranial pathology    MDM    CONSULTS     None    Critical Care:   None    REASSESSMENT          PROCEDURE     Unless otherwise noted below, none     Procedures      FINAL IMPRESSION      1.  Acute cystitis without hematuria            DISPOSITION/PLAN   DISPOSITION Decision To Discharge 05/27/2021 06:35:55 PM        PATIENT REFERRED TO:  MD Carola Borges  [facility]  [phone]    Schedule an appointment as soon as possible for a

## 2021-05-27 NOTE — ED NOTES
Discharge paperwork, prescription and follow-up instructions reviewed with pts Caregiver, Jese Pan. Verbalized understanding. [facility] ETA 2130. Updated Caregiver at bedside.      Parvin Pop RN  05/27/21 5261

## 2021-05-29 LAB
ORGANISM: ABNORMAL
URINE CULTURE, ROUTINE: ABNORMAL

## 2021-08-10 ENCOUNTER — APPOINTMENT (OUTPATIENT)
Dept: CT IMAGING | Age: 61
End: 2021-08-10
Payer: MEDICARE

## 2021-08-10 VITALS
TEMPERATURE: 97.5 F | SYSTOLIC BLOOD PRESSURE: 111 MMHG | HEART RATE: 67 BPM | RESPIRATION RATE: 20 BRPM | OXYGEN SATURATION: 95 % | DIASTOLIC BLOOD PRESSURE: 60 MMHG

## 2021-08-10 PROCEDURE — 4500000002 HC ER NO CHARGE

## 2021-08-11 ENCOUNTER — HOSPITAL ENCOUNTER (EMERGENCY)
Age: 61
Discharge: HOME | End: 2021-08-11
Payer: MEDICARE

## 2021-08-11 VITALS
SYSTOLIC BLOOD PRESSURE: 101 MMHG | HEIGHT: 60 IN | BODY MASS INDEX: 19.63 KG/M2 | RESPIRATION RATE: 16 BRPM | OXYGEN SATURATION: 97 % | HEART RATE: 86 BPM | DIASTOLIC BLOOD PRESSURE: 74 MMHG | TEMPERATURE: 97.2 F | WEIGHT: 100 LBS

## 2021-08-11 DIAGNOSIS — S09.90XA INJURY OF HEAD, INITIAL ENCOUNTER: Primary | ICD-10-CM

## 2021-08-11 DIAGNOSIS — S00.81XA ABRASION OF FOREHEAD, INITIAL ENCOUNTER: ICD-10-CM

## 2021-08-11 DIAGNOSIS — S00.83XA CONTUSION OF FOREHEAD, INITIAL ENCOUNTER: ICD-10-CM

## 2021-08-11 PROCEDURE — 99283 EMERGENCY DEPT VISIT LOW MDM: CPT

## 2021-08-11 NOTE — ED NOTES
Unable to answer screening questions due to baseline mental status.      Colleen Scott RN  08/10/21 0779

## 2021-08-11 NOTE — ED PROVIDER NOTES
Diagnosis Date    A-fib [facility])     B[facility] disorder [facility]     Brain herniation ([facility]     Constipation     COVID-19 01/20/2021    Cystitis     Developmental non-verbal disorder     Impulse control disorder     Influenza A 02/13/2014    Mental retardation, profound (I.Q. < 20)     Osteopenia     Subdural hematoma (HCC)          SURGICAL HISTORY:      Past Surgical History:   Procedure Laterality Date    CATARACT REMOVAL      COLONOSCOPY  06/20/2016    incomplete, poor prep    COLONOSCOPY  08/26/2020    COLON W/ANES.  COLONOSCOPY N/A 8/26/2020    COLON W/ANES. (9:00) COVID TEST 8/20-8/22. PT IS NON-VERBAL, IS NOT IN A FACILITY. IS CARED FOR BY 2 CAREGIVERS IN HIS HOME. performed by Nikki Viera MD at [facility] Right 12/2/2019    Right Trephine Craniotomy For Evacuation of Subdural Hematoma performed by Vivian Esteves MD at [address] TURP N/A 2/28/2020    CYSTOSCOPY, TRANSURETHRAL RESECTION OF PROSTATE performed by Annel Tee MD at [address] [facility] [address]:       Discharge Medication List as of 8/11/2021 11:31 AM      CONTINUE these medications which have NOT CHANGED    Details   LORazepam (ATIVAN) 0.5 MG tablet Take 0.5 mg by mouth every 6 hours as needed for Anxiety. Historical Med      polyethylene glycol (GLYCOLAX) 17 g packet Take 17 g by mouth daily as needed for ConstipationHistorical Med      Nutritional Supplements (ENSURE ENLIVE) LIQD Take by mouth 2 times daily In between mealsHistorical Med      melatonin 3 MG TABS tablet Take 5 mg by mouth nightly Historical Med      lactulose (CHRONULAC) 10 GM/15ML solution Take 20 g by mouth 2 times dailyHistorical Med      levETIRAcetam (KEPPRA) 250 MG tablet Take 250 mg by mouth 2 times dailyHistorical Med      tamsulosin (FLOMAX) 0.4 MG capsule Take 0.8 mg by mouth dailyHistorical Med      traZODone (DESYREL) 50 MG tablet 1 tablet by Per NG tube route nightly, Disp-30 tablet, R-0Normal metoprolol tartrate (LOPRESSOR) 25 MG tablet 1 tablet by Per NG tube route 2 times daily, Disp-60 tablet, R-3Normal      sennosides-docusate sodium (SENOKOT-S) 8.6-50 MG tablet Take 2 tablets by mouth every 72 hours, Disp-30 tablet, R-0Normal      QUEtiapine (SEROQUEL XR) 50 MG extended release tablet Take 100 mg by mouth 3 times daily 50mg in AM  100mg @ 1600  150mg @ 1900Historical Med               ALLERGIES:    Penicillins, Benadryl [diphenhydramine], Clonidine derivatives, and Tetracyclines & related    FAMILY HISTORY:       Family History   Family history unknown: Yes          SOCIAL HISTORY:       Social History     Socioeconomic History    Marital status: Single     Spouse name: None    Number of children: None    Years of education: None    Highest education level: None   Occupational History    None   Tobacco Use    Smoking status: Never Smoker    Smokeless tobacco: Never Used   Vaping Use    Vaping Use: Never used   Substance and Sexual Activity    Alcohol use: No    Drug use: No    Sexual activity: Not Currently   Other Topics Concern    None   Social History Narrative    None     Social Determinants of Health     Financial Resource Strain:     Difficulty of Paying Living Expenses:    Food Insecurity:     Worried About Running Out of Food in the Last Year:     Ran Out of Food in the Last Year:    Transportation Needs:     Lack of Transportation (Medical):      Lack of Transportation (Non-Medical):    Physical Activity:     Days of Exercise per Week:     Minutes of Exercise per Session:    Stress:     Feeling of Stress :    Social Connections:     Frequency of Communication with Friends and Family:     Frequency of Social Gatherings with Friends and Family:     Attends Yazidi Services:     Active Member of Clubs or Organizations:     Attends Club or Organization Meetings:     Marital Status:    Intimate Partner Violence:     Fear of Current or Ex-Partner:     Emotionally Abused:     Physically Abused:     Sexually Abused:        SCREENINGS:             PHYSICAL EXAM:       ED Triage Vitals [08/11/21 1039]   BP Temp Temp Source Pulse Resp SpO2 Height Weight   101/74 97.2 °F (36.2 °C) Oral 86 16 97 % 4' 8\" (1.422 m) 100 lb (45.4 kg)       Physical Exam    CONSTITUTIONAL: Awake and alert. Cooperative. Well-developed. Well-nourished. Non-toxic. No acute distress. HENT: Normocephalic. Abrasion to the mid forehead. No bony step-off or crepitus. No contusion/hematoma or sign of injury other than a small abrasion to the forehead. Jj Mehrdad External ears normal, without discharge. No nasal discharge. Oropharynx clear. Mucous membranes moist.  EYES: Conjunctiva non-injected. No scleral icterus. PERRL. EOM's grossly intact. NECK: Supple. Normal ROM. No evidence of reproducible pain to palpate  CARDIOVASCULAR: RRR. No Murmer. Intact distal pulses. PULMONARY/CHEST WALL: Effort normal. No tachypnea. Lungs clear to ausculation. ABDOMEN: Soft. Nondistended. No tenderness to palpate. No guarding. /ANORECTAL: Not assessed  MUSKULOSKELETAL: Normal ROM. No acute deformities. No edema. No tenderness to palpate. SKIN: Warm and dry. No rash. NEUROLOGICAL: Awake and alert. GCS 15. CN II-XII grossly intact. Strength is 5/5 in all extremities and sensation is intact. PSYCHIATRIC: Normal affect        DIAGNOSTICRESULTS:     None      PROCEDURES:   N/A    CRITICAL CARE TIME:       None      CONSULTS:  None      EMERGENCY DEPARTMENT COURSE and DIFFERENTIAL DIAGNOSIS/MDM:   Vitals:    Vitals:    08/11/21 1039   BP: 101/74   Pulse: 86   Resp: 16   Temp: 97.2 °F (36.2 °C)   TempSrc: Oral   SpO2: 97%   Weight: 100 lb (45.4 kg)   Height: 4' 8\" (1.422 m)       Patient was given the following medications:  None    I have evaluated this patient in the ED. Old records were reviewed. Patient has what sounds like a minor head injury.   It occurred yesterday summer between 7 and 8 PM.  He fell forward from his note were completed with a voice recognition program.  Efforts were made to edit the dictations, but occasionally words are mis-transcribed.)    Vincent Angulo PA-C (electronicallysigned)              [place], [place]  08/11/21 2113

## 2021-08-12 NOTE — ED PROVIDER NOTES
I personally never saw this patient, he was never clinically examined by myself, supposedly that the patient was called by nursing staff to be seen medically however, patient must have left without provider examination.      Saulo Coulter, MAYA - CNP  08/12/21 0969

## 2021-08-31 NOTE — PROGRESS NOTES
Message left for pre op reminder to be here at 0930, NPO status,  and care taker and med list needed.

## 2021-09-01 ENCOUNTER — HOSPITAL ENCOUNTER (OUTPATIENT)
Age: 61
Setting detail: OUTPATIENT SURGERY
Discharge: HOME HEALTH | End: 2021-09-01
Attending: INTERNAL MEDICINE | Admitting: INTERNAL MEDICINE
Payer: MEDICARE

## 2021-09-01 ENCOUNTER — ANESTHESIA (OUTPATIENT)
Dept: ENDOSCOPY | Age: 61
End: 2021-09-01
Payer: MEDICARE

## 2021-09-01 ENCOUNTER — ANESTHESIA EVENT (OUTPATIENT)
Dept: ENDOSCOPY | Age: 61
End: 2021-09-01
Payer: MEDICARE

## 2021-09-01 VITALS
SYSTOLIC BLOOD PRESSURE: 95 MMHG | TEMPERATURE: 97 F | OXYGEN SATURATION: 95 % | HEART RATE: 73 BPM | RESPIRATION RATE: 18 BRPM | DIASTOLIC BLOOD PRESSURE: 60 MMHG

## 2021-09-01 DIAGNOSIS — Z12.11 SPECIAL SCREENING FOR MALIGNANT NEOPLASMS, COLON: ICD-10-CM

## 2021-09-01 PROCEDURE — 2500000003 HC RX 250 WO HCPCS: Performed by: NURSE ANESTHETIST, CERTIFIED REGISTERED

## 2021-09-01 PROCEDURE — 7100000011 HC PHASE II RECOVERY - ADDTL 15 MIN: Performed by: INTERNAL MEDICINE

## 2021-09-01 PROCEDURE — 7100000010 HC PHASE II RECOVERY - FIRST 15 MIN: Performed by: INTERNAL MEDICINE

## 2021-09-01 PROCEDURE — 3609010600 HC COLONOSCOPY POLYPECTOMY SNARE/COLD BIOPSY: Performed by: INTERNAL MEDICINE

## 2021-09-01 PROCEDURE — 6360000002 HC RX W HCPCS: Performed by: NURSE ANESTHETIST, CERTIFIED REGISTERED

## 2021-09-01 PROCEDURE — 2580000003 HC RX 258: Performed by: INTERNAL MEDICINE

## 2021-09-01 PROCEDURE — 88305 TISSUE EXAM BY PATHOLOGIST: CPT

## 2021-09-01 PROCEDURE — 3700000001 HC ADD 15 MINUTES (ANESTHESIA): Performed by: INTERNAL MEDICINE

## 2021-09-01 PROCEDURE — 2709999900 HC NON-CHARGEABLE SUPPLY: Performed by: INTERNAL MEDICINE

## 2021-09-01 PROCEDURE — 3700000000 HC ANESTHESIA ATTENDED CARE: Performed by: INTERNAL MEDICINE

## 2021-09-01 RX ORDER — LIDOCAINE HYDROCHLORIDE 20 MG/ML
INJECTION, SOLUTION EPIDURAL; INFILTRATION; INTRACAUDAL; PERINEURAL PRN
Status: DISCONTINUED | OUTPATIENT
Start: 2021-09-01 | End: 2021-09-01 | Stop reason: SDUPTHER

## 2021-09-01 RX ORDER — ONDANSETRON 2 MG/ML
4 INJECTION INTRAMUSCULAR; INTRAVENOUS
Status: DISCONTINUED | OUTPATIENT
Start: 2021-09-01 | End: 2021-09-01 | Stop reason: HOSPADM

## 2021-09-01 RX ORDER — OXYCODONE HYDROCHLORIDE AND ACETAMINOPHEN 5; 325 MG/1; MG/1
1 TABLET ORAL PRN
Status: DISCONTINUED | OUTPATIENT
Start: 2021-09-01 | End: 2021-09-01 | Stop reason: HOSPADM

## 2021-09-01 RX ORDER — FENTANYL CITRATE 50 UG/ML
25 INJECTION, SOLUTION INTRAMUSCULAR; INTRAVENOUS EVERY 5 MIN PRN
Status: DISCONTINUED | OUTPATIENT
Start: 2021-09-01 | End: 2021-09-01 | Stop reason: HOSPADM

## 2021-09-01 RX ORDER — SODIUM CHLORIDE, SODIUM LACTATE, POTASSIUM CHLORIDE, CALCIUM CHLORIDE 600; 310; 30; 20 MG/100ML; MG/100ML; MG/100ML; MG/100ML
INJECTION, SOLUTION INTRAVENOUS CONTINUOUS
Status: DISCONTINUED | OUTPATIENT
Start: 2021-09-01 | End: 2021-09-01 | Stop reason: HOSPADM

## 2021-09-01 RX ORDER — PROPOFOL 10 MG/ML
INJECTION, EMULSION INTRAVENOUS PRN
Status: DISCONTINUED | OUTPATIENT
Start: 2021-09-01 | End: 2021-09-01 | Stop reason: SDUPTHER

## 2021-09-01 RX ORDER — MEPERIDINE HYDROCHLORIDE 25 MG/ML
12.5 INJECTION INTRAMUSCULAR; INTRAVENOUS; SUBCUTANEOUS EVERY 5 MIN PRN
Status: DISCONTINUED | OUTPATIENT
Start: 2021-09-01 | End: 2021-09-01 | Stop reason: HOSPADM

## 2021-09-01 RX ORDER — OXYCODONE HYDROCHLORIDE AND ACETAMINOPHEN 5; 325 MG/1; MG/1
2 TABLET ORAL PRN
Status: DISCONTINUED | OUTPATIENT
Start: 2021-09-01 | End: 2021-09-01 | Stop reason: HOSPADM

## 2021-09-01 RX ORDER — HYDRALAZINE HYDROCHLORIDE 20 MG/ML
5 INJECTION INTRAMUSCULAR; INTRAVENOUS EVERY 10 MIN PRN
Status: DISCONTINUED | OUTPATIENT
Start: 2021-09-01 | End: 2021-09-01 | Stop reason: HOSPADM

## 2021-09-01 RX ORDER — PROMETHAZINE HYDROCHLORIDE 25 MG/ML
6.25 INJECTION, SOLUTION INTRAMUSCULAR; INTRAVENOUS
Status: DISCONTINUED | OUTPATIENT
Start: 2021-09-01 | End: 2021-09-01 | Stop reason: HOSPADM

## 2021-09-01 RX ADMIN — PHENYLEPHRINE HYDROCHLORIDE 200 MCG: 10 INJECTION INTRAVENOUS at 10:53

## 2021-09-01 RX ADMIN — PHENYLEPHRINE HYDROCHLORIDE 200 MCG: 10 INJECTION INTRAVENOUS at 10:55

## 2021-09-01 RX ADMIN — LIDOCAINE HYDROCHLORIDE 60 MG: 20 INJECTION, SOLUTION EPIDURAL; INFILTRATION; INTRACAUDAL; PERINEURAL at 10:41

## 2021-09-01 RX ADMIN — PHENYLEPHRINE HYDROCHLORIDE 300 MCG: 10 INJECTION INTRAVENOUS at 11:00

## 2021-09-01 RX ADMIN — PHENYLEPHRINE HYDROCHLORIDE 400 MCG: 10 INJECTION INTRAVENOUS at 10:58

## 2021-09-01 RX ADMIN — PROPOFOL 150 MG: 10 INJECTION, EMULSION INTRAVENOUS at 10:41

## 2021-09-01 RX ADMIN — PHENYLEPHRINE HYDROCHLORIDE 200 MCG: 10 INJECTION INTRAVENOUS at 10:51

## 2021-09-01 RX ADMIN — PHENYLEPHRINE HYDROCHLORIDE 300 MCG: 10 INJECTION INTRAVENOUS at 11:03

## 2021-09-01 RX ADMIN — SODIUM CHLORIDE, POTASSIUM CHLORIDE, SODIUM LACTATE AND CALCIUM CHLORIDE: 600; 310; 30; 20 INJECTION, SOLUTION INTRAVENOUS at 10:41

## 2021-09-01 NOTE — OP NOTE
[address]                 [address] [place] [address]                                OPERATIVE REPORT    PATIENT NAME: CAROTA, DUANE A                     :        1960  MED REC NO:   [id number]                          ROOM:  ACCOUNT NO:   [de-identified]                           ADMIT DATE: 2021  PROVIDER:     Aleks Garcia MD    DATE OF PROCEDURE:  2021    PREOPERATIVE DIAGNOSES:  1.  Screening colonoscopy. 2.  Failed prep on last colonoscopy a year ago. PROCEDURE:  Colonoscopy to the cecum with snare polypectomy. POSTPROCEDURE DIAGNOSES:  1.  Melanosis coli. 2.  Three descending colon polyps. 3.  Internal hemorrhoids. PROCEDURE INDICATIONS:  This is a 43-year-old male with history of  mental retardation, bipolar disorder, AFib, seizure disorder, presents  for screening colonoscopy. He did have a colonoscopy a year ago but the  prep was poor. MEDICATIONS:  MAC per Anesthesia. PROCEDURE IN DETAIL:  Informed consent obtained after discussing risks,  benefits, alternatives with the patient's caregiver. Full history and  physical was performed. The patient was classified as ASA class III. Medications were given by Anesthesia. Cardiopulmonary status was  continuously monitored throughout the procedure. The patient was placed  in left lateral decubitus position. Once adequately sedated, a standard  pediatric colonoscope was inserted in the anus and advanced to the cecum  identified by landmarks of appendiceal orifice and IC valve. Terminal  ileum was briefly intubated for visualization. The entire mucosa of the  cecum, ascending colon, transverse colon, descending colon, sigmoid  colon, and rectum was examined carefully during withdrawal.  Colon prep  was fair. Aggressive lavage was performed to obtain adequate views. FINDINGS:  RECTUM:  The digital rectal exam was normal.  No masses were felt.   COLON:  There were three sessile 5-7 mm polyps in the descending colon;  all three were completely resected and retrieved using snare polypectomy  method. Remaining examined colon mucosa appeared normal and healthy  without any evidence of inflammation, ulcers, pseudomembranes or masses. Retroflexed view of the rectum showed internal hemorrhoids. The colon  mucosa also had pigmentation consistent with melanosis coli. SUMMARY:  1.  Melanosis coli. 2.  Three descending colon polyps. 3.  Internal hemorrhoids. RECOMMENDATIONS:  1. Discharge the patient to home when standard parameters are met. 2.  Await pathology results. 3.  Repeat colonoscopy in 3 years. 4.  Encourage high-fiber diet.     EBL: <5mL    Holly Leija MD    D: 09/01/2021 11:13:22       T: 09/01/2021 11:22:07     [email or web address]  Job#: [id number]     Doc#: [id number]    CC:

## 2021-09-01 NOTE — H&P
History and Physical / Pre-Sedation Assessment    Patient:  Tracey Olmstead   :   1960     Intended Procedure:  Colonoscopy    HPI: 62year old for screening. Last colon a year ago was poor prep    Past Medical History:   Diagnosis Date    A-fib ([address]     Bipolar disorder ([address]     Brain herniation ([address]     Constipation     COVID-19 2021    Cystitis     Developmental non-verbal disorder     Impulse control disorder     Influenza A 2014    Mental retardation, profound (I.Q. < 20)     Osteopenia     Seizure ([address]     Subdural hematoma ([address]      Past Surgical History:   Procedure Laterality Date    CATARACT REMOVAL      COLONOSCOPY  2016    incomplete, poor prep    COLONOSCOPY  2020    COLON W/ANES.  COLONOSCOPY N/A 2020    COLON W/ANES. (9:00) COVID TEST -. PT IS NON-VERBAL, IS NOT IN A FACILITY. IS CARED FOR BY 2 CAREGIVERS IN HIS HOME. performed by Manolo Gifford MD at [facility] Right 2019    Right Trephine Craniotomy For Evacuation of Subdural Hematoma performed by Nilo Guerrero MD at [address] TURP N/A 2020    CYSTOSCOPY, TRANSURETHRAL RESECTION OF PROSTATE performed by Carlos Knox MD at [facility]       Medications reviewed  Prior to Admission medications    Medication Sig Start Date End Date Taking? Authorizing Provider   LORazepam (ATIVAN) 0.5 MG tablet Take 0.5 mg by mouth every 6 hours as needed for Anxiety.    Yes Historical Provider, MD   polyethylene glycol (GLYCOLAX) 17 g packet Take 17 g by mouth daily as needed for Constipation   Yes Historical Provider, MD   lactulose (CHRONULAC) 10 GM/15ML solution Take 20 g by mouth 2 times daily   Yes Historical Provider, MD   levETIRAcetam (KEPPRA) 250 MG tablet Take 250 mg by mouth 2 times daily   Yes Historical Provider, MD   tamsulosin (FLOMAX) 0.4 MG capsule Take 0.8 mg by mouth daily   Yes Historical Provider, MD   traZODone (DESYREL) 50 MG tablet 1 tablet by Per NG tube route nightly  Patient taking differently: 150 mg by Per NG tube route nightly  12/5/19  Yes Vanessa Alvarez MD   metoprolol tartrate (LOPRESSOR) 25 MG tablet 1 tablet by Per NG tube route 2 times daily 12/5/19  Yes Vanessa Alvarez MD   sennosides-docusate sodium (SENOKOT-S) 8.6-50 MG tablet Take 2 tablets by mouth every 72 hours 12/5/19  Yes Vanessa Alvarez MD   QUEtiapine (SEROQUEL XR) 50 MG extended release tablet Take 100 mg by mouth 3 times daily 50mg in AM  100mg @ 1600  150mg @ 1900   Yes Historical Provider, MD   Nutritional Supplements (ENSURE ENLIVE) LIQD Take by mouth 2 times daily In between meals    Historical Provider, MD   melatonin 3 MG TABS tablet Take 5 mg by mouth nightly     Historical Provider, MD        Allergies: Allergies   Allergen Reactions    Penicillins      Other reaction(s): Unknown    Benadryl [Diphenhydramine] Other (See Comments)     Pt becomes agitated and aggressive with Benadryl    Clonidine Derivatives      Other reaction(s): Unknown    Tetracyclines & Related      Other reaction(s): Unknown       Nurses notes reviewed and agreed. Physical Exam:  Vital Signs: BP 96/65   Pulse 90   Temp 97 °F (36.1 °C)   Resp 18   SpO2 91%    Airway: Mallampati: II (soft palate, uvula, fauces visible)  Pulmonary:Normal  Cardiac:Normal  Abdomen:Normal    Pre-Procedure Assessment / Plan:  ASA: Class 3 - A patient with severe systemic disease that limits activity but is not incapacitating  Level of Sedation Plan: Moderate sedation  Post Procedure plan: Return to same level of care    I assessed the patient and find that the patient is in satisfactory condition to proceed with the planned procedure and sedation plan. I have explained the risk, benefits, and alternatives to the procedure; the patient understands and agrees to proceed.        Jacob Murguia MD  9/1/2021

## 2021-09-01 NOTE — PROGRESS NOTES
PT ALERT, CALM, AND COOPERATIVE, OTHERWISE NO CHANGE IN PHYSICAL ASSESSMENT; CARETAKER VERBALIZES UNDERSTANDING OF INSTRUCTIONS; PT DISCHARGED VIA W/C BY TRANSPORT WITH CARETAKER  IN STABLE CONDITION

## 2021-09-01 NOTE — ANESTHESIA PRE PROCEDURE
Department of Anesthesiology  Preprocedure Note       Name:  Ever Lopes   Age:  62 y.o.  :  1960                                          MRN:  [id number]         Date:  2021      Surgeon:  Juan Dominguez MD    Procedure: COLON W/ANES. (10:30) PT IS COMBATIVE. [facility] [employer] SERVICES. [phone] XSTEPH    HPI:  62 y.o. male who presents to [facility] for a screening colonoscopy. He has a past medical history of developmental nonverbal disorder, MR, history of subdural hematoma. History cannot be obtained from the patient due to developmental disorder     Medications prior to admission:   LORazepam (ATIVAN) 0.5 MG tablet Take 0.5 mg by mouth every 6 hours as needed for Anxiety.    polyethylene glycol (GLYCOLAX) 17 g packet Take 17 g by mouth daily as needed for Constipation   Nutritional Supplements (ENSURE ENLIVE) LIQD Take by mouth 2 times daily In between meals   melatonin 3 MG TABS tablet Take 5 mg by mouth nightly    lactulose (CHRONULAC) 10 GM/15ML solution Take 20 g by mouth 2 times daily   levETIRAcetam (KEPPRA) 250 MG tablet Take 250 mg by mouth 2 times daily   tamsulosin (FLOMAX) 0.4 MG capsule Take 0.8 mg by mouth daily   traZODone (DESYREL) 50 MG tablet 1 tablet by Per NG tube route nightly   metoprolol tartrate (LOPRESSOR) 25 MG tablet 1 tablet by Per NG tube route 2 times daily   sennosides-docusate sodium (SENOKOT-S) 8.6-50 MG tablet Take 2 tablets by mouth every 72 hours   QUEtiapine (SEROQUEL XR) 50 MG extended release tablet Take 100 mg by mouth 3 times daily 50mg in AM  100mg @ 1600  150mg @ 1900     Allergies:      Penicillins      Other reaction(s): Unknown    Benadryl [Diphenhydramine] Other (See Comments)     Pt becomes agitated and aggressive with Benadryl    Clonidine Derivatives      Other reaction(s): Unknown    Tetracyclines & Related      Other reaction(s): Unknown     Problem List:     Lipoma of head    Atrial fibrillation with RVR (HCC)    Acute cystitis with hematuria    Altered mental status    Brain herniation (HCC)    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 (HCC)    Constipation    Acute urinary retention    Atrial fibrillation, chronic (HCC)    History of subdural hematoma    Pneumonia    2019-nCoV acute respiratory disease    Pulmonary infiltrates     Past Medical History:     A-fib ([address]     [facility]ipolar disorder [facility] [address]     Brain herniation ([facility] [address]     Constipation     COVID-19 01/20/2021    Cystitis     Developmental non-verbal disorder     Impulse control disorder     Influenza A 02/13/2014    Mental retardation, profound (I.Q. < 20)     Osteopenia     Subdural hematoma (HCC)      Past Surgical History:     CATARACT REMOVAL      COLONOSCOPY  06/20/2016    incomplete, poor prep    COLONOSCOPY  08/26/2020    COLON W/ANES.  COLONOSCOPY N/A 8/26/2020    COLON W/ANES. (9:00) COVID TEST 8/20-8/22. PT IS NON-VERBAL, IS NOT IN A FACILITY.  IS CARED FOR BY 2 CAREGIVERS IN HIS HOME. performed by Jaymie Coffman MD at [address] Right 12/2/2019    Right Trephine Craniotomy For Evacuation of Subdural Hematoma performed by Sheri Daley MD at [address] [facility] TURP N/A 2/28/2020    CYSTOSCOPY, TRANSURETHRAL RESECTION OF PROSTATE performed by Paul Butler MD at [place] [address] History:    Tobacco Use    Smoking status: Never Smoker    Smokeless tobacco: Never Used   Substance Use Topics    Alcohol use: No     Vital Signs (Current):    BP: 96/65 Pulse: 90   Resp: 18 SpO2: 91   Temp: 97 °F (36.1 °C)     BP Readings from Last 3 Encounters:   08/11/21 101/74   08/10/21 111/60   05/27/21 90/71     NPO Status: >8 hrs                       BMI:   Wt Readings from Last 3 Encounters:   08/11/21 100 lb (45.4 kg)   05/27/21 92 lb (41.7 kg)   02/05/21 92 lb (41.7 kg)       CBC:    WBC 6.0 05/27/2021    HGB 12.3 05/27/2021    HCT 37.5 05/27/2021    MCV 85.1 05/27/2021    RDW 15.1 05/27/2021     05/27/2021 CMP:     05/27/2021    K 5.4 05/27/2021    CL 98 05/27/2021    CO2 28 05/27/2021    BUN 16 05/27/2021    CREATININE 1.2 05/27/2021    GFRAA >60 05/27/2021    GLUCOSE 83 05/27/2021    PROT 6.9 05/27/2021    CALCIUM 9.7 05/27/2021    BILITOT <0.2 05/27/2021    ALKPHOS 128 05/27/2021    AST 22 05/27/2021    ALT 13 05/27/2021     Coags:    PROTIME 12.5 02/04/2020    INR 1.08 02/04/2020     COVID-19 Screening (If Applicable): [address] DETECTED 01/20/2021    COVID19 NOT DETECTED 08/20/2020     Anesthesia Evaluation  Patient summary reviewed and Nursing notes reviewed  Airway: Mallampati: Unable to assess / NA  TM distance: <3 FB   Neck ROM: limited  Mouth opening: < 3 FB Dental:          Pulmonary: breath sounds clear to auscultation  (+) pneumonia:                            ROS comment: Remote COVID-19 pneumonia   Cardiovascular:    (+) dysrhythmias: atrial fibrillation,                   Neuro/Psych:   (+) psychiatric history:             ROS comment: MR/DD: non-verbal    H/O SDH GI/Hepatic/Renal:             Endo/Other:                     Abdominal:             Vascular: Other Findings:           Anesthesia Plan      MAC     ASA 3       Induction: intravenous. MIPS: Prophylactic antiemetics administered. Anesthetic plan and risks discussed with healthcare power of . Plan discussed with CRNA.             Odalis Reyes MD

## 2021-09-01 NOTE — ANESTHESIA POSTPROCEDURE EVALUATION
Department of Anesthesiology  Postprocedure Note    Patient: Reva Whitmore  MRN: [id number]  YOB: 1960  Date of evaluation: 9/1/2021    Procedure Summary     Date: 09/01/21 Room / Location: [place] [address] 01 / [facility]    Anesthesia Start: 5204 Anesthesia Stop: 2606    Procedure: COLON W/ANES. (10:30) PT IS COMBATIVE. [facility] HUMAN SERVICES. [phone] XSTEPH (N/A ) Diagnosis:       Special screening for malignant neoplasms, colon      (SCREENING)    Surgeons: Bailee Pagan MD Responsible Provider: Josiane Grimaldo MD    Anesthesia Type: MAC ASA Status: 3        Anesthesia Type: MAC    Saleem Phase I:      Saleem Phase II: Saleem Score: 10    Last vitals: Reviewed and per EMR flowsheets.      Anesthesia Post Evaluation   Anesthetic Problems: no   Cardiovascular System Stable: yes  Respiratory Function: Airway Patent yes  ETT no  Ventilator no  Level of consciousness: awake, alert and oriented  Post-op pain: adequate analgesia  Hydration Adequate: yes  Nausea/Vomiting:no  Other Issues:     Jona Bess MD

## 2021-09-05 ENCOUNTER — APPOINTMENT (OUTPATIENT)
Dept: GENERAL RADIOLOGY | Age: 61
DRG: 871 | End: 2021-09-05
Payer: MEDICARE

## 2021-09-05 ENCOUNTER — APPOINTMENT (OUTPATIENT)
Dept: CT IMAGING | Age: 61
DRG: 871 | End: 2021-09-05
Payer: MEDICARE

## 2021-09-05 ENCOUNTER — HOSPITAL ENCOUNTER (INPATIENT)
Age: 61
LOS: 16 days | Discharge: NURSING, REHAB OR LONG-TERM CARE | DRG: 871 | End: 2021-09-21
Attending: EMERGENCY MEDICINE | Admitting: INTERNAL MEDICINE
Payer: MEDICARE

## 2021-09-05 DIAGNOSIS — E87.0 HYPERNATREMIA: ICD-10-CM

## 2021-09-05 DIAGNOSIS — J18.9 PNEUMONIA OF BOTH LUNGS DUE TO INFECTIOUS ORGANISM, UNSPECIFIED PART OF LUNG: Primary | ICD-10-CM

## 2021-09-05 DIAGNOSIS — R41.82 ALTERED MENTAL STATUS, UNSPECIFIED ALTERED MENTAL STATUS TYPE: ICD-10-CM

## 2021-09-05 DIAGNOSIS — R77.8 ELEVATED TROPONIN: ICD-10-CM

## 2021-09-05 DIAGNOSIS — R68.89 LOW BODY TEMPERATURE: ICD-10-CM

## 2021-09-05 DIAGNOSIS — I48.0 PAF (PAROXYSMAL ATRIAL FIBRILLATION) (HCC): ICD-10-CM

## 2021-09-05 PROBLEM — D69.6 THROMBOCYTOPENIA (HCC): Status: ACTIVE | Noted: 2021-09-05

## 2021-09-05 PROBLEM — A41.9 SEPSIS (HCC): Status: ACTIVE | Noted: 2021-09-05

## 2021-09-05 PROBLEM — N17.9 AKI (ACUTE KIDNEY INJURY) (HCC): Status: ACTIVE | Noted: 2021-09-05

## 2021-09-05 LAB
A/G RATIO: 0.9 (ref 1.1–2.2)
ALBUMIN SERPL-MCNC: 3.2 G/DL (ref 3.4–5)
ALP BLD-CCNC: 161 U/L (ref 40–129)
ALT SERPL-CCNC: 44 U/L (ref 10–40)
ANION GAP SERPL CALCULATED.3IONS-SCNC: 10 MMOL/L (ref 3–16)
ANISOCYTOSIS: ABNORMAL
AST SERPL-CCNC: 24 U/L (ref 15–37)
ATYPICAL LYMPHOCYTE RELATIVE PERCENT: 2 % (ref 0–6)
BANDED NEUTROPHILS RELATIVE PERCENT: 14 % (ref 0–7)
BASOPHILS ABSOLUTE: 0 K/UL (ref 0–0.2)
BASOPHILS RELATIVE PERCENT: 0 %
BILIRUB SERPL-MCNC: <0.2 MG/DL (ref 0–1)
BUN BLDV-MCNC: 33 MG/DL (ref 7–20)
CALCIUM SERPL-MCNC: 10.5 MG/DL (ref 8.3–10.6)
CHLORIDE BLD-SCNC: 109 MMOL/L (ref 99–110)
CO2: 30 MMOL/L (ref 21–32)
CREAT SERPL-MCNC: 1.3 MG/DL (ref 0.8–1.3)
EOSINOPHILS ABSOLUTE: 0.1 K/UL (ref 0–0.6)
EOSINOPHILS RELATIVE PERCENT: 1 %
GFR AFRICAN AMERICAN: >60
GFR NON-AFRICAN AMERICAN: 56
GLOBULIN: 3.6 G/DL
GLUCOSE BLD-MCNC: 79 MG/DL (ref 70–99)
HCT VFR BLD CALC: 41.7 % (ref 40.5–52.5)
HEMOGLOBIN: 13.6 G/DL (ref 13.5–17.5)
LACTIC ACID: 0.7 MMOL/L (ref 0.4–2)
LIPASE: 21 U/L (ref 13–60)
LYMPHOCYTES ABSOLUTE: 1 K/UL (ref 1–5.1)
LYMPHOCYTES RELATIVE PERCENT: 10 %
MACROCYTES: ABNORMAL
MCH RBC QN AUTO: 27.8 PG (ref 26–34)
MCHC RBC AUTO-ENTMCNC: 32.5 G/DL (ref 31–36)
MCV RBC AUTO: 85.6 FL (ref 80–100)
MICROCYTES: ABNORMAL
MONOCYTES ABSOLUTE: 0.7 K/UL (ref 0–1.3)
MONOCYTES RELATIVE PERCENT: 8 %
NEUTROPHILS ABSOLUTE: 6.9 K/UL (ref 1.7–7.7)
NEUTROPHILS RELATIVE PERCENT: 65 %
PDW BLD-RTO: 17.3 % (ref 12.4–15.4)
PLATELET # BLD: 83 K/UL (ref 135–450)
PLATELET SLIDE REVIEW: ABNORMAL
PMV BLD AUTO: 8.1 FL (ref 5–10.5)
POIKILOCYTES: ABNORMAL
POTASSIUM REFLEX MAGNESIUM: 4.5 MMOL/L (ref 3.5–5.1)
PROCALCITONIN: 0.19 NG/ML (ref 0–0.15)
RBC # BLD: 4.87 M/UL (ref 4.2–5.9)
SLIDE REVIEW: ABNORMAL
SODIUM BLD-SCNC: 149 MMOL/L (ref 136–145)
STOMATOCYTES: ABNORMAL
TOTAL PROTEIN: 6.8 G/DL (ref 6.4–8.2)
TROPONIN: 0.02 NG/ML
TROPONIN: <0.01 NG/ML
WBC # BLD: 8.7 K/UL (ref 4–11)

## 2021-09-05 PROCEDURE — 83605 ASSAY OF LACTIC ACID: CPT

## 2021-09-05 PROCEDURE — 83690 ASSAY OF LIPASE: CPT

## 2021-09-05 PROCEDURE — 84484 ASSAY OF TROPONIN QUANT: CPT

## 2021-09-05 PROCEDURE — 2000000000 HC ICU R&B

## 2021-09-05 PROCEDURE — U0003 INFECTIOUS AGENT DETECTION BY NUCLEIC ACID (DNA OR RNA); SEVERE ACUTE RESPIRATORY SYNDROME CORONAVIRUS 2 (SARS-COV-2) (CORONAVIRUS DISEASE [COVID-19]), AMPLIFIED PROBE TECHNIQUE, MAKING USE OF HIGH THROUGHPUT TECHNOLOGIES AS DESCRIBED BY CMS-2020-01-R: HCPCS

## 2021-09-05 PROCEDURE — 6360000002 HC RX W HCPCS: Performed by: EMERGENCY MEDICINE

## 2021-09-05 PROCEDURE — 74022 RADEX COMPL AQT ABD SERIES: CPT

## 2021-09-05 PROCEDURE — 71250 CT THORAX DX C-: CPT

## 2021-09-05 PROCEDURE — 96374 THER/PROPH/DIAG INJ IV PUSH: CPT

## 2021-09-05 PROCEDURE — 87635 SARS-COV-2 COVID-19 AMP PRB: CPT

## 2021-09-05 PROCEDURE — 93005 ELECTROCARDIOGRAM TRACING: CPT | Performed by: PHYSICIAN ASSISTANT

## 2021-09-05 PROCEDURE — 36556 INSERT NON-TUNNEL CV CATH: CPT

## 2021-09-05 PROCEDURE — 80053 COMPREHEN METABOLIC PANEL: CPT

## 2021-09-05 PROCEDURE — U0005 INFEC AGEN DETEC AMPLI PROBE: HCPCS

## 2021-09-05 PROCEDURE — 51702 INSERT TEMP BLADDER CATH: CPT

## 2021-09-05 PROCEDURE — 85025 COMPLETE CBC W/AUTO DIFF WBC: CPT

## 2021-09-05 PROCEDURE — 2580000003 HC RX 258: Performed by: PHYSICIAN ASSISTANT

## 2021-09-05 PROCEDURE — 99285 EMERGENCY DEPT VISIT HI MDM: CPT

## 2021-09-05 PROCEDURE — 96361 HYDRATE IV INFUSION ADD-ON: CPT

## 2021-09-05 PROCEDURE — 84145 PROCALCITONIN (PCT): CPT

## 2021-09-05 PROCEDURE — 2580000003 HC RX 258: Performed by: EMERGENCY MEDICINE

## 2021-09-05 RX ORDER — DEXAMETHASONE SODIUM PHOSPHATE 10 MG/ML
6 INJECTION INTRAMUSCULAR; INTRAVENOUS ONCE
Status: COMPLETED | OUTPATIENT
Start: 2021-09-05 | End: 2021-09-05

## 2021-09-05 RX ORDER — SODIUM CHLORIDE, SODIUM LACTATE, POTASSIUM CHLORIDE, AND CALCIUM CHLORIDE .6; .31; .03; .02 G/100ML; G/100ML; G/100ML; G/100ML
1000 INJECTION, SOLUTION INTRAVENOUS ONCE
Status: COMPLETED | OUTPATIENT
Start: 2021-09-05 | End: 2021-09-06

## 2021-09-05 RX ORDER — 0.9 % SODIUM CHLORIDE 0.9 %
1000 INTRAVENOUS SOLUTION INTRAVENOUS ONCE
Status: COMPLETED | OUTPATIENT
Start: 2021-09-05 | End: 2021-09-05

## 2021-09-05 RX ADMIN — SODIUM CHLORIDE 1000 ML: 9 INJECTION, SOLUTION INTRAVENOUS at 22:44

## 2021-09-05 RX ADMIN — SODIUM CHLORIDE 1000 ML: 9 INJECTION, SOLUTION INTRAVENOUS at 18:57

## 2021-09-05 RX ADMIN — DEXAMETHASONE SODIUM PHOSPHATE 6 MG: 10 INJECTION INTRAMUSCULAR; INTRAVENOUS at 22:48

## 2021-09-05 ASSESSMENT — ENCOUNTER SYMPTOMS
VOMITING: 0
COUGH: 0
CHEST TIGHTNESS: 0
ABDOMINAL PAIN: 0
NAUSEA: 0
SHORTNESS OF BREATH: 0
BACK PAIN: 0

## 2021-09-05 ASSESSMENT — PAIN SCALES - GENERAL: PAINLEVEL_OUTOF10: 0

## 2021-09-05 NOTE — ED PROVIDER NOTES
Emergency Department Provider Note     Location: [address] [place] [address]  ED  9/5/2021    I independently performed a history and physical on Duane A Carota. All diagnostic, treatment, and disposition decisions were made by myself in conjunction with the mid-level provider. Briefly, this is a 62 y.o. male here for worsening mental status compare to baseline. Patient had colonoscopy 4 days ago. Since then, he has declined in mental status and has not ate or drink very well. Patient is nonverbal at baseline. ED Triage Vitals   BP Temp Temp src Pulse Resp SpO2 Height Weight   09/05/21 1705 -- -- 09/05/21 1705 09/05/21 1706 09/05/21 1705 09/05/21 1705 09/05/21 1705   83/60   84 18 91 % 4' 8\" (1.422 m) 140 lb (63.5 kg)        Exam showed WDWN male awake, ill-appearing, breathing with his mouth open, heart RRR, lungs with occasional scattered rhonchi, no LE edema. Patient seen and examined in room 18. EKG  The Ekg interpreted by me in the absence of a cardiologist shows. normal sinus rhythm with a rate of 95  Axis is   Left axis deviation  QTc is  prolonged  No specific ST-T wave changes appreciated. No evidence of acute ischemia. Compared to prior EKG dated January 24, 2021, patient's heart rate jumped up by 38 bpm.    Radiology  XR ACUTE ABD SERIES CHEST 1 VW    Result Date: 9/5/2021  EXAMINATION: TWO XRAY VIEWS OF THE ABDOMEN AND SINGLE  XRAY VIEW OF THE CHEST 9/5/2021 6:29 pm COMPARISON: Chest x-ray 05/27/2021. Abdominal films 02/27/2020. HISTORY: ORDERING SYSTEM PROVIDED HISTORY: colonoscopy now refusing to eat TECHNOLOGIST PROVIDED HISTORY: Reason for exam:->colonoscopy now refusing to eat Reason for Exam: had colonscopy last week +refusing to eat Acuity: Acute Type of Exam: Initial FINDINGS: The cardiomediastinal silhouette is unremarkable. Stable mild elevation of the right hemidiaphragm. Right perihilar and basilar opacification, likely a infiltrate superimposed on some atelectasis.   The left lung is clear. The abdominal bowel gas pattern is nonspecific. Scattered gas and stool throughout the colon. No small bowel distension, air-fluid levels or free air. Left-sided nephrolithiasis is noted with stones measuring up to 5 mm. No other definite plain film evidence of renal or ureteral calculi. Bones are osteopenic. Right perihilar and basilar opacification, concerning for infiltrate superimposed on atelectasis. Nonspecific abdominal bowel gas pattern. Left-sided nephrolithiasis. CT CHEST WO CONTRAST    Result Date: 9/5/2021  EXAMINATION: CT OF THE CHEST WITHOUT CONTRAST 9/5/2021 9:31 pm TECHNIQUE: CT of the chest was performed without the administration of intravenous contrast. Multiplanar reformatted images are provided for review. Dose modulation, iterative reconstruction, and/or weight based adjustment of the mA/kV was utilized to reduce the radiation dose to as low as reasonably achievable. COMPARISON: None. HISTORY: ORDERING SYSTEM PROVIDED HISTORY: possible pna on cxr TECHNOLOGIST PROVIDED HISTORY: Reason for exam:->possible pna on cxr Decision Support Exception - unselect if not a suspected or confirmed emergency medical condition->Emergency Medical Condition (MA) Reason for Exam: possible pna on cxr. unable to get details from pt. FINDINGS: Lungs/Pleura: Few consolidations in the bilateral lower lobes and right middle lobe with scattered bilateral ground-glass opacities representing pneumonia, of which COVID-19 pneumonia cannot be ruled out. Right middle lobe cyst.  Left lower lobe nodule measures 0.6 cm (2, 43). No pneumothorax or pleural effusion. Mediastinum: The central airways are clear. No mediastinal lymphadenopathy. The heart is unremarkable. No pericardial effusion. Vessels: The pulmonary artery and thoracic aorta are within normal limits. Upper Abdomen: Visualized upper abdomen is unremarkable. Soft Tissue/Bones: Multilevel degenerative disease.      Bilateral consolidations and ground-glass opacities representing pneumonia, which COVID-19 pneumonia cannot be ruled out. Lab reviewed. Pertinent for WBC 8.7, H/H WNL, BUN 33, Cr 1.3. Na 149. Trop 0.02. After I saw the checks x-ray, we added on CT of the chest. The pattern we are seeing on check history is concerning for COVID. COVID PCR test ordered and pending at this time. I have seen this patient several times before. I agreed that he is not acting himself. He normally is a lot more . Patient also noted to be hypothermic. In addition, his O2 sat is borderline on room air at rest. My concern is if he exerts himself, he would get hypoxic. Patient needs admission. Patient came in hypotensive but was fluid responsive and blood pressure improved after liter. However without any fluids and sitting in the ED for a while, blood pressure dropped to 89/78. Second liter of fluids ordered. We will continue to monitor. I spoke with Dr. Doyle Jeffers, hospitalist via [address] [place] [address]. We discussed pertinent history, physical exam, laboratory and imaging studies, as well as, emergency department course. Based upon that discussion, we've decided to admit Duane A Carota for further observation and evaluation of Duane A Carota's mental status change. As I have deemed necessary from their history, physical and studies, I have considered and evaluated Duane A Carota for the following diagnoses:  UTI, PNEUMONIA, DIABETES, INTRACRANIAL HEMORRHAGE, SEPSIS SUBARACHNOID HEMORRHAGE, SUBDURAL HEMATOMA, & STROKE. For further details of Duane A Carota's emergency department encounter, please see GALLO Gutierrez's documentation. Total critical care time is 15-20 minutes, which excludes separately billable procedures and updating family. Time spent is specifically for management of the presenting complaint and symptoms initially, direct bedside care, reevaluation, review of records, and consultation.   There was a high probability of clinically significant life-threatening deterioration in the patient's condition, which required my urgent intervention. This chart was generated in part by using Dragon Dictation system and may contain errors related to that system including errors in grammar, punctuation, and spelling, as well as words and phrases that may be inappropriate. If there are any questions or concerns please feel free to contact the dictating provider for clarification.           Cameron Nguyen MD  09/05/21 [id number]

## 2021-09-05 NOTE — ED PROVIDER NOTES
**ADVANCED PRACTICE PROVIDER, I HAVE EVALUATED THIS [facility]  ED  EMERGENCY DEPARTMENT ENCOUNTER      Pt Name: Shruthi Vásquez  XPQ:[id number]  [place] 1960  Date of evaluation: 9/5/2021  Provider: GALLO Dave      Chief Complaint:    Chief Complaint   Patient presents with    Failure To Thrive     Colonoscopy on wednesday, since then pt has not been eating or drinking much, urine has got darker as well          Nursing Notes, Past Medical Hx, Past Surgical Hx, Social Hx, Allergies, and Family Hx were all reviewed and agreed with or any disagreements were addressed in the HPI.    HPI: (Location, Duration, Timing, Severity, Quality, Assoc Sx, Context, Modifying factors)    Chief Complaint of failure to thrive. This is a  62 y.o. male who presents presents to the emergency department today via personal transport with home health aide. Patient is unable to provide any history as he is nonverbal, he has home health aide states he had a colonoscopy procedure done on Wednesday and was mildly lethargic on Thursday which was to be expected however the patient has been unable to eat or drink anything since the procedure. She states initially he would drink small amounts of water and roll food around in his mouth before spitting it out. However beginning yesterday he now does not swallow the water and will continue to swish it around his mouth before spitting it out. Denies recent fever. The patient is also not urinating normally and his caregiver states it is darker than usual.  She also states she is concerned about his mental status as he is very less interactive compared to baseline.     PastMedical/Surgical History:      Diagnosis Date    [facility]     Bipolar disorder ([facility]     Brain herniation ([facility]     Constipation     COVID-19 01/20/2021    Cystitis     Developmental non-verbal disorder     Impulse control disorder     Influenza A 02/13/2014    Mental retardation, profound (I.Q. < 20)     Osteopenia     [facility]     Subdural hematoma ([facility]          Procedure Laterality Date    CATARACT REMOVAL      COLONOSCOPY  06/20/2016    incomplete, poor prep    COLONOSCOPY  08/26/2020    COLON W/ANES.  COLONOSCOPY N/A 8/26/2020    COLON W/ANES. (9:00) COVID TEST 8/20-8/22. PT IS NON-VERBAL, IS NOT IN A FACILITY. IS CARED FOR BY 2 CAREGIVERS IN HIS HOME. performed by Saji Hodge MD at [address] [facility] COLONOSCOPY N/A 09/01/2021    COLONOSCOPY N/A 9/1/2021    COLON W/ANES. (10:30) PT IS COMBATIVE. [facility] SERVICES. [phone] XSTEPH performed by Saji Hodge MD at [address] [facility] Right 12/2/2019    Right Trephine Craniotomy For Evacuation of Subdural Hematoma performed by Maru Almanza MD at [address] [facility] TURP N/A 2/28/2020    CYSTOSCOPY, TRANSURETHRAL RESECTION OF PROSTATE performed by Karson Barron MD at [facility] 1       Medications:  Previous Medications    LACTULOSE ([facility]) 10 GM/15ML SOLUTION    Take 20 g by mouth 2 times daily    LEVETIRACETAM (KEPPRA) 250 MG TABLET    Take 250 mg by mouth 2 times daily    LORAZEPAM (ATIVAN) 0.5 MG TABLET    Take 0.5 mg by mouth every 6 hours as needed for Anxiety.     MELATONIN 3 MG TABS TABLET    Take 5 mg by mouth nightly     METOPROLOL TARTRATE (LOPRESSOR) 25 MG TABLET    1 tablet by Per NG tube route 2 times daily    NUTRITIONAL SUPPLEMENTS (ENSURE ENLIVE) LIQD    Take by mouth 2 times daily In between meals    POLYETHYLENE GLYCOL (GLYCOLAX) 17 G PACKET    Take 17 g by mouth daily as needed for Constipation    QUETIAPINE (SEROQUEL XR) 50 MG EXTENDED RELEASE TABLET    Take 100 mg by mouth 3 times daily 50mg in AM  100mg @ 1600  150mg @ 1900    SENNOSIDES-DOCUSATE SODIUM (SENOKOT-S) 8.6-50 MG TABLET    Take 2 tablets by mouth every 72 hours    TAMSULOSIN (FLOMAX) 0.4 MG CAPSULE    Take 0.8 mg by mouth daily    TRAZODONE (DESYREL) 50 MG TABLET    1 tablet by Per Neurological:      Mental Status: He is lethargic.          MEDICAL DECISION MAKING    Vitals:    Vitals:    09/06/21 0201 09/06/21 0211 09/06/21 0221 09/06/21 0228   BP: 107/84 115/79 120/76 122/84   Pulse: 70 75 73 104   Resp: 14 14 10 23   Temp:       TempSrc:       SpO2: 95% 95% 95% 95%   Weight:       Height:           LABS:  Labs Reviewed   CBC WITH AUTO DIFFERENTIAL - Abnormal; Notable for the following components:       Result Value    RDW 17.3 (*)     Platelets 83 (*)     Bands Relative 14 (*)     Anisocytosis 1+ (*)     Macrocytes Occasional (*)     Microcytes Occasional (*)     Poikilocytes Occasional (*)     Stomatocytes Occasional (*)     All other components within normal limits    Narrative:     Performed at:  [place] [address] [employer]   Phone [phone]   COMPREHENSIVE METABOLIC PANEL W/ REFLEX TO MG FOR LOW K - Abnormal; Notable for the following components:    Sodium 149 (*)     BUN 33 (*)     GFR Non- 56 (*)     Albumin 3.2 (*)     Albumin/Globulin Ratio 0.9 (*)     Alkaline Phosphatase 161 (*)     ALT 44 (*)     All other components within normal limits    Narrative:     Performed at:  [place] [address] [employer]   Phone [phone]   TROPONIN - Abnormal; Notable for the following components:    Troponin 0.02 (*)     All other components within normal limits    Narrative:     Performed at:  [place] [address] [employer]   Phone [phone]   URINE RT REFLEX TO CULTURE - Abnormal; Notable for the following components:    Blood, Urine TRACE-INTACT (*)     Nitrite, Urine POSITIVE (*)     Leukocyte Esterase, Urine TRACE (*)     All other components within normal limits    Narrative:     Performed at:  [place] [address] [employer]   Phone [phone] PROCALCITONIN - Abnormal; Notable for the following components:    Procalcitonin 0.19 (*)     All other components within normal limits    Narrative:     Performed at:  [address], [facility] [employer]   Phone [phone]   MICROSCOPIC URINALYSIS - Abnormal; Notable for the following components:    WBC, UA 10-20 (*)     Bacteria, UA 3+ (*)     All other components within normal limits    Narrative:     Performed at:  [address], [facility] [employer]   Phone [phone], RAPID    Narrative:     Performed at:  [address], [facility] [employer]   Phone [phone]   CULTURE, BLOOD 1   CULTURE, BLOOD 2   CULTURE, RESPIRATORY   STREP PNEUMONIAE ANTIGEN   LEGIONELLA ANTIGEN, URINE   CULTURE, URINE   LIPASE    Narrative:     Performed at:  [address], [facility] [employer]   Phone [phone]   TROPONIN    Narrative:     Performed at:  [address], [facility] [employer]   Phone [phone]   LACTIC ACID, PLASMA    Narrative:     Performed at:  [address], [facility] [employer]   Phone [phone]   PROCALCITONIN   CBC WITH AUTO DIFFERENTIAL   COMPREHENSIVE METABOLIC PANEL   MAGNESIUM   PHOSPHORUS   PROTIME-INR   LACTIC ACID, PLASMA   LACTIC ACID, PLASMA   LACTIC ACID, PLASMA        Remainder of labs reviewed and were negative at this time or not returned at the time of this note.     RADIOLOGY:   Non-plain film images such as CT, Ultrasound and MRI are read by the radiologist. GALLO Andino have directly visualized the radiologic plain film image(s) with the below findings:      Interpretation per the Radiologist below, if available at the time of this note:    XR CHEST PORTABLE   Final Result   Central venous catheter in expected position without pneumothorax. CT CHEST WO CONTRAST   Final Result   Bilateral consolidations and ground-glass opacities representing pneumonia,   which COVID-19 pneumonia cannot be ruled out. XR ACUTE ABD SERIES CHEST 1 VW   Final Result   Right perihilar and basilar opacification, concerning for infiltrate   superimposed on atelectasis. Nonspecific abdominal bowel gas pattern. Left-sided nephrolithiasis. MEDICAL DECISION MAKING / ED COURSE:      Patient was seen and evaluated in the emergency department today for 4 failure to thrive. Patient was seen and evaluated by myself and attending physician. All diagnostic, treatment, and disposition decisions were made in conjunction with attending physician.     Patient was given:  Medications   cefepime (MAXIPIME) 2000 mg IVPB minibag (0 mg IntraVENous Stopped 9/6/21 0120)   norepinephrine (LEVOPHED) 16,000 mcg in dextrose 5 % 250 mL infusion (7 mcg/min IntraVENous Rate/Dose Change 9/6/21 0115)   levETIRAcetam (KEPPRA) 250 mg in sodium chloride 0.9 % 100 mL IVPB (has no administration in time range)   sodium chloride flush 0.9 % injection 10 mL (has no administration in time range)   0.9 % sodium chloride infusion (has no administration in time range)   potassium chloride 10 mEq/100 mL IVPB (Peripheral Line) (has no administration in time range)   magnesium sulfate 1000 mg in dextrose 5% 100 mL IVPB (has no administration in time range)   ondansetron (ZOFRAN) injection 4 mg (has no administration in time range)   acetaminophen (TYLENOL) tablet 650 mg (has no administration in time range)     Or   acetaminophen (TYLENOL) suppository 650 mg (has no administration in time range)   famotidine (PEPCID) injection 20 mg (20 mg IntraVENous Given 9/6/21 0055)   vancomycin 1000 mg IVPB in 250 mL D5W addavial (0 mg IntraVENous Stopped 9/6/21 0231)   0.9 % sodium chloride bolus (0 mLs IntraVENous Stopped 9/5/21 2110)   0.9 % sodium chloride bolus (0 mLs IntraVENous Stopped 9/5/21 2352)   dexamethasone (DECADRON) injection 6 mg (6 mg IntraVENous Given 9/5/21 2248)   lactated ringers bolus (0 mLs IntraVENous Stopped 9/6/21 0230)     Patient was seen in the emergency department today for altered mental status for 4 days. Triage vitals are concerning for hypotension of 83/60. Initially a temperature is not documented, however the patient is found to have a temperature of 94. Therefore he is placed in bearhugger. Initial work-up includes CBC, CMP, EKG, abd/chest x-ray, troponin. EKG interpreted by attending physician and found to have normal sinus rhythm. CMP with sodium of 149, BUN of 33 and GFR 56. Alk phos is elevated at 161 and ALT elevated at 44. CBC without evidence of leukocytosis or acute anemia. He is noted to have platelet count of 83. Troponin is elevated at 0.02. Lipase of 21  XR acute abd series with chest shows right perihilar and basilar opacification concerning for infiltrate superimposed on atelectasis. X-ray with out acute abdomen concern. He is noted to have left-sided nephrolithiasis. CT chest without contrast is obtained due to the patient kidney function and concerns on chest x-ray. Bilateral consolidation and groundglass opacities representing pneumonia possibly Covid pneumonia. Patient's results are concerning for Covid therefore a rapid Covid and PCR are obtained. Rapid Covid negative. Patient was given IV fluids and Decadron while in the emergency department. We will plan for admission, attending physician spoke with hospitalist.  Patient will be admitted to the ICU. Prior to patient leaving department his pressures did begin to drop therefore attending physician placed central line. The patient tolerated their visit well. I evaluated the patient. The physician was available for consultation as needed.   The patient and / or the family were

## 2021-09-05 NOTE — ED NOTES
Report received care assumed provider at bedside speaking with patient's home provider     Katelyn Ramon RN  09/05/21 9022

## 2021-09-05 NOTE — Clinical Note
Patient Class: Inpatient [101]   REQUIRED: Diagnosis: Pneumonia [484421]   Estimated Length of Stay: Estimated stay of more than 2 midnights   Admitting Provider:  Kala Sharma [[id number]]   Telemetry/Cardiac Monitoring Required?: Yes

## 2021-09-05 NOTE — ED NOTES
Attempt to straight cath patient for urine, unable to advance, Cath removed with no visible blood, Placed external cath unable to obtain blood from right hand IV straight stick for blood in left hand sent to lab. Tolerated with moaning and grunting.  No verbalization      Saurabh Thakur RN  09/05/21 1932

## 2021-09-06 ENCOUNTER — APPOINTMENT (OUTPATIENT)
Dept: GENERAL RADIOLOGY | Age: 61
DRG: 871 | End: 2021-09-06
Payer: MEDICARE

## 2021-09-06 PROBLEM — T68.XXXA HYPOTHERMIA: Status: ACTIVE | Noted: 2021-09-06

## 2021-09-06 PROBLEM — I48.0 PAF (PAROXYSMAL ATRIAL FIBRILLATION) (HCC): Status: ACTIVE | Noted: 2021-09-06

## 2021-09-06 PROBLEM — G40.909 SEIZURE DISORDER (HCC): Status: ACTIVE | Noted: 2021-09-06

## 2021-09-06 LAB
A/G RATIO: 1.1 (ref 1.1–2.2)
ALBUMIN SERPL-MCNC: 3.2 G/DL (ref 3.4–5)
ALP BLD-CCNC: 202 U/L (ref 40–129)
ALT SERPL-CCNC: 41 U/L (ref 10–40)
ANION GAP SERPL CALCULATED.3IONS-SCNC: 7 MMOL/L (ref 3–16)
ANISOCYTOSIS: ABNORMAL
AST SERPL-CCNC: 28 U/L (ref 15–37)
BACTERIA: ABNORMAL /HPF
BANDED NEUTROPHILS RELATIVE PERCENT: 19 % (ref 0–7)
BASOPHILIC STIPPLING: ABNORMAL
BASOPHILS ABSOLUTE: 0 K/UL (ref 0–0.2)
BASOPHILS RELATIVE PERCENT: 0 %
BILIRUB SERPL-MCNC: <0.2 MG/DL (ref 0–1)
BILIRUBIN URINE: NEGATIVE
BLOOD, URINE: ABNORMAL
BUN BLDV-MCNC: 27 MG/DL (ref 7–20)
CALCIUM SERPL-MCNC: 9.5 MG/DL (ref 8.3–10.6)
CHLORIDE BLD-SCNC: 114 MMOL/L (ref 99–110)
CLARITY: CLEAR
CO2: 30 MMOL/L (ref 21–32)
COLOR: YELLOW
CREAT SERPL-MCNC: 1.2 MG/DL (ref 0.8–1.3)
EKG ATRIAL RATE: 95 BPM
EKG DIAGNOSIS: NORMAL
EKG P AXIS: 52 DEGREES
EKG P-R INTERVAL: 130 MS
EKG Q-T INTERVAL: 390 MS
EKG QRS DURATION: 100 MS
EKG QTC CALCULATION (BAZETT): 490 MS
EKG R AXIS: 268 DEGREES
EKG T AXIS: 66 DEGREES
EKG VENTRICULAR RATE: 95 BPM
EOSINOPHILS ABSOLUTE: 0 K/UL (ref 0–0.6)
EOSINOPHILS RELATIVE PERCENT: 0 %
EPITHELIAL CELLS, UA: ABNORMAL /HPF (ref 0–5)
GFR AFRICAN AMERICAN: >60
GFR NON-AFRICAN AMERICAN: >60
GLOBULIN: 2.8 G/DL
GLUCOSE BLD-MCNC: 132 MG/DL (ref 70–99)
GLUCOSE URINE: NEGATIVE MG/DL
HCT VFR BLD CALC: 36.3 % (ref 40.5–52.5)
HEMOGLOBIN: 11.8 G/DL (ref 13.5–17.5)
INR BLD: 1.53 (ref 0.88–1.12)
KETONES, URINE: NEGATIVE MG/DL
LACTIC ACID: 0.6 MMOL/L (ref 0.4–2)
LACTIC ACID: 0.8 MMOL/L (ref 0.4–2)
LEUKOCYTE ESTERASE, URINE: ABNORMAL
LYMPHOCYTES ABSOLUTE: 0.5 K/UL (ref 1–5.1)
LYMPHOCYTES RELATIVE PERCENT: 9 %
MACROCYTES: ABNORMAL
MAGNESIUM: 1.3 MG/DL (ref 1.8–2.4)
MCH RBC QN AUTO: 27.6 PG (ref 26–34)
MCHC RBC AUTO-ENTMCNC: 32.7 G/DL (ref 31–36)
MCV RBC AUTO: 84.6 FL (ref 80–100)
METAMYELOCYTES RELATIVE PERCENT: 1 %
MICROSCOPIC EXAMINATION: YES
MONOCYTES ABSOLUTE: 0 K/UL (ref 0–1.3)
MONOCYTES RELATIVE PERCENT: 0 %
NEUTROPHILS ABSOLUTE: 5.5 K/UL (ref 1.7–7.7)
NEUTROPHILS RELATIVE PERCENT: 71 %
NITRITE, URINE: POSITIVE
PDW BLD-RTO: 17.1 % (ref 12.4–15.4)
PH UA: 5.5 (ref 5–8)
PHOSPHORUS: 1.4 MG/DL (ref 2.5–4.9)
PLATELET # BLD: 92 K/UL (ref 135–450)
PLATELET SLIDE REVIEW: ABNORMAL
PMV BLD AUTO: 7.9 FL (ref 5–10.5)
POLYCHROMASIA: ABNORMAL
POTASSIUM SERPL-SCNC: 4.1 MMOL/L (ref 3.5–5.1)
PROCALCITONIN: 0.27 NG/ML (ref 0–0.15)
PROTEIN UA: NEGATIVE MG/DL
PROTHROMBIN TIME: 17.6 SEC (ref 9.9–12.7)
RBC # BLD: 4.28 M/UL (ref 4.2–5.9)
RBC UA: ABNORMAL /HPF (ref 0–4)
SARS-COV-2, NAAT: NOT DETECTED
SODIUM BLD-SCNC: 151 MMOL/L (ref 136–145)
SPECIFIC GRAVITY UA: 1.02 (ref 1–1.03)
STOMATOCYTES: ABNORMAL
TOTAL PROTEIN: 6 G/DL (ref 6.4–8.2)
URINE REFLEX TO CULTURE: YES
URINE TYPE: ABNORMAL
UROBILINOGEN, URINE: 0.2 E.U./DL
WBC # BLD: 6 K/UL (ref 4–11)
WBC UA: ABNORMAL /HPF (ref 0–5)

## 2021-09-06 PROCEDURE — 6360000002 HC RX W HCPCS: Performed by: INTERNAL MEDICINE

## 2021-09-06 PROCEDURE — 92610 EVALUATE SWALLOWING FUNCTION: CPT

## 2021-09-06 PROCEDURE — 99291 CRITICAL CARE FIRST HOUR: CPT | Performed by: INTERNAL MEDICINE

## 2021-09-06 PROCEDURE — 6370000000 HC RX 637 (ALT 250 FOR IP): Performed by: INTERNAL MEDICINE

## 2021-09-06 PROCEDURE — 94761 N-INVAS EAR/PLS OXIMETRY MLT: CPT

## 2021-09-06 PROCEDURE — 87040 BLOOD CULTURE FOR BACTERIA: CPT

## 2021-09-06 PROCEDURE — 84100 ASSAY OF PHOSPHORUS: CPT

## 2021-09-06 PROCEDURE — 87449 NOS EACH ORGANISM AG IA: CPT

## 2021-09-06 PROCEDURE — 2580000003 HC RX 258: Performed by: INTERNAL MEDICINE

## 2021-09-06 PROCEDURE — 81001 URINALYSIS AUTO W/SCOPE: CPT

## 2021-09-06 PROCEDURE — 85025 COMPLETE CBC W/AUTO DIFF WBC: CPT

## 2021-09-06 PROCEDURE — 83735 ASSAY OF MAGNESIUM: CPT

## 2021-09-06 PROCEDURE — 2500000003 HC RX 250 WO HCPCS: Performed by: INTERNAL MEDICINE

## 2021-09-06 PROCEDURE — 1200000000 HC SEMI PRIVATE

## 2021-09-06 PROCEDURE — 71045 X-RAY EXAM CHEST 1 VIEW: CPT

## 2021-09-06 PROCEDURE — 80053 COMPREHEN METABOLIC PANEL: CPT

## 2021-09-06 PROCEDURE — 84145 PROCALCITONIN (PCT): CPT

## 2021-09-06 PROCEDURE — 87086 URINE CULTURE/COLONY COUNT: CPT

## 2021-09-06 PROCEDURE — 02HV33Z INSERTION OF INFUSION DEVICE INTO SUPERIOR VENA CAVA, PERCUTANEOUS APPROACH: ICD-10-PCS | Performed by: INTERNAL MEDICINE

## 2021-09-06 PROCEDURE — 93010 ELECTROCARDIOGRAM REPORT: CPT | Performed by: INTERNAL MEDICINE

## 2021-09-06 PROCEDURE — 2700000000 HC OXYGEN THERAPY PER DAY

## 2021-09-06 PROCEDURE — 36592 COLLECT BLOOD FROM PICC: CPT

## 2021-09-06 PROCEDURE — 85610 PROTHROMBIN TIME: CPT

## 2021-09-06 PROCEDURE — 83605 ASSAY OF LACTIC ACID: CPT

## 2021-09-06 RX ORDER — MAGNESIUM SULFATE 1 G/100ML
1000 INJECTION INTRAVENOUS PRN
Status: DISCONTINUED | OUTPATIENT
Start: 2021-09-06 | End: 2021-09-18

## 2021-09-06 RX ORDER — ACETAMINOPHEN 325 MG/1
650 TABLET ORAL EVERY 4 HOURS PRN
Status: DISCONTINUED | OUTPATIENT
Start: 2021-09-06 | End: 2021-09-21 | Stop reason: HOSPADM

## 2021-09-06 RX ORDER — SODIUM CHLORIDE 9 MG/ML
25 INJECTION, SOLUTION INTRAVENOUS PRN
Status: DISCONTINUED | OUTPATIENT
Start: 2021-09-06 | End: 2021-09-21 | Stop reason: HOSPADM

## 2021-09-06 RX ORDER — ONDANSETRON 2 MG/ML
4 INJECTION INTRAMUSCULAR; INTRAVENOUS EVERY 6 HOURS PRN
Status: DISCONTINUED | OUTPATIENT
Start: 2021-09-06 | End: 2021-09-21 | Stop reason: HOSPADM

## 2021-09-06 RX ORDER — ACETAMINOPHEN 650 MG/1
650 SUPPOSITORY RECTAL EVERY 4 HOURS PRN
Status: DISCONTINUED | OUTPATIENT
Start: 2021-09-06 | End: 2021-09-21 | Stop reason: HOSPADM

## 2021-09-06 RX ORDER — DEXTROSE MONOHYDRATE 50 MG/ML
INJECTION, SOLUTION INTRAVENOUS CONTINUOUS
Status: DISCONTINUED | OUTPATIENT
Start: 2021-09-06 | End: 2021-09-09

## 2021-09-06 RX ORDER — SODIUM CHLORIDE 0.9 % (FLUSH) 0.9 %
10 SYRINGE (ML) INJECTION PRN
Status: DISCONTINUED | OUTPATIENT
Start: 2021-09-06 | End: 2021-09-21 | Stop reason: HOSPADM

## 2021-09-06 RX ORDER — POTASSIUM CHLORIDE 7.45 MG/ML
10 INJECTION INTRAVENOUS PRN
Status: DISCONTINUED | OUTPATIENT
Start: 2021-09-06 | End: 2021-09-18

## 2021-09-06 RX ORDER — SODIUM CHLORIDE 9 MG/ML
INJECTION, SOLUTION INTRAVENOUS CONTINUOUS
Status: DISCONTINUED | OUTPATIENT
Start: 2021-09-06 | End: 2021-09-06

## 2021-09-06 RX ADMIN — MAGNESIUM SULFATE HEPTAHYDRATE 1000 MG: 1 INJECTION, SOLUTION INTRAVENOUS at 12:15

## 2021-09-06 RX ADMIN — MAGNESIUM SULFATE HEPTAHYDRATE 1000 MG: 1 INJECTION, SOLUTION INTRAVENOUS at 09:17

## 2021-09-06 RX ADMIN — SODIUM CHLORIDE 25 ML: 9 INJECTION, SOLUTION INTRAVENOUS at 18:56

## 2021-09-06 RX ADMIN — VANCOMYCIN HYDROCHLORIDE 1000 MG: 1 INJECTION, POWDER, LYOPHILIZED, FOR SOLUTION INTRAVENOUS at 18:57

## 2021-09-06 RX ADMIN — MAGNESIUM SULFATE HEPTAHYDRATE 1000 MG: 1 INJECTION, SOLUTION INTRAVENOUS at 08:05

## 2021-09-06 RX ADMIN — DEXTROSE MONOHYDRATE: 50 INJECTION, SOLUTION INTRAVENOUS at 11:15

## 2021-09-06 RX ADMIN — FAMOTIDINE 20 MG: 10 INJECTION, SOLUTION INTRAVENOUS at 08:00

## 2021-09-06 RX ADMIN — MAGNESIUM SULFATE HEPTAHYDRATE 1000 MG: 1 INJECTION, SOLUTION INTRAVENOUS at 10:27

## 2021-09-06 RX ADMIN — LEVETIRACETAM 250 MG: 100 INJECTION, SOLUTION INTRAVENOUS at 02:51

## 2021-09-06 RX ADMIN — ACETAMINOPHEN 650 MG: 650 SUPPOSITORY RECTAL at 12:11

## 2021-09-06 RX ADMIN — CEFEPIME 2000 MG: 2 INJECTION, POWDER, FOR SOLUTION INTRAMUSCULAR; INTRAVENOUS at 23:12

## 2021-09-06 RX ADMIN — SODIUM CHLORIDE, POTASSIUM CHLORIDE, SODIUM LACTATE AND CALCIUM CHLORIDE 1000 ML: 600; 310; 30; 20 INJECTION, SOLUTION INTRAVENOUS at 00:54

## 2021-09-06 RX ADMIN — CEFEPIME 2000 MG: 2 INJECTION, POWDER, FOR SOLUTION INTRAMUSCULAR; INTRAVENOUS at 10:32

## 2021-09-06 RX ADMIN — DEXTROSE MONOHYDRATE 5 MCG/MIN: 50 INJECTION, SOLUTION INTRAVENOUS at 01:06

## 2021-09-06 RX ADMIN — CEFEPIME 2000 MG: 2 INJECTION, POWDER, FOR SOLUTION INTRAMUSCULAR; INTRAVENOUS at 00:59

## 2021-09-06 RX ADMIN — VANCOMYCIN HYDROCHLORIDE 1000 MG: 1 INJECTION, POWDER, LYOPHILIZED, FOR SOLUTION INTRAVENOUS at 01:20

## 2021-09-06 RX ADMIN — SODIUM CHLORIDE: 9 INJECTION, SOLUTION INTRAVENOUS at 10:28

## 2021-09-06 RX ADMIN — FAMOTIDINE 20 MG: 10 INJECTION, SOLUTION INTRAVENOUS at 00:55

## 2021-09-06 RX ADMIN — LEVETIRACETAM 250 MG: 100 INJECTION, SOLUTION INTRAVENOUS at 14:42

## 2021-09-06 ASSESSMENT — PAIN SCALES - WONG BAKER
WONGBAKER_NUMERICALRESPONSE: 2
WONGBAKER_NUMERICALRESPONSE: 4
WONGBAKER_NUMERICALRESPONSE: 4
WONGBAKER_NUMERICALRESPONSE: 6
WONGBAKER_NUMERICALRESPONSE: 4
WONGBAKER_NUMERICALRESPONSE: 2
WONGBAKER_NUMERICALRESPONSE: 4
WONGBAKER_NUMERICALRESPONSE: 6
WONGBAKER_NUMERICALRESPONSE: 6
WONGBAKER_NUMERICALRESPONSE: 2
WONGBAKER_NUMERICALRESPONSE: 6
WONGBAKER_NUMERICALRESPONSE: 6

## 2021-09-06 ASSESSMENT — PAIN SCALES - GENERAL
PAINLEVEL_OUTOF10: 2
PAINLEVEL_OUTOF10: 6
PAINLEVEL_OUTOF10: 2
PAINLEVEL_OUTOF10: 2

## 2021-09-06 ASSESSMENT — ENCOUNTER SYMPTOMS
SORE THROAT: 0
SINUS PAIN: 0
DIARRHEA: 0
TROUBLE SWALLOWING: 1

## 2021-09-06 ASSESSMENT — PAIN DESCRIPTION - PROGRESSION
CLINICAL_PROGRESSION: GRADUALLY IMPROVING

## 2021-09-06 ASSESSMENT — PAIN DESCRIPTION - FREQUENCY: FREQUENCY: INTERMITTENT

## 2021-09-06 ASSESSMENT — PAIN DESCRIPTION - ONSET: ONSET: ON-GOING

## 2021-09-06 NOTE — PROGRESS NOTES
Pt transferred from ICU to B3 room 359. [employer] updated via telephone. Pt placed on remote telemetry and transferred via bed accompanied by staff and all belongings.

## 2021-09-06 NOTE — CONSULTS
Pharmacy to Dose Vancomycin    Dx: PNA  Goal trough = 15-20  Pt wt = 63.5 kg  Recent Labs     09/05/21  1924   CREATININE 1.3     Recent Labs     09/05/21  1924   WBC 8.7     Loading dose: N/A  Regimen: 1000 mg IV every 18 hours. Start time: 23:44 on 09/05/2021  Exposure target: AUC24 (range)400-600 mg/L.hr   AUC24,ss: 503 mg/L.hr  Probability of AUC24 > 400: 76 %  Ctrough,ss: 15.6 mg/L  Probability of Ctrough,ss > 20: 24 %  Probability of nephrotoxicity (Lodise JAKI 2009): 11 %  Vancomycin trough: 9/8 0600  Jessika Dwyer [facility] 9/5/2021 11:45 PM    9/8/2021  Vancomycin Day ____3____  Recent Labs     09/08/21  0600   VANCOTROUGH 23.1*     Recent Labs     09/06/21  0526 09/07/21  0602 09/08/21  0600   WBC 6.0 6.9 3.8*   CREATININE 1.2 1.3 1.2   - Decreasing dose to 750 mg IVPB q24h. - New projected AUC 435mg/L.hr  - Timing of next vancomycin level will be determined based on culture results, renal function, and clinical response.    Augie Nissen, PharmD    9/8/2021 7:34 AM

## 2021-09-06 NOTE — ED NOTES
Noted decreased responsiveness. Movement stimuli.   MD informed at bedside Caregiver leaving at this time      Ash Velasco RN  09/05/21 7499

## 2021-09-06 NOTE — PROCEDURES
Hospital Medicine  Procedure Note    Procedure: Central Vein Catheter     - 7Fr x 16cm triple lumen    Site: Right IJ vein   Central veins were surveyed by ultrasound to assess venous anatomy and patency. Site was chosen based on findings and patient-specific factors. Description:  Patient was placed in the trendelenberg position. The site was prepped and draped in sterile fashion. The skin was infiltrated with 1% lidocaine. The vein was cannulated using real-time ultrasound guidance. A wire was threaded through the needle into the vein. Correct wire placement was verified by ultrasound. The soft tissue tract was then dilated. The catheter was then passed over the wire. The wire was withdrawn intact. All catheter ports were drawn and flushed. The catheter was secured in place with sutures at a depth of 16 cm at the skin. The site was covered with a sterile dressing. The right anterior chest was then interrogated by ultrasound. Lung sliding was still present . A portable CXR was ordered to verify appropriate catheter tip placement. There was no immediately apparent complication.   Estimated Blood Loss: < 20mL

## 2021-09-06 NOTE — CONSULTS
Consult Note            Date:9/6/2021        Patient Name:Duane A Carota     Date of Birth:[birth year]     Age:61 y.o. Consult to Intensivist  Consult performed by: Geno Navarro MD  Consult ordered by: Tarsha Lauren MD  Reason for consult: Neurology          Chief Complaint     Chief Complaint   Patient presents with    Failure To Thrive     Colonoscopy on wednesday, since then pt has not been eating or drinking much, urine has got darker as well       Neurology    History Obtained From   electronic medical record    History of Present Illness   This 81-year-old gentleman with MRDD who has been sent from a group home to the hospital ED because of decreased p.o. intake and not feeling well. Unable to get hold of family but apparently the patient had a colonoscopy just recently on 9/1/2021 and has not been taking oral intake as much. Most of the history of present illness is from the chart review. Patient is nonverbal, not in any distress at this time    Past Medical History     Past Medical History:   Diagnosis Date    A-fib ([address]     Bipolar disorder ([address]     Brain herniation ([address]     Constipation     COVID-19 01/20/2021    Cystitis     Developmental non-verbal disorder     Impulse control disorder     Influenza A 02/13/2014    Mental retardation, profound (I.Q. < 20)     Osteopenia     Seizure ([address]     Subdural hematoma (HCC)         Past Surgical History     Past Surgical History:   Procedure Laterality Date    CATARACT REMOVAL      COLONOSCOPY  06/20/2016    incomplete, poor prep    COLONOSCOPY  08/26/2020    COLON W/ANES.  COLONOSCOPY N/A 8/26/2020    COLON W/ANES. (9:00) COVID TEST 8/20-8/22. PT IS NON-VERBAL, IS NOT IN A FACILITY. IS CARED FOR BY 2 CAREGIVERS IN HIS HOME. performed by Tray Ojeda MD at [facility] COLONOSCOPY N/A 09/01/2021    COLONOSCOPY N/A 9/1/2021    COLON W/ANES. (10:30) PT IS COMBATIVE. [facility] HUMAN SERVICES.  [phone] XSTEPH performed by Shashi Valdez MD at [address] Right 12/2/2019    Right Trephine Craniotomy For Evacuation of Subdural Hematoma performed by Jeremiah Casiano MD at [address] [facility] TURP N/A 2/28/2020    CYSTOSCOPY, TRANSURETHRAL RESECTION OF PROSTATE performed by Lety Pierce MD at [facility] 1        Medications     Prior to Admission medications    Medication Sig Start Date End Date Taking? Authorizing Provider   LORazepam (ATIVAN) 0.5 MG tablet Take 0.5 mg by mouth every 6 hours as needed for Anxiety.     Historical Provider, MD   polyethylene glycol (GLYCOLAX) 17 g packet Take 17 g by mouth daily as needed for Constipation    Historical Provider, MD   Nutritional Supplements (ENSURE ENLIVE) LIQD Take by mouth 2 times daily In between meals    Historical Provider, MD   melatonin 3 MG TABS tablet Take 5 mg by mouth nightly     Historical Provider, MD   lactulose (CHRONULAC) 10 GM/15ML solution Take 20 g by mouth 2 times daily    Historical Provider, MD   levETIRAcetam (KEPPRA) 250 MG tablet Take 250 mg by mouth 2 times daily    Historical Provider, MD   tamsulosin (FLOMAX) 0.4 MG capsule Take 0.8 mg by mouth daily    Historical Provider, MD   traZODone (DESYREL) 50 MG tablet 1 tablet by Per NG tube route nightly  Patient taking differently: 150 mg by Per NG tube route nightly  12/5/19   Bree Colindres MD   metoprolol tartrate (LOPRESSOR) 25 MG tablet 1 tablet by Per NG tube route 2 times daily 12/5/19   Bree Colindres MD   sennosides-docusate sodium (SENOKOT-S) 8.6-50 MG tablet Take 2 tablets by mouth every 72 hours 12/5/19   Bree Colindres MD   QUEtiapine (SEROQUEL XR) 50 MG extended release tablet Take 100 mg by mouth 3 times daily 50mg in AM  100mg @ 1600  150mg @ 1900    Historical Provider, MD        levETIRAcetam (KEPPRA) 250 mg in sodium chloride 0.9 % 100 mL IVPB, Q12H  sodium chloride flush 0.9 % injection 10 mL, PRN  0.9 % sodium chloride infusion, Normal pulses. Heart sounds: Normal heart sounds. No murmur heard. No friction rub. Pulmonary:      Effort: Pulmonary effort is normal. No respiratory distress. Breath sounds: Normal breath sounds. No stridor. No wheezing or rhonchi. Abdominal:      General: Abdomen is flat. Bowel sounds are normal. There is no distension. Tenderness: There is no abdominal tenderness. There is no guarding. Musculoskeletal:         General: No swelling or tenderness. Normal range of motion. Cervical back: Normal range of motion and neck supple. No rigidity. Skin:     General: Skin is warm and dry. Coloration: Skin is not jaundiced. Neurological:      Mental Status: He is alert. Labs    CBC:  Recent Labs     09/05/21 1924 09/06/21  0526   WBC 8.7 6.0   RBC 4.87 4.28   HGB 13.6 11.8*   HCT 41.7 36.3*   MCV 85.6 84.6   RDW 17.3* 17.1*   PLT 83* 92*     CHEMISTRIES:  Recent Labs     09/05/21 1924 09/06/21  0526   * 151*   K 4.5 4.1    114*   CO2 30 30   BUN 33* 27*   CREATININE 1.3 1.2   GLUCOSE 79 132*   PHOS  --  1.4*   MG  --  1.30*     PT/INR:  Recent Labs     09/06/21 0526   PROTIME 17.6*   INR 1.53*     APTT:No results for input(s): APTT in the last 72 hours. LIVER PROFILE:  Recent Labs     09/05/21 1924 09/06/21  0526   AST 24 28   ALT 44* 41*   BILITOT <0.2 <0.2   ALKPHOS 161* 202*       Imaging/Diagnostics   XR ACUTE ABD SERIES CHEST 1 VW    Result Date: 9/5/2021  Right perihilar and basilar opacification, concerning for infiltrate superimposed on atelectasis. Nonspecific abdominal bowel gas pattern. Left-sided nephrolithiasis. CT CHEST WO CONTRAST    Result Date: 9/5/2021  Bilateral consolidations and ground-glass opacities representing pneumonia, which COVID-19 pneumonia cannot be ruled out. XR CHEST PORTABLE    Result Date: 9/6/2021  Central venous catheter in expected position without pneumothorax.        Assessment      Hospital Problems         Last function(s) to treat single or multiple vital organ system failure and/or to prevent further life-threatening deterioration of the patient's condition.  Total attending physician time, excluding unbundled procedures, spent at the bedside, and away from the bedside but on the unit or floor, reviewing laboratory test results, discussing care with medical staff, and discussing care with family when the patient was unable or incompetent to participate:   Critical Care Time (Minutes) # 35   (Discontinuous)       Electronically signed by Az Almaguer MD on 9/6/21 at 9:17 AM EDT

## 2021-09-06 NOTE — PROGRESS NOTES
Pt transferred to B3 in stable condition into a room closer to the nurses station. [employer] in room for pt safety and tele monitor on patient, CMU and [employer] both notified. Pt is alert and nonverbal. Making audible noises. Foam placed on buttocks and bilateral heels. Pt turned in bed. Remains NPO. Pt turned in bed. HOB elevated. Bed alarm on. Bed locked in lowest position. Side rails up. Call light within reach of pt. Will continue to monitor.

## 2021-09-06 NOTE — ED NOTES
Continued with decreased responsiveness. initially was moaning pulling away from pain such as IV and ly moaning with repositioning, Will open eyes but left eye noted to be glazed and dry in appearance.  ER MD informed patient  moved to room 4 due to little response to additional IVF and      Tracee Guerrero RN  09/05/21 [address], RN  09/05/21 0389

## 2021-09-06 NOTE — PROGRESS NOTES
Anthony Mckeon with [address] that manages pt's home care. She will need to receive discharge orders at the time of discharge planning. Her contact number is [phone].

## 2021-09-06 NOTE — PROGRESS NOTES
Neurovascularly intact without any focal sensory/motor deficits. Cranial nerves: II-XII intact, grossly non-focal.  Psychiatric: Drowsy, non-verbal, MRDD. Skin: Skin color, texture, turgor normal.  No rashes or lesions. Capillary Refill: Brisk,< 3 seconds   Peripheral Pulses: +2 palpable, equal bilaterally       Assessment/Plan:    Active Hospital Problems    Diagnosis     Seizure disorder ([facility] [G40.909]     PAF (paroxysmal atrial fibrillation) ([facility] [I48.0]     Hypothermia [F55. XXXA]     Thrombocytopenia ([facility] [D69.6]     RUBEN (acute kidney injury) ([facility] [N17.9]     Sepsis ([facility] [A41.9]     Pneumonia [J18.9]      Sepsis, Multifocal Pneumonia, Acute Respiratory Failure  -  Patient has a multifocal pneumonia distributed throughout dependent lungs regions most c/w aspiration pneumonia. Findings could also be d/t COVID-19 but seems unlikely since patient has been through one episode of this in January, 2021 and received the J&J vaccine in May, 2021. Rapid COVID negative. PCR pending although suspicion is low. -  Minimal hypoxia  -  Cultures:  Blood, sputum, urine legionella and pneumococcal Ag's  -  Empiric Abx:  Cefepime 2g IV q12h, vancomycin w/ pharmacy dosing/monitoring assistance. - Initially required Levophed support for hypotension, although quickly stabilized and pressors weaned off.   - SLP eval    RUBEN  -  Baseline creatinine ~ 0.6 and currently 1.3 mg/dL. -  Volume expansion  -  Has a h/o urinary retention. Colindres catheter placed. -  Monitor. Hypothermia:  -  Due to sepsis. Poor prognostic indicator and has occurred with infections in the past and thyroid function has been normal.  Responded to external warming measures. Hypernatremia: Reduced PO intake, saline load. Start hypotonic fluids. Monitor. Reduced oral intake  -  Temporally began after colonoscopy, but unlikely d/t direct complication of colonoscopy such as bowel perforation.   Complication possibly could have been indirect since patient may have aspirated during or after the procedure. In other words anorexia seems most likely d/t systemic infection.  - SLP eval and start diet as appropriate. H/O Seizure D/O  -  Continue keppra. Converted to IV formulation. PAF  -  Currently in sinus rhythm.     DVT prophylaxis: SCDs only d/t thrombocytopenia  Diet: Diet NPO  Code Status: Full Code  PT/OT Eval Status: NA    Dispo - OK to leave ICU     Forest Cruz MD

## 2021-09-06 NOTE — PLAN OF CARE
Problem: Nutrition  Intervention: Aspiration precautions  Note: SLP completed evaluation. Please refer to notes in EMR. Intervention: Nutritional evaluation  Note: SLP completed evaluation. Please refer to notes in EMR.      Bk Monterroso MS CCC-SLP  [place] .[id number]  Speech Language Pathologist  9/6/2021

## 2021-09-06 NOTE — ED NOTES
No urine produced at this time temp checked warmer to be applied repositioned in bed.       Bhavik Ferreira RN  09/05/21 0965

## 2021-09-06 NOTE — H&P
Hospital Medicine History & Physical      Patient:  Nayely Stone  :   1960  MRN:   [id number]  Date of Service: 21    Chief Complaint   Patient presents with    Failure To Thrive     Colonoscopy on wednesday, since then pt has not been eating or drinking much, urine has got darker as well        HISTORY OF PRESENT ILLNESS:    Nayely Stone is a 62 y.o. male. He has a h/o MRDD and is nonverbal. He lives in a group home and was sent to the ER because he has not been eating or drinking as usual for the past four days. He underwent colonoscopy on 21 and has not been taking much oral intake since then. He received the [employer] COVID-19 vaccine on 21. Prior to that he was hospitalized in  for acute respiratory failure due to COVID-19. Review of Systems:  Unobtainable. Patient is nonverbal.    Past Medical History:   Diagnosis Date    A-fib ([address]     Bipolar disorder ([address]     Brain herniation ([address]     Constipation     COVID-19 2021    Cystitis     Developmental non-verbal disorder     Impulse control disorder     Influenza A 2014    Mental retardation, profound (I.Q. < 20)     Osteopenia     Seizure ([address]     Subdural hematoma ([address]        Past Surgical History:   Procedure Laterality Date    CATARACT REMOVAL      COLONOSCOPY  2016    incomplete, poor prep    COLONOSCOPY  2020    COLON W/ANES.  COLONOSCOPY N/A 2020    COLON W/ANES. (9:00) COVID TEST -. PT IS NON-VERBAL, IS NOT IN A FACILITY. IS CARED FOR BY 2 CAREGIVERS IN HIS HOME. performed by Hawa Aldridge MD at [address] [facility] COLONOSCOPY N/A 2021    COLONOSCOPY N/A 2021    COLON W/ANES. (10:30) PT IS COMBATIVE. [facility] SERVICES.  [phone] XSTEP performed by Hawa Aldridge MD at [address] Right 2019    Right Trephine Craniotomy For Evacuation of Subdural Hematoma performed by Tyrone Aguiar MD at [address] [facility] TURP N/A 2/28/2020    CYSTOSCOPY, TRANSURETHRAL RESECTION OF PROSTATE performed by Leo Bob MD at [facility]         Prior to Admission medications    Medication Sig Start Date End Date Taking? Authorizing Provider   LORazepam (ATIVAN) 0.5 MG tablet Take 0.5 mg by mouth every 6 hours as needed for Anxiety. Historical Provider, MD   polyethylene glycol (GLYCOLAX) 17 g packet Take 17 g by mouth daily as needed for Constipation    Historical Provider, MD   Nutritional Supplements (ENSURE ENLIVE) LIQD Take by mouth 2 times daily In between meals    Historical Provider, MD   melatonin 3 MG TABS tablet Take 5 mg by mouth nightly     Historical Provider, MD   lactulose (CHRONULAC) 10 GM/15ML solution Take 20 g by mouth 2 times daily    Historical Provider, MD   levETIRAcetam (KEPPRA) 250 MG tablet Take 250 mg by mouth 2 times daily    Historical Provider, MD   tamsulosin (FLOMAX) 0.4 MG capsule Take 0.8 mg by mouth daily    Historical Provider, MD   traZODone (DESYREL) 50 MG tablet 1 tablet by Per NG tube route nightly  Patient taking differently: 150 mg by Per NG tube route nightly  12/5/19   Sarah Aguilar MD   metoprolol tartrate (LOPRESSOR) 25 MG tablet 1 tablet by Per NG tube route 2 times daily 12/5/19   Sarah Aguilar MD   sennosides-docusate sodium (SENOKOT-S) 8.6-50 MG tablet Take 2 tablets by mouth every 72 hours 12/5/19   Sarah Aguilar MD   QUEtiapine (SEROQUEL XR) 50 MG extended release tablet Take 100 mg by mouth 3 times daily 50mg in AM  100mg @ 1600  150mg @ 1900    Historical Provider, MD       Allergies:   Penicillins, Benadryl [diphenhydramine], Clonidine derivatives, and Tetracyclines & related    Social:   reports that he has never smoked. He has never used smokeless tobacco.   reports no history of alcohol use. Social History     Substance and Sexual Activity   Drug Use No       Family History   Family history unknown:  Yes PHYSICAL EXAM:  I performed this physical examination. Vitals:  Patient Vitals for the past 24 hrs:   BP Temp Temp src Pulse Resp SpO2 Height Weight   09/05/21 2210 101/63   89 17 95 %     09/05/21 2200 89/78 94 °F (34.4 °C) Rectal 92 17 94 %     09/05/21 2146    87 20 96 %     09/05/21 2120 99/69   79 13 97 %     09/05/21 2100 97/69   82 14 93 %     09/05/21 2024 103/79   90 17      09/05/21 1924 100/79   94 13 94 %     09/05/21 1900 88/70   90 19 91 %     09/05/21 1706     18      09/05/21 1705 83/60   84  91 % 4' 8\" (1.422 m) 140 lb (63.5 kg)       Intake/Output Summary (Last 24 hours) at 9/5/2021 2256  Last data filed at 9/5/2021 2110  Gross per 24 hour   Intake 1000 ml   Output    Net 1000 ml       Vent Settings:  2L/min O2    GEN:  Appearance:  Appears older than stated age . Level of Consciousness:  stuporous . Orientation:  N/A Patient is not interactive    HEENT: Sclera anicteric.  no conjunctival chemosis. dry mucus membranes. no specific or diagnostic oral lesions. NECK:  no signs of meningismus. Jugular veins not distended. Carotid pulses  2+.  no cervical lymphadenopathy. no thyromegaly. CV:  regular rhythm. normal S1 & S2.    no murmur. no rub.  no gallop. PULM:  Chest excursion is symmetric. Breath sounds are generally diminished. Poor effort. Adventitious sounds:  Right basilar crackles    AB:  Abdominal shape is normal.  Bowel sounds are absent. Generally soft to palpation. No appreciable focal tenderness is present. no involuntary guarding. no rebound guarding. RECTAL:   :  Condom catheter    EXTR:  Skin is warm. Capillary refill brisk. no specific or pathognomic rash. no clubbing. no pitting edema. no active wound or ulcer.         LABS:  Lab Results   Component Value Date    WBC 8.7 09/05/2021    HGB 13.6 09/05/2021    HCT 41.7 09/05/2021    MCV 85.6 09/05/2021    PLT 83 (L) 09/05/2021 Lab Results   Component Value Date    CREATININE 1.3 09/05/2021    BUN 33 (H) 09/05/2021     (H) 09/05/2021    K 4.5 09/05/2021     09/05/2021    CO2 30 09/05/2021     Lab Results   Component Value Date    ALT 44 (H) 09/05/2021    AST 24 09/05/2021    ALKPHOS 161 (H) 09/05/2021    BILITOT <0.2 09/05/2021     Lab Results   Component Value Date    TROPONINI <0.01 09/05/2021     No results for input(s): PHART, GQU0HYD, PO2ART in the last 72 hours. IMAGING:  XR ACUTE ABD SERIES CHEST 1 VW    Result Date: 9/5/2021  EXAMINATION: TWO XRAY VIEWS OF THE ABDOMEN AND SINGLE  XRAY VIEW OF THE CHEST 9/5/2021 6:29 pm COMPARISON: Chest x-ray 05/27/2021. Abdominal films 02/27/2020. HISTORY: ORDERING SYSTEM PROVIDED HISTORY: colonoscopy now refusing to eat TECHNOLOGIST PROVIDED HISTORY: Reason for exam:->colonoscopy now refusing to eat Reason for Exam: had colonscopy last week +refusing to eat Acuity: Acute Type of Exam: Initial FINDINGS: The cardiomediastinal silhouette is unremarkable. Stable mild elevation of the right hemidiaphragm. Right perihilar and basilar opacification, likely a infiltrate superimposed on some atelectasis. The left lung is clear. The abdominal bowel gas pattern is nonspecific. Scattered gas and stool throughout the colon. No small bowel distension, air-fluid levels or free air. Left-sided nephrolithiasis is noted with stones measuring up to 5 mm. No other definite plain film evidence of renal or ureteral calculi. Bones are osteopenic. Right perihilar and basilar opacification, concerning for infiltrate superimposed on atelectasis. Nonspecific abdominal bowel gas pattern. Left-sided nephrolithiasis. CT CHEST WO CONTRAST    Result Date: 9/5/2021  EXAMINATION: CT OF THE CHEST WITHOUT CONTRAST 9/5/2021 9:31 pm TECHNIQUE: CT of the chest was performed without the administration of intravenous contrast. Multiplanar reformatted images are provided for review.  Dose modulation, iterative reconstruction, and/or weight based adjustment of the mA/kV was utilized to reduce the radiation dose to as low as reasonably achievable. COMPARISON: None. HISTORY: ORDERING SYSTEM PROVIDED HISTORY: possible pna on cxr TECHNOLOGIST PROVIDED HISTORY: Reason for exam:->possible pna on cxr Decision Support Exception - unselect if not a suspected or confirmed emergency medical condition->Emergency Medical Condition (MA) Reason for Exam: possible pna on cxr. unable to get details from pt. FINDINGS: Lungs/Pleura: Few consolidations in the bilateral lower lobes and right middle lobe with scattered bilateral ground-glass opacities representing pneumonia, of which COVID-19 pneumonia cannot be ruled out. Right middle lobe cyst.  Left lower lobe nodule measures 0.6 cm (2, 43). No pneumothorax or pleural effusion. Mediastinum: The central airways are clear. No mediastinal lymphadenopathy. The heart is unremarkable. No pericardial effusion. Vessels: The pulmonary artery and thoracic aorta are within normal limits. Upper Abdomen: Visualized upper abdomen is unremarkable. Soft Tissue/Bones: Multilevel degenerative disease. Bilateral consolidations and ground-glass opacities representing pneumonia, which COVID-19 pneumonia cannot be ruled out. I directly reviewed all recent imaging studies as well as pertinent prior studies. Radiology reports may or may not be available at the time of my review. EKG:  New and pertinent prior tracings were directly reviewed. My interpretation is as follows:  Prominent baseline noise limits interpretation. Most likely normal sinus rhythm with LAFB. Active Hospital Problems    Diagnosis Date Noted    Seizure disorder ([facility] [W35.293] 09/06/2021    PAF (paroxysmal atrial fibrillation) ([facility] [I48.0] 09/06/2021    Hypothermia [P12. XXXA] 09/06/2021    Thrombocytopenia ([facility] [D69.6] 09/05/2021    RUBEN (acute kidney injury) ([facility] [N17.9] 09/05/2021    Sepsis ([facility] [A41.9] 09/05/2021    Pneumonia [J18.9] 01/20/2021       ASSESSMENT & PLAN  Sepsis, Multifocal Pneumonia, Acute Respiratory Failure  -  Patient has a multifocal pneumonia distributed throughout dependent lungs regions most c/w aspiration pneumonia. Findings could also be d/t COVID-19 but seems unlikely since patient has been through one episode of this in January, 2021 and received the J&J vaccine in May, 2021. Nonetheless will proceed to collect an NP sample for rapid COVID-19 NAAT assay.  -  Respiratory failure at this time is mild. Patient is requiring 2L/min O2 to maintain an SpO2 > 90%. Mental status is poor though. -  Cultures:  Blood, sputum, urine legionella and pneumococcal Ag's  -  Empiric Abx:  Cefepime 2g IV q12h, vancomycin w/ pharmacy dosing/monitoring assistance. -  Resus:  Has received 2L NS so far in the ER. Still hypovolemic. BP dropping. Another 1L LR bolus will be given over two hours. Norepinephrine infusion will be available on a prn basis. -  Critical care assistance requested. RUBEN  -  Baseline creatinine ~ 0.6 and currently 1.3 mg/dL. -  Avoid nephrotoxin exposure where possible. -  Crystalloid resuscitation as noted above. -  Has a h/o urinary retention. Will insert ly catheter. Hypothermia:  -  Due to sepsis. Poor prognostic indicator and has occurred with infections in the past and thyroid function has been normal.  Responding to external warming measures. Reduced oral intake  -  Temporally began after colonoscopy, but unlikely d/t direct complication of colonoscopy such as bowel perforation. Complication possibly could have been indirect since patient may have aspirated during or after the procedure. In other words anorexia seems most likely d/t systemic infection. H/O Seizure D/O  -  Continue keppra. Converted to IV formulation. PAF  -  Currently in sinus rhythm.     DVT prophylaxis: SCDs only d/t thrombocytopenia  Stress ulcers:  IV famotidine  Code Status:  Full. POA is [place], caregiver is [phone]   Disposition:  Inpatient for the foreseeable future. I attest that this patient's condition was sufficiently critical to warrant urgent intervention due to sepsis. A total of 60 minutes was spent in direct patient care at the bedside and entering orders. This time did not include separately billable procedures.       Kaylan Duran MD MD

## 2021-09-07 ENCOUNTER — APPOINTMENT (OUTPATIENT)
Dept: GENERAL RADIOLOGY | Age: 61
DRG: 871 | End: 2021-09-07
Payer: MEDICARE

## 2021-09-07 LAB
A/G RATIO: 1 (ref 1.1–2.2)
ALBUMIN SERPL-MCNC: 3.1 G/DL (ref 3.4–5)
ALP BLD-CCNC: 174 U/L (ref 40–129)
ALT SERPL-CCNC: 36 U/L (ref 10–40)
ANION GAP SERPL CALCULATED.3IONS-SCNC: 8 MMOL/L (ref 3–16)
ANISOCYTOSIS: ABNORMAL
AST SERPL-CCNC: 28 U/L (ref 15–37)
BANDED NEUTROPHILS RELATIVE PERCENT: 13 % (ref 0–7)
BASOPHILS ABSOLUTE: 0 K/UL (ref 0–0.2)
BASOPHILS RELATIVE PERCENT: 0 %
BILIRUB SERPL-MCNC: 0.3 MG/DL (ref 0–1)
BUN BLDV-MCNC: 17 MG/DL (ref 7–20)
CALCIUM SERPL-MCNC: 9.5 MG/DL (ref 8.3–10.6)
CHLORIDE BLD-SCNC: 112 MMOL/L (ref 99–110)
CO2: 30 MMOL/L (ref 21–32)
CREAT SERPL-MCNC: 1.3 MG/DL (ref 0.8–1.3)
EOSINOPHILS ABSOLUTE: 0.2 K/UL (ref 0–0.6)
EOSINOPHILS RELATIVE PERCENT: 3 %
GFR AFRICAN AMERICAN: >60
GFR NON-AFRICAN AMERICAN: 56
GLOBULIN: 3 G/DL
GLUCOSE BLD-MCNC: 153 MG/DL (ref 70–99)
GLUCOSE BLD-MCNC: 83 MG/DL (ref 70–99)
GLUCOSE BLD-MCNC: 95 MG/DL (ref 70–99)
GLUCOSE BLD-MCNC: 96 MG/DL (ref 70–99)
HCT VFR BLD CALC: 35.6 % (ref 40.5–52.5)
HEMOGLOBIN: 12 G/DL (ref 13.5–17.5)
INR BLD: 1.87 (ref 0.88–1.12)
KEPPRA DOSE AMT: NORMAL
KEPPRA: 16.4 UG/ML (ref 6–46)
L. PNEUMOPHILA SEROGP 1 UR AG: NORMAL
LYMPHOCYTES ABSOLUTE: 1.2 K/UL (ref 1–5.1)
LYMPHOCYTES RELATIVE PERCENT: 18 %
MACROCYTES: ABNORMAL
MAGNESIUM: 2.2 MG/DL (ref 1.8–2.4)
MCH RBC QN AUTO: 27.9 PG (ref 26–34)
MCHC RBC AUTO-ENTMCNC: 33.8 G/DL (ref 31–36)
MCV RBC AUTO: 82.5 FL (ref 80–100)
MICROCYTES: ABNORMAL
MONOCYTES ABSOLUTE: 0.3 K/UL (ref 0–1.3)
MONOCYTES RELATIVE PERCENT: 5 %
NEUTROPHILS ABSOLUTE: 5.1 K/UL (ref 1.7–7.7)
NEUTROPHILS RELATIVE PERCENT: 61 %
PDW BLD-RTO: 16.8 % (ref 12.4–15.4)
PERFORMED ON: ABNORMAL
PERFORMED ON: NORMAL
PERFORMED ON: NORMAL
PHOSPHORUS: 1.2 MG/DL (ref 2.5–4.9)
PLATELET # BLD: 82 K/UL (ref 135–450)
PLATELET SLIDE REVIEW: ABNORMAL
PMV BLD AUTO: 7.8 FL (ref 5–10.5)
POLYCHROMASIA: ABNORMAL
POTASSIUM SERPL-SCNC: 3.5 MMOL/L (ref 3.5–5.1)
PROTHROMBIN TIME: 21.7 SEC (ref 9.9–12.7)
RBC # BLD: 4.32 M/UL (ref 4.2–5.9)
SARS-COV-2, PCR: DETECTED
SLIDE REVIEW: ABNORMAL
SODIUM BLD-SCNC: 150 MMOL/L (ref 136–145)
STOMATOCYTES: ABNORMAL
STREP PNEUMONIAE ANTIGEN, URINE: NORMAL
TOTAL PROTEIN: 6.1 G/DL (ref 6.4–8.2)
URINE CULTURE, ROUTINE: NORMAL
WBC # BLD: 6.9 K/UL (ref 4–11)

## 2021-09-07 PROCEDURE — 85025 COMPLETE CBC W/AUTO DIFF WBC: CPT

## 2021-09-07 PROCEDURE — 80177 DRUG SCRN QUAN LEVETIRACETAM: CPT

## 2021-09-07 PROCEDURE — 84100 ASSAY OF PHOSPHORUS: CPT

## 2021-09-07 PROCEDURE — 85610 PROTHROMBIN TIME: CPT

## 2021-09-07 PROCEDURE — 2580000003 HC RX 258: Performed by: INTERNAL MEDICINE

## 2021-09-07 PROCEDURE — 6360000002 HC RX W HCPCS: Performed by: INTERNAL MEDICINE

## 2021-09-07 PROCEDURE — 94761 N-INVAS EAR/PLS OXIMETRY MLT: CPT

## 2021-09-07 PROCEDURE — 83735 ASSAY OF MAGNESIUM: CPT

## 2021-09-07 PROCEDURE — 80053 COMPREHEN METABOLIC PANEL: CPT

## 2021-09-07 PROCEDURE — 2500000003 HC RX 250 WO HCPCS: Performed by: INTERNAL MEDICINE

## 2021-09-07 PROCEDURE — 71045 X-RAY EXAM CHEST 1 VIEW: CPT

## 2021-09-07 PROCEDURE — 2700000000 HC OXYGEN THERAPY PER DAY

## 2021-09-07 PROCEDURE — 99232 SBSQ HOSP IP/OBS MODERATE 35: CPT | Performed by: INTERNAL MEDICINE

## 2021-09-07 PROCEDURE — 1200000000 HC SEMI PRIVATE

## 2021-09-07 PROCEDURE — 92526 ORAL FUNCTION THERAPY: CPT

## 2021-09-07 RX ORDER — MORPHINE SULFATE 2 MG/ML
2 INJECTION, SOLUTION INTRAMUSCULAR; INTRAVENOUS EVERY 4 HOURS PRN
Status: DISCONTINUED | OUTPATIENT
Start: 2021-09-07 | End: 2021-09-21 | Stop reason: HOSPADM

## 2021-09-07 RX ORDER — METHYLPREDNISOLONE SODIUM SUCCINATE 40 MG/ML
40 INJECTION, POWDER, LYOPHILIZED, FOR SOLUTION INTRAMUSCULAR; INTRAVENOUS EVERY 12 HOURS
Status: DISCONTINUED | OUTPATIENT
Start: 2021-09-07 | End: 2021-09-13

## 2021-09-07 RX ADMIN — METHYLPREDNISOLONE SODIUM SUCCINATE 40 MG: 40 INJECTION, POWDER, FOR SOLUTION INTRAMUSCULAR; INTRAVENOUS at 22:15

## 2021-09-07 RX ADMIN — DEXTROSE MONOHYDRATE: 50 INJECTION, SOLUTION INTRAVENOUS at 07:22

## 2021-09-07 RX ADMIN — DEXTROSE MONOHYDRATE: 50 INJECTION, SOLUTION INTRAVENOUS at 23:24

## 2021-09-07 RX ADMIN — LEVETIRACETAM 250 MG: 100 INJECTION, SOLUTION INTRAVENOUS at 02:32

## 2021-09-07 RX ADMIN — Medication 10 ML: at 22:16

## 2021-09-07 RX ADMIN — MORPHINE SULFATE 2 MG: 2 INJECTION, SOLUTION INTRAMUSCULAR; INTRAVENOUS at 10:22

## 2021-09-07 RX ADMIN — MORPHINE SULFATE 2 MG: 2 INJECTION, SOLUTION INTRAMUSCULAR; INTRAVENOUS at 15:25

## 2021-09-07 RX ADMIN — CEFEPIME 2000 MG: 2 INJECTION, POWDER, FOR SOLUTION INTRAMUSCULAR; INTRAVENOUS at 11:56

## 2021-09-07 RX ADMIN — LEVETIRACETAM 250 MG: 100 INJECTION, SOLUTION INTRAVENOUS at 15:35

## 2021-09-07 RX ADMIN — CEFEPIME 2000 MG: 2 INJECTION, POWDER, FOR SOLUTION INTRAMUSCULAR; INTRAVENOUS at 23:31

## 2021-09-07 RX ADMIN — FAMOTIDINE 20 MG: 10 INJECTION, SOLUTION INTRAVENOUS at 11:56

## 2021-09-07 RX ADMIN — VANCOMYCIN HYDROCHLORIDE 1000 MG: 1 INJECTION, POWDER, LYOPHILIZED, FOR SOLUTION INTRAVENOUS at 15:24

## 2021-09-07 ASSESSMENT — PAIN SCALES - GENERAL
PAINLEVEL_OUTOF10: 10
PAINLEVEL_OUTOF10: 10
PAINLEVEL_OUTOF10: 8
PAINLEVEL_OUTOF10: 0

## 2021-09-07 ASSESSMENT — PAIN SCALES - WONG BAKER
WONGBAKER_NUMERICALRESPONSE: 4
WONGBAKER_NUMERICALRESPONSE: 0
WONGBAKER_NUMERICALRESPONSE: 4
WONGBAKER_NUMERICALRESPONSE: 4
WONGBAKER_NUMERICALRESPONSE: 0
WONGBAKER_NUMERICALRESPONSE: 0
WONGBAKER_NUMERICALRESPONSE: 10
WONGBAKER_NUMERICALRESPONSE: 4

## 2021-09-07 ASSESSMENT — PAIN DESCRIPTION - PROGRESSION
CLINICAL_PROGRESSION: GRADUALLY IMPROVING

## 2021-09-07 NOTE — CARE COORDINATION
Writer called [phone] and spoke with Cherry Calix, he is no longer pt's advocate, Melany Felty is advocate and to be reached at [phone]. Writer left voicemail for [employer], requesting to talk about pt's baseline and code status. Updated emergency contacts.   ROMERO Schwartz-RN

## 2021-09-07 NOTE — PROGRESS NOTES
Speech Language Pathology  Facility/Department: [facility] - MED SURG  Dysphagia Daily Treatment Note    Recommendations:  Solid Consistency: NPO  Liquid Consistency: Mildly thick (nectar) by tsp only  Medication: via alternative means    NAME: Ever Lopes  : 1960  MRN: [id number]    Patient Diagnosis(es):   Patient Active Problem List    Diagnosis Date Noted    Seizure disorder ([address] 2021    PAF (paroxysmal atrial fibrillation) ([address] 2021    Hypothermia 2021    Thrombocytopenia ([address] 2021    RUBEN (acute kidney injury) ([address] 2021    Sepsis ([address] 2021    2019-nCoV acute respiratory disease 2021    Pulmonary infiltrates 2021    Pneumonia 2021    Acute urinary retention 2020    Atrial fibrillation, chronic ([address] 2020    History of subdural hematoma 2020    Constipation     Atrial fibrillation with RVR ([address] 2019    Acute cystitis with hematuria 2019    Altered mental status 2019    Brain herniation ([address] 2019    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 ([address] 2019    Lipoma of head      Allergies: Allergies   Allergen Reactions    Penicillins      Other reaction(s): Unknown    Benadryl [Diphenhydramine] Other (See Comments)     Pt becomes agitated and aggressive with Benadryl    Clonidine Derivatives      Other reaction(s): Unknown    Tetracyclines & Related      Other reaction(s): Unknown     Onset Date: 21  Current Diet Level:  NPO    Subjective: RN okayed SLP entry. Pt w/ eyes clenched, making moaning noises upon SLP entry. Pt nonverbal and unable to answer basic questions. Pt able to intermittently make eye contact w/ SLP. Pain: pt unable to endorse/deny pain levels    Current Diet: Diet NPO    Diet Tolerance:  Patient tolerating current diet level without signs/symptoms of penetration / aspiration. P.O. Trials:   Thin       Nectar / Mildly Thick   x Tsps alternative means    Patient/Family/Caregiver Education: Pt educated on diet recommendations; no evidence of learning    Compensatory Strategies: N/A    Plan:    Continued Dysphagia treatment with goals per plan of care. Discharge Recommendations: TBD    If pt discharges from hospital prior to Speech/Swallowing discharge, this note serves as tx and discharge summary. Total Treatment Time / Charges     Time in Time out Total Time / units   Cognitive Tx         Speech Tx      Dysphagia Tx 1055 1105 10 min / 1 unit     Signature:  Claudine Puente M.A.  [address] [facility]  Speech Language Pathologist

## 2021-09-07 NOTE — CARE COORDINATION
Call back received from [facility] with [employer], who is patient's advocate ([phone]. Extension [phone]). Crystal states pt needs assistance walking at baseline and is nonverbal. No barriers to returning to facility. No covid test required (to her knowdledge). When pt is ready for discharge, CM to contact:    [address] [phone] to notify group home of patient returning.      Lane Cardenas RN

## 2021-09-07 NOTE — PROGRESS NOTES
For patient safety, an [employer] camera has been placed in patient's room. [employer] monitoring needed for Confusion, Increased Fall Risk, Pulling at Lines and Delirium. Writer placed call to monitor observer to verify camera number [id number] and review patient name, reason for monitoring, and contact information for primary RN. Patient notified of use of remote monitoring upon implementation of camera and verbalized understanding.

## 2021-09-07 NOTE — PROGRESS NOTES
INPATIENT PULMONARY CRITICAL CARE PROGRESS NOTE      Reason for visit: Acute respiratory failure, pneumonia, sepsis, RUBEN, hypothermia. Reduced p.o. intake after recent colonoscopy. Underlying MRDD, seizure disorder    SUBJECTIVE: The patient was on room air, nursing noted his SaO2 to be 88% this morning, he was placed on supplemental oxygen at 2 LPM.  He has been kept n.p.o. by speech and swallow, further evaluation pending. He remains afebrile and hemodynamically stable. He is nonverbal, does not appear to be in distress       Physical Exam:  Blood pressure 122/84, pulse 113, temperature 97.7 °F (36.5 °C), temperature source Axillary, resp. rate 19, height 4' 8\" (1.422 m), weight 104 lb 8 oz (47.4 kg), SpO2 92 %.'   Constitutional: Underweight appearing, noncommunicative, awake  HENT: Class II airway, no oral lesions. Bitemporal muscle wasting  Eyes:  Conjunctivae are normal. Pupils equal, round, and reactive to light. No scleral icterus. Neck: No tracheal deviation present. No obvious thyroid mass. Cardiovascular: Normal rate, regular rhythm, normal heart sounds. No right ventricular heave. No lower extremity edema. Pulmonary/Chest: No wheezes. No rales. No accessory muscle usage, questionable stridor when he attempts to speak. Abdominal: Soft. Bowel sounds present. No distension or tenderness. Musculoskeletal: No cyanosis. No clubbing. No obvious joint deformity. Lymphadenopathy: No cervical or supraclavicular adenopathy. Skin: Skin is warm and dry. No rash or nodules on the exposed extremities. Psychiatric: Could not be assessed  Neurologic: Alert, awake.   Neurological exam not performed        Results:  CBC:   Recent Labs     09/05/21 1924 09/06/21 0526 09/07/21  0602   WBC 8.7 6.0 6.9   HGB 13.6 11.8* 12.0*   HCT 41.7 36.3* 35.6*   MCV 85.6 84.6 82.5   PLT 83* 92* 82*     BMP:   Recent Labs     09/05/21 1924 09/06/21 0526 09/07/21  0602   * 151* 150*   K 4.5 4.1 3.5    elevation of the right hemidiaphragm. Right perihilar and basilar opacification, likely a infiltrate superimposed on some atelectasis. The left lung is clear. The abdominal bowel gas pattern is nonspecific. Scattered gas and stool throughout the colon. No small bowel distension, air-fluid levels or free air. Left-sided nephrolithiasis is noted with stones measuring up to 5 mm. No other definite plain film evidence of renal or ureteral calculi. Bones are osteopenic. Right perihilar and basilar opacification, concerning for infiltrate superimposed on atelectasis. Nonspecific abdominal bowel gas pattern. Left-sided nephrolithiasis. CT CHEST WO CONTRAST    Result Date: 9/5/2021  EXAMINATION: CT OF THE CHEST WITHOUT CONTRAST 9/5/2021 9:31 pm TECHNIQUE: CT of the chest was performed without the administration of intravenous contrast. Multiplanar reformatted images are provided for review. Dose modulation, iterative reconstruction, and/or weight based adjustment of the mA/kV was utilized to reduce the radiation dose to as low as reasonably achievable. COMPARISON: None. HISTORY: ORDERING SYSTEM PROVIDED HISTORY: possible pna on cxr TECHNOLOGIST PROVIDED HISTORY: Reason for exam:->possible pna on cxr Decision Support Exception - unselect if not a suspected or confirmed emergency medical condition->Emergency Medical Condition (MA) Reason for Exam: possible pna on cxr. unable to get details from pt. FINDINGS: Lungs/Pleura: Few consolidations in the bilateral lower lobes and right middle lobe with scattered bilateral ground-glass opacities representing pneumonia, of which COVID-19 pneumonia cannot be ruled out. Right middle lobe cyst.  Left lower lobe nodule measures 0.6 cm (2, 43). No pneumothorax or pleural effusion. Mediastinum: The central airways are clear. No mediastinal lymphadenopathy. The heart is unremarkable. No pericardial effusion. Vessels:  The pulmonary artery and thoracic aorta are within normal limits. Upper Abdomen: Visualized upper abdomen is unremarkable. Soft Tissue/Bones: Multilevel degenerative disease. Bilateral consolidations and ground-glass opacities representing pneumonia, which COVID-19 pneumonia cannot be ruled out. XR CHEST PORTABLE    Result Date: 9/6/2021  EXAMINATION: ONE XRAY VIEW OF THE CHEST 9/6/2021 1:09 am COMPARISON: Radiograph performed earlier the same day HISTORY: ORDERING SYSTEM PROVIDED HISTORY: Right IJ central line insertion TECHNOLOGIST PROVIDED HISTORY: Reason for exam:->Right IJ central line insertion Reason for Exam: Right IJ central line insertion FINDINGS: Cardiomediastinal silhouette is normal in size. Right IJ central venous catheter terminates at the cavoatrial junction. No pleural effusion or pneumothorax. Bilateral pulmonary opacities are noted. No acute osseous abnormality. Central venous catheter in expected position without pneumothorax. Assessment and plan:  Acute respiratory failure, hypoxemic. Oxygenation improving, keep SaO2 >90%. Pneumonia. Right lower lobe infiltrate on CXR. On vancomycin and cefepime since 9/5. Would complete 7 days of antibiotic  Sepsis, hypothermia. Hemodynamically stable. Blood cultures pending negative so far. RUBEN/CKD. Fluids, creatinine improved and stable  Hypernatremia. Increase free water  Hypophosphatemia. Replace and monitor, possible refeeding  Thrombocytopenia. Possibly from sepsis, improving and stable  PAF. Per RN  Seizure disorder, MRDD. Per IM. Keppra  DVT prophylaxis.   Would consider Lovenox    We will sign off, please call with questions and thank you for the consultation    Electronically signed by:  Yamil Isabel MD    9/7/2021    11:37 AM.

## 2021-09-07 NOTE — PROGRESS NOTES
distention. Trachea midline with full range of motion. Respiratory:  Normal respiratory effort. Clear to auscultation, bilaterally without Rales/Wheezes/Rhonchi. Cardiovascular: Regular rate and rhythm with normal S1/S2 without murmurs, rubs or gallops. Abdomen: Soft, non-tender, non-distended with normal bowel sounds. Musculoskelatal: No clubbing, cyanosis or edema bilaterally. Full range of motion without deformity. Neurologic:  Neurovascularly intact without any focal sensory/motor deficits. Cranial nerves: II-XII intact, grossly non-focal.  Psychiatric: Alert, non-verbal, MRDD. Skin: Skin color, texture, turgor normal.  No rashes or lesions. Capillary Refill: Brisk,< 3 seconds   Peripheral Pulses: +2 palpable, equal bilaterally       Assessment/Plan:    Active Hospital Problems    Diagnosis     Seizure disorder ([facility] [G40.909]     PAF (paroxysmal atrial fibrillation) ([facility] [I48.0]     Hypothermia [N44. XXXA]     Thrombocytopenia ([facility] [D69.6]     RUBEN (acute kidney injury) ([facility] [N17.9]     Sepsis ([facility] [A41.9]     COVID-19 [U07.1]     Pneumonia [J18.9]      Sepsis, COVID-19 Pneumonia, Possible Aspiration Pneumonia, Acute Respiratory Failure with Hypoxia  -  Patient has a multifocal pneumonia distributed throughout dependent lungs regions most c/w aspiration pneumonia. Findings could also be d/t COVID-19 but initially felt unlikely since patient has had COVID in January, 2021 and received the J&J vaccine in May, 2021. Rapid COVID negative. PCR has now returned positive. -  Has been treated with empiric Abx with clinical improvement. -  Only has mild hypoxia.   -  However, would go ahead and add steroids given the concurrent COVID diagnosis. - Initially required Levophed support for hypotension, although quickly stabilized and pressors weaned off.   - SLP eval ongoing, does not significantly tolerate any solids at this time.   - Low-dose morphine added for apparent pain/discomfort although difficult to localize    RUBEN  -  Baseline creatinine ~ 0.6 and currently 1.3 mg/dL. -  Volume expansion  -  Has a h/o urinary retention. Colindres catheter placed. -  Monitor. Hypothermia:  -  Due to sepsis. Poor prognostic indicator and has occurred with infections in the past and thyroid function has been normal.  Responded to external warming measures. Resolved. Hypernatremia: Reduced PO intake, saline load. Continue hypotonic fluids. Improving monitor. Reduced oral intake  -  Temporally began after colonoscopy, but unlikely d/t direct complication of colonoscopy such as bowel perforation. Complication possibly could have been indirect since patient may have aspirated during or after the procedure. In other words anorexia seems most likely d/t systemic infection.  - SLP eval as above. H/O Seizure D/O  -  Continue keppra. Converted to IV formulation. PAF  -  Currently in sinus rhythm.     DVT prophylaxis: SCDs only d/t thrombocytopenia  Diet: Diet NPO  Code Status: Full Code  PT/OT Eval Status: NA    Dispo -2 to 3 days at least    Kvng Do MD

## 2021-09-08 LAB
A/G RATIO: 1.1 (ref 1.1–2.2)
ALBUMIN SERPL-MCNC: 3.1 G/DL (ref 3.4–5)
ALP BLD-CCNC: 151 U/L (ref 40–129)
ALT SERPL-CCNC: 29 U/L (ref 10–40)
ANION GAP SERPL CALCULATED.3IONS-SCNC: 10 MMOL/L (ref 3–16)
ANISOCYTOSIS: ABNORMAL
AST SERPL-CCNC: 24 U/L (ref 15–37)
ATYPICAL LYMPHOCYTE RELATIVE PERCENT: 1 % (ref 0–6)
BANDED NEUTROPHILS RELATIVE PERCENT: 23 % (ref 0–7)
BASOPHILS ABSOLUTE: 0 K/UL (ref 0–0.2)
BASOPHILS RELATIVE PERCENT: 1 %
BILIRUB SERPL-MCNC: 0.3 MG/DL (ref 0–1)
BUN BLDV-MCNC: 16 MG/DL (ref 7–20)
CALCIUM SERPL-MCNC: 9.5 MG/DL (ref 8.3–10.6)
CHLORIDE BLD-SCNC: 109 MMOL/L (ref 99–110)
CO2: 28 MMOL/L (ref 21–32)
CREAT SERPL-MCNC: 1.2 MG/DL (ref 0.8–1.3)
EOSINOPHILS ABSOLUTE: 0 K/UL (ref 0–0.6)
EOSINOPHILS RELATIVE PERCENT: 0 %
GFR AFRICAN AMERICAN: >60
GFR NON-AFRICAN AMERICAN: >60
GLOBULIN: 2.8 G/DL
GLUCOSE BLD-MCNC: 177 MG/DL (ref 70–99)
HCT VFR BLD CALC: 34.4 % (ref 40.5–52.5)
HEMOGLOBIN: 11.4 G/DL (ref 13.5–17.5)
INR BLD: 2.18 (ref 0.88–1.12)
LYMPHOCYTES ABSOLUTE: 0.3 K/UL (ref 1–5.1)
LYMPHOCYTES RELATIVE PERCENT: 8 %
MACROCYTES: ABNORMAL
MAGNESIUM: 1.7 MG/DL (ref 1.8–2.4)
MCH RBC QN AUTO: 27.6 PG (ref 26–34)
MCHC RBC AUTO-ENTMCNC: 33.3 G/DL (ref 31–36)
MCV RBC AUTO: 83 FL (ref 80–100)
MICROCYTES: ABNORMAL
MONOCYTES ABSOLUTE: 0.1 K/UL (ref 0–1.3)
MONOCYTES RELATIVE PERCENT: 3 %
NEUTROPHILS ABSOLUTE: 3.3 K/UL (ref 1.7–7.7)
NEUTROPHILS RELATIVE PERCENT: 64 %
OVALOCYTES: ABNORMAL
PDW BLD-RTO: 16.8 % (ref 12.4–15.4)
PHOSPHORUS: 2.6 MG/DL (ref 2.5–4.9)
PLATELET # BLD: 82 K/UL (ref 135–450)
PMV BLD AUTO: 7.6 FL (ref 5–10.5)
POIKILOCYTES: ABNORMAL
POTASSIUM SERPL-SCNC: 3.7 MMOL/L (ref 3.5–5.1)
PROTHROMBIN TIME: 25.4 SEC (ref 9.9–12.7)
RBC # BLD: 4.14 M/UL (ref 4.2–5.9)
SODIUM BLD-SCNC: 147 MMOL/L (ref 136–145)
TOTAL PROTEIN: 5.9 G/DL (ref 6.4–8.2)
VANCOMYCIN TROUGH: 23.1 UG/ML (ref 10–20)
WBC # BLD: 3.8 K/UL (ref 4–11)

## 2021-09-08 PROCEDURE — 6360000002 HC RX W HCPCS: Performed by: INTERNAL MEDICINE

## 2021-09-08 PROCEDURE — 80053 COMPREHEN METABOLIC PANEL: CPT

## 2021-09-08 PROCEDURE — 80202 ASSAY OF VANCOMYCIN: CPT

## 2021-09-08 PROCEDURE — 83735 ASSAY OF MAGNESIUM: CPT

## 2021-09-08 PROCEDURE — 2580000003 HC RX 258: Performed by: INTERNAL MEDICINE

## 2021-09-08 PROCEDURE — 94761 N-INVAS EAR/PLS OXIMETRY MLT: CPT

## 2021-09-08 PROCEDURE — 2500000003 HC RX 250 WO HCPCS: Performed by: INTERNAL MEDICINE

## 2021-09-08 PROCEDURE — 2700000000 HC OXYGEN THERAPY PER DAY

## 2021-09-08 PROCEDURE — 36592 COLLECT BLOOD FROM PICC: CPT

## 2021-09-08 PROCEDURE — 92526 ORAL FUNCTION THERAPY: CPT

## 2021-09-08 PROCEDURE — 85025 COMPLETE CBC W/AUTO DIFF WBC: CPT

## 2021-09-08 PROCEDURE — 85610 PROTHROMBIN TIME: CPT

## 2021-09-08 PROCEDURE — 1200000000 HC SEMI PRIVATE

## 2021-09-08 PROCEDURE — 84100 ASSAY OF PHOSPHORUS: CPT

## 2021-09-08 RX ADMIN — Medication 10 ML: at 04:18

## 2021-09-08 RX ADMIN — FAMOTIDINE 20 MG: 10 INJECTION, SOLUTION INTRAVENOUS at 09:37

## 2021-09-08 RX ADMIN — VANCOMYCIN HYDROCHLORIDE 750 MG: 750 INJECTION, POWDER, LYOPHILIZED, FOR SOLUTION INTRAVENOUS at 12:58

## 2021-09-08 RX ADMIN — CEFEPIME 2000 MG: 2 INJECTION, POWDER, FOR SOLUTION INTRAMUSCULAR; INTRAVENOUS at 11:39

## 2021-09-08 RX ADMIN — METHYLPREDNISOLONE SODIUM SUCCINATE 40 MG: 40 INJECTION, POWDER, FOR SOLUTION INTRAMUSCULAR; INTRAVENOUS at 20:49

## 2021-09-08 RX ADMIN — Medication 10 ML: at 09:37

## 2021-09-08 RX ADMIN — LEVETIRACETAM 250 MG: 100 INJECTION, SOLUTION INTRAVENOUS at 14:52

## 2021-09-08 RX ADMIN — DEXTROSE MONOHYDRATE: 50 INJECTION, SOLUTION INTRAVENOUS at 14:06

## 2021-09-08 RX ADMIN — METHYLPREDNISOLONE SODIUM SUCCINATE 40 MG: 40 INJECTION, POWDER, FOR SOLUTION INTRAMUSCULAR; INTRAVENOUS at 09:37

## 2021-09-08 RX ADMIN — LEVETIRACETAM 250 MG: 100 INJECTION, SOLUTION INTRAVENOUS at 04:25

## 2021-09-08 ASSESSMENT — PAIN SCALES - WONG BAKER
WONGBAKER_NUMERICALRESPONSE: 0
WONGBAKER_NUMERICALRESPONSE: 0

## 2021-09-08 ASSESSMENT — PAIN SCALES - GENERAL
PAINLEVEL_OUTOF10: 0

## 2021-09-08 NOTE — PROGRESS NOTES
Hospitalist Progress Note    Date of Admission: 9/5/2021    Chief Complaint: Decreased oral intake    Hospital Course:   Tracey Olmstead is a 62 y.o. male. He has a h/o MRDD and is nonverbal. He lives in a group home and was sent to the ER because he has not been eating or drinking as usual for the past four days. He underwent colonoscopy on 9/1/21 and has not been taking much oral intake since then. He received the [employer] COVID-19 vaccine on 5/7/21. Prior to that he was hospitalized in January, 2021 for acute respiratory failure due to COVID-19. Subjective: Now in Droplet Plus isolation. Remains on minimal O2. BP stable. He appears more comfortable today with less grimacing/moaning. Labs:   Recent Labs     09/06/21 0526 09/07/21 0602 09/08/21  0600   WBC 6.0 6.9 3.8*   HGB 11.8* 12.0* 11.4*   HCT 36.3* 35.6* 34.4*   PLT 92* 82* 82*     Recent Labs     09/06/21 0526 09/07/21 0602 09/08/21  0600   * 150* 147*   K 4.1 3.5 3.7   * 112* 109   CO2 30 30 28   BUN 27* 17 16   CREATININE 1.2 1.3 1.2   CALCIUM 9.5 9.5 9.5   PHOS 1.4* 1.2* 2.6     Recent Labs     09/06/21 0526 09/07/21 0602 09/08/21  0600   AST 28 28 24   ALT 41* 36 29   BILITOT <0.2 0.3 0.3   ALKPHOS 202* 174* 151*     Recent Labs     09/06/21 0526 09/07/21 0602 09/08/21  0600   INR 1.53* 1.87* 2.18*       Physical Exam Performed:    BP (!) 126/96   Pulse 65   Temp 98.9 °F (37.2 °C) (Axillary)   Resp 16   Ht 4' 8\" (1.422 m)   Wt 97 lb 3.6 oz (44.1 kg)   SpO2 94%   BMI 21.80 kg/m²     General appearance: Appears comfortable. HEENT: Pupils equal, round, and reactive to light. Conjunctivae/corneas clear. Facial/ear deformities. Neck: Supple, no jugular venous distention. Trachea midline with full range of motion. Respiratory:  Normal respiratory effort. Clear to auscultation, bilaterally without Rales/Wheezes/Rhonchi.   Cardiovascular: Regular rate and rhythm with normal S1/S2 without murmurs, rubs or gallops. Abdomen: Soft, non-tender, non-distended with normal bowel sounds. Musculoskelatal: No clubbing, cyanosis or edema bilaterally. Full range of motion without deformity. Neurologic:  Neurovascularly intact without any focal sensory/motor deficits. Cranial nerves: II-XII intact, grossly non-focal.  Psychiatric: Alert, non-verbal, MRDD. Skin: Skin color, texture, turgor normal.  No rashes or lesions. Capillary Refill: Brisk,< 3 seconds   Peripheral Pulses: +2 palpable, equal bilaterally       Assessment/Plan:    Active Hospital Problems    Diagnosis     [facility] [G40.909]     PAF (paroxysmal atrial fibrillation) ([facility] [I48.0]     Hypothermia [S62. XXXA]     Thrombocytopenia ([facility] [D69.6]     RUBEN (acute kidney injury) ([facility] [N17.9]     Sepsis ([facility] [A41.9]     COVID-19 [U07.1]     Pneumonia [J18.9]      Sepsis, COVID-19 Pneumonia, Possible Aspiration Pneumonia, Acute Respiratory Failure with Hypoxia  -  Patient has a multifocal pneumonia distributed throughout dependent lungs regions most c/w aspiration pneumonia. Findings could also be d/t COVID-19 but initially felt unlikely since patient has had COVID in January, 2021 and received the J&J vaccine in May, 2021. Rapid COVID negative. PCR has now returned positive. -  Has been treated with empiric Abx with clinical improvement.   -  Still only has mild hypoxia.   -  Continue Steroids  - Initially required Levophed support for hypotension, although quickly stabilized and pressors weaned off.   - SLP eval ongoing, does not significantly tolerate any solids at this time. - Low-dose morphine added for apparent pain/discomfort although difficult to localize    RUBEN  -  Baseline creatinine ~ 0.6 and currently 1.3 mg/dL. -  Volume expansion  -  Has a h/o urinary retention. Colindres catheter placed. -  Monitor. Hypothermia:  -  Due to sepsis.   Poor prognostic indicator and has occurred with infections in the past and thyroid function has been normal.  Responded to external warming measures. Resolved. Hypernatremia: Reduced PO intake, saline load. Continue hypotonic fluids. Improving monitor. Reduced oral intake  -  Temporally began after colonoscopy, but unlikely d/t direct complication of colonoscopy such as bowel perforation. Complication possibly could have been indirect since patient may have aspirated during or after the procedure. In other words anorexia seems most likely d/t systemic infection.  - SLP eval as above. H/O Seizure D/O  -  Continue keppra. Converted to IV formulation. PAF  -  Currently in sinus rhythm.     DVT prophylaxis: SCDs only d/t thrombocytopenia  Diet: Diet NPO  Code Status: Full Code  PT/OT Eval Status: NA    Dispo -2 to 3 days at least    Dave Christensen MD

## 2021-09-08 NOTE — PROGRESS NOTES
Speech Language Pathology  Facility/Department: [facility] B[facility] - MED SURG  Dysphagia Daily Treatment Note    Recommendations:  Solid Consistency: NPO  Liquid Consistency: NPO  Medication: via alternative means    NAME: Vanessa Early  : 1960  MRN: [id number]    Patient Diagnosis(es):   Patient Active Problem List    Diagnosis Date Noted    Seizure disorder ([address] 2021    PAF (paroxysmal atrial fibrillation) ([address] 2021    Hypothermia 2021    Thrombocytopenia ([address] 2021    RUBEN (acute kidney injury) ([address] 2021    Sepsis ([address] 2021    COVID-19 2021    Pulmonary infiltrates 2021    Pneumonia 2021    Acute urinary retention 2020    Atrial fibrillation, chronic ([address] 2020    History of subdural hematoma 2020    Constipation     Atrial fibrillation with RVR ([address] 2019    Acute cystitis with hematuria 2019    Altered mental status 2019    Brain herniation ([address] 2019    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 ([address] 2019    Lipoma of head      Allergies: Allergies   Allergen Reactions    Penicillins      Other reaction(s): Unknown    Benadryl [Diphenhydramine] Other (See Comments)     Pt becomes agitated and aggressive with Benadryl    Clonidine Derivatives      Other reaction(s): Unknown    Tetracyclines & Related      Other reaction(s): Unknown     Onset Date: 21  Current Diet Level:  Nectar thick via tsp- diet orders do not reflect this previous recommendation. Subjective:   RN cleared ST for f/u. She reports she has not been giving the pt nectar thick liquids d/t aspiration concerns. Pt is non-verbal with occasional moaning, h/w appears behavorial vs d/t pain. Pain: Pt unable to endorse/deny pain levels    Current Diet: Diet NPO    Diet Tolerance:  Patient is currently NPO per chart. P.O. Trials: Thin       Nectar / Mildly Thick   x Tsp, cup & straw attempted.  Pt unable to suck from straw. Poor seal on cup & spoon. Absent A-P transfer & swallow initiation, requiring suction. Honey / Moderately Thick       Pudding / Extremely Thick       Puree   x Tsp administered. Absent A-P transfer & swallow initiation, requiring suction. Solid         Dysphagia Treatment and Impressions:  · Pt's baseline diet is minced and moist w/ nectar thick liquids and is often downgraded to puree w/ honey thick liquids when having difficulty. ·  Pt is nonverbal and dependent for feeding. ·  Dry oral cavity with minimal thick/ dried secretion. Suction set-up by this ST. Toothette swabbing & suction completed. · Absent A-P transfer and swallow across presentations, requiring suction. · Recommend pt continue NPO. Pt continues at high risk of aspiration and malnutrition/dehydration. Pt may benefit from alternate means of nutrition w/ long standing h/o dysphagia, however concern for risk of pulling any feeding tube. Dysphagia Goals:  Timeframe for Long-term Goals: 3-5x/wk for LOS  Goal 1: Patient will tolerate least restrictive and safest diet without clinical indicators of aspiration 90% of the time. 09/08; addressed and ongoing, see above    Short-term Goals  Timeframe for Short-term Goals: 3-5x/week for LOS  Dysphagia Goals:   Goal 1: The patient will tolerate repeat BSE when able.  09/08; addressed and ongoing, see above  Goal 2 : The patient will tolerate instrumental swallowing procedure 09/08; not appropriate per today's observations   Goal 3: The patient will tolerate puree foods 10/10. 09/08; addressed and ongoing, see above    Speech/Language/Cog Goals: N/A    Recommendations:  Solid Consistency: NPO  Liquid Consistency: NPO  Medication: via alternative means    Patient/Family/Caregiver Education:   Pt educated on reason for SLP; no evidence of learning    Compensatory Strategies:   Frequent oral care     Plan:    Continued Dysphagia treatment with goals per plan of care.    Discharge Recommendations: TBD    If pt discharges from hospital prior to Speech/Swallowing discharge, this note serves as tx and discharge summary. Total Treatment Time / Charges     Time in Time out Total Time / units   Cognitive Tx         Speech Tx      Dysphagia Tx 1056 1128 32 min / 1 unit     Thank you.    Fox Gillette M.A, CCC-SLP/ CBIS   Acute Speech Therapy: [phone]

## 2021-09-08 NOTE — CARE COORDINATION
Received message from Jordan Fang requesting update on status. Returned call and Williams updated and aware. Pt remains stable at this time. Will follow.

## 2021-09-09 LAB
A/G RATIO: 1 (ref 1.1–2.2)
ALBUMIN SERPL-MCNC: 2.8 G/DL (ref 3.4–5)
ALP BLD-CCNC: 120 U/L (ref 40–129)
ALT SERPL-CCNC: 20 U/L (ref 10–40)
ANION GAP SERPL CALCULATED.3IONS-SCNC: 9 MMOL/L (ref 3–16)
AST SERPL-CCNC: 15 U/L (ref 15–37)
BASOPHILS ABSOLUTE: 0 K/UL (ref 0–0.2)
BASOPHILS RELATIVE PERCENT: 0.4 %
BILIRUB SERPL-MCNC: 0.3 MG/DL (ref 0–1)
BUN BLDV-MCNC: 24 MG/DL (ref 7–20)
CALCIUM SERPL-MCNC: 9.4 MG/DL (ref 8.3–10.6)
CHLORIDE BLD-SCNC: 107 MMOL/L (ref 99–110)
CO2: 27 MMOL/L (ref 21–32)
CREAT SERPL-MCNC: 1.4 MG/DL (ref 0.8–1.3)
EOSINOPHILS ABSOLUTE: 0 K/UL (ref 0–0.6)
EOSINOPHILS RELATIVE PERCENT: 0 %
GFR AFRICAN AMERICAN: >60
GFR NON-AFRICAN AMERICAN: 52
GLOBULIN: 2.7 G/DL
GLUCOSE BLD-MCNC: 148 MG/DL (ref 70–99)
HCT VFR BLD CALC: 31.1 % (ref 40.5–52.5)
HEMOGLOBIN: 10.2 G/DL (ref 13.5–17.5)
HEMOGLOBIN: 10.6 G/DL (ref 13.5–17.5)
HEMOGLOBIN: 8.7 G/DL (ref 13.5–17.5)
INR BLD: 3.25 (ref 0.88–1.12)
LACTIC ACID, SEPSIS: 0.9 MMOL/L (ref 0.4–1.9)
LYMPHOCYTES ABSOLUTE: 0.9 K/UL (ref 1–5.1)
LYMPHOCYTES RELATIVE PERCENT: 11.2 %
MAGNESIUM: 1.8 MG/DL (ref 1.8–2.4)
MCH RBC QN AUTO: 27.4 PG (ref 26–34)
MCHC RBC AUTO-ENTMCNC: 33 G/DL (ref 31–36)
MCV RBC AUTO: 83 FL (ref 80–100)
MONOCYTES ABSOLUTE: 0.5 K/UL (ref 0–1.3)
MONOCYTES RELATIVE PERCENT: 6.3 %
NEUTROPHILS ABSOLUTE: 6.4 K/UL (ref 1.7–7.7)
NEUTROPHILS RELATIVE PERCENT: 82.1 %
PDW BLD-RTO: 17 % (ref 12.4–15.4)
PHOSPHORUS: 3.1 MG/DL (ref 2.5–4.9)
PLATELET # BLD: 114 K/UL (ref 135–450)
PMV BLD AUTO: 8 FL (ref 5–10.5)
POTASSIUM SERPL-SCNC: 3.9 MMOL/L (ref 3.5–5.1)
PROTHROMBIN TIME: 38.6 SEC (ref 9.9–12.7)
RBC # BLD: 3.74 M/UL (ref 4.2–5.9)
SODIUM BLD-SCNC: 143 MMOL/L (ref 136–145)
TOTAL PROTEIN: 5.5 G/DL (ref 6.4–8.2)
WBC # BLD: 7.7 K/UL (ref 4–11)

## 2021-09-09 PROCEDURE — 1200000000 HC SEMI PRIVATE

## 2021-09-09 PROCEDURE — C9113 INJ PANTOPRAZOLE SODIUM, VIA: HCPCS | Performed by: INTERNAL MEDICINE

## 2021-09-09 PROCEDURE — 80053 COMPREHEN METABOLIC PANEL: CPT

## 2021-09-09 PROCEDURE — 6360000002 HC RX W HCPCS: Performed by: INTERNAL MEDICINE

## 2021-09-09 PROCEDURE — 93005 ELECTROCARDIOGRAM TRACING: CPT | Performed by: INTERNAL MEDICINE

## 2021-09-09 PROCEDURE — 2580000003 HC RX 258: Performed by: INTERNAL MEDICINE

## 2021-09-09 PROCEDURE — 2500000003 HC RX 250 WO HCPCS: Performed by: INTERNAL MEDICINE

## 2021-09-09 PROCEDURE — 2700000000 HC OXYGEN THERAPY PER DAY

## 2021-09-09 PROCEDURE — 83735 ASSAY OF MAGNESIUM: CPT

## 2021-09-09 PROCEDURE — 84100 ASSAY OF PHOSPHORUS: CPT

## 2021-09-09 PROCEDURE — 92611 MOTION FLUOROSCOPY/SWALLOW: CPT

## 2021-09-09 PROCEDURE — 85610 PROTHROMBIN TIME: CPT

## 2021-09-09 PROCEDURE — 85018 HEMOGLOBIN: CPT

## 2021-09-09 PROCEDURE — 94761 N-INVAS EAR/PLS OXIMETRY MLT: CPT

## 2021-09-09 PROCEDURE — 6370000000 HC RX 637 (ALT 250 FOR IP): Performed by: INTERNAL MEDICINE

## 2021-09-09 PROCEDURE — 83605 ASSAY OF LACTIC ACID: CPT

## 2021-09-09 PROCEDURE — 85025 COMPLETE CBC W/AUTO DIFF WBC: CPT

## 2021-09-09 RX ORDER — ATROPINE SULFATE 0.1 MG/ML
1 INJECTION INTRAVENOUS PRN
Status: DISCONTINUED | OUTPATIENT
Start: 2021-09-09 | End: 2021-09-10

## 2021-09-09 RX ORDER — 0.9 % SODIUM CHLORIDE 0.9 %
1000 INTRAVENOUS SOLUTION INTRAVENOUS ONCE
Status: COMPLETED | OUTPATIENT
Start: 2021-09-09 | End: 2021-09-10

## 2021-09-09 RX ORDER — PANTOPRAZOLE SODIUM 40 MG/10ML
40 INJECTION, POWDER, LYOPHILIZED, FOR SOLUTION INTRAVENOUS 2 TIMES DAILY
Status: DISCONTINUED | OUTPATIENT
Start: 2021-09-09 | End: 2021-09-20

## 2021-09-09 RX ADMIN — METHYLPREDNISOLONE SODIUM SUCCINATE 40 MG: 40 INJECTION, POWDER, FOR SOLUTION INTRAMUSCULAR; INTRAVENOUS at 11:40

## 2021-09-09 RX ADMIN — CEFEPIME 2000 MG: 2 INJECTION, POWDER, FOR SOLUTION INTRAMUSCULAR; INTRAVENOUS at 00:40

## 2021-09-09 RX ADMIN — METHYLPREDNISOLONE SODIUM SUCCINATE 40 MG: 40 INJECTION, POWDER, FOR SOLUTION INTRAMUSCULAR; INTRAVENOUS at 22:55

## 2021-09-09 RX ADMIN — PHYTONADIONE 10 MG: 10 INJECTION, EMULSION INTRAMUSCULAR; INTRAVENOUS; SUBCUTANEOUS at 11:45

## 2021-09-09 RX ADMIN — PANTOPRAZOLE SODIUM 40 MG: 40 INJECTION, POWDER, FOR SOLUTION INTRAVENOUS at 11:43

## 2021-09-09 RX ADMIN — LEVETIRACETAM 250 MG: 100 INJECTION, SOLUTION INTRAVENOUS at 04:15

## 2021-09-09 RX ADMIN — ONDANSETRON 4 MG: 2 INJECTION INTRAMUSCULAR; INTRAVENOUS at 09:21

## 2021-09-09 RX ADMIN — SODIUM CHLORIDE 1000 ML: 9 INJECTION, SOLUTION INTRAVENOUS at 17:07

## 2021-09-09 RX ADMIN — CEFEPIME 2000 MG: 2 INJECTION, POWDER, FOR SOLUTION INTRAMUSCULAR; INTRAVENOUS at 12:11

## 2021-09-09 RX ADMIN — VANCOMYCIN HYDROCHLORIDE 750 MG: 750 INJECTION, POWDER, LYOPHILIZED, FOR SOLUTION INTRAVENOUS at 12:30

## 2021-09-09 RX ADMIN — LEVETIRACETAM 250 MG: 100 INJECTION, SOLUTION INTRAVENOUS at 16:44

## 2021-09-09 RX ADMIN — PANTOPRAZOLE SODIUM 40 MG: 40 INJECTION, POWDER, FOR SOLUTION INTRAVENOUS at 16:49

## 2021-09-09 RX ADMIN — FAMOTIDINE 20 MG: 10 INJECTION, SOLUTION INTRAVENOUS at 09:22

## 2021-09-09 RX ADMIN — ACETAMINOPHEN 650 MG: 650 SUPPOSITORY RECTAL at 00:40

## 2021-09-09 ASSESSMENT — PAIN SCALES - GENERAL
PAINLEVEL_OUTOF10: 0

## 2021-09-09 NOTE — PROGRESS NOTES
Speech Language Pathology  Facility/Department: [facility] - MED SURG  Dysphagia Daily Treatment Note     Recommendations:  Solid Consistency: NPO  Liquid Consistency: NPO  Medication: via alternative means     NAME: Aye Garza  : 1960  MRN: [id number]     Patient Diagnosis(es):        Patient Active Problem List     Diagnosis Date Noted    Seizure disorder ([address] 2021    PAF (paroxysmal atrial fibrillation) ([address] 2021    Hypothermia 2021    Thrombocytopenia ([address] 2021    RUBEN (acute kidney injury) ([address] 2021    Sepsis ([address] 2021    COVID-19 2021    Pulmonary infiltrates 2021    Pneumonia 2021    Acute urinary retention 2020    Atrial fibrillation, chronic ([address] 2020    History of subdural hematoma 2020    Constipation      Atrial fibrillation with RVR ([address] 2019    Acute cystitis with hematuria 2019    Altered mental status 2019    Brain herniation ([address] 2019    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 ([address] 2019    Lipoma of head        Allergies: Allergies   Allergen Reactions    Penicillins         Other reaction(s): Unknown    Benadryl [Diphenhydramine] Other (See Comments)       Pt becomes agitated and aggressive with Benadryl    Clonidine Derivatives         Other reaction(s): Unknown    Tetracyclines & Related         Other reaction(s): Unknown      Onset Date: 21  Current Diet Level:  strict NPO     Subjective:   RN requested re-eval of swallowing, stating that pt is not tolerating NTL. SLP reviewed that pt was re-evaluated on 21 and SLP downgraded pt to NPO.     Pain: Pt unable to endorse/deny pain levels     Current Diet: Diet NPO     Diet Tolerance:  Patient is currently NPO per chart.      P.O. Trials:   Thin          Nectar / Mildly Thick    x                    Tsp x 4 offered to pt   Honey / Moderately Thick          Pudding / Extremely Thick          Puree    x Tsp trial x 1   Solid             Dysphagia Treatment and Impressions:  Pt's baseline diet is minced and moist w/ nectar thick liquids and is often downgraded to puree w/ honey thick liquids when having difficulty. Pt is nonverbal and dependent for feeding. Pt is alert and non-verbal at time of follow-up, but RN reports that pt does vocalize at times but it is incomprehensible. RR 12/min with O2 sat of 97% on RA. Of note, pt's HR is 38 upon SLP entering room, but increases of 57 when HOB raised. Pt accepts initial NTL trial with partial labial seal and allows part of bolus to flow from right lateral sulcus. Pt spontaneously swallows and it is quite audible in nature. Pt refuses to swallow all further trials and allows them to flow from oral cavity. Pt exhibits delayed deep, wet coughing, possibly due to aspiration of initial nectar thick trial.  PO trials discontinued due to pt not swallowing them. Recommend pt continue NPO. Pt continues at high risk of aspiration and malnutrition/dehydration. Pt may benefit from alternate means of nutrition w/ long standing h/o dysphagia, however concern for risk of pulling any feeding tube.       Dysphagia Goals:  Timeframe for Long-term Goals: 3-5x/wk for LOS  Goal 1: Patient will tolerate least restrictive and safest diet without clinical indicators of aspiration 90% of the time. 09/09; addressed and ongoing, see above     Short-term Goals  Timeframe for Short-term Goals: 3-5x/week for LOS  Dysphagia Goals:   Goal 1: The patient will tolerate repeat BSE when able.  09/09; addressed and ongoing, see above  Goal 2 : The patient will tolerate instrumental swallowing procedure 09/09; not appropriate per today's observations   Goal 3: The patient will tolerate puree foods 10/10. 09/09; addressed and ongoing, see above     Speech/Language/Cog Goals: N/A     Recommendations:  Solid Consistency: NPO  Liquid Consistency: NPO  Medication: via alternative means     Patient/Family/Caregiver Education:   Pt educated on reason for SLP; no evidence of learning     Compensatory Strategies:   Frequent oral care with suction, 2x/day     Plan:    Continued Dysphagia treatment with goals per plan of care.     Discharge Recommendations: per PT/OT     If pt discharges from hospital prior to Speech/Swallowing discharge, this note serves as tx and discharge summary.      Total Treatment Time / Charges     Time in Time out Total Time / units   Cognitive Tx         Speech Tx         Dysphagia Tx 1700 1713 13 min / 1 unit                             Carolyn Overton, [address] [place] [address] [id number]  Speech-Language Pathologist

## 2021-09-09 NOTE — CARE COORDINATION
CM attempted to reach patient's group home staff, [employer] ([phone]. Ext [phone]). No answer. VM left for call back.

## 2021-09-09 NOTE — PLAN OF CARE
Called for persistent Bradycardia - now normothermic. Hx Afib not on any mehreen blocking agents. Atropine at bedside and continue to monitor.  TSH ordered for AM

## 2021-09-09 NOTE — PROGRESS NOTES
Hospitalist Progress Note    Date of Admission: 9/5/2021    Chief Complaint: Decreased oral intake    Hospital Course:   Jayson Johnston is a 62 y.o. male. He has a h/o MRDD and is nonverbal. He lives in a group home and was sent to the ER because he has not been eating or drinking as usual for the past four days. He underwent colonoscopy on 9/1/21 and has not been taking much oral intake since then. He received the [employer] COVID-19 vaccine on 5/7/21. Prior to that he was hospitalized in January, 2021 for acute respiratory failure due to COVID-19. Subjective: Febrile overnight. Coffee ground emesis noted. Hb stable. INR noted to be rising, now > 3. More lethargic today. Less interactive. No definite seizure activity. Remains on minimal O2. Has had intermittent episodes of sinus bradycardia without clear stimulus (only vomited once this AM). HR will drop into 30-40 range intermittently. Difficult to evaluate if symptomatic as he does not appear clinically any different during these episodes, and is non-verbal/MRDD. Hypotension appears to be worsening throughout the day; volume expansion given.  Lactate normal.     Labs:   Recent Labs     09/07/21 0602 09/08/21 0600 09/09/21  0630   WBC 6.9 3.8* 7.7   HGB 12.0* 11.4* 10.2*   HCT 35.6* 34.4* 31.1*   PLT 82* 82* 114*     Recent Labs     09/07/21  0602 09/08/21 0600 09/09/21  0630   * 147* 143   K 3.5 3.7 3.9   * 109 107   CO2 30 28 27   BUN 17 16 24*   CREATININE 1.3 1.2 1.4*   CALCIUM 9.5 9.5 9.4   PHOS 1.2* 2.6 3.1     Recent Labs     09/07/21  0602 09/08/21  0600 09/09/21  0630   AST 28 24 15   ALT 36 29 20   BILITOT 0.3 0.3 0.3   ALKPHOS 174* 151* 120     Recent Labs     09/07/21  0602 09/08/21  0600 09/09/21  0630   INR 1.87* 2.18* 3.25*       Physical Exam Performed:    BP (!) 92/57   Pulse 119   Temp 102.4 °F (39.1 °C) (Axillary)   Resp 18   Ht 4' 8\" (1.422 m)   Wt 97 lb 3.6 oz (44.1 kg)   SpO2 93%   BMI 21.80 kg/m² General appearance: NAD  HEENT: Pupils equal, round, and reactive to light. Conjunctivae/corneas clear. Facial/ear deformities. Neck: Supple, no jugular venous distention. Trachea midline with full range of motion. Respiratory:  Normal respiratory effort. Clear to auscultation, bilaterally without Rales/Wheezes/Rhonchi. Cardiovascular: Regular rate and rhythm with normal S1/S2 without murmurs, rubs or gallops. Abdomen: Soft, non-tender, non-distended with normal bowel sounds. Musculoskelatal: No clubbing, cyanosis or edema bilaterally. Full range of motion without deformity. Neurologic:  Neurovascularly intact without any focal sensory/motor deficits. Cranial nerves: II-XII intact, grossly non-focal.  Psychiatric: More lethargic, less interactive. Non-verbal, MRDD. Skin: Skin color, texture, turgor normal.  No rashes or lesions. Capillary Refill: Brisk,< 3 seconds   Peripheral Pulses: +2 palpable, equal bilaterally       Assessment/Plan:    Active Hospital Problems    Diagnosis     Seizure disorder ([facility] [address] [G40.909]     PAF (paroxysmal atrial fibrillation) ([facility] [address] [I48.0]     Hypothermia [C43. XXXA]     Thrombocytopenia ([facility] [address] [D69.6]     RUBEN (acute kidney injury) ([facility] [address] [N17.9]     Sepsis ([facility] [address] [A41.9]     COVID-19 [U07.1]     Pneumonia [J18.9]      Sepsis, COVID-19 Pneumonia, Possible Aspiration Pneumonia, Acute Respiratory Failure with Hypoxia  -  Patient has a multifocal pneumonia distributed throughout dependent lungs regions most c/w aspiration pneumonia. Findings could also be d/t COVID-19 but initially felt unlikely since patient has had COVID in January, 2021 and received the [employer] vaccine in May, 2021. Rapid COVID negative. PCR has now returned positive. -  Has been treated with empiric Abx with clinical improvement; consider de-escalation of Abx soon. -  Still only has mild hypoxia.   -  Continue Steroids  - SLP eval ongoing, does not significantly tolerate any solids at this time.   - Low-dose morphine added for apparent pain/discomfort although difficult to localize    Hypotension: Initially required vasopressor support but quickly weaned off. On 9/9/21, has had increased lethargy, worsening hypotension and intermittent sinus bradycardia. Volume expansion given. Lactate normal. Sepsis seems less likely cause at this point. With episode of coffee ground bleeding, need to follow serial Hb as GI bleeding could be contributing. RUBEN  -  Baseline creatinine ~ 0.6 and currently 1.3 mg/dL. -  Volume expansion  -  Has a h/o urinary retention. Colindres catheter placed. -  Monitor. Possible GI Bleeding/Coffee Ground Emesis:  Isolated episode of coffee ground emesis. Monitor Hb and transfuse if needed. Worsening coagulopathy noted in setting of COVID-19, lack of nutrition. Give IV Vitamin K. Monitor INR. Protonix IV BID. Consider GI evaluation if Hb continues to decline or ongoing bleeding. Hypothermia:  -  Due to sepsis. Poor prognostic indicator and has occurred with infections in the past and thyroid function has been normal.  Responded to external warming measures. Resolved. Hypernatremia: Reduced PO intake, saline load. Improved with hypotonic fluids. Now requiring volume expansion as above. Monitor. Malnutrition, poor PO intake  -  Temporally began after colonoscopy, but unlikely d/t direct complication of colonoscopy such as bowel perforation. Complication possibly could have been indirect since patient may have aspirated during or after the procedure. In other words anorexia seems most likely d/t systemic infection.  - SLP eval as above; remains NPO     H/O Seizure D/O  -  Continue keppra. Converted to IV formulation. PAF  -  Currently in sinus rhythm.     DVT prophylaxis: SCDs only d/t thrombocytopenia  Diet: Diet NPO  Code Status: Full Code  PT/OT Eval Status: NA    Dispo - > 2 days  Discussed with guardian that we will need to make decisions in the coming days if he continues to fail SLP evaluations. NGT would be challenging as he would be high risk for pulling out. G tube could be considered if remains too encephalopathic to swallow, although again, caregivers are concerned he may try to pull it out. They would also be willing to discuss comfort oriented care if he continues to decline. To remain Full Code for now.      Gracy Mandel MD

## 2021-09-10 LAB
ANION GAP SERPL CALCULATED.3IONS-SCNC: 8 MMOL/L (ref 3–16)
BASOPHILS ABSOLUTE: 0 K/UL (ref 0–0.2)
BASOPHILS RELATIVE PERCENT: 0.3 %
BLOOD CULTURE, ROUTINE: NORMAL
BUN BLDV-MCNC: 29 MG/DL (ref 7–20)
CALCIUM SERPL-MCNC: 9.5 MG/DL (ref 8.3–10.6)
CHLORIDE BLD-SCNC: 108 MMOL/L (ref 99–110)
CO2: 26 MMOL/L (ref 21–32)
CREAT SERPL-MCNC: 1.2 MG/DL (ref 0.8–1.3)
CULTURE, BLOOD 2: NORMAL
EKG ATRIAL RATE: 69 BPM
EKG DIAGNOSIS: NORMAL
EKG P AXIS: 39 DEGREES
EKG P-R INTERVAL: 114 MS
EKG Q-T INTERVAL: 386 MS
EKG QRS DURATION: 96 MS
EKG QTC CALCULATION (BAZETT): 413 MS
EKG R AXIS: -69 DEGREES
EKG T AXIS: -5 DEGREES
EKG VENTRICULAR RATE: 69 BPM
EOSINOPHILS ABSOLUTE: 0 K/UL (ref 0–0.6)
EOSINOPHILS RELATIVE PERCENT: 0 %
GFR AFRICAN AMERICAN: >60
GFR NON-AFRICAN AMERICAN: >60
GLUCOSE BLD-MCNC: 146 MG/DL (ref 70–99)
HCT VFR BLD CALC: 30.3 % (ref 40.5–52.5)
HEMOGLOBIN: 10 G/DL (ref 13.5–17.5)
HEMOGLOBIN: 10 G/DL (ref 13.5–17.5)
HEMOGLOBIN: 10.2 G/DL (ref 13.5–17.5)
INR BLD: 1.1 (ref 0.88–1.12)
LYMPHOCYTES ABSOLUTE: 0.7 K/UL (ref 1–5.1)
LYMPHOCYTES RELATIVE PERCENT: 7.1 %
MAGNESIUM: 1.8 MG/DL (ref 1.8–2.4)
MCH RBC QN AUTO: 27.9 PG (ref 26–34)
MCHC RBC AUTO-ENTMCNC: 33 G/DL (ref 31–36)
MCV RBC AUTO: 84.6 FL (ref 80–100)
MONOCYTES ABSOLUTE: 0.2 K/UL (ref 0–1.3)
MONOCYTES RELATIVE PERCENT: 1.9 %
NEUTROPHILS ABSOLUTE: 9.3 K/UL (ref 1.7–7.7)
NEUTROPHILS RELATIVE PERCENT: 90.7 %
PDW BLD-RTO: 17 % (ref 12.4–15.4)
PHOSPHORUS: 4.4 MG/DL (ref 2.5–4.9)
PLATELET # BLD: 125 K/UL (ref 135–450)
PLATELET SLIDE REVIEW: ABNORMAL
PMV BLD AUTO: 8.3 FL (ref 5–10.5)
POTASSIUM SERPL-SCNC: 4.1 MMOL/L (ref 3.5–5.1)
PROTHROMBIN TIME: 12.5 SEC (ref 9.9–12.7)
RBC # BLD: 3.58 M/UL (ref 4.2–5.9)
SLIDE REVIEW: ABNORMAL
SODIUM BLD-SCNC: 142 MMOL/L (ref 136–145)
TSH REFLEX: 0.5 UIU/ML (ref 0.27–4.2)
VANCOMYCIN RANDOM: 13 UG/ML
WBC # BLD: 10.3 K/UL (ref 4–11)

## 2021-09-10 PROCEDURE — 94761 N-INVAS EAR/PLS OXIMETRY MLT: CPT

## 2021-09-10 PROCEDURE — C9113 INJ PANTOPRAZOLE SODIUM, VIA: HCPCS | Performed by: INTERNAL MEDICINE

## 2021-09-10 PROCEDURE — 6360000002 HC RX W HCPCS: Performed by: INTERNAL MEDICINE

## 2021-09-10 PROCEDURE — 1200000000 HC SEMI PRIVATE

## 2021-09-10 PROCEDURE — 83735 ASSAY OF MAGNESIUM: CPT

## 2021-09-10 PROCEDURE — 85025 COMPLETE CBC W/AUTO DIFF WBC: CPT

## 2021-09-10 PROCEDURE — 2580000003 HC RX 258: Performed by: INTERNAL MEDICINE

## 2021-09-10 PROCEDURE — 84100 ASSAY OF PHOSPHORUS: CPT

## 2021-09-10 PROCEDURE — 84443 ASSAY THYROID STIM HORMONE: CPT

## 2021-09-10 PROCEDURE — 85018 HEMOGLOBIN: CPT

## 2021-09-10 PROCEDURE — 2700000000 HC OXYGEN THERAPY PER DAY

## 2021-09-10 PROCEDURE — 80202 ASSAY OF VANCOMYCIN: CPT

## 2021-09-10 PROCEDURE — 85610 PROTHROMBIN TIME: CPT

## 2021-09-10 PROCEDURE — 80048 BASIC METABOLIC PNL TOTAL CA: CPT

## 2021-09-10 PROCEDURE — 93010 ELECTROCARDIOGRAM REPORT: CPT | Performed by: INTERNAL MEDICINE

## 2021-09-10 PROCEDURE — 99223 1ST HOSP IP/OBS HIGH 75: CPT | Performed by: INTERNAL MEDICINE

## 2021-09-10 RX ORDER — DEXTROSE, SODIUM CHLORIDE, AND POTASSIUM CHLORIDE 5; .45; .15 G/100ML; G/100ML; G/100ML
INJECTION INTRAVENOUS CONTINUOUS
Status: DISCONTINUED | OUTPATIENT
Start: 2021-09-10 | End: 2021-09-11

## 2021-09-10 RX ADMIN — CEFEPIME 2000 MG: 2 INJECTION, POWDER, FOR SOLUTION INTRAMUSCULAR; INTRAVENOUS at 00:48

## 2021-09-10 RX ADMIN — METHYLPREDNISOLONE SODIUM SUCCINATE 40 MG: 40 INJECTION, POWDER, FOR SOLUTION INTRAMUSCULAR; INTRAVENOUS at 23:15

## 2021-09-10 RX ADMIN — PANTOPRAZOLE SODIUM 40 MG: 40 INJECTION, POWDER, FOR SOLUTION INTRAVENOUS at 08:23

## 2021-09-10 RX ADMIN — METHYLPREDNISOLONE SODIUM SUCCINATE 40 MG: 40 INJECTION, POWDER, FOR SOLUTION INTRAMUSCULAR; INTRAVENOUS at 08:23

## 2021-09-10 RX ADMIN — Medication 10 ML: at 17:16

## 2021-09-10 RX ADMIN — CEFEPIME 2000 MG: 2 INJECTION, POWDER, FOR SOLUTION INTRAMUSCULAR; INTRAVENOUS at 13:19

## 2021-09-10 RX ADMIN — LEVETIRACETAM 500 MG: 100 INJECTION, SOLUTION INTRAVENOUS at 16:18

## 2021-09-10 RX ADMIN — LEVETIRACETAM 500 MG: 100 INJECTION, SOLUTION INTRAVENOUS at 05:42

## 2021-09-10 RX ADMIN — PANTOPRAZOLE SODIUM 40 MG: 40 INJECTION, POWDER, FOR SOLUTION INTRAVENOUS at 17:16

## 2021-09-10 ASSESSMENT — PAIN SCALES - GENERAL
PAINLEVEL_OUTOF10: 0

## 2021-09-10 ASSESSMENT — PAIN SCALES - WONG BAKER: WONGBAKER_NUMERICALRESPONSE: 0

## 2021-09-10 NOTE — PLAN OF CARE
Problem: Nutrition  Goal: Optimal nutrition therapy  Outcome: Ongoing  Note: Nutrition Problem #1: Inadequate energy intake  Intervention: Food and/or Nutrient Delivery: Continue NPO, Start Tube Feeding  Nutritional Goals: Pt will tolerate most appropriate form of nutrition this admission w/o GI distress

## 2021-09-10 NOTE — PROGRESS NOTES
Md notified: Heart rate sustaining in 30's atropine at beside with orders not to give with out MD ZUL[facility]AGA [facility]. Are there other peramitiros of when to give or orders for vitals when you would like to be notified for BP/HR?

## 2021-09-10 NOTE — PROGRESS NOTES
Hospitalist Progress Note    Date of Admission: 9/5/2021    Chief Complaint: Decreased oral intake    Hospital Course:   Evan Sánchez is a 62 y.o. male. He has a h/o MRDD and is nonverbal. He lives in a group home and was sent to the ER because he has not been eating or drinking as usual for the past four days. He underwent colonoscopy on 9/1/21 and has not been taking much oral intake since then. He received the [employer] COVID-19 vaccine on 5/7/21. Prior to that he was hospitalized in January, 2021 for acute respiratory failure due to COVID-19. Subjective: Blood pressures have improved. No further nausea or coffee-ground emesis. Hemoglobin remained stable. INR has improved. He is slightly more alert today although not as interactive as he was earlier in his hospital stay. No seizure activity has been noted. He continues to have intermittent episodes of sinus bradycardia in the 30s with some brief pauses. Still difficult to tell if he is symptomatic as his mental status does not seem to change at all during these episodes. Cardiology was consulted and recommends continued observation for any symptoms of bradycardia, but does not recommend any specific treatment at this time. He continues to be too lethargic to tolerate p.o. intake. He continues to have minimal oxygen requirement. Labs:   Recent Labs     09/08/21  0600 09/08/21  0600 09/09/21  0630 09/09/21  1210 09/09/21  1817 09/10/21  0055 09/10/21  0600   WBC 3.8*  --  7.7  --   --   --  10.3   HGB 11.4*   < > 10.2*   < > 8.7* 10.2* 10.0*   HCT 34.4*  --  31.1*  --   --   --  30.3*   PLT 82*  --  114*  --   --   --  125*    < > = values in this interval not displayed.      Recent Labs     09/08/21  0600 09/09/21  0630 09/10/21  0600   * 143 142   K 3.7 3.9 4.1    107 108   CO2 28 27 26   BUN 16 24* 29*   CREATININE 1.2 1.4* 1.2   CALCIUM 9.5 9.4 9.5   PHOS 2.6 3.1 4.4     Recent Labs     09/08/21  0600 09/09/21  0630   AST 24 15   ALT 29 20   BILITOT 0.3 0.3   ALKPHOS 151* 120     Recent Labs     09/08/21  0600 09/09/21  0630 09/10/21  0600   INR 2.18* 3.25* 1.10       Physical Exam Performed:    /61   Pulse 51   Temp 98 °F (36.7 °C) (Axillary)   Resp 14   Ht 4' 8\" (1.422 m)   Wt 99 lb 13.9 oz (45.3 kg)   SpO2 94%   BMI 22.39 kg/m²     General appearance: NAD  HEENT: Pupils equal, round, and reactive to light. Conjunctivae/corneas clear. Facial/ear deformities. Neck: Supple, no jugular venous distention. Trachea midline with full range of motion. Respiratory:  Normal respiratory effort. Clear to auscultation, bilaterally without Rales/Wheezes/Rhonchi. Cardiovascular: Regular rate and rhythm with normal S1/S2 without murmurs, rubs or gallops. Abdomen: Soft, non-tender, non-distended with normal bowel sounds. Musculoskelatal: No clubbing, cyanosis or edema bilaterally. Full range of motion without deformity. Neurologic:  Neurovascularly intact without any focal sensory/motor deficits. Cranial nerves: II-XII intact, grossly non-focal.  Psychiatric: Not as lethargic today, slightly more interactive. Non-verbal, MRDD. Skin: Skin color, texture, turgor normal.  No rashes or lesions. Capillary Refill: Brisk,< 3 seconds   Peripheral Pulses: +2 palpable, equal bilaterally       Assessment/Plan:    Active Hospital Problems    Diagnosis     Seizure disorder ([facility] [address] [G40.909]     PAF (paroxysmal atrial fibrillation) ([facility] [facility] [I48.0]     Hypothermia [Q76. XXXA]     Thrombocytopenia ([facility] [address] [facility] [D69.6]     RUBEN (acute kidney injury) ([facility] [address] [facility] [N17.9]     Sepsis ([facility] [address] [facility] [A41.9]     COVID-19 [U07.1]     Pneumonia [J18.9]      Sepsis, COVID-19 Pneumonia, Possible Aspiration Pneumonia, Acute Respiratory Failure with Hypoxia  -  Patient has a multifocal pneumonia distributed throughout dependent lungs regions most c/w aspiration pneumonia.   Findings could also be d/t COVID-19 but initially felt unlikely since patient has had COVID in January, 2021 and received the [employer] vaccine in May, 2021. Rapid COVID negative. PCR has now returned positive. -  Has been treated with empiric Abx with clinical improvement; recommend de-escalation of Abx. Typically would provide course of Ceftin but will use ceftriaxone as he is unable to tolerate p.o. at this time.  -  Still only has mild hypoxia.   -  Continue Steroids    Sinus Bradycardia: Ongoing episodes of sinus bradycardia intermittently with occasional pauses. There are no obvious symptoms although this is difficult to elucidate given his MRDD and nonverbal status. However objectively his mental status does not seem to change at all during these episodes. Blood pressure is now stable. There are no culprit mehreen blocking agents. EP was consulted for input and currently recommends ongoing observation for any symptoms but without any specific medical therapy at this time. He is not currently a candidate for pacemaker placement. We will continue to monitor. Hypotension: Initially required vasopressor support but quickly weaned off. On 9/9/21, had increased lethargy, worsening hypotension and intermittent sinus bradycardia. Volume expansion given with resolution of hypotension. Lactate normal.  Suspect this is most likely volume related. Sepsis seems less likely cause at this point. Hemoglobin is stable. Monitor. RUBEN  -  Baseline creatinine ~ 0.6 and currently 1.3 mg/dL. -  Volume expansion has been given  -  Has a h/o urinary retention. Colindres catheter to remain in place for now. -  Monitor. Possible GI Bleeding/Coffee Ground Emesis:  Isolated episode of coffee ground emesis. Monitor Hb and transfuse if needed. Worsening coagulopathy noted in setting of COVID-19, lack of nutrition. INR has normalized after vitamin K. Continue Protonix IV BID.   We will hold off on GI evaluation for now as there is no further evidence of bleeding and hemoglobin remained stable. Malnutrition, poor PO intake  -  Temporally began after colonoscopy, but unlikely d/t direct complication of colonoscopy such as bowel perforation. Complication possibly could have been indirect since patient may have aspirated during or after the procedure. In other words anorexia seems most likely d/t systemic infection.  - SLP eval ongoing, remains n.p.o. as he cannot tolerate oral diet at this time.  -Not a candidate for temporary NG tube  -We will continue discussions with his guardian pending clinical course regarding placement of G-tube versus comfort care measures should he not improve.  -We will resume maintenance IV fluids now that blood pressure is stable again    H/O Seizure D/O  -  Continue keppra; dose increased given possibility of seizure activity leading to his mental status changes. PAF  -  Currently in sinus rhythm. Hypothermia:  -  Due to sepsis. Poor prognostic indicator and has occurred with infections in the past and thyroid function has been normal.  Responded to external warming measures. Resolved. Hypernatremia: Reduced PO intake, saline load. Improved with hypotonic fluids. Monitor. DVT prophylaxis: SCDs only d/t thrombocytopenia  Diet: Diet NPO  Code Status: Full Code  PT/OT Eval Status: NA    Dispo - > 2 days  Previously discussed with guardian that we will need to make decisions in the coming days if he continues to fail SLP evaluations. NGT would be challenging as he would be high risk for pulling out. G tube could be considered if remains too encephalopathic to swallow, although again, caregivers are concerned he may try to pull it out. They would also be willing to discuss comfort oriented care if he continues to decline with inability to tolerate p.o. To remain Full Code for now.      Gracy Mandel MD

## 2021-09-10 NOTE — PROGRESS NOTES
Comprehensive Nutrition Assessment    Type and Reason for Visit:  Initial, NPO/Clear Liquid    Nutrition Recommendations/Plan:   Monitor SLP recommendations and ability to advance diet   If unable to advance diet, recommend starting TFs  1. Recommend order \"Diet: Adult Tube Feed\". Initiate Jevity 1.5 (standard with fiber formula) at 10 mL/hr and as tolerated, increase by 10 mL/hr q 6 hours until goal of 50 mL/hr. 2. Recommend 100 mL H20 q 4 hours. Monitor IVF and need for adjustments to water flush   3. Monitor closely and correct lytes (K, Phos, Mg) before progressing TF to goal d/t risk of refeeding syndrome (hx extended inadequate nutritional intake). 4. Monitor for tolerance (bowel habits, N/V, cramping, abdominal exam findings: distended, firm, tense, guarded, discomfort). Monitor nutrition adequacy, pertinent labs, bowel habits, wt changes, and clinical progress      Nutrition Assessment:  63 yo male with failure to thrive following colonoscopy. Pt with decreased PO intakes s/p procedure, 4 days prior to admission. Hx of MRDD and non-verbal. Currently in droplet plus precautions. Pt NPO x5 days. SLP following pt, continue to recommend NPO status. Discussions of next steps for nutrition if SLP continue to recommend NPO status. Concerns for pt to pull NG or G tube. Will monitor nutrition plans. Malnutrition Assessment:  Malnutrition Status: At risk for malnutrition (Comment)      Estimated Daily Nutrient Needs:  Energy (kcal):  2647-7966 kcal; Weight Used for Energy Requirements:  Current (46 kg)     Protein (g):  55-65 g; Weight Used for Protein Requirements:  Current (1.2-1.4 g/kg)        Fluid (ml/day):   ; Method Used for Fluid Requirements:  1 ml/kcal      Nutrition Related Findings:  +2 generalized edema. Coffee ground emesis yesterday.       Wounds:  None       Current Nutrition Therapies:    Diet NPO  Current Tube Feeding (TF) Orders:  · Goal TF & Flush Orders Provides: Jevity 1.5 at goal rate of 50 mL/hr x20 hours to provide 1000 mL TV, 1500 kcal, 63 g protein, and 760 mL free water. +100 mL free water flush q 4 hrs      Anthropometric Measures:  · Height: 4' 8\" (142.2 cm)  · Current Body Weight: 99 lb (44.9 kg)   · Usual Body Weight: 92 lb (41.7 kg) (bed scale on 1/20/21 per EMR)     · Ideal Body Weight: 82 lbs; % Ideal Body Weight 120.7 %   · BMI: 22.2  · BMI Categories: Normal Weight (BMI 18.5-24. 9)       Nutrition Diagnosis:   · Inadequate energy intake related to swallowing difficulty as evidenced by NPO or clear liquid status due to medical condition      Nutrition Interventions:   Food and/or Nutrient Delivery:  Continue NPO, Start Tube Feeding  Nutrition Education/Counseling:  No recommendation at this time   Coordination of Nutrition Care:  Continue to monitor while inpatient, Speech Therapy    Goals:  Pt will tolerate most appropriate form of nutrition this admission w/o GI distress       Nutrition Monitoring and Evaluation:   Behavioral-Environmental Outcomes:  None Identified   Food/Nutrient Intake Outcomes:  Diet Advancement/Tolerance, Enteral Nutrition Intake/Tolerance  Physical Signs/Symptoms Outcomes:  Biochemical Data, GI Status, Weight     Discharge Planning:     Too soon to determine     Electronically signed by Alpesh Salazar MS, RD, LD on 9/10/21 at 1:29 PM EDT    Contact: [phone]

## 2021-09-10 NOTE — CARE COORDINATION
Spoke to Pts advocate/guardian Keshia at [phone]. She has been in touch w group Home- RN there suggesting Benjamin-key button GT if feeding tube is needed/chosen. If unable- next step is to define GOC. CM continues to follow.   Tasha Nickerson RN

## 2021-09-10 NOTE — CONSULTS
Electrophysiology Consultation   Date: 9/10/2021  Admit Date:  9/5/2021  Reason for Consultation: Bradycardia  Consult Requesting Physician: Shima Heck MD     Chief Complaint   Patient presents with    Failure To Thrive     Colonoscopy on wednesday, since then pt has not been eating or drinking much, urine has got darker as well      HPI:   Mr. Obie Christine is a pleasant 62year old male with a medical history significant for paroxysmal atrial fibrillation (not on [address]), intellectual and developmental disabilities and known KDM5c gene mutation who presents from home with altered mental status, decreased PO intake, pneumonia, and septic shock whose course has been complicated by bradycardia. Patient presented on 09/05/2021 with failure to thrive and septic shock secondary to pneumonia. He tested positive for COVID-19 again. He was eventually transferred from ICU to floor and since then has been bradycardic with pauses up to 3.7 seconds. Unable to determine if symptoms given his MRDD and limited physical capacity. Electrophysiology was consulted to assist with further management of his bradycardia given his history of atrial fibrillation with RVR. Past Medical History:   Diagnosis Date    A-fib ([address]     Bipolar disorder ([address]     Brain herniation ([address]     Constipation     COVID-19 01/20/2021    Cystitis     Developmental non-verbal disorder     Impulse control disorder     Influenza A 02/13/2014    Mental retardation, profound (I.Q. < 20)     Osteopenia     Seizure ([address]     Subdural hematoma ([address]         Past Surgical History:   Procedure Laterality Date    CATARACT REMOVAL      COLONOSCOPY  06/20/2016    incomplete, poor prep    COLONOSCOPY  08/26/2020    COLON W/ANES.  COLONOSCOPY N/A 8/26/2020    COLON W/ANES. (9:00) COVID TEST 8/20-8/22. PT IS NON-VERBAL, IS NOT IN A FACILITY.  IS CARED FOR BY 2 CAREGIVERS IN HIS HOME. performed by Ana Caamra MD at [address] [place] [address] ENDOSCOPY    COLONOSCOPY N/A 2021    COLONOSCOPY N/A 2021    COLON W/ANES. (10:30) PT IS COMBATIVE. [facility]. [phone] XSTEPH performed by Vivienne Arango MD at [address] Right 2019    Right Trephine Craniotomy For Evacuation of Subdural Hematoma performed by Audra Garces MD at [address] [facility] TURP N/A 2020    CYSTOSCOPY, TRANSURETHRAL RESECTION OF PROSTATE performed by Daniel Aguirre MD at [address] [facility]   Allergen Reactions    Penicillins      Other reaction(s): Unknown    Benadryl [Diphenhydramine] Other (See Comments)     Pt becomes agitated and aggressive with Benadryl    Clonidine Derivatives      Other reaction(s): Unknown    Tetracyclines & Related      Other reaction(s): Unknown       Social History:  Reviewed. reports that he has never smoked. He has never used smokeless tobacco. He reports that he does not drink alcohol and does not use drugs. Family History:  Reviewed. Family history is unknown by patient. No premature CAD. Review of System:  · All other systems reviewed except for that noted above. Patient unable to give history or ROS. Physical Examination:  Vitals:    09/10/21 1150   BP: (!) 97/54   Pulse: 58   Resp: 14   Temp: 97.8 °F (36.6 °C)   SpO2: 95%        Intake/Output Summary (Last 24 hours) at 9/10/2021 1248  Last data filed at 9/10/2021 1150  Gross per 24 hour   Intake 0 ml   Output 1650 ml   Net -1650 ml     In: 0   Out: 1200    Wt Readings from Last 3 Encounters:   09/10/21 99 lb 13.9 oz (45.3 kg)   21 100 lb (45.4 kg)   21 92 lb (41.7 kg)     Temp  Av.3 °F (36.8 °C)  Min: 97.8 °F (36.6 °C)  Max: 99.2 °F (37.3 °C)  Pulse  Av.4  Min: 31  Max: 59  BP  Min: 79/49  Max: 101/61  SpO2  Av.3 %  Min: 88 %  Max: 97 %    · Telemetry: Sinus rhythm and sinus bradycardia with pauses up to 3.7 seconds. · Constitutional: Alert.    · Mouth/Throat: Lips appear moist. Oropharynx is clear and moist.  · Eyes: Conjunctivae normal. EOM are normal.   · Neck: Neck supple. No lymphadenopathy. No rigidity. No JVD present. · Cardiovascular: Normal rate, regular rhythm. Normal S1&S2. · Pulmonary/Chest: Bilateral respiratory sounds present. No respiratory accessory muscle use. · Abdominal: Soft. Normal bowel sounds present. No distension, No tenderness. · Musculoskeletal: No tenderness. No edema    · Neurological: Alert. Cranial nerve II-XII grossly intact. · Skin: Skin is warm and dry. No rash, lesions, ulcerations noted. · Psychiatric: No anxiety nor agitation. Labs:  Reviewed. Recent Labs     09/08/21  0600 09/09/21  0630 09/10/21  0600   * 143 142   K 3.7 3.9 4.1    107 108   CO2 28 27 26   PHOS 2.6 3.1 4.4   BUN 16 24* 29*   CREATININE 1.2 1.4* 1.2     Recent Labs     09/08/21  0600 09/08/21  0600 09/09/21  0630 09/09/21  1210 09/09/21  1817 09/10/21  0055 09/10/21  0600   WBC 3.8*  --  7.7  --   --   --  10.3   HGB 11.4*   < > 10.2*   < > 8.7* 10.2* 10.0*   HCT 34.4*  --  31.1*  --   --   --  30.3*   MCV 83.0  --  83.0  --   --   --  84.6   PLT 82*  --  114*  --   --   --  125*    < > = values in this interval not displayed. Lab Results   Component Value Date    TROPONINI <0.01 09/05/2021     No results found for: BNP  Lab Results   Component Value Date    PROTIME 12.5 09/10/2021    PROTIME 38.6 09/09/2021    PROTIME 25.4 09/08/2021    INR 1.10 09/10/2021    INR 3.25 09/09/2021    INR 2.18 09/08/2021     No results found for: CHOL, HDL, TRIG    Diagnostic and imaging results reviewed. ECG: Normal sinus rhythm. LAFB. Non-specific TW changes. Echo: 02/24/2020   Summary   Technically difficult study due to poor acoustic window and patient was   combative. Normal left ventricular systolic function with ejection fraction of 55-60%. No regional wall motion abnormalites are seen. Grade I diastolic dysfunction with normal filling pressure.    Mild mitral regurgitation. Systolic pulmonic artery pressure (SPAP) is normal estimated at 32 mmHg   (Right atrial pressure of 8 mmHg). Cath: None. I independently reviewed the ECG and telemetry. Scheduled Meds:   pantoprazole  40 mg IntraVENous BID    levetiracetam  500 mg IntraVENous Q12H    vancomycin  750 mg IntraVENous Q24H    methylPREDNISolone  40 mg IntraVENous Q12H    cefepime  2,000 mg IntraVENous Q12H     Continuous Infusions:   sodium chloride Stopped (09/08/21 0535)     PRN Meds:.atropine, morphine, sodium chloride flush, sodium chloride, potassium chloride, magnesium sulfate, ondansetron, acetaminophen **OR** acetaminophen     Assessment:   Patient Active Problem List    Diagnosis Date Noted    Seizure disorder ([address] 09/06/2021    PA[facility]oxysmal [facility] fibrillation) [facility] 09/06/2021    Hypothermia 09/06/2021    Thrombocytopenia ([facility] 09/05/2021    RUBEN (acute kidney injury) ([facility] 09/05/2021    Sepsis ([facility] 09/05/2021    COVID-19 01/25/2021    Pulmonary infiltrates 01/25/2021    Pneumonia 01/20/2021    Acute urinary retention 03/01/2020    Atrial fibrillation, chronic [facility] 03/01/2020    History of subdural hematoma 03/01/2020    Constipation     Atrial fibrillation with RVR ([facility] 12/02/2019    Acute cystitis with hematuria 12/02/2019    Altered mental status 12/02/2019    Brain herniation ([facility] 12/02/2019    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 ([facility] 12/02/2019    Lipoma of head       Active Hospital Problems    Diagnosis Date Noted    Seizure disorder ([facility] [G40.909] 09/06/2021    PAF (paroxysmal atrial fibrillation) ([facility] [I48.0] 09/06/2021    Hypothermia [T68. XXXA] 09/06/2021    Thrombocytopenia ([facility] [D69.6] 09/05/2021    RUBEN (acute kidney injury) ([facility] [N17.9] 09/05/2021    Sepsis ([facility] [A41.9] 09/05/2021    COVID-19 [U07.1] 01/25/2021    Pneumonia [J18.9] 01/20/2021     Mr. Ace Hameed is a pleasant 62year old male with a medical history significant for paroxysmal atrial fibrillation (not on [address]), intellectual and developmental disabilities and known KDM5c gene mutation who presents from home with altered mental status, decreased PO intake, pneumonia, and septic shock whose course has been complicated by bradycardia. Problem List:  1. Sinus bradycardia with pauses. 2. Paroxysmal atrial fibrillation. 3. Pneumonia/sepsis. Assessment and Plan:  1. Sinus bradycardia with pauses. Mr. Maulik Serrano is a pleasant mentally delayed 62year old male with a medical history significant for paroxysmal atrial fibrillation and anemia who presents from home with septic shock secondary to pneumonia now complicated by bradycardia with pauses. Patient is asymptomatic as far as I can tell based on his MRDD. He is currently on no mehreen agents. From EP standpoint I do not believe that patient meets criteria for a pacemaker. Doubt he would be able to keep monitor on as outpatient however is able to wear here in house. I would ask to watch clinically for symptoms of bradycardia. If atrial fibrillation with RVR recurs and he needs BB then we may consider pacemaker for tachy-yvonne syndrome.    - Outpatient cardiac monitor.  - Monitor clinically for symptoms.  - We will sign off and set up outpatient follow up. 2. Paroxysmal atrial fibrillation. Patient with a history of paroxysmal atrial fibrillation. His [id number] score is 0 and is is at risk for poor outcome given his risk for falls. - Avoid nodals for now. - No OAC. - Monitor as outpatient. - Follow up in clinic. 3. Pneumonia/sepsis. - Per primary team.    Thank you for allowing me to participate in the care of [facility] Ch[facility]  . If you have any questions/comments, please do not hesitate to contact us.     Stanton Hurd MD  Cardiac Electrophysiology  [address] [place] [address]  [phone] [address] [place] [address]

## 2021-09-11 LAB
ANION GAP SERPL CALCULATED.3IONS-SCNC: 7 MMOL/L (ref 3–16)
BASOPHILS ABSOLUTE: 0 K/UL (ref 0–0.2)
BASOPHILS RELATIVE PERCENT: 0.3 %
BUN BLDV-MCNC: 40 MG/DL (ref 7–20)
CALCIUM SERPL-MCNC: 9.5 MG/DL (ref 8.3–10.6)
CHLORIDE BLD-SCNC: 112 MMOL/L (ref 99–110)
CO2: 27 MMOL/L (ref 21–32)
CREAT SERPL-MCNC: 1 MG/DL (ref 0.8–1.3)
EOSINOPHILS ABSOLUTE: 0 K/UL (ref 0–0.6)
EOSINOPHILS RELATIVE PERCENT: 0 %
GFR AFRICAN AMERICAN: >60
GFR NON-AFRICAN AMERICAN: >60
GLUCOSE BLD-MCNC: 109 MG/DL (ref 70–99)
GLUCOSE BLD-MCNC: 141 MG/DL (ref 70–99)
HCT VFR BLD CALC: 28.4 % (ref 40.5–52.5)
HEMOGLOBIN: 9.4 G/DL (ref 13.5–17.5)
INR BLD: 1.16 (ref 0.88–1.12)
LYMPHOCYTES ABSOLUTE: 0.4 K/UL (ref 1–5.1)
LYMPHOCYTES RELATIVE PERCENT: 5.3 %
MAGNESIUM: 2 MG/DL (ref 1.8–2.4)
MCH RBC QN AUTO: 27.8 PG (ref 26–34)
MCHC RBC AUTO-ENTMCNC: 33.1 G/DL (ref 31–36)
MCV RBC AUTO: 84.2 FL (ref 80–100)
MONOCYTES ABSOLUTE: 0.3 K/UL (ref 0–1.3)
MONOCYTES RELATIVE PERCENT: 3.1 %
NEUTROPHILS ABSOLUTE: 7.7 K/UL (ref 1.7–7.7)
NEUTROPHILS RELATIVE PERCENT: 91.3 %
PDW BLD-RTO: 16.8 % (ref 12.4–15.4)
PERFORMED ON: ABNORMAL
PHOSPHORUS: 3.4 MG/DL (ref 2.5–4.9)
PLATELET # BLD: 136 K/UL (ref 135–450)
PMV BLD AUTO: 9.2 FL (ref 5–10.5)
POTASSIUM REFLEX MAGNESIUM: 4.3 MMOL/L (ref 3.5–5.1)
PROTHROMBIN TIME: 13.2 SEC (ref 9.9–12.7)
RBC # BLD: 3.38 M/UL (ref 4.2–5.9)
SODIUM BLD-SCNC: 146 MMOL/L (ref 136–145)
WBC # BLD: 8.4 K/UL (ref 4–11)

## 2021-09-11 PROCEDURE — 2580000003 HC RX 258: Performed by: INTERNAL MEDICINE

## 2021-09-11 PROCEDURE — 80048 BASIC METABOLIC PNL TOTAL CA: CPT

## 2021-09-11 PROCEDURE — 83735 ASSAY OF MAGNESIUM: CPT

## 2021-09-11 PROCEDURE — 2580000003 HC RX 258: Performed by: NURSE PRACTITIONER

## 2021-09-11 PROCEDURE — 2700000000 HC OXYGEN THERAPY PER DAY

## 2021-09-11 PROCEDURE — 85025 COMPLETE CBC W/AUTO DIFF WBC: CPT

## 2021-09-11 PROCEDURE — 2500000003 HC RX 250 WO HCPCS: Performed by: INTERNAL MEDICINE

## 2021-09-11 PROCEDURE — C9113 INJ PANTOPRAZOLE SODIUM, VIA: HCPCS | Performed by: INTERNAL MEDICINE

## 2021-09-11 PROCEDURE — 85610 PROTHROMBIN TIME: CPT

## 2021-09-11 PROCEDURE — 92526 ORAL FUNCTION THERAPY: CPT

## 2021-09-11 PROCEDURE — 84100 ASSAY OF PHOSPHORUS: CPT

## 2021-09-11 PROCEDURE — 94761 N-INVAS EAR/PLS OXIMETRY MLT: CPT

## 2021-09-11 PROCEDURE — 6360000002 HC RX W HCPCS: Performed by: INTERNAL MEDICINE

## 2021-09-11 PROCEDURE — 1200000000 HC SEMI PRIVATE

## 2021-09-11 RX ORDER — SODIUM CHLORIDE, SODIUM LACTATE, POTASSIUM CHLORIDE, AND CALCIUM CHLORIDE .6; .31; .03; .02 G/100ML; G/100ML; G/100ML; G/100ML
500 INJECTION, SOLUTION INTRAVENOUS ONCE
Status: COMPLETED | OUTPATIENT
Start: 2021-09-11 | End: 2021-09-12

## 2021-09-11 RX ORDER — DEXTROSE MONOHYDRATE 50 MG/ML
INJECTION, SOLUTION INTRAVENOUS CONTINUOUS
Status: DISCONTINUED | OUTPATIENT
Start: 2021-09-11 | End: 2021-09-12

## 2021-09-11 RX ADMIN — Medication 10 ML: at 08:18

## 2021-09-11 RX ADMIN — SODIUM CHLORIDE, POTASSIUM CHLORIDE, SODIUM LACTATE AND CALCIUM CHLORIDE 500 ML: 600; 310; 30; 20 INJECTION, SOLUTION INTRAVENOUS at 21:23

## 2021-09-11 RX ADMIN — PANTOPRAZOLE SODIUM 40 MG: 40 INJECTION, POWDER, FOR SOLUTION INTRAVENOUS at 08:18

## 2021-09-11 RX ADMIN — CEFTRIAXONE SODIUM 1000 MG: 1 INJECTION, POWDER, FOR SOLUTION INTRAMUSCULAR; INTRAVENOUS at 08:24

## 2021-09-11 RX ADMIN — METHYLPREDNISOLONE SODIUM SUCCINATE 40 MG: 40 INJECTION, POWDER, FOR SOLUTION INTRAMUSCULAR; INTRAVENOUS at 21:21

## 2021-09-11 RX ADMIN — LEVETIRACETAM 500 MG: 100 INJECTION, SOLUTION INTRAVENOUS at 15:52

## 2021-09-11 RX ADMIN — POTASSIUM CHLORIDE, DEXTROSE MONOHYDRATE AND SODIUM CHLORIDE: 150; 5; 450 INJECTION, SOLUTION INTRAVENOUS at 08:23

## 2021-09-11 RX ADMIN — LEVETIRACETAM 500 MG: 100 INJECTION, SOLUTION INTRAVENOUS at 06:32

## 2021-09-11 RX ADMIN — DEXTROSE MONOHYDRATE: 50 INJECTION, SOLUTION INTRAVENOUS at 10:09

## 2021-09-11 RX ADMIN — METHYLPREDNISOLONE SODIUM SUCCINATE 40 MG: 40 INJECTION, POWDER, FOR SOLUTION INTRAMUSCULAR; INTRAVENOUS at 08:18

## 2021-09-11 RX ADMIN — PANTOPRAZOLE SODIUM 40 MG: 40 INJECTION, POWDER, FOR SOLUTION INTRAVENOUS at 17:56

## 2021-09-11 RX ADMIN — Medication 10 ML: at 17:56

## 2021-09-11 ASSESSMENT — PAIN SCALES - GENERAL
PAINLEVEL_OUTOF10: 0

## 2021-09-11 NOTE — PROGRESS NOTES
MD notified: Pt HR sustaining 20-45 with pauses up to 5 seconds. Per CMU consistently lower than last night 96/62 [address]. Pt very lethargic and hard to elicit response. RR 10-12 and rhonchi noted bilaterally. Blood sugar 141 on dextrose fluids at 60ml/hr. Last Chest xray 9/7. When would you like to be notified about Pt status for HR and BP?

## 2021-09-11 NOTE — PROGRESS NOTES
motion. Respiratory:  Normal respiratory effort. Clear to auscultation, bilaterally without Rales/Wheezes/Rhonchi. Cardiovascular: Regular rate and rhythm with normal S1/S2 without murmurs, rubs or gallops. Abdomen: Soft, non-tender, non-distended with normal bowel sounds. Musculoskelatal: No clubbing, cyanosis or edema bilaterally. Full range of motion without deformity. Neurologic:  Neurovascularly intact without any focal sensory/motor deficits. Cranial nerves: II-XII intact, grossly non-focal.  Psychiatric: Drowsy, but selightly more interactive. Non-verbal, MRDD. Skin: Skin color, texture, turgor normal.  No rashes or lesions. Capillary Refill: Brisk,< 3 seconds   Peripheral Pulses: +2 palpable, equal bilaterally       Assessment/Plan:    Active Hospital Problems    Diagnosis     Seiz[facility] disorder ([facility] [G40.909]     PAF (paroxysmal atrial fibrillation) ([facility] [I48.0]     Hypothermia [T67. XXXA]     Thrombocytopenia ([facility] [D69.6]     RUBEN (acute kidney injury) ([facility] [N17.9]     Sepsis ([facility] [A41.9]     COVID-19 [U07.1]     Pneumonia [J18.9]      Sepsis, COVID-19 Pneumonia, Possible Aspiration Pneumonia, Acute Respiratory Failure with Hypoxia  -  Patient has a multifocal pneumonia distributed throughout dependent lungs regions most c/w aspiration pneumonia. Findings could also be d/t COVID-19 but initially felt unlikely since patient has had COVID in January, 2021 and received the J&J vaccine in May, 2021. Rapid COVID negative. PCR has now returned positive. -  Has been treated with empiric Abx with clinical improvement; recommended de-escalation of Abx. Typically would provide course of Ceftin but will use ceftriaxone as he is unable to tolerate p.o. at this time. - Minimal hypoxia now weaned off as tolerated. -  Continue Steroids    Sinus Bradycardia: Ongoing episodes of sinus bradycardia intermittently with occasional pauses.   There are no obvious symptoms although this is difficult to elucidate given his MRDD and nonverbal status. However objectively his mental status does not seem to change at all during these episodes. Blood pressure is now stable. There are no culprit mehreen blocking agents. EP was consulted for input and currently recommends ongoing observation for any symptoms but without any specific medical therapy at this time. He is not currently a candidate for pacemaker placement. We will continue to monitor. Hypotension: Initially required vasopressor support but quickly weaned off. On 9/9/21, had increased lethargy, worsening hypotension and intermittent sinus bradycardia. Volume expansion given with resolution of hypotension. Lactate normal.  Suspect this was most likely volume related. Sepsis seems less likely cause at this point. Hemoglobin is stable. Monitor. RUBEN  -  Baseline creatinine ~ 0.6 and currently 1.3 mg/dL. -  Volume expansion has been given  -  Has a h/o urinary retention. Colindres catheter to remain in place for now. - Change back to D5W for worsening hypernatremia  -  Monitor. Possible GI Bleeding/Coffee Ground Emesis:  Isolated episode of coffee ground emesis. Monitor Hb and transfuse if needed. Worsening coagulopathy noted in setting of COVID-19, lack of nutrition. INR has normalized after vitamin K. Continue Protonix IV BID. We will hold off on GI evaluation for now as there is no further evidence of bleeding and hemoglobin remained stable. Malnutrition, poor PO intake  -  Temporally began after colonoscopy, but unlikely d/t direct complication of colonoscopy such as bowel perforation. Complication possibly could have been indirect since patient may have aspirated during or after the procedure.   In other words anorexia seems most likely d/t systemic infection.  -Not a candidate for temporary NG tube  -We will continue discussions with his guardian pending clinical course regarding placement of G-tube versus comfort care measures should he not improve.  -We will resume maintenance IV fluids now that blood pressure is stable again  -he is more alert and interactive although when working with SLP he has not willing to participate in swallow the attempted boluses. H/O Seizure D/O  -  Continue keppra; dose increased given possibility of seizure activity leading to his mental status changes. PAF  -  Currently in sinus rhythm. Hypothermia:  -  Due to sepsis. Poor prognostic indicator and has occurred with infections in the past and thyroid function has been normal.  Responded to external warming measures. Resolved. Hypernatremia: Reduced PO intake, saline load. Improved with hypotonic fluids. Need to resume D5W again given worsening. Monitor. DVT prophylaxis: SCDs only d/t thrombocytopenia  Diet: Diet NPO  Code Status: Full Code  PT/OT Eval Status: NA    Dispo - > 2 days  Previously discussed with guardian that we will need to make decisions in the coming days if he continues to fail SLP evaluations. NGT would be challenging as he would be high risk for pulling out. G tube could be considered if remains too encephalopathic to swallow, although again, caregivers are concerned he may try to pull it out. They would also be willing to discuss comfort oriented care if he continues to decline with inability to tolerate p.o. To remain Full Code for now.      She Palencia MD

## 2021-09-11 NOTE — PROGRESS NOTES
Speech Language Pathology  Facility/Department: [facility] - MED SURG  Dysphagia Daily Treatment Note    NAME: Chirag Cummings  : 1960  MRN: [id number]    Patient Diagnosis(es):   Patient Active Problem List    Diagnosis Date Noted    Seizure disorder ([address] 2021    PAF (paroxysmal atrial fibrillation) ([address] 2021    Hypothermia 2021    Thrombocytopenia ([address] 2021    RUBEN (acute kidney injury) ([address] 2021    Sepsis ([address] 2021    COVID-19 2021    Pulmonary infiltrates 2021    Pneumonia 2021    Acute urinary retention 2020    Atrial fibrillation, chronic ([address] 2020    History of subdural hematoma 2020    Constipation     Atrial fibrillation with RVR ([address] 2019    Acute cystitis with hematuria 2019    Altered mental status 2019    Brain herniation ([address] 2019    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 ([address] 2019    Lipoma of head      Allergies: Allergies   Allergen Reactions    Penicillins      Other reaction(s): Unknown    Benadryl [Diphenhydramine] Other (See Comments)     Pt becomes agitated and aggressive with Benadryl    Clonidine Derivatives      Other reaction(s): Unknown    Tetracyclines & Related      Other reaction(s): Unknown     Subjective: 62year old male with MRDD admitted with covid-19 on 21    Pain: unable to assess; pt is non-verbal; no outward s/s of pain    Current Diet: Diet NPO    Diet Tolerance:  Patient tolerating current diet level without signs/symptoms of penetration / aspiration. P.O. Trials: Thin       Nectar / Mildly Thick   x X 2   Honey / Moderately Thick       Pudding / Extremely Thick       Puree   x X 1   Solid         Dysphagia Treatment and Impressions:  RN okays SLP entry into room. Pt is alert and makes some eye contact. Pt is on RA and O2 sat is 95% at time of follow-up.   SLP attempted to count breath rate and pt allowed his head to abruptly  forward and hit SLP's arm. Pt did this again when SLP again attempted to count RR, so SLP discontinued touching the pt as it seemed to be irritating him. SLP provided oral care with moistened toothette to remove secretions from labial and lingual surfaces. Pt resistant to allowing SLP to place toothette in oral cavity. SLP offered pt bite of puree and he partially extracted bolus from spoon. Pt allowed puree to come to rest on anterior edge of tongue and in anterior sulcus and did not manipulate bolus. SLP removed bolus from mouth and attempted to administer another bite, but pt again allowed head to fall forward abruptly (in a  head-butting manner) in an attempt to reject puree trial.  This occurred again with offered sips of nectar thick liquid. SLP discontinued follow-up at this point and relayed results to RN. Dysphagia Goals:  Timeframe for Long-term Goals: 3-5x/wk for LOS  Goal 1: Patient will tolerate least restrictive and safest diet without clinical indicators of aspiration 90% of the time. Today, 09/11: Not progressing. Ongoing. Short-term Goals  Timeframe for Short-term Goals: 3-5x/week for LOS  1. The patient will tolerate repeat BSE when able. , Today, 09/11: Not progressing. Ongoing. 2. The patient will tolerate instrumental swallowing procedure, Not indicated this date as pt refusing po trials and is in droplet precautions  3. The patient will tolerate puree foods 10/10. Today, 09/11: Not progressing. Ongoing. Recommendations:  Continue NPO; consider alternative means of nutrition if this is consistent with pt's wishes/plan of care    Patient/Family/Caregiver Education: reviewed with RN    Compensatory Strategies:  · Pasted toothbrushing with suction, 2x per day    Plan:    Continued Dysphagia treatment with goals per plan of care.     Discharge Recommendations: defer to PT/OT     If pt discharges from hospital prior to Speech/Swallowing discharge, this note serves as tx and discharge summary. Total Treatment Time / Charges     Time in Time out Total Time / units   Cognitive Tx         Speech Tx      Dysphagia Tx  1005 1015  10 min / 1 unit     Signature:    Carolyn Overton, [address] [facility] UV#[id number]  Speech-Language Pathologist

## 2021-09-12 LAB
ANION GAP SERPL CALCULATED.3IONS-SCNC: 7 MMOL/L (ref 3–16)
BASOPHILS ABSOLUTE: 0 K/UL (ref 0–0.2)
BASOPHILS RELATIVE PERCENT: 0.3 %
BUN BLDV-MCNC: 36 MG/DL (ref 7–20)
CALCIUM SERPL-MCNC: 9.2 MG/DL (ref 8.3–10.6)
CHLORIDE BLD-SCNC: 106 MMOL/L (ref 99–110)
CO2: 26 MMOL/L (ref 21–32)
CREAT SERPL-MCNC: 0.9 MG/DL (ref 0.8–1.3)
EOSINOPHILS ABSOLUTE: 0 K/UL (ref 0–0.6)
EOSINOPHILS RELATIVE PERCENT: 0 %
GFR AFRICAN AMERICAN: >60
GFR NON-AFRICAN AMERICAN: >60
GLUCOSE BLD-MCNC: 151 MG/DL (ref 70–99)
HCT VFR BLD CALC: 26.6 % (ref 40.5–52.5)
HEMOGLOBIN: 9 G/DL (ref 13.5–17.5)
INR BLD: 1.17 (ref 0.88–1.12)
LYMPHOCYTES ABSOLUTE: 0.6 K/UL (ref 1–5.1)
LYMPHOCYTES RELATIVE PERCENT: 5.8 %
MAGNESIUM: 1.9 MG/DL (ref 1.8–2.4)
MCH RBC QN AUTO: 28.6 PG (ref 26–34)
MCHC RBC AUTO-ENTMCNC: 33.7 G/DL (ref 31–36)
MCV RBC AUTO: 84.7 FL (ref 80–100)
MONOCYTES ABSOLUTE: 0.4 K/UL (ref 0–1.3)
MONOCYTES RELATIVE PERCENT: 4.2 %
NEUTROPHILS ABSOLUTE: 8.6 K/UL (ref 1.7–7.7)
NEUTROPHILS RELATIVE PERCENT: 89.7 %
PDW BLD-RTO: 16.9 % (ref 12.4–15.4)
PHOSPHORUS: 2.4 MG/DL (ref 2.5–4.9)
PLATELET # BLD: 176 K/UL (ref 135–450)
PMV BLD AUTO: 9.1 FL (ref 5–10.5)
POTASSIUM REFLEX MAGNESIUM: 3.9 MMOL/L (ref 3.5–5.1)
PROTHROMBIN TIME: 13.3 SEC (ref 9.9–12.7)
RBC # BLD: 3.14 M/UL (ref 4.2–5.9)
SODIUM BLD-SCNC: 139 MMOL/L (ref 136–145)
WBC # BLD: 9.6 K/UL (ref 4–11)

## 2021-09-12 PROCEDURE — 1200000000 HC SEMI PRIVATE

## 2021-09-12 PROCEDURE — 2580000003 HC RX 258: Performed by: INTERNAL MEDICINE

## 2021-09-12 PROCEDURE — 80048 BASIC METABOLIC PNL TOTAL CA: CPT

## 2021-09-12 PROCEDURE — C9113 INJ PANTOPRAZOLE SODIUM, VIA: HCPCS | Performed by: INTERNAL MEDICINE

## 2021-09-12 PROCEDURE — 94761 N-INVAS EAR/PLS OXIMETRY MLT: CPT

## 2021-09-12 PROCEDURE — 84100 ASSAY OF PHOSPHORUS: CPT

## 2021-09-12 PROCEDURE — 2500000003 HC RX 250 WO HCPCS: Performed by: INTERNAL MEDICINE

## 2021-09-12 PROCEDURE — 36592 COLLECT BLOOD FROM PICC: CPT

## 2021-09-12 PROCEDURE — 85025 COMPLETE CBC W/AUTO DIFF WBC: CPT

## 2021-09-12 PROCEDURE — 2700000000 HC OXYGEN THERAPY PER DAY

## 2021-09-12 PROCEDURE — 85610 PROTHROMBIN TIME: CPT

## 2021-09-12 PROCEDURE — 83735 ASSAY OF MAGNESIUM: CPT

## 2021-09-12 PROCEDURE — 6360000002 HC RX W HCPCS: Performed by: INTERNAL MEDICINE

## 2021-09-12 RX ORDER — DEXTROSE, SODIUM CHLORIDE, AND POTASSIUM CHLORIDE 5; .45; .15 G/100ML; G/100ML; G/100ML
INJECTION INTRAVENOUS CONTINUOUS
Status: DISCONTINUED | OUTPATIENT
Start: 2021-09-12 | End: 2021-09-18

## 2021-09-12 RX ADMIN — PANTOPRAZOLE SODIUM 40 MG: 40 INJECTION, POWDER, FOR SOLUTION INTRAVENOUS at 17:33

## 2021-09-12 RX ADMIN — CEFTRIAXONE SODIUM 1000 MG: 1 INJECTION, POWDER, FOR SOLUTION INTRAMUSCULAR; INTRAVENOUS at 07:59

## 2021-09-12 RX ADMIN — LEVETIRACETAM 500 MG: 100 INJECTION, SOLUTION INTRAVENOUS at 15:34

## 2021-09-12 RX ADMIN — Medication 10 ML: at 17:33

## 2021-09-12 RX ADMIN — METHYLPREDNISOLONE SODIUM SUCCINATE 40 MG: 40 INJECTION, POWDER, FOR SOLUTION INTRAMUSCULAR; INTRAVENOUS at 07:54

## 2021-09-12 RX ADMIN — PANTOPRAZOLE SODIUM 40 MG: 40 INJECTION, POWDER, FOR SOLUTION INTRAVENOUS at 07:54

## 2021-09-12 RX ADMIN — POTASSIUM CHLORIDE, DEXTROSE MONOHYDRATE AND SODIUM CHLORIDE: 150; 5; 450 INJECTION, SOLUTION INTRAVENOUS at 12:00

## 2021-09-12 RX ADMIN — METHYLPREDNISOLONE SODIUM SUCCINATE 40 MG: 40 INJECTION, POWDER, FOR SOLUTION INTRAMUSCULAR; INTRAVENOUS at 23:13

## 2021-09-12 RX ADMIN — LEVETIRACETAM 500 MG: 100 INJECTION, SOLUTION INTRAVENOUS at 04:37

## 2021-09-12 RX ADMIN — Medication 10 ML: at 07:54

## 2021-09-12 RX ADMIN — DEXTROSE MONOHYDRATE: 50 INJECTION, SOLUTION INTRAVENOUS at 03:13

## 2021-09-12 ASSESSMENT — PAIN SCALES - GENERAL
PAINLEVEL_OUTOF10: 0

## 2021-09-12 NOTE — PROGRESS NOTES
Pt very lethargic tonight vs 9/10. On 9/10 pt would follow staff with eyes while in room and was easier to arouse. Pt would make noise on occasion and move hands and head when care was given. Tonight on 9/11 pt does not follow staff with eyes, is very lethargic and has very little movement. Bolus of fluids given per MD order HR still having pauses and in 20's to 45 range. Per CMU pt HR is sustaining lower than last night. Pt respirations are 10-12 with SPO2 in 90's on 1 liter, Rhonchi heard bilaterally in pt lungs.

## 2021-09-12 NOTE — PROGRESS NOTES
Hospitalist Progress Note    Date of Admission: 9/5/2021    Chief Complaint: Decreased oral intake    Hospital Course:   Selena Gore is a 62 y.o. male. He has a h/o MRDD and is nonverbal. He lives in a group home and was sent to the ER because he has not been eating or drinking as usual for the past four days. He underwent colonoscopy on 9/1/21 and has not been taking much oral intake since then. He received the [employer] COVID-19 vaccine on 5/7/21. Prior to that he was hospitalized in January, 2021 for acute respiratory failure due to COVID-19. Subjective: Blood pressures stable. Continues to have intermittent bradycardia again asymptomatic. No vomiting. No outward signs of bleeding. He is more interactive today but still unable to tolerate PO. Labs:   Recent Labs     09/10/21  0600 09/10/21  0600 09/10/21  1727 09/11/21 0800 09/12/21  0513   WBC 10.3  --   --  8.4 9.6   HGB 10.0*   < > 10.0* 9.4* 9.0*   HCT 30.3*  --   --  28.4* 26.6*   *  --   --  136 176    < > = values in this interval not displayed. Recent Labs     09/10/21  0600 09/11/21  0800 09/12/21  0513    146* 139   K 4.1 4.3 3.9    112* 106   CO2 26 27 26   BUN 29* 40* 36*   CREATININE 1.2 1.0 0.9   CALCIUM 9.5 9.5 9.2   PHOS 4.4 3.4 2.4*     No results for input(s): AST, ALT, BILIDIR, BILITOT, ALKPHOS in the last 72 hours. Recent Labs     09/10/21  0600 09/11/21  0800 09/12/21  0513   INR 1.10 1.16* 1.17*       Physical Exam Performed:    BP (!) 91/49   Pulse 56   Temp 98.5 °F (36.9 °C) (Axillary)   Resp 16   Ht 4' 8\" (1.422 m)   Wt 93 lb 0.6 oz (42.2 kg)   SpO2 94%   BMI 20.86 kg/m²     General appearance: NAD  HEENT: Pupils equal, round, and reactive to light. Conjunctivae/corneas clear. Facial/ear deformities. Neck: Supple, no jugular venous distention. Trachea midline with full range of motion. Respiratory:  Normal respiratory effort.  Clear to auscultation, bilaterally without occasional pauses. There are no obvious symptoms although this is difficult to elucidate given his MRDD and nonverbal status. However objectively his mental status does not seem to change at all during these episodes. Blood pressure is now stable. There are no culprit mehreen blocking agents. EP was consulted for input and currently recommends ongoing observation for any symptoms but without any specific medical therapy at this time. He is not currently a candidate for pacemaker placement. We will continue to monitor. Hypotension: Initially required vasopressor support but quickly weaned off. On 9/9/21, had increased lethargy, worsening hypotension and intermittent sinus bradycardia. Volume expansion given with resolution of hypotension. Lactate normal.  Suspect this was most likely volume related. Sepsis seems less likely cause at this point. Hemoglobin is stable. Monitor. RUBEN  -  Baseline creatinine ~ 0.6 and currently 1.3 mg/dL. -  Volume expansion has been given  -  Has a h/o urinary retention. Colindres catheter to remain in place for now. - Changed back to D5W for worsening hypernatremia then improved again  - Continue maintenance D5/0.45NS with K.   -  Monitor. Possible GI Bleeding/Coffee Ground Emesis:  Isolated episode of coffee ground emesis. Monitor Hb and transfuse if needed. Worsening coagulopathy noted in setting of COVID-19, lack of nutrition. INR has normalized after vitamin K. Continue Protonix IV BID. We will hold off on GI evaluation for now as there is no further evidence of bleeding and hemoglobin remained stable. Malnutrition, poor PO intake  -  Temporally began after colonoscopy, but unlikely d/t direct complication of colonoscopy such as bowel perforation. Complication possibly could have been indirect since patient may have aspirated during or after the procedure. In other words anorexia seems most likely d/t systemic infection.   -he is overall more alert and interactive although when working with SLP he has not willing to participate in swallow the attempted boluses. H/O Seizure D/O  -  Continue keppra; dose increased given possibility of seizure activity leading to his mental status changes. PAF  -  Currently in sinus rhythm. Hypothermia:  -  Due to sepsis. Poor prognostic indicator and has occurred with infections in the past and thyroid function has been normal.  Responded to external warming measures. Resolved. Hypernatremia: Reduced PO intake, saline load. Improved with hypotonic fluids. Need to resume D5W again given worsening. Monitor. DVT prophylaxis: SCDs only d/t thrombocytopenia  Diet: Diet NPO  Code Status: Full Code  PT/OT Eval Status: NA    Dispo - > 2 days    -Previously discussed nutritional issues with his guardian. Not considered a good candidate for temporary NG tube. TPN not ideal. They agreed to proceed with IVF hydration for now pending course. They would be willing to further discuss G-tube vs Palliative Care depending on his clinical progress or overall prognosis for improvement. However, if G-tube is needed, they would prefer a low profile G-tube (i.e. Mi-Key button) as they believe he is very likely to try to pull it out.   -A week has now passed since admission. If he cannot tolerate SLP eval/PO soon, will need to have additional discussions with his guardian.      Gracy Mandel MD

## 2021-09-13 LAB
ANION GAP SERPL CALCULATED.3IONS-SCNC: 6 MMOL/L (ref 3–16)
BASOPHILS ABSOLUTE: 0 K/UL (ref 0–0.2)
BASOPHILS RELATIVE PERCENT: 0.2 %
BUN BLDV-MCNC: 27 MG/DL (ref 7–20)
CALCIUM SERPL-MCNC: 9.3 MG/DL (ref 8.3–10.6)
CHLORIDE BLD-SCNC: 108 MMOL/L (ref 99–110)
CO2: 26 MMOL/L (ref 21–32)
CREAT SERPL-MCNC: 0.8 MG/DL (ref 0.8–1.3)
EOSINOPHILS ABSOLUTE: 0 K/UL (ref 0–0.6)
EOSINOPHILS RELATIVE PERCENT: 0 %
GFR AFRICAN AMERICAN: >60
GFR NON-AFRICAN AMERICAN: >60
GLUCOSE BLD-MCNC: 125 MG/DL (ref 70–99)
HCT VFR BLD CALC: 27.8 % (ref 40.5–52.5)
HEMOGLOBIN: 9.3 G/DL (ref 13.5–17.5)
LYMPHOCYTES ABSOLUTE: 0.4 K/UL (ref 1–5.1)
LYMPHOCYTES RELATIVE PERCENT: 4.1 %
MAGNESIUM: 1.9 MG/DL (ref 1.8–2.4)
MCH RBC QN AUTO: 28.4 PG (ref 26–34)
MCHC RBC AUTO-ENTMCNC: 33.4 G/DL (ref 31–36)
MCV RBC AUTO: 85 FL (ref 80–100)
MONOCYTES ABSOLUTE: 0.3 K/UL (ref 0–1.3)
MONOCYTES RELATIVE PERCENT: 2.8 %
NEUTROPHILS ABSOLUTE: 9.1 K/UL (ref 1.7–7.7)
NEUTROPHILS RELATIVE PERCENT: 92.9 %
PDW BLD-RTO: 16.6 % (ref 12.4–15.4)
PHOSPHORUS: 2.2 MG/DL (ref 2.5–4.9)
PLATELET # BLD: 230 K/UL (ref 135–450)
PMV BLD AUTO: 9.3 FL (ref 5–10.5)
POTASSIUM REFLEX MAGNESIUM: 4.2 MMOL/L (ref 3.5–5.1)
RBC # BLD: 3.27 M/UL (ref 4.2–5.9)
SODIUM BLD-SCNC: 140 MMOL/L (ref 136–145)
WBC # BLD: 9.8 K/UL (ref 4–11)

## 2021-09-13 PROCEDURE — 80048 BASIC METABOLIC PNL TOTAL CA: CPT

## 2021-09-13 PROCEDURE — 36592 COLLECT BLOOD FROM PICC: CPT

## 2021-09-13 PROCEDURE — 83735 ASSAY OF MAGNESIUM: CPT

## 2021-09-13 PROCEDURE — 1200000000 HC SEMI PRIVATE

## 2021-09-13 PROCEDURE — C9113 INJ PANTOPRAZOLE SODIUM, VIA: HCPCS | Performed by: INTERNAL MEDICINE

## 2021-09-13 PROCEDURE — 6360000002 HC RX W HCPCS: Performed by: INTERNAL MEDICINE

## 2021-09-13 PROCEDURE — 84100 ASSAY OF PHOSPHORUS: CPT

## 2021-09-13 PROCEDURE — 2700000000 HC OXYGEN THERAPY PER DAY

## 2021-09-13 PROCEDURE — 2580000003 HC RX 258: Performed by: INTERNAL MEDICINE

## 2021-09-13 PROCEDURE — 94761 N-INVAS EAR/PLS OXIMETRY MLT: CPT

## 2021-09-13 PROCEDURE — 85025 COMPLETE CBC W/AUTO DIFF WBC: CPT

## 2021-09-13 RX ORDER — METHYLPREDNISOLONE SODIUM SUCCINATE 40 MG/ML
40 INJECTION, POWDER, LYOPHILIZED, FOR SOLUTION INTRAMUSCULAR; INTRAVENOUS DAILY
Status: DISCONTINUED | OUTPATIENT
Start: 2021-09-14 | End: 2021-09-15

## 2021-09-13 RX ADMIN — Medication 10 ML: at 17:45

## 2021-09-13 RX ADMIN — METHYLPREDNISOLONE SODIUM SUCCINATE 40 MG: 40 INJECTION, POWDER, FOR SOLUTION INTRAMUSCULAR; INTRAVENOUS at 09:22

## 2021-09-13 RX ADMIN — Medication 10 ML: at 07:39

## 2021-09-13 RX ADMIN — CEFTRIAXONE SODIUM 1000 MG: 1 INJECTION, POWDER, FOR SOLUTION INTRAMUSCULAR; INTRAVENOUS at 07:41

## 2021-09-13 RX ADMIN — LEVETIRACETAM 500 MG: 100 INJECTION, SOLUTION INTRAVENOUS at 04:29

## 2021-09-13 RX ADMIN — PANTOPRAZOLE SODIUM 40 MG: 40 INJECTION, POWDER, FOR SOLUTION INTRAVENOUS at 07:38

## 2021-09-13 RX ADMIN — LEVETIRACETAM 500 MG: 100 INJECTION, SOLUTION INTRAVENOUS at 16:09

## 2021-09-13 RX ADMIN — Medication 10 ML: at 09:23

## 2021-09-13 RX ADMIN — PANTOPRAZOLE SODIUM 40 MG: 40 INJECTION, POWDER, FOR SOLUTION INTRAVENOUS at 17:45

## 2021-09-13 ASSESSMENT — PAIN SCALES - GENERAL
PAINLEVEL_OUTOF10: 0

## 2021-09-13 ASSESSMENT — PAIN SCALES - WONG BAKER: WONGBAKER_NUMERICALRESPONSE: 0

## 2021-09-13 NOTE — PROGRESS NOTES
Assessment complete. BP soft SB on monitor. Alert, calm, eyes open. CHG bath given. Oral care given. Avasys monitor in place for safety.

## 2021-09-13 NOTE — CARE COORDINATION
Packet received from [employer] for provider to complete. Legal NOT contacted, as CM lead, Shannan Briceno, states would not be of benefit to contact them.

## 2021-09-13 NOTE — PROGRESS NOTES
Hospitalist Progress Note    Date of Admission: 9/5/2021    Chief Complaint: Decreased oral intake    Hospital Course:   Chirag Cummings is a 62 y.o. male. He has a h/o MRDD and is nonverbal. He lives in a group home and was sent to the ER because he has not been eating or drinking as usual for the past four days. He underwent colonoscopy on 9/1/21 and has not been taking much oral intake since then. He received the [employer] COVID-19 vaccine on 5/7/21. Prior to that he was hospitalized in January, 2021 for acute respiratory failure due to COVID-19. Subjective: Continues with yvonne cardia in the 30's, non verbal. Pale dry MM NAD closed eyes down tight and some spuratic movement         Labs:   Recent Labs     09/11/21  0800 09/12/21  0513 09/13/21  0500   WBC 8.4 9.6 9.8   HGB 9.4* 9.0* 9.3*   HCT 28.4* 26.6* 27.8*    176 230     Recent Labs     09/11/21  0800 09/12/21  0513 09/13/21  0500   * 139 140   K 4.3 3.9 4.2   * 106 108   CO2 27 26 26   BUN 40* 36* 27*   CREATININE 1.0 0.9 0.8   CALCIUM 9.5 9.2 9.3   PHOS 3.4 2.4* 2.2*     No results for input(s): AST, ALT, BILIDIR, BILITOT, ALKPHOS in the last 72 hours. Recent Labs     09/11/21  0800 09/12/21  0513   INR 1.16* 1.17*       Physical Exam Performed:    BP (!) 94/55   Pulse (!) 37   Temp 97.4 °F (36.3 °C) (Axillary)   Resp 14   Ht 4' 8\" (1.422 m)   Wt 99 lb 10.4 oz (45.2 kg)   SpO2 94%   BMI 22.34 kg/m²     General appearance: NAD, pale   HEENT: Pupils equal, round, and reactive to light. Conjunctivae/corneas clear. Facial/ear deformities. Congential anomalies   Neck: Supple, no jugular venous distention. Trachea midline with full range of motion. MM profoundly dry   Respiratory:  Normal respiratory effort. Decreased bilaterally   Cardiovascular: Regular rate and rhythm with normal S1/S2 without murmurs, rubs or gallops.  Mallory Osier down in the 30's   Abdomen: Soft, non-tender, non-distended with normal bowel sounds. Musculoskelatal: No clubbing, cyanosis or edema bilaterally. Full range of motion without deformity. Neurologic: non verbal   Psychiatric: Drowsy,   Non-verbal, MRDD. Skin: Skin color, texture, turgor normal.  No rashes or lesions. Capillary Refill: Brisk,< 3 seconds   Peripheral Pulses: +2 palpable, equal bilaterally       Assessment/Plan:    Active Hospital Problems    Diagnosis     Seizure disorder ([facility] [G40.909]     PAF (paroxysmal atrial fibrillation) ([facility] [I48.0]     Hypothermia [Q22. XXXA]     Thrombocytopenia ([facility] [D69.6]     RUBEN (acute kidney injury) ([facility] [N17.9]     Sepsis ([facility] [A41.9]     COVID-19 [U07.1]     Pneumonia [J18.9]      Sepsis, COVID-19 Pneumonia, Possible Aspiration Pneumonia, Acute Respiratory Failure with Hypoxia  -  Patient has a multifocal pneumonia distributed throughout dependent lungs regions most c/w aspiration pneumonia. Findings could also be d/t COVID-19 but initially felt unlikely since patient has had COVID in January, 2021 and received the [employer] vaccine in May, 2021. Rapid COVID negative. PCR has now returned positive. -  Ultimately suspect COVID had minimal effect on him from a respiratory standpoint, but could have led to or contributed to Aspiration risk and Aspiration Pneumonia.   -  Has been treated with empiric Abx with clinical improvement; recommended de-escalation of Abx. Typically would provide course of Ceftin but will use ceftriaxone as he is unable to tolerate p.o. at this time. - Minimal hypoxia now weaned off as tolerated. -  Continue Steroids    Sinus Bradycardia: Ongoing episodes of sinus bradycardia intermittently with occasional pauses. There are no obvious symptoms although this is difficult to elucidate given his MRDD and nonverbal status. However objectively his mental status does not seem to change at all during these episodes. Blood pressure is now stable. There are no culprit mehreen blocking agents.   EP was consulted for input and currently recommends ongoing observation for any symptoms but without any specific medical therapy at this time. He is not currently a candidate for pacemaker placement. We will continue to monitor. Hypotension: Initially required vasopressor support but quickly weaned off. On 9/9/21, had increased lethargy, worsening hypotension and intermittent sinus bradycardia. Volume expansion given with resolution of hypotension. Lactate normal.  Suspect this was most likely volume related. Sepsis seems less likely cause at this point. Hemoglobin is stable. Monitor. RUBEN-   -  Baseline creatinine ~ 0.6 and currently 1.3 mg/dL. -  Volume expansion has been given  -  Has a h/o urinary retention. Colindres catheter to remain in place for now. - Changed back to D5W for worsening hypernatremia then improved again  - Continue maintenance D5/0.45NS with K.   -  Monitor.  - 9/13 stable     Possible GI Bleeding/Coffee Ground Emesis:  Isolated episode of coffee ground emesis. Monitor Hb and transfuse if needed. Worsening coagulopathy noted in setting of COVID-19, lack of nutrition. INR has normalized after vitamin K. Continue Protonix IV BID. We will hold off on GI evaluation for now as there is no further evidence of bleeding and hemoglobin remained stable. Malnutrition, poor PO intake  -  Temporally began after colonoscopy, but unlikely d/t direct complication of colonoscopy such as bowel perforation. Complication possibly could have been indirect since patient may have aspirated during or after the procedure. In other words anorexia seems most likely d/t systemic infection. -he is overall more alert and interactive although when working with SLP he has not willing to participate in swallow the attempted boluses. H/O Seizure D/O  -  Continue keppra; dose increased given possibility of seizure activity leading to his mental status changes. PAF  -  Currently in sinus rhythm.     Hypothermia: resolved   - Due to sepsis. Poor prognostic indicator and has occurred with infections in the past and thyroid function has been normal.  Responded to external warming measures. Resolved. Hypernatremia:resolved    Reduced PO intake, saline load. Improved with hypotonic fluids. Need to resume D5W again given worsening. Monitor. DVT prophylaxis: SCDs only d/t thrombocytopenia  Diet: Diet NPO  Code Status: Full Code  PT/OT Eval Status: NA    Dispo - > 2 days    \" -Previously discussed nutritional issues with his guardian. Not considered a good candidate for temporary NG tube. TPN not ideal. They agreed to proceed with IVF hydration. They would be willing to further discuss G-tube vs Palliative Care depending on his clinical progress or overall prognosis for improvement. However, if G-tube is needed, they would prefer a low profile G-tube (i.e. Mi-Key button) as they believe he is very likely to try to pull it out.   -A week has now passed since admission. If he cannot tolerate SLP eval/PO soon, will need to have additional discussions with his guardian. \"     9/13 No significant change or improvement in his status.  Reached out to POA 11:46 left personal call back #     Ferdie Nyhan, APRN - CNP

## 2021-09-13 NOTE — CARE COORDINATION
VM received at [phone] from Director of Nursing with [facility] ([phone]). Primary nurse, Jacky Mesa, provided updates to [facility] this morning at 3095. CM does not have further information/updates to provide, as this is a nursing/provider barrier to discharge - palliative care vs. g-tube placement.  received at 0911 from [facility]" with JASPER for call back/updates on patient condition ([phone] ext [phone]). CM attempted to call back. \"System problems\" message received when CM entered rep extension. Tried calling back and  is having trouble transferring call. 10:48 AM  CM called back after being on extended hold. Spoke with \"Tonja\", who states Crystal is unavailable and she will pass a message to her to call CM back.

## 2021-09-13 NOTE — PLAN OF CARE
Problem: Falls - Risk of:  Goal: Will remain free from falls  Description: Will remain free from falls  Outcome: Ongoing  Goal: Absence of physical injury  Description: Absence of physical injury  Outcome: Ongoing     Patient free from falls during this admission. Two side rails up and bed in locked and lowest position. Video monitoring in place. Problem: Nutrition  Goal: Optimal nutrition therapy  Outcome: Ongoing    Patient still unable to pass swallow eval and is still NPO. Patient on IV fluids. Decision needs to be made regarding peg tube placement. Problem: Skin Integrity:  Goal: Absence of new skin breakdown  Description: Absence of new skin breakdown  Outcome: Ongoing    Skin is intact. Patient being turned every to hours.       Problem: Gas Exchange - Impaired  Goal: Absence of hypoxia  Outcome: Ongoing    Patient is no stable on RA

## 2021-09-13 NOTE — CARE COORDINATION
Call back received from Jillian Sloan at [phone] -  called her cell back ([phone]). Yosi Rodríguez again asking questions regarding pt status and \"whether or not the doctor has made the decision for a feeding tube or not\". NP, Gabriella Aparicio, in nursing station - phone call placed on speaker. Niyah explained palliative/hospice care, changing code status to DNR versus feeding tube for patient. Yosi Rodríguez states \"paperwork needs to be completed from my agency, for approval of changing a patient's code status. The papers get faxed back for our physician to review, and they will fax back an approval or denial letter\". NP, Gabriella Aparicio, does not feel comfortable with this process and feels pt code status should be DNR at this time. Yosi Rodríguez is faxing packet of information to [employer]. Legal will be notified by  of how to proceed.

## 2021-09-14 LAB
ANION GAP SERPL CALCULATED.3IONS-SCNC: 6 MMOL/L (ref 3–16)
BASOPHILS ABSOLUTE: 0 K/UL (ref 0–0.2)
BASOPHILS RELATIVE PERCENT: 0.4 %
BUN BLDV-MCNC: 19 MG/DL (ref 7–20)
CALCIUM SERPL-MCNC: 9.2 MG/DL (ref 8.3–10.6)
CHLORIDE BLD-SCNC: 106 MMOL/L (ref 99–110)
CO2: 27 MMOL/L (ref 21–32)
CREAT SERPL-MCNC: 0.7 MG/DL (ref 0.8–1.3)
EOSINOPHILS ABSOLUTE: 0 K/UL (ref 0–0.6)
EOSINOPHILS RELATIVE PERCENT: 0.4 %
GFR AFRICAN AMERICAN: >60
GFR NON-AFRICAN AMERICAN: >60
GLUCOSE BLD-MCNC: 85 MG/DL (ref 70–99)
HCT VFR BLD CALC: 27.9 % (ref 40.5–52.5)
HEMOGLOBIN: 9.4 G/DL (ref 13.5–17.5)
INR BLD: 1.21 (ref 0.88–1.12)
LYMPHOCYTES ABSOLUTE: 1 K/UL (ref 1–5.1)
LYMPHOCYTES RELATIVE PERCENT: 10.5 %
MAGNESIUM: 1.8 MG/DL (ref 1.8–2.4)
MCH RBC QN AUTO: 28.5 PG (ref 26–34)
MCHC RBC AUTO-ENTMCNC: 33.7 G/DL (ref 31–36)
MCV RBC AUTO: 84.7 FL (ref 80–100)
MONOCYTES ABSOLUTE: 0.8 K/UL (ref 0–1.3)
MONOCYTES RELATIVE PERCENT: 8.9 %
NEUTROPHILS ABSOLUTE: 7.6 K/UL (ref 1.7–7.7)
NEUTROPHILS RELATIVE PERCENT: 79.8 %
PDW BLD-RTO: 16.5 % (ref 12.4–15.4)
PHOSPHORUS: 2.1 MG/DL (ref 2.5–4.9)
PLATELET # BLD: 277 K/UL (ref 135–450)
PMV BLD AUTO: 9.9 FL (ref 5–10.5)
POTASSIUM SERPL-SCNC: 3.9 MMOL/L (ref 3.5–5.1)
PROTHROMBIN TIME: 13.8 SEC (ref 9.9–12.7)
RBC # BLD: 3.29 M/UL (ref 4.2–5.9)
REASON FOR REJECTION: NORMAL
REASON FOR REJECTION: NORMAL
REJECTED TEST: NORMAL
REJECTED TEST: NORMAL
SODIUM BLD-SCNC: 139 MMOL/L (ref 136–145)
WBC # BLD: 9.5 K/UL (ref 4–11)

## 2021-09-14 PROCEDURE — 92526 ORAL FUNCTION THERAPY: CPT

## 2021-09-14 PROCEDURE — C9113 INJ PANTOPRAZOLE SODIUM, VIA: HCPCS | Performed by: INTERNAL MEDICINE

## 2021-09-14 PROCEDURE — 6360000002 HC RX W HCPCS: Performed by: NURSE PRACTITIONER

## 2021-09-14 PROCEDURE — 84100 ASSAY OF PHOSPHORUS: CPT

## 2021-09-14 PROCEDURE — 6360000002 HC RX W HCPCS: Performed by: INTERNAL MEDICINE

## 2021-09-14 PROCEDURE — 2580000003 HC RX 258: Performed by: INTERNAL MEDICINE

## 2021-09-14 PROCEDURE — 85025 COMPLETE CBC W/AUTO DIFF WBC: CPT

## 2021-09-14 PROCEDURE — 80048 BASIC METABOLIC PNL TOTAL CA: CPT

## 2021-09-14 PROCEDURE — 85610 PROTHROMBIN TIME: CPT

## 2021-09-14 PROCEDURE — 83735 ASSAY OF MAGNESIUM: CPT

## 2021-09-14 PROCEDURE — 1200000000 HC SEMI PRIVATE

## 2021-09-14 RX ADMIN — PANTOPRAZOLE SODIUM 40 MG: 40 INJECTION, POWDER, FOR SOLUTION INTRAVENOUS at 18:50

## 2021-09-14 RX ADMIN — METHYLPREDNISOLONE SODIUM SUCCINATE 40 MG: 40 INJECTION, POWDER, FOR SOLUTION INTRAMUSCULAR; INTRAVENOUS at 11:18

## 2021-09-14 RX ADMIN — LEVETIRACETAM 500 MG: 100 INJECTION, SOLUTION INTRAVENOUS at 15:59

## 2021-09-14 RX ADMIN — PANTOPRAZOLE SODIUM 40 MG: 40 INJECTION, POWDER, FOR SOLUTION INTRAVENOUS at 11:18

## 2021-09-14 RX ADMIN — Medication 10 ML: at 11:18

## 2021-09-14 RX ADMIN — CEFTRIAXONE SODIUM 1000 MG: 1 INJECTION, POWDER, FOR SOLUTION INTRAMUSCULAR; INTRAVENOUS at 11:07

## 2021-09-14 RX ADMIN — LEVETIRACETAM 500 MG: 100 INJECTION, SOLUTION INTRAVENOUS at 06:24

## 2021-09-14 ASSESSMENT — PAIN SCALES - GENERAL
PAINLEVEL_OUTOF10: 0
PAINLEVEL_OUTOF10: 0

## 2021-09-14 ASSESSMENT — PAIN SCALES - WONG BAKER: WONGBAKER_NUMERICALRESPONSE: 0

## 2021-09-14 NOTE — PROGRESS NOTES
Speech Language Pathology  Facility/Department: [facility] B3 - MED SURG  Dysphagia Daily Treatment Note    Recommendations:  Continue NPO; consider alternative means of nutrition if this is consistent with pt's wishes/plan of care    NAME: Suzy Buck  : 1960  MRN: [id number]    Patient Diagnosis(es):   Patient Active Problem List    Diagnosis Date Noted    Seizure disorder ([address] 2021    PAF (paroxysmal atrial fibrillation) ([address] 2021    Hypothermia 2021    Thrombocytopenia ([address] 2021    RUBEN (acute kidney injury) ([address] 2021    Sepsis ([address] 2021    COVID-19 2021    Pulmonary infiltrates 2021    Pneumonia 2021    Acute urinary retention 2020    Atrial fibrillation, chronic ([address] 2020    History of subdural hematoma 2020    Constipation     Atrial fibrillation with RVR ([address] 2019    Acute cystitis with hematuria 2019    Altered mental status 2019    Brain herniation ([address] 2019    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 ([address] 2019    Lipoma of head      Allergies: Allergies   Allergen Reactions    Penicillins      Other reaction(s): Unknown    Benadryl [Diphenhydramine] Other (See Comments)     Pt becomes agitated and aggressive with Benadryl    Clonidine Derivatives      Other reaction(s): Unknown    Tetracyclines & Related      Other reaction(s): Unknown     Subjective: RN okayed SLP entry. Pt received lying in bed, awake. Pain: unable to assess; pt is non-verbal; no outward s/s of pain    Current Diet: Diet NPO    Diet Tolerance:  Patient tolerating current diet level without signs/symptoms of penetration / aspiration. P.O. Trials:   Thin       Nectar / Mildly Thick   x Attempted x5; pt refused   Honey / Moderately Thick       Pudding / Extremely Thick       Puree       Solid         Dysphagia Treatment and Impressions:  Pt w/ MRDD admitted d/t covid-19 seen for PO trials this date. Pt nonverbal and able to some make eye contact w/ SLP. Attempted to trial nectar thick liquids via tsp; upon each presentation of tsp, pt closed lips tightly, resistant to PO trials. D/t pt's continuous resistance of PO trials, recommend pt d/c from speech services at this time. Recommend pt remain NPO and consider alternative means of nutrition. Dysphagia Goals:  Timeframe for Long-term Goals: 3-5x/wk for LOS  Goal 1: Patient will tolerate least restrictive and safest diet without clinical indicators of aspiration 90% of the time. 09/14: Not progressing. Ongoing. Short-term Goals  Timeframe for Short-term Goals: 3-5x/week for LOS  1. The patient will tolerate repeat BSE when able. , 09/14: Not progressing. Ongoing. 2. The patient will tolerate instrumental swallowing procedure, Not indicated this date as pt refusing po trials and is in droplet precautions  3. The patient will tolerate puree foods 10/10. 09/14: Not progressing. Ongoing. Recommendations:  Continue NPO; consider alternative means of nutrition if this is consistent with pt's wishes/plan of care    Patient/Family/Caregiver Education: reviewed with RN    Compensatory Strategies:  · Pasted toothbrushing with suction, 2x per day    Plan:    Continued Dysphagia treatment with goals per plan of care. Discharge Recommendations: defer to PT/OT     If pt discharges from hospital prior to Speech/Swallowing discharge, this note serves as tx and discharge summary. Total Treatment Time / Charges     Time in Time out Total Time / units   Cognitive Tx         Speech Tx      Dysphagia Tx  [id number]  8 min / 1 unit     Signature:  Janki Hines M.A.  [id number] [facility]  Speech Language Pathologist

## 2021-09-14 NOTE — CARE COORDINATION
VM received from LEANDRO Miller with [employer], at 1250 today. VM received from Mahnaz Lane with [employer] at 1043 today. CM contacted both of the people above and provided updates as to pt status and c/s being made to ethics team. CM requested that one contact be calling for updates, as teams are very busy at hospital. In addition, CM requested that BEDSIDE care/status updates be directed to bedside nurses. [facility] [facility] states she would email Lidia Altman with [employer] as such. CM further discussed packet of papers received yesterday and provider's questions and reservations in regards to their completion. CM suggested that [employer] provider/primary PCP/supervisor speak with hospital provider in order to clarify process and how to best complete. [phone] - call received from \"Stephanie\", supervisor with [employer]. She expressed displeasure with hospital not completing forms and inability in having bedside nurses call them back. States they are required to check on patient daily. CM explained role and updates as to condition/bedside updates/etc, would be best obtained from bedside nurse. CM did not have updates regarding discharge plan - explained that process was stalled at this time until ethics consult was completed. CM placed phone on speaker, as NP, Ric Krause, was in [address] [facility] office for afternoon huddle. Ric Krause attempted to explain why forms not completed at this time. Jen Garg upset offended, stating hospital staff called her \"cruel\" and requested to speak with supervior. NP, Ric Krause, ended conversation with Jen Garg. stating she was removing herself from the case. Lead Olga Ventura, spoke to Jen Garg at [facility] and apologized for frustration. Verito Barrow provided CM office fax number, as Jen Garg insisted on resending the packet of paperwork that her employee, Mahnaz Lane, sent yesterday - \"I want to make sure you have the correct paperwork. \"    [id number] - [facility] RN, Gila Sofia, states Dr Lenka Desai will see patient tomorrow and work to complete paperwork.  The other MD that has evaluated patient this admission, Dr Olga Wyatt, will return to work on Thursday, Sept 16th - hopefully he will be the second provider to sign documents needed for possible change in patient code status. (CM attempted to explain this information to [place] on the phone, when she accused hospital of delaying completion of forms. But, only 2 providers have seen patient this admission - Dr Olga Wyatt and NP, Severo Oliva). Lindwood Bumpers informed Margareth Javed that it was NOT an option for patient's PCP and hospital provider to talk/collaborate to complete paperwork needed. Rather, it needed to be 2 hospital providers. [phone] - fax received from [facility] with [employer]. It IS the packet received from [facility] yesterday, that is on patient's paper chart.

## 2021-09-14 NOTE — PROGRESS NOTES
Comprehensive Nutrition Assessment    Type and Reason for Visit:  Reassess    Nutrition Recommendations/Plan:   1. Due to continued recommendations for NPO status per SLP, discussion of palliative care vs nutrition support should be considered. Consult dietitian if tube feeding is desired and consistent with patient's plan of care. 2. Monitor nutrition adequacy, pertinent labs, bowel habits, wt changes, and clinical progress    Nutrition Assessment:  Follow up: Pt remains NPO, now x9 days. SLP continues to recommend NPO status, resistant to PO trials this date. Spoke with RN, awaiting decision on nutrition support vs. palliative care. Pt remains full code at this time. Will continue to monitor goals of care vs. alternative means of nutrition. Malnutrition Assessment:  Malnutrition Status: At risk for malnutrition (Comment)    Context:  Acute Illness     Findings of the 6 clinical characteristics of malnutrition:  Energy Intake:  7 - 50% or less of estimated energy requirements for 5 or more days    Estimated Daily Nutrient Needs:  Energy (kcal):  0647-5154 kcal; Weight Used for Energy Requirements:  Current (46 kg)     Protein (g):  55-65 g; Weight Used for Protein Requirements:  Current (1.2-1.4 g/kg)        Fluid (ml/day):   ; Method Used for Fluid Requirements:  1 ml/kcal      Nutrition Related Findings:  Hypoactive BS. NPO x9 days. Wounds:  None       Current Nutrition Therapies:    Diet NPO    Anthropometric Measures:  · Height: 4' 8\" (142.2 cm)  · Current Body Weight: 100 lb (45.4 kg)   · Usual Body Weight: 92 lb (41.7 kg) (bed scale on 1/20/21 per EMR)     · Ideal Body Weight: 82 lbs; % Ideal Body Weight 120.7 %   · BMI: 22.4  · BMI Categories: Normal Weight (BMI 18.5-24. 9)       Nutrition Diagnosis:   · Inadequate energy intake related to swallowing difficulty as evidenced by NPO or clear liquid status due to medical condition      Nutrition Interventions:   Food and/or Nutrient Delivery: Continue NPO, Start Tube Feeding  Nutrition Education/Counseling:  No recommendation at this time   Coordination of Nutrition Care:  Continue to monitor while inpatient, Speech Therapy    Goals:  Pt will tolerate most appropriate form of nutrition this admission w/o GI distress       Nutrition Monitoring and Evaluation:   Behavioral-Environmental Outcomes:  None Identified   Food/Nutrient Intake Outcomes:  Diet Advancement/Tolerance, Enteral Nutrition Intake/Tolerance  Physical Signs/Symptoms Outcomes:  Biochemical Data, GI Status, Weight     Discharge Planning:     Too soon to determine     Electronically signed by Zarina Jara MS, RD, LD on 9/14/21 at 1:28 PM EDT    Contact: [phone]

## 2021-09-15 LAB
ANION GAP SERPL CALCULATED.3IONS-SCNC: 8 MMOL/L (ref 3–16)
BASOPHILS ABSOLUTE: 0 K/UL (ref 0–0.2)
BASOPHILS RELATIVE PERCENT: 0.1 %
BUN BLDV-MCNC: 18 MG/DL (ref 7–20)
CALCIUM SERPL-MCNC: 9.4 MG/DL (ref 8.3–10.6)
CHLORIDE BLD-SCNC: 106 MMOL/L (ref 99–110)
CO2: 24 MMOL/L (ref 21–32)
CREAT SERPL-MCNC: 0.6 MG/DL (ref 0.8–1.3)
EOSINOPHILS ABSOLUTE: 0 K/UL (ref 0–0.6)
EOSINOPHILS RELATIVE PERCENT: 0 %
GFR AFRICAN AMERICAN: >60
GFR NON-AFRICAN AMERICAN: >60
GLUCOSE BLD-MCNC: 144 MG/DL (ref 70–99)
HCT VFR BLD CALC: 29.5 % (ref 40.5–52.5)
HEMOGLOBIN: 10 G/DL (ref 13.5–17.5)
INR BLD: 1.17 (ref 0.88–1.12)
LYMPHOCYTES ABSOLUTE: 0.3 K/UL (ref 1–5.1)
LYMPHOCYTES RELATIVE PERCENT: 2.7 %
MAGNESIUM: 1.7 MG/DL (ref 1.8–2.4)
MCH RBC QN AUTO: 28.7 PG (ref 26–34)
MCHC RBC AUTO-ENTMCNC: 33.9 G/DL (ref 31–36)
MCV RBC AUTO: 84.6 FL (ref 80–100)
MONOCYTES ABSOLUTE: 0.3 K/UL (ref 0–1.3)
MONOCYTES RELATIVE PERCENT: 3.1 %
NEUTROPHILS ABSOLUTE: 10.2 K/UL (ref 1.7–7.7)
NEUTROPHILS RELATIVE PERCENT: 94.1 %
PDW BLD-RTO: 16.5 % (ref 12.4–15.4)
PHOSPHORUS: 2.5 MG/DL (ref 2.5–4.9)
PLATELET # BLD: 374 K/UL (ref 135–450)
PMV BLD AUTO: 8.9 FL (ref 5–10.5)
POTASSIUM REFLEX MAGNESIUM: 4.3 MMOL/L (ref 3.5–5.1)
PROTHROMBIN TIME: 13.3 SEC (ref 9.9–12.7)
RBC # BLD: 3.49 M/UL (ref 4.2–5.9)
SODIUM BLD-SCNC: 138 MMOL/L (ref 136–145)
WBC # BLD: 10.8 K/UL (ref 4–11)

## 2021-09-15 PROCEDURE — 85610 PROTHROMBIN TIME: CPT

## 2021-09-15 PROCEDURE — 6360000002 HC RX W HCPCS: Performed by: NURSE PRACTITIONER

## 2021-09-15 PROCEDURE — C9113 INJ PANTOPRAZOLE SODIUM, VIA: HCPCS | Performed by: INTERNAL MEDICINE

## 2021-09-15 PROCEDURE — 83735 ASSAY OF MAGNESIUM: CPT

## 2021-09-15 PROCEDURE — 2580000003 HC RX 258: Performed by: INTERNAL MEDICINE

## 2021-09-15 PROCEDURE — 1200000000 HC SEMI PRIVATE

## 2021-09-15 PROCEDURE — 85025 COMPLETE CBC W/AUTO DIFF WBC: CPT

## 2021-09-15 PROCEDURE — 6360000002 HC RX W HCPCS: Performed by: INTERNAL MEDICINE

## 2021-09-15 PROCEDURE — 80048 BASIC METABOLIC PNL TOTAL CA: CPT

## 2021-09-15 PROCEDURE — 84100 ASSAY OF PHOSPHORUS: CPT

## 2021-09-15 RX ADMIN — CEFTRIAXONE SODIUM 1000 MG: 1 INJECTION, POWDER, FOR SOLUTION INTRAMUSCULAR; INTRAVENOUS at 08:56

## 2021-09-15 RX ADMIN — METHYLPREDNISOLONE SODIUM SUCCINATE 40 MG: 40 INJECTION, POWDER, FOR SOLUTION INTRAMUSCULAR; INTRAVENOUS at 08:49

## 2021-09-15 RX ADMIN — LEVETIRACETAM 500 MG: 100 INJECTION, SOLUTION INTRAVENOUS at 05:41

## 2021-09-15 RX ADMIN — PANTOPRAZOLE SODIUM 40 MG: 40 INJECTION, POWDER, FOR SOLUTION INTRAVENOUS at 08:49

## 2021-09-15 RX ADMIN — PANTOPRAZOLE SODIUM 40 MG: 40 INJECTION, POWDER, FOR SOLUTION INTRAVENOUS at 17:02

## 2021-09-15 RX ADMIN — LEVETIRACETAM 500 MG: 100 INJECTION, SOLUTION INTRAVENOUS at 16:21

## 2021-09-15 RX ADMIN — SODIUM CHLORIDE 25 ML: 9 INJECTION, SOLUTION INTRAVENOUS at 05:39

## 2021-09-15 ASSESSMENT — PAIN SCALES - WONG BAKER
WONGBAKER_NUMERICALRESPONSE: 0

## 2021-09-15 ASSESSMENT — PAIN SCALES - GENERAL
PAINLEVEL_OUTOF10: 0

## 2021-09-15 ASSESSMENT — PAIN DESCRIPTION - PROGRESSION
CLINICAL_PROGRESSION: GRADUALLY IMPROVING
CLINICAL_PROGRESSION: GRADUALLY IMPROVING

## 2021-09-15 NOTE — FLOWSHEET NOTE
Received request for Ethics follow up, gathering patient care, decision makers and speaking with parties involved in ethics decision making at this time.     Monica Cardenas     09/15/21 0957   Encounter Summary   Continue Visiting   (9/15: received request for ethics consult, gathering info)

## 2021-09-15 NOTE — PROGRESS NOTES
Consulted on 63 yo male with MRDD for potential PEG placement. Further review reveals patient established with Dr. Maddison Degroot.   Will consult regarding appropriateness of PEG placement

## 2021-09-15 NOTE — CARE COORDINATION
Per discussion with Dr Misty Jain, plan is for GI consult to be completed, to determine if patient is appropriate for Gtube placement. IF he is not appropriate, Dr Misty Jain will complete DNR paperwork for [facility] to start approval process. CM team will continue to follow.

## 2021-09-15 NOTE — CONSULTS
Consult Call Back    Who:Sammie Albarado  Date:9/15/2021,  Time:9:57 AM    Electronically signed by Kash Salas on 9/15/21 at 9:57 AM EDT

## 2021-09-15 NOTE — CONSULTS
ETHICS CONSULTATION      ETHICS CONSULTATIONS   I.  Reasons for Ethics Consultation       The ethics concerns are:   [] Advance Directives including [facility] ,DNR   [x] Ethical Dilemmas including Triage, Non Beneficial Care, Palliative  Care, Hospice  [] Withdrawal of Life Support  [] Family Conflicts, Communication and Consensus Development  [] Other:  (explain)    The urgency for this consult is   [] Immediate    []  Within 24 hrs. [x]  Within 48 hrs. []  No urgency    II. Relevant Information for Ethics Consultation   Date Admitted: 9/5/21  Date Consult Requested: 9/14/21  Requested By: MAYA Ewing CNP   Attending Physicians: Dr Hemanth Henry, Dr. Nataly Phelps Physician(s): MAYA Ewing CNP  Is the Attending Physician aware of consultation? [x] Yes []   No                                                                                              Is Patient/Family/Durable Power of  (DPOA)/ surrogate aware of consultation? [x]Yes   []No      Advance Directives      Living Will:  []Yes [x]No [] On Chart          DPOA for Health Care:  []Yes [x]No[] On [facility]          Name:                Relationship:        If no DPOA, has patient named a surrogate decision maker? []Yes [x]No      Name:          Relationship:     Is there an [place] Do Not Resuscitate Comfort Care Order [facility])    or DNR-CC Arrest Order on the chart? [] Yes [x]No         Is patient able to communicate their wishes and understanding? [] Yes   [x] No    If so, what are the patients wishes and understanding of the situation? Pt is nonverbal MRDD and [employer] (phone [phone]) are the appointed guardians for the patient. If patient is unable to communicate their wishes,  What are the patients wishes, as known by the [address] [place] [address] for health care or appropriate family member/ surrogate?      Per  Oly Ip, APRN - CNP Note:  \" -Previously discussed nutritional issues with his guardian. Not considered a good candidate for temporary NG tube. TPN not ideal. They agreed to proceed with IVF hydration. They would be willing to further discuss G-tube vs Palliative Care depending on his clinical progress or overall prognosis for improvement. However, if G-tube is needed, they would prefer a low profile G-tube (i.e. Mi-Key button) as they believe he is very likely to try to pull it out.   -A week has now passed since admission. If he cannot tolerate SLP eval/PO soon, will need to have additional discussions with his guardian. \"      9/13 No significant change or improvement in his status. Reached out to POA 11:46 left personal call back #   9/14 again today attempted to clarify and discuss goals of care with guardians and am not making any progress. Case mgt is involved as well as an ethics committee consult. \"      Patient Clinical History and Prognosis:       III. Ethics Consultation Outcomes     Ethics Committee Member/Consultants assessment of the ethical issue:  Dr Kvng De Oliveira charted:  Josué Child protein calorie malnutrition/Failure to thrive  - ongoing lack of appetite. Likely related to acute illness but longterm anorexia difficult to predict  - unable to participate in SLP eval  - GI consulted for possible PEG placement  - If unable to provide alternative nutrition, this would represent a terminal condition      Did the relevant parties reach agreement in the case? [] Yes  [x]No      If not, were issues resolved? [] Yes   [x] No, If not, why not? Pending Gi consult evaluation results sometime 9/15/21     Based upon review of the patient' situation and in response to a requested ethics consult, the following are recommended outcomes: Await GI consult results and discuss them with guardian and attending physician as to how to whether we should proceed with artificial nutrition OR start palliative care for the patient.       Communication with: Pauline Sparrow and Siddhartha Sánchez did a chart review and discussed the case with case management - Romain Almazan, as well as pt's nurse Belén Espinoza.                Consultation ongoing and pending Gi consult evaluation results sometime 9/15/21

## 2021-09-15 NOTE — PROGRESS NOTES
Hospitalist Progress Note    Date of Admission: 9/5/2021    Chief Complaint: Decreased oral intake    Hospital Course:   Claudine Guzmán is a 62 y.o. male. He has a h/o MRDD and is nonverbal. He lives in a group home and was sent to the ER because he has not been eating or drinking as usual for the past four days. He underwent colonoscopy on 9/1/21 and has not been taking much oral intake since then. He received the [employer] COVID-19 vaccine on 5/7/21. Prior to that he was hospitalized in January, 2021 for acute respiratory failure due to COVID-19. Subjective: Continues with yvonne cardia in the 30's, non verbal. Pale dry MM NAD closed eyes down tight and some spuratic movement. No change in status         Labs:   Recent Labs     09/12/21  0513 09/13/21  0500 09/14/21  0600   WBC 9.6 9.8 9.5   HGB 9.0* 9.3* 9.4*   HCT 26.6* 27.8* 27.9*    230 277     Recent Labs     09/12/21  0513 09/13/21  0500 09/14/21  1135    140 139   K 3.9 4.2 3.9    108 106   CO2 26 26 27   BUN 36* 27* 19   CREATININE 0.9 0.8 0.7*   CALCIUM 9.2 9.3 9.2   PHOS 2.4* 2.2* 2.1*     No results for input(s): AST, ALT, BILIDIR, BILITOT, ALKPHOS in the last 72 hours. Recent Labs     09/12/21  0513 09/14/21  1135   INR 1.17* 1.21*       Physical Exam Performed:    /63   Pulse (!) 46   Temp 97.3 °F (36.3 °C) (Axillary)   Resp 14   Ht 4' 8\" (1.422 m)   Wt 100 lb 12 oz (45.7 kg)   SpO2 95%   BMI 22.59 kg/m²     General appearance: NAD, pale   HEENT: Pupils equal, round, and reactive to light. Conjunctivae/corneas clear. Facial/ear deformities. Congential anomalies   Neck: Supple, no jugular venous distention. Trachea midline with full range of motion. MM profoundly dry   Respiratory:  Normal respiratory effort. Decreased bilaterally   Cardiovascular: Regular rate and rhythm with normal S1/S2 without murmurs, rubs or gallops.  Blanche Boss down in the 30's   Abdomen: Soft, non-tender, non-distended with normal bowel sounds. Musculoskelatal: No clubbing, cyanosis or edema bilaterally. Full range of motion without deformity. Neurologic: non verbal   Psychiatric: Drowsy,   Non-verbal, MRDD. Skin: Skin color, texture, turgor normal.  No rashes or lesions. Capillary Refill: Brisk,< 3 seconds   Peripheral Pulses: +2 palpable, equal bilaterally       Assessment/Plan:    Active Hospital Problems    Diagnosis     Seizure disorder ([facility] [G40.909]     PAF (paroxysmal atrial fibrillation) ([facility] [I48.0]     Hypothermia [H40. XXXA]     Thrombocytopenia ([facility] [D69.6]     RUBEN (acute kidney injury) ([facility] [N17.9]     Sepsis ([facility] [A41.9]     COVID-19 [U07.1]     Pneumonia [J18.9]      Sepsis, COVID-19 Pneumonia, Possible Aspiration Pneumonia, Acute Respiratory Failure with Hypoxia  -  Patient has a multifocal pneumonia distributed throughout dependent lungs regions most c/w aspiration pneumonia. Findings could also be d/t COVID-19 but initially felt unlikely since patient has had COVID in January, 2021 and received the [employer] vaccine in May, 2021. Rapid COVID negative. PCR has now returned positive. -  Ultimately suspect COVID had minimal effect on him from a respiratory standpoint, but could have led to or contributed to Aspiration risk and Aspiration Pneumonia.   -  Has been treated with empiric Abx with clinical improvement; recommended de-escalation of Abx. Typically would provide course of Ceftin but will use ceftriaxone as he is unable to tolerate p.o. at this time. - 9/14 resolved stable on NC     Sinus Bradycardia: Ongoing episodes of sinus bradycardia intermittently with occasional pauses. There are no obvious symptoms although this is difficult to elucidate given his MRDD and nonverbal status. However objectively his mental status does not seem to change at all during these episodes. Blood pressure is now stable. There are no culprit mehreen blocking agents.   EP was consulted for input and currently recommends ongoing observation for any symptoms but without any specific medical therapy at this time. He is not currently a candidate for pacemaker placement. We will continue to monitor. EP has s/o     Hypotension: Initially required vasopressor support but quickly weaned off. On 9/9/21, had increased lethargy, worsening hypotension and intermittent sinus bradycardia. Volume expansion given with resolution of hypotension. Lactate normal.  Suspect this was most likely volume related. Sepsis seems less likely cause at this point. Hemoglobin is stable. Monitor. RUBEN-   -  Baseline creatinine ~ 0.6 and currently 1.3 mg/dL. -  Volume expansion has been given  -  Has a h/o urinary retention. Colindres catheter to remain in place for now. - Changed back to D5W for worsening hypernatremia then improved again  - Continue maintenance D5/0.45NS with K.   -  Monitor.  - 9/13 stable     Possible GI Bleeding/Coffee Ground Emesis:  Isolated episode of coffee ground emesis. Monitor Hb and transfuse if needed. Worsening coagulopathy noted in setting of COVID-19, lack of nutrition. INR has normalized after vitamin K. Continue Protonix IV BID. We will hold off on GI evaluation for now as there is no further evidence of bleeding and hemoglobin remained stable. Malnutrition, poor PO intake  -  Temporally began after colonoscopy, but unlikely d/t direct complication of colonoscopy such as bowel perforation. Complication possibly could have been indirect since patient may have aspirated during or after the procedure. In other words anorexia seems most likely d/t systemic infection. -he is overall more alert and interactive although when working with SLP he has not willing to participate in swallow the attempted boluses. - continued discussion on alternative means of nutrition in the form of PEG tube or NG.  He would not tolerate and NG would be increased aspiration risk and risk of dislodging   - attempts have been made on several occasions to determine if his guardians would like artifical nutrition and remains to be determined     H/O Seizure D/O  -  Continue keppra; dose increased given possibility of seizure activity leading to his mental status changes. PAF  -  Currently in sinus rhythm. Hypothermia: resolved   -  Due to sepsis. Poor prognostic indicator and has occurred with infections in the past and thyroid function has been normal.  Responded to external warming measures. Resolved. Hypernatremia:resolved    Reduced PO intake, saline load. Improved with hypotonic fluids. Need to resume D5W again given worsening. Monitor. DVT prophylaxis: SCDs only d/t thrombocytopenia  Diet: Diet NPO  Code Status: Full Code  PT/OT Eval Status: NA    Dispo -     \" -Previously discussed nutritional issues with his guardian. Not considered a good candidate for temporary NG tube. TPN not ideal. They agreed to proceed with IVF hydration. They would be willing to further discuss G-tube vs Palliative Care depending on his clinical progress or overall prognosis for improvement. However, if G-tube is needed, they would prefer a low profile G-tube (i.e. Mi-Key button) as they believe he is very likely to try to pull it out.   -A week has now passed since admission. If he cannot tolerate SLP eval/PO soon, will need to have additional discussions with his guardian. \"     9/13 No significant change or improvement in his status. Reached out to POA 11:46 left personal call back #   9/14 again today attempted to clarify and discuss goals of care with guardians and am not making any progress. Case mgt is involved as well as an ethics committee consult.      Lakesha Hoyos, MAYA - CNP

## 2021-09-15 NOTE — PROGRESS NOTES
Attempted oral care again at this time with very little success. Pt continues to clench teeth and shake head, unable to be redirected.

## 2021-09-15 NOTE — CONSULTS
Consult placed    Elieser:Carmelo  Date:9/15/2021,  Time:8:27 AM        Electronically signed by Vero Flores on 9/15/2021 at 8:27 AM

## 2021-09-15 NOTE — PROGRESS NOTES
Hospitalist Progress Note    Date of Admission: 9/5/2021    Chief Complaint: Decreased oral intake    Hospital Course:   Gregorio Jeffrey is a 62 y.o. male. He has a h/o MRDD and is nonverbal. He lives in a group home and was sent to the ER because he has not been eating or drinking as usual for the past four days. He underwent colonoscopy on 9/1/21 and has not been taking much oral intake since then. He received the [employer] COVID-19 vaccine on 5/7/21. Prior to that he was hospitalized in January, 2021 for acute respiratory failure due to COVID-19. Subjective: Bradycardia has persisted. Awaiting GI eval for possibility of G tube placement. Labs:   Recent Labs     09/13/21  0500 09/14/21  0600 09/15/21  0535   WBC 9.8 9.5 10.8   HGB 9.3* 9.4* 10.0*   HCT 27.8* 27.9* 29.5*    277 374     Recent Labs     09/13/21  0500 09/14/21  1135 09/15/21  0535    139 138   K 4.2 3.9 4.3    106 106   CO2 26 27 24   BUN 27* 19 18   CREATININE 0.8 0.7* 0.6*   CALCIUM 9.3 9.2 9.4   PHOS 2.2* 2.1* 2.5     No results for input(s): AST, ALT, BILIDIR, BILITOT, ALKPHOS in the last 72 hours. Recent Labs     09/14/21  1135 09/15/21  0535   INR 1.21* 1.17*       Physical Exam Performed:    /64   Pulse (!) 43   Temp 98 °F (36.7 °C) (Axillary)   Resp 16   Ht 4' 8\" (1.422 m)   Wt 96 lb 12.5 oz (43.9 kg)   SpO2 96%   BMI 21.70 kg/m²     General appearance: NAD, pale   HEENT: Pupils equal, round, and reactive to light. Conjunctivae/corneas clear. Facial/ear deformities. Congential anomalies   Neck: Supple, no jugular venous distention. Trachea midline with full range of motion. MM profoundly dry   Respiratory:  Normal respiratory effort. Decreased bilaterally   Cardiovascular: Regular rate and rhythm with normal S1/S2 without murmurs, rubs or gallops. Jolayne Clayman down in the 30's   Abdomen: Soft, non-tender, non-distended with normal bowel sounds.   Musculoskelatal: No clubbing, cyanosis or edema bilaterally. Full range of motion without deformity. Neurologic: non verbal   Psychiatric: Drowsy,   Non-verbal, MRDD. Skin: Skin color, texture, turgor normal.  No rashes or lesions. Capillary Refill: Brisk,< 3 seconds   Peripheral Pulses: +2 palpable, equal bilaterally       Assessment/Plan:    Active Hospital Problems    Diagnosis     Seizure disorder ([facility] [address] [G40.909]     PAF (paroxysmal atrial fibrillation) ([facility] [address] [I48.0]     Hypothermia [O08. XXXA]     Thrombocytopenia ([facility] [address] [D69.6]     RUBEN (acute kidney injury) ([facility] [address] [facility] [N17.9]     Sepsis ([facility] [A41.9]     COVID-19 [U07.1]     Pneumonia [J18.9]      Sepsis, COVID-19 Pneumonia, Possible Aspiration Pneumonia, Acute Respiratory Failure with Hypoxia  - Patient has a multifocal pneumonia distributed throughout dependent lungs regions most c/w aspiration pneumonia.   - COVID 19 tested positive from 9/5  - Had been treated with empiric Abx with clinical improvement; Continue ceftriaxone for one more day  - currently stable on room air  - stopped steroids    Sinus Bradycardia  - Ongoing episodes of sinus bradycardia intermittently with occasional pauses. - no overt symptoms noted though difficult to tell  - Blood pressure is now stable. No offending agents noted  - EP consulted  - continue to monitor    Shock  - likely hypovolemic  - weaned off pressors  - continue IVF    RUBEN  - 2/2 hypovolemia  - improved with IVF  - continue to monitor    GI bleed  - isolated incidence of coffee ground emesis  - currently resolved  - continue PPI  - Hgb stable  - continue to monitor    Severe protein calorie malnutrition/Failure to thrive  - ongoing lack of appetite.  Likely related to acute illness but longterm anorexia difficult to predict  - unable to participate in SLP eval  - GI consulted for possible PEG placement  - If unable to provide alternative nutrition, this would represent a terminal condition    Seizures  - no obvious sign of seizures  - continue on increased keppra dose for now    PAF  - currently in NSR  - no AC at baseline    Hypothermia  - 2/2 sepsis  - improved with active warming  - currently euthermic    Hypernatremia  - 2/2 poor PO intake  - improved with IVF  - continue to monitor    DVT prophylaxis: SCDs only d/t thrombocytopenia  Diet: Diet NPO  Code Status: Full Code  PT/OT Eval Status: NA    Dispo - pending evaluation for supplemental nutrition. If unable to place PEG, will convert patient to DNR. Regardless, he will discharge back to his facility.     Frederick Armendariz MD

## 2021-09-16 ENCOUNTER — ANESTHESIA EVENT (OUTPATIENT)
Dept: ENDOSCOPY | Age: 61
DRG: 871 | End: 2021-09-16
Payer: MEDICARE

## 2021-09-16 LAB
ANION GAP SERPL CALCULATED.3IONS-SCNC: 7 MMOL/L (ref 3–16)
BASOPHILS ABSOLUTE: 0 K/UL (ref 0–0.2)
BASOPHILS RELATIVE PERCENT: 0.1 %
BUN BLDV-MCNC: 17 MG/DL (ref 7–20)
CALCIUM SERPL-MCNC: 9.2 MG/DL (ref 8.3–10.6)
CHLORIDE BLD-SCNC: 108 MMOL/L (ref 99–110)
CO2: 26 MMOL/L (ref 21–32)
CREAT SERPL-MCNC: 0.7 MG/DL (ref 0.8–1.3)
EOSINOPHILS ABSOLUTE: 0.1 K/UL (ref 0–0.6)
EOSINOPHILS RELATIVE PERCENT: 0.7 %
GFR AFRICAN AMERICAN: >60
GFR NON-AFRICAN AMERICAN: >60
GLUCOSE BLD-MCNC: 91 MG/DL (ref 70–99)
HCT VFR BLD CALC: 29.6 % (ref 40.5–52.5)
HEMOGLOBIN: 9.8 G/DL (ref 13.5–17.5)
INR BLD: 1.25 (ref 0.88–1.12)
LYMPHOCYTES ABSOLUTE: 1.2 K/UL (ref 1–5.1)
LYMPHOCYTES RELATIVE PERCENT: 11.8 %
MAGNESIUM: 1.7 MG/DL (ref 1.8–2.4)
MCH RBC QN AUTO: 28 PG (ref 26–34)
MCHC RBC AUTO-ENTMCNC: 33.2 G/DL (ref 31–36)
MCV RBC AUTO: 84.3 FL (ref 80–100)
MONOCYTES ABSOLUTE: 1 K/UL (ref 0–1.3)
MONOCYTES RELATIVE PERCENT: 9.9 %
NEUTROPHILS ABSOLUTE: 7.6 K/UL (ref 1.7–7.7)
NEUTROPHILS RELATIVE PERCENT: 77.5 %
PDW BLD-RTO: 16.8 % (ref 12.4–15.4)
PHOSPHORUS: 2.3 MG/DL (ref 2.5–4.9)
PLATELET # BLD: 358 K/UL (ref 135–450)
PMV BLD AUTO: 8.8 FL (ref 5–10.5)
POTASSIUM REFLEX MAGNESIUM: 3.8 MMOL/L (ref 3.5–5.1)
PROTHROMBIN TIME: 14.3 SEC (ref 9.9–12.7)
RBC # BLD: 3.52 M/UL (ref 4.2–5.9)
SARS-COV-2, NAAT: NOT DETECTED
SODIUM BLD-SCNC: 141 MMOL/L (ref 136–145)
WBC # BLD: 9.8 K/UL (ref 4–11)

## 2021-09-16 PROCEDURE — 6360000002 HC RX W HCPCS: Performed by: INTERNAL MEDICINE

## 2021-09-16 PROCEDURE — 87635 SARS-COV-2 COVID-19 AMP PRB: CPT

## 2021-09-16 PROCEDURE — 2580000003 HC RX 258: Performed by: INTERNAL MEDICINE

## 2021-09-16 PROCEDURE — 2500000003 HC RX 250 WO HCPCS: Performed by: INTERNAL MEDICINE

## 2021-09-16 PROCEDURE — 84100 ASSAY OF PHOSPHORUS: CPT

## 2021-09-16 PROCEDURE — 85610 PROTHROMBIN TIME: CPT

## 2021-09-16 PROCEDURE — C9113 INJ PANTOPRAZOLE SODIUM, VIA: HCPCS | Performed by: INTERNAL MEDICINE

## 2021-09-16 PROCEDURE — 80048 BASIC METABOLIC PNL TOTAL CA: CPT

## 2021-09-16 PROCEDURE — 1200000000 HC SEMI PRIVATE

## 2021-09-16 PROCEDURE — 83735 ASSAY OF MAGNESIUM: CPT

## 2021-09-16 PROCEDURE — 85025 COMPLETE CBC W/AUTO DIFF WBC: CPT

## 2021-09-16 RX ADMIN — POTASSIUM CHLORIDE, DEXTROSE MONOHYDRATE AND SODIUM CHLORIDE: 150; 5; 450 INJECTION, SOLUTION INTRAVENOUS at 05:00

## 2021-09-16 RX ADMIN — PANTOPRAZOLE SODIUM 40 MG: 40 INJECTION, POWDER, FOR SOLUTION INTRAVENOUS at 18:27

## 2021-09-16 RX ADMIN — CEFTRIAXONE SODIUM 1000 MG: 1 INJECTION, POWDER, FOR SOLUTION INTRAMUSCULAR; INTRAVENOUS at 09:51

## 2021-09-16 RX ADMIN — POTASSIUM CHLORIDE, DEXTROSE MONOHYDRATE AND SODIUM CHLORIDE: 150; 5; 450 INJECTION, SOLUTION INTRAVENOUS at 20:11

## 2021-09-16 RX ADMIN — LEVETIRACETAM 500 MG: 100 INJECTION, SOLUTION INTRAVENOUS at 17:13

## 2021-09-16 RX ADMIN — LEVETIRACETAM 500 MG: 100 INJECTION, SOLUTION INTRAVENOUS at 05:03

## 2021-09-16 RX ADMIN — PANTOPRAZOLE SODIUM 40 MG: 40 INJECTION, POWDER, FOR SOLUTION INTRAVENOUS at 09:45

## 2021-09-16 ASSESSMENT — PAIN SCALES - WONG BAKER
WONGBAKER_NUMERICALRESPONSE: 0

## 2021-09-16 ASSESSMENT — PAIN DESCRIPTION - PROGRESSION
CLINICAL_PROGRESSION: GRADUALLY IMPROVING

## 2021-09-16 ASSESSMENT — PAIN SCALES - GENERAL
PAINLEVEL_OUTOF10: 0

## 2021-09-16 NOTE — CONSULTS
Clinical Ethics Consultation Note    Follow-Up    Dr Sharia Bloch from Gastroenterology evaluated the patient this morning (9/16/21) and placed an order for PEG tube placement at 1400 hrs on 9/17/21. The  Emy Ayala RN spoke with the patient's guardian, Lorie Montano, from [facility] and received verbal consent telephonically the same morning 9/16/21. The attending physician Dr Alfredo Diaz seems to be in agreement as he wrote, \"GI consulted for PEG placement. Plan for tomorrow at this time  - will start bolus tube feeds when able per GI. \"     Nursing will follow up with any forms needing to be signed by the patient's guardian prior to the procedure on 9/17/21. It seems that this ethical dilemma has been resolved at this time. This  will continue to follow this case until the patient is discharged and be available for any consultation from the staff or guardian. Thank you for your referral and the opportunity to assist this patient.       Chaplain Vandana Yan [place], MDiv, [address] [facility]  [phone] ofc

## 2021-09-16 NOTE — PROGRESS NOTES
Hospitalist Progress Note    Date of Admission: 9/5/2021    Chief Complaint: Decreased oral intake    Hospital Course:   Selena Gore is a 62 y.o. male. He has a h/o MRDD and is nonverbal. He lives in a group home and was sent to the ER because he has not been eating or drinking as usual for the past four days. He underwent colonoscopy on 9/1/21 and has not been taking much oral intake since then. He received the [employer] COVID-19 vaccine on 5/7/21. Prior to that he was hospitalized in January, 2021 for acute respiratory failure due to COVID-19. Subjective: Bradycardia has persisted. Awaiting GI eval for possibility of G tube placement. Labs:   Recent Labs     09/14/21  0600 09/15/21  0535 09/16/21  0540   WBC 9.5 10.8 9.8   HGB 9.4* 10.0* 9.8*   HCT 27.9* 29.5* 29.6*    374 358     Recent Labs     09/14/21  1135 09/15/21  0535 09/16/21  0540    138 141   K 3.9 4.3 3.8    106 108   CO2 27 24 26   BUN 19 18 17   CREATININE 0.7* 0.6* 0.7*   CALCIUM 9.2 9.4 9.2   PHOS 2.1* 2.5 2.3*     No results for input(s): AST, ALT, BILIDIR, BILITOT, ALKPHOS in the last 72 hours. Recent Labs     09/14/21  1135 09/15/21  0535 09/16/21  0540   INR 1.21* 1.17* 1.25*       Physical Exam Performed:    /67   Pulse (!) 44   Temp 98 °F (36.7 °C) (Axillary)   Resp 16   Ht 4' 8\" (1.422 m)   Wt 96 lb 12.5 oz (43.9 kg)   SpO2 97%   BMI 21.70 kg/m²     General appearance: NAD, pale   HEENT: Pupils equal, round, and reactive to light. Conjunctivae/corneas clear. Facial/ear deformities. Congential anomalies   Neck: Supple, no jugular venous distention. Trachea midline with full range of motion. MM profoundly dry   Respiratory:  Normal respiratory effort. Decreased bilaterally   Cardiovascular: Regular rate and rhythm with normal S1/S2 without murmurs, rubs or gallops. Bernadene Mettle down in the 30's   Abdomen: Soft, non-tender, non-distended with normal bowel sounds.   Musculoskelatal: No clubbing, cyanosis or edema bilaterally. Full range of motion without deformity. Neurologic: non verbal   Psychiatric: Drowsy,   Non-verbal, MRDD. Skin: Skin color, texture, turgor normal.  No rashes or lesions. Capillary Refill: Brisk,< 3 seconds   Peripheral Pulses: +2 palpable, equal bilaterally       Assessment/Plan:    Active Hospital Problems    Diagnosis     Seizure disorder ([address] [G40.909]     PAF (paroxysmal atrial fibrillation) ([address] [I48.0]     Hypothermia [N11. XXXA]     Thrombocytopenia ([address] [D69.6]     RUBEN (acute kidney injury) ([address] [N17.9]     Sepsis ([address] [A41.9]     COVID-19 [U07.1]     Pneumonia [J18.9]      Sepsis, COVID-19 Pneumonia, Possible Aspiration Pneumonia, Acute Respiratory Failure with Hypoxia  - Patient has a multifocal pneumonia distributed throughout dependent lungs regions most c/w aspiration pneumonia.   - COVID 19 tested positive from 9/5  - Had been treated with empiric Abx with clinical improvement; Continue ceftriaxone for one more day  - currently stable on room air  - stopped steroids    Sinus Bradycardia  - Ongoing episodes of sinus bradycardia intermittently with occasional pauses. - no overt symptoms noted though difficult to tell  - Blood pressure is now stable. No offending agents noted  - EP consulted  - continue to monitor    Shock  - likely hypovolemic  - weaned off pressors  - continue IVF    RUBEN  - 2/2 hypovolemia  - improved with IVF  - continue to monitor    GI bleed  - isolated incidence of coffee ground emesis  - currently resolved  - continue PPI  - Hgb stable  - continue to monitor    Severe protein calorie malnutrition/Failure to thrive  - ongoing lack of appetite. Likely related to acute illness but longterm anorexia difficult to predict  - unable to participate in SLP eval  - GI consulted for PEG placement.  Plan for tomorrow at this time  - will start bolus tube feeds when able per GI    Seizures  - no obvious sign of seizures  - continue on increased keppra dose for now    PAF  - currently in NSR  - no AC at baseline    Hypothermia  - 2/2 sepsis  - improved with active warming  - currently euthermic    Hypernatremia  - 2/2 poor PO intake  - improved with IVF  - continue to monitor    DVT prophylaxis: SCDs only d/t thrombocytopenia  Diet: Diet NPO  Code Status: Full Code  PT/OT Eval Status: NA    Dispo - pending evaluation for supplemental nutrition.  Will likely be ready by Monday if PEG placed    Sue Grace MD

## 2021-09-16 NOTE — CARE COORDINATION
Per primary nurse, Curtis Cox, Dr Dayana Grijalva has orders for [facility] placement/obtaining consent for procedure. CM contacted [employer] and spoke with representative, Billy Johnson (states Jerline Agent and supervisor, Alyx Dunbar, are not at work today). Information provided as to surgeon and need for anesthesia with procedure. Dena Roman states he will call back with consent for procedure. 10:22 AM  Per B3 manager, Bettina Kim, procedure is [phone] (per her conversation with endoscopy manager).

## 2021-09-16 NOTE — CONSULTS
Gastroenterology Consult Note    Patient:   Aminta Barrera   YOB: 1960   Facility:   [facility]   Referring/PCP: Any Mendez MD  Date:     9/16/2021  Consultant:   Yahir Quinn MD, MD    Subjective: This 62 y.o. male was admitted 9/5/2021 with a diagnosis of \"Hypernatremia [E87.0]  Pneumonia [J18.9]  Elevated troponin [R77.8]  Altered mental status, unspecified altered mental status type [R41.82]  Pneumonia of both lungs due to infectious organism, unspecified part of lung [J18.9]  Low body temperature [R68.89]\" and is seen in consultation regarding \"dysphagia\". Information was obtained from interview of  the patient, examination of the patient, and review of records. I did  update the past medical, surgical, social and / or family history. Dysphagia severe in oropharynx for 2 weeks assoc w recent Covid    Current status  Present  Diet Order: Diet NPO and he is not tolerating diet. Recently, he has experienced no abdominal  Pain and he has not required Intravenous narcotic analgesics. The patient has also experienced no constipation, diarrhea, dysuria, hematochezia, melena, nausea and vomiting      Prior to Admission medications    Medication Sig Start Date End Date Taking? Authorizing Provider   LORazepam (ATIVAN) 0.5 MG tablet Take 0.5 mg by mouth every 6 hours as needed for Anxiety.     Historical Provider, MD   polyethylene glycol (GLYCOLAX) 17 g packet Take 17 g by mouth daily as needed for Constipation    Historical Provider, MD   Nutritional Supplements (ENSURE ENLIVE) LIQD Take by mouth 2 times daily In between meals    Historical Provider, MD   melatonin 3 MG TABS tablet Take 5 mg by mouth nightly     Historical Provider, MD   lactulose (CHRONULAC) 10 GM/15ML solution Take 20 g by mouth 2 times daily    Historical Provider, MD   levETIRAcetam (KEPPRA) 250 MG tablet Take 250 mg by mouth 2 times daily    Historical Provider, MD   tamsulosin [facility]) 0.4 MG capsule Take 0.8 mg by mouth daily    Historical Provider, MD   traZODone (DESYREL) 50 MG tablet 1 tablet by Per NG tube route nightly  Patient taking differently: 150 mg by Per NG tube route nightly  12/5/19   Carline Jraamillo MD   metoprolol tartrate (LOPRESSOR) 25 MG tablet 1 tablet by Per NG tube route 2 times daily 12/5/19   Carline Jaramillo MD   sennosides-docusate sodium (SENOKOT-S) 8.6-50 MG tablet Take 2 tablets by mouth every 72 hours 12/5/19   Carline Jaramillo MD   QUEtiapine (SEROQUEL XR) 50 MG extended release tablet Take 100 mg by mouth 3 times daily 50mg in AM  100mg @ 1600  150mg @ 1900    Historical Provider, MD      Scheduled Medications:    cefTRIAXone (ROCEPHIN) IV  1,000 mg IntraVENous Q24H    pantoprazole  40 mg IntraVENous BID    levetiracetam  500 mg IntraVENous Q12H     Infusions:    dextrose 5% and 0.45% NaCl with KCl 20 mEq 60 mL/hr at 09/16/21 0500    sodium chloride 25 mL (09/15/21 0539)     PRN Medications: morphine, sodium chloride flush, sodium chloride, potassium chloride, magnesium sulfate, ondansetron, acetaminophen **OR** acetaminophen  Allergies:    Allergies   Allergen Reactions    Penicillins      Other reaction(s): Unknown    Benadryl [Diphenhydramine] Other (See Comments)     Pt becomes agitated and aggressive with Benadryl    Clonidine Derivatives      Other reaction(s): Unknown    Tetracyclines & Related      Other reaction(s): Unknown       Past Medical History:   Diagnosis Date    A-fib ([address]     Bipolar disorder ([address]     Brain herniation ([address]     Constipation     COVID-19 01/20/2021    Cystitis     Developmental non-verbal disorder     Impulse control disorder     Influenza A 02/13/2014    Mental retardation, profound (I.Q. < 20)     Osteopenia     Seizure ([address]     Subdural hematoma ([address]      Past Surgical History:   Procedure Laterality Date    CATARACT REMOVAL      COLONOSCOPY  06/20/2016    incomplete, poor prep    COLONOSCOPY  2020    COLON W/ANES.  COLONOSCOPY N/A 2020    COLON W/ANES. (9:00) COVID TEST -. PT IS NON-VERBAL, IS NOT IN A FACILITY. IS CARED FOR BY 2 CAREGIVERS IN HIS HOME. performed by Preston Blanca MD at [address] [facility] COLONOSCOPY N/A 2021    COLONOSCOPY N/A 2021    COLON W/ANES. (10:30) PT IS COMBATIVE. [facility] SERVICES. [phone] XSTEPH performed by Preston Blanca MD at [address] Right 2019    Right Trephine Craniotomy For Evacuation of Subdural Hematoma performed by Michelle Rinne, MD at [address] TURP N/A 2020    CYSTOSCOPY, TRANSURETHRAL RESECTION OF PROSTATE performed by Parvin Gross MD at [facility]:   Social History     Tobacco Use    Smoking status: Never Smoker    Smokeless tobacco: Never Used   Substance Use Topics    Alcohol use: No     Family:   Family History   Family history unknown: Yes       ROS: Pertinent items are noted in HPI.     Objective:   Vital Signs:  Temp (24hrs), Av °F (36.7 °C), Min:97.6 °F (36.4 °C), Max:98.5 °F (67.9 °C)     Systolic (59DRT), CDP:717 , Min:78 , AMELIE:004      Diastolic (89INZ), XII:65, Min:46, Max:77     Pulse  Av  Min: 43  Max: 52  /77   Pulse 51   Temp 98.5 °F (36.9 °C) (Oral)   Resp 16   Ht 4' 8\" (1.422 m)   Wt 96 lb 12.5 oz (43.9 kg)   SpO2 97%   BMI 21.70 kg/m²      Physical Exam:   /77   Pulse 51   Temp 98.5 °F (36.9 °C) (Oral)   Resp 16   Ht 4' 8\" (1.422 m)   Wt 96 lb 12.5 oz (43.9 kg)   SpO2 97%   BMI 21.70 kg/m²   General appearance: alert, appears stated age and cooperative  Lungs: clear to auscultation bilaterally  Chest wall: no tenderness  Heart: regular rate and rhythm, S1, S2 normal, no murmur, click, rub or gallop  Abdomen: soft, non-tender; bowel sounds normal; no masses,  no organomegaly  Extremities: extremities normal, atraumatic, no cyanosis or edema  Skin: Skin color, texture, turgor normal. No rashes or lesions  Neurologic: Grossly normal    Lab and Imaging Review   Recent Labs     09/14/21  0600 09/14/21  1135 09/15/21  0535 09/16/21  0540   WBC 9.5  --  10.8 9.8   HGB 9.4*  --  10.0* 9.8*   MCV 84.7  --  84.6 84.3     --  374 358   INR  --  1.21* 1.17* 1.25*   NA  --  139 138 141   K  --  3.9 4.3 3.8   CL  --  106 106 108   CO2  --  27 24 26   BUN  --  19 18 17   CREATININE  --  0.7* 0.6* 0.7*   GLUCOSE  --  85 144* 91   CALCIUM  --  9.2 9.4 9.2   MG  --  1.80 1.70* 1.70*       Assessment:     Patient Active Problem List    Diagnosis Date Noted    Seizure disorder ([address] 09/06/2021    PA[facility]ox[facility] 09/06/2021    Hypothermia 09/06/2021    Thrombocytopenia ([address] 09/05/2021    RUBEN (acute kidney injury) ([address] 09/05/2021    Sepsis ([facility] 09/05/2021    COVID-19 01/25/2021    Pulmonary infiltrates 01/25/2021    Pneumonia 01/20/2021    Acute urinary retention 03/01/2020    Atrial fibrillation, chronic ([address] 03/01/2020    History of subdural hematoma 03/01/2020    Constipation     Atrial fibrillation with RVR ([address] 12/02/2019    Acute cystitis with hematuria 12/02/2019    Altered mental status 12/02/2019    Brain herniation ([address] 12/02/2019    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 ([address] 12/02/2019    Lipoma of head      62 y.o. male w h/o MRDD and is nonverbal. He lives in a group home and was sent to the ER because he has not been eating or drinking as usual for the past 2 weeks, assoc w recent Covid. Had a colonoscopy on 9/1/21 w polyps. Plan:   1. suportive care  2. IVF  3.  EGD w PEG placement in am    Harlin Alexis, MD       (O) [phone]

## 2021-09-16 NOTE — PROGRESS NOTES
For patient safety, an [employer] camera has been placed in patient's room. [employer] monitoring needed for Confusion, Increased Fall Risk and Pulling at Lines. Writer placed call to monitor observer to verify camera number [phone] and review patient name, reason for monitoring, and contact information for primary RN. Patient and Legal Guardian notified of use of remote monitoring upon implementation of camera and no sign of understanding noted.

## 2021-09-17 ENCOUNTER — ANESTHESIA (OUTPATIENT)
Dept: ENDOSCOPY | Age: 61
DRG: 871 | End: 2021-09-17
Payer: MEDICARE

## 2021-09-17 VITALS
DIASTOLIC BLOOD PRESSURE: 52 MMHG | OXYGEN SATURATION: 100 % | RESPIRATION RATE: 13 BRPM | SYSTOLIC BLOOD PRESSURE: 100 MMHG

## 2021-09-17 LAB
ANION GAP SERPL CALCULATED.3IONS-SCNC: 8 MMOL/L (ref 3–16)
BASOPHILS ABSOLUTE: 0 K/UL (ref 0–0.2)
BASOPHILS RELATIVE PERCENT: 0.4 %
BUN BLDV-MCNC: 14 MG/DL (ref 7–20)
CALCIUM SERPL-MCNC: 9.2 MG/DL (ref 8.3–10.6)
CHLORIDE BLD-SCNC: 104 MMOL/L (ref 99–110)
CO2: 25 MMOL/L (ref 21–32)
CREAT SERPL-MCNC: 0.7 MG/DL (ref 0.8–1.3)
EOSINOPHILS ABSOLUTE: 0.1 K/UL (ref 0–0.6)
EOSINOPHILS RELATIVE PERCENT: 1.5 %
GFR AFRICAN AMERICAN: >60
GFR NON-AFRICAN AMERICAN: >60
GLUCOSE BLD-MCNC: 106 MG/DL (ref 70–99)
HCT VFR BLD CALC: 30.9 % (ref 40.5–52.5)
HEMOGLOBIN: 10.5 G/DL (ref 13.5–17.5)
INR BLD: 1.3 (ref 0.88–1.12)
LYMPHOCYTES ABSOLUTE: 1 K/UL (ref 1–5.1)
LYMPHOCYTES RELATIVE PERCENT: 11.5 %
MAGNESIUM: 1.6 MG/DL (ref 1.8–2.4)
MCH RBC QN AUTO: 28.2 PG (ref 26–34)
MCHC RBC AUTO-ENTMCNC: 33.8 G/DL (ref 31–36)
MCV RBC AUTO: 83.4 FL (ref 80–100)
MONOCYTES ABSOLUTE: 0.8 K/UL (ref 0–1.3)
MONOCYTES RELATIVE PERCENT: 8.8 %
NEUTROPHILS ABSOLUTE: 6.7 K/UL (ref 1.7–7.7)
NEUTROPHILS RELATIVE PERCENT: 77.8 %
PDW BLD-RTO: 16.8 % (ref 12.4–15.4)
PHOSPHORUS: 2.5 MG/DL (ref 2.5–4.9)
PLATELET # BLD: 418 K/UL (ref 135–450)
PMV BLD AUTO: 8.4 FL (ref 5–10.5)
POTASSIUM REFLEX MAGNESIUM: 4.1 MMOL/L (ref 3.5–5.1)
PROTHROMBIN TIME: 14.8 SEC (ref 9.9–12.7)
RBC # BLD: 3.7 M/UL (ref 4.2–5.9)
SODIUM BLD-SCNC: 137 MMOL/L (ref 136–145)
WBC # BLD: 8.7 K/UL (ref 4–11)

## 2021-09-17 PROCEDURE — 7100000011 HC PHASE II RECOVERY - ADDTL 15 MIN: Performed by: INTERNAL MEDICINE

## 2021-09-17 PROCEDURE — 3609013300 HC EGD TUBE PLACEMENT: Performed by: INTERNAL MEDICINE

## 2021-09-17 PROCEDURE — 94761 N-INVAS EAR/PLS OXIMETRY MLT: CPT

## 2021-09-17 PROCEDURE — 6360000002 HC RX W HCPCS: Performed by: NURSE ANESTHETIST, CERTIFIED REGISTERED

## 2021-09-17 PROCEDURE — 2580000003 HC RX 258: Performed by: ANESTHESIOLOGY

## 2021-09-17 PROCEDURE — 83735 ASSAY OF MAGNESIUM: CPT

## 2021-09-17 PROCEDURE — 2500000003 HC RX 250 WO HCPCS: Performed by: INTERNAL MEDICINE

## 2021-09-17 PROCEDURE — 85610 PROTHROMBIN TIME: CPT

## 2021-09-17 PROCEDURE — 2580000003 HC RX 258: Performed by: INTERNAL MEDICINE

## 2021-09-17 PROCEDURE — 6360000002 HC RX W HCPCS: Performed by: INTERNAL MEDICINE

## 2021-09-17 PROCEDURE — 2500000003 HC RX 250 WO HCPCS: Performed by: NURSE ANESTHETIST, CERTIFIED REGISTERED

## 2021-09-17 PROCEDURE — 85025 COMPLETE CBC W/AUTO DIFF WBC: CPT

## 2021-09-17 PROCEDURE — 36415 COLL VENOUS BLD VENIPUNCTURE: CPT

## 2021-09-17 PROCEDURE — C9113 INJ PANTOPRAZOLE SODIUM, VIA: HCPCS | Performed by: INTERNAL MEDICINE

## 2021-09-17 PROCEDURE — 2700000000 HC OXYGEN THERAPY PER DAY

## 2021-09-17 PROCEDURE — 7100000010 HC PHASE II RECOVERY - FIRST 15 MIN: Performed by: INTERNAL MEDICINE

## 2021-09-17 PROCEDURE — 84100 ASSAY OF PHOSPHORUS: CPT

## 2021-09-17 PROCEDURE — 0DH63UZ INSERTION OF FEEDING DEVICE INTO STOMACH, PERCUTANEOUS APPROACH: ICD-10-PCS | Performed by: INTERNAL MEDICINE

## 2021-09-17 PROCEDURE — 80048 BASIC METABOLIC PNL TOTAL CA: CPT

## 2021-09-17 PROCEDURE — 3700000001 HC ADD 15 MINUTES (ANESTHESIA): Performed by: INTERNAL MEDICINE

## 2021-09-17 PROCEDURE — 3700000000 HC ANESTHESIA ATTENDED CARE: Performed by: INTERNAL MEDICINE

## 2021-09-17 PROCEDURE — 2709999900 HC NON-CHARGEABLE SUPPLY: Performed by: INTERNAL MEDICINE

## 2021-09-17 PROCEDURE — 1200000000 HC SEMI PRIVATE

## 2021-09-17 RX ORDER — SODIUM CHLORIDE 0.9 % (FLUSH) 0.9 %
10 SYRINGE (ML) INJECTION PRN
Status: DISCONTINUED | OUTPATIENT
Start: 2021-09-17 | End: 2021-09-17 | Stop reason: HOSPADM

## 2021-09-17 RX ORDER — LIDOCAINE HYDROCHLORIDE 10 MG/ML
INJECTION, SOLUTION EPIDURAL; INFILTRATION; INTRACAUDAL; PERINEURAL PRN
Status: DISCONTINUED | OUTPATIENT
Start: 2021-09-17 | End: 2021-09-17 | Stop reason: ALTCHOICE

## 2021-09-17 RX ORDER — SODIUM CHLORIDE 0.9 % (FLUSH) 0.9 %
10 SYRINGE (ML) INJECTION EVERY 12 HOURS SCHEDULED
Status: DISCONTINUED | OUTPATIENT
Start: 2021-09-17 | End: 2021-09-17 | Stop reason: HOSPADM

## 2021-09-17 RX ORDER — SODIUM CHLORIDE, SODIUM LACTATE, POTASSIUM CHLORIDE, CALCIUM CHLORIDE 600; 310; 30; 20 MG/100ML; MG/100ML; MG/100ML; MG/100ML
INJECTION, SOLUTION INTRAVENOUS CONTINUOUS
Status: DISCONTINUED | OUTPATIENT
Start: 2021-09-17 | End: 2021-09-17

## 2021-09-17 RX ORDER — PROPOFOL 10 MG/ML
INJECTION, EMULSION INTRAVENOUS PRN
Status: DISCONTINUED | OUTPATIENT
Start: 2021-09-17 | End: 2021-09-17 | Stop reason: SDUPTHER

## 2021-09-17 RX ORDER — PHENYLEPHRINE HCL IN 0.9% NACL 1 MG/10 ML
SYRINGE (ML) INTRAVENOUS PRN
Status: DISCONTINUED | OUTPATIENT
Start: 2021-09-17 | End: 2021-09-17 | Stop reason: SDUPTHER

## 2021-09-17 RX ORDER — SODIUM CHLORIDE, SODIUM LACTATE, POTASSIUM CHLORIDE, CALCIUM CHLORIDE 600; 310; 30; 20 MG/100ML; MG/100ML; MG/100ML; MG/100ML
INJECTION, SOLUTION INTRAVENOUS CONTINUOUS
Status: DISCONTINUED | OUTPATIENT
Start: 2021-09-17 | End: 2021-09-19

## 2021-09-17 RX ORDER — SODIUM CHLORIDE 9 MG/ML
25 INJECTION, SOLUTION INTRAVENOUS PRN
Status: DISCONTINUED | OUTPATIENT
Start: 2021-09-17 | End: 2021-09-17 | Stop reason: HOSPADM

## 2021-09-17 RX ADMIN — LEVETIRACETAM 500 MG: 100 INJECTION, SOLUTION INTRAVENOUS at 17:09

## 2021-09-17 RX ADMIN — MAGNESIUM SULFATE HEPTAHYDRATE 1000 MG: 1 INJECTION, SOLUTION INTRAVENOUS at 07:54

## 2021-09-17 RX ADMIN — CEFTRIAXONE SODIUM 1000 MG: 1 INJECTION, POWDER, FOR SOLUTION INTRAMUSCULAR; INTRAVENOUS at 07:52

## 2021-09-17 RX ADMIN — POTASSIUM CHLORIDE, DEXTROSE MONOHYDRATE AND SODIUM CHLORIDE: 150; 5; 450 INJECTION, SOLUTION INTRAVENOUS at 23:50

## 2021-09-17 RX ADMIN — PANTOPRAZOLE SODIUM 40 MG: 40 INJECTION, POWDER, FOR SOLUTION INTRAVENOUS at 07:50

## 2021-09-17 RX ADMIN — SODIUM CHLORIDE, POTASSIUM CHLORIDE, SODIUM LACTATE AND CALCIUM CHLORIDE: 600; 310; 30; 20 INJECTION, SOLUTION INTRAVENOUS at 13:37

## 2021-09-17 RX ADMIN — PROPOFOL 120 MG: 10 INJECTION, EMULSION INTRAVENOUS at 13:46

## 2021-09-17 RX ADMIN — SODIUM CHLORIDE, POTASSIUM CHLORIDE, SODIUM LACTATE AND CALCIUM CHLORIDE: 600; 310; 30; 20 INJECTION, SOLUTION INTRAVENOUS at 17:17

## 2021-09-17 RX ADMIN — MAGNESIUM SULFATE HEPTAHYDRATE 1000 MG: 1 INJECTION, SOLUTION INTRAVENOUS at 06:11

## 2021-09-17 RX ADMIN — LEVETIRACETAM 500 MG: 100 INJECTION, SOLUTION INTRAVENOUS at 04:50

## 2021-09-17 RX ADMIN — Medication 10 ML: at 07:50

## 2021-09-17 RX ADMIN — Medication 40 MCG: at 13:54

## 2021-09-17 RX ADMIN — PANTOPRAZOLE SODIUM 40 MG: 40 INJECTION, POWDER, FOR SOLUTION INTRAVENOUS at 19:00

## 2021-09-17 ASSESSMENT — PAIN SCALES - WONG BAKER
WONGBAKER_NUMERICALRESPONSE: 0

## 2021-09-17 ASSESSMENT — PULMONARY FUNCTION TESTS
PIF_VALUE: 1
PIF_VALUE: 0
PIF_VALUE: 1
PIF_VALUE: 1

## 2021-09-17 ASSESSMENT — PAIN SCALES - GENERAL
PAINLEVEL_OUTOF10: 0

## 2021-09-17 NOTE — ANESTHESIA POSTPROCEDURE EVALUATION
Department of Anesthesiology  Postprocedure Note    Patient: Claudine Guzmán  MRN: [id number]  YOB: 1960  Date of evaluation: 9/17/2021  Time:  4:52 PM     Procedure Summary     Date: 09/17/21 Room / Location: [facility] / [facility]    Anesthesia Start: 1162 Anesthesia Stop: 5947    Procedure: EGD ESOPHAGOGASTRODUODENOSCOPY PEG TUBE INSERTION (N/A ) Diagnosis: (Severe protein calorie malnutrition/Failure to thrive)    Surgeons: Leanne Hood MD Responsible Provider: Rosa Abbott MD    Anesthesia Type: general ASA Status: 3          Anesthesia Type: general    Saleem Phase I: Saleem Score: 10    Saleem Phase II: Saleem Score: 9    Last vitals: Reviewed and per EMR flowsheets. Anesthesia Post Evaluation    Patient location during evaluation: PACU  Patient participation: complete - patient participated  Level of consciousness: awake and alert  Airway patency: patent  Nausea & Vomiting: no nausea and no vomiting  Complications: no  Cardiovascular status: blood pressure returned to baseline  Respiratory status: acceptable  Hydration status: euvolemic  Comments: VSS on transfer to phase 2 recovery. No anesthetic complications.

## 2021-09-17 NOTE — H&P
Pre-sedation Assessment    History and Physical / Pre-Sedation Assessment  Patient:  Reva Whitmore   :   1960     Intended Procedure: PEG placement      HPI: 62 y.o. male w h/o MRDD and is nonverbal. He lives in a group home and was sent to the ER because he has not been eating or drinking as usual for the past 2 weeks, assoc w recent Covid.  Had a colonoscopy on 21 w polyps.     Current Facility-Administered Medications   Medication Dose Route Frequency Provider Last Rate Last Admin    lactated ringers infusion   IntraVENous Continuous Mague Castorena  mL/hr at 21 1337 New Bag at 21 1337    sodium chloride flush 0.9 % injection 10 mL  10 mL IntraVENous 2 times per day Mague Castorena MD   10 mL at 21 0750    sodium chloride flush 0.9 % injection 10 mL  10 mL IntraVENous PRN Mague Castorena MD        0.9 % sodium chloride infusion  25 mL IntraVENous PRN Mague Castorena MD        famotidine (PEPCID) injection 20 mg  20 mg IntraVENous Once Mague Castorena MD        lactated ringers infusion   IntraVENous Continuous Andriy May MD        dextrose 5 % and 0.45 % NaCl with KCl 20 mEq infusion   IntraVENous Continuous Molly Daily MD 60 mL/hr at 21 New Bag at 21    cefTRIAXone (ROCEPHIN) 1000 mg IVPB in 50 mL D5W minibag  1,000 mg IntraVENous Q24H Molly Daily MD   Stopped at 21 0825    pantoprazole (PROTONIX) injection 40 mg  40 mg IntraVENous BID Molly Daily MD   40 mg at 21 0750    levETIRAcetam (KEPPRA) 500 mg in sodium chloride 0.9 % 100 mL IVPB  500 mg IntraVENous Q12H Molly Daily MD   Stopped at 21 0522    morphine (PF) injection 2 mg  2 mg IntraVENous Q4H PRN Molly Daily MD   2 mg at 21 1525    sodium chloride flush 0.9 % injection 10 mL  10 mL IntraVENous PRN Yulia Norris MD   10 mL at 21 1118    0.9 % sodium chloride infusion  25 mL IntraVENous PRN Aamir Rivas  mL/hr at 09/15/21 0539 25 mL at 09/15/21 0539    potassium chloride 10 mEq/100 mL IVPB (Peripheral Line)  10 mEq IntraVENous PRN Aamir Rivas MD        magnesium sulfate 1000 mg in dextrose 5% 100 mL IVPB  1,000 mg IntraVENous PRN Aamir Rivas MD   Stopped at 09/17/21 0855    ondansetron (ZOFRAN) injection 4 mg  4 mg IntraVENous Q6H PRN Aamir Rivas MD   4 mg at 09/09/21 7009    acetaminophen (TYLENOL) tablet 650 mg  650 mg Oral Q4H PRN Aamir Rivas MD        Or    acetaminophen (TYLENOL) suppository 650 mg  650 mg Rectal Q4H PRN Aamir Rivas MD   650 mg at 09/09/21 0040     Past Medical History:   Diagnosis Date    A-fib [facility])     [facility]     Brain herniation ([facility]     Constipation     COVID-19 01/20/2021 9/5/21 positive    Cystitis     Developmental non-verbal disorder     Impulse control disorder     Influenza A 02/13/2014    Mental retardation, profound (I.Q. < 20)     Osteopenia     Seizure ([facility]     Subdural hematoma ([address]      Past Surgical History:   Procedure Laterality Date    CATARACT REMOVAL      COLONOSCOPY  06/20/2016    incomplete, poor prep    COLONOSCOPY  08/26/2020    COLON W/ANES.  COLONOSCOPY N/A 8/26/2020    COLON W/ANES. (9:00) COVID TEST 8/20-8/22. PT IS NON-VERBAL, IS NOT IN A FACILITY. IS CARED FOR BY 2 CAREGIVERS IN HIS HOME. performed by Jeronimo Glasgow MD at [address] [facility] COLONOSCOPY N/A 09/01/2021    COLONOSCOPY N/A 9/1/2021    COLON W/ANES. (10:30) PT IS COMBATIVE. [facility] SERVICES.  [phone] XSTEPH performed by Jeronimo Glasgow MD at [address] [facility] Right 12/2/2019    Right Trephine Craniotomy For Evacuation of Subdural Hematoma performed by Richar Restrepo MD at [address] [facility] TURP N/A 2/28/2020    CYSTOSCOPY, TRANSURETHRAL RESECTION OF PROSTATE performed by Madeline Mcconnell MD at [address] [facility] notes reviewed and agreed. Medications reviewed  Allergies: Allergies   Allergen Reactions    Penicillins      Other reaction(s): Unknown    Benadryl [Diphenhydramine] Other (See Comments)     Pt becomes agitated and aggressive with Benadryl    Clonidine Derivatives      Other reaction(s): Unknown    Tetracyclines & Related      Other reaction(s): Unknown           Physical Exam:  Vital Signs: /69   Pulse 52   Temp 98 °F (36.7 °C) (Axillary)   Resp 16   Ht 4' 8\" (1.422 m)   Wt 90 lb 9.7 oz (41.1 kg)   SpO2 95%   BMI 20.31 kg/m²  Body mass index is 20.31 kg/m². Airway:Normal  Cardiac:Normal  Pulmonary:Normal  Abdomen:Normal  Specific to procedure: none      Pre-Procedure Assessment/Plan:  ASA 3 - Patient with moderate systemic disease with functional limitations  Mallampati II  Level of Sedation Plan:Deep sedation    Post Procedure plan: Return to same level of care    I assessed the patient and find that the patient is in satisfactory condition to proceed with the planned procedure and sedation plan. I have explained the risk, benefits, and alternatives to the procedure. The patient's POA understands and agrees to proceed.   Yes    Anthony Zarate MD       (O) [phone]          Anthony Zarate MD  1:43 PM 9/17/2021

## 2021-09-17 NOTE — OP NOTE
Percutaneous Endoscopic Gastrostomy Note    Patient:   Selena Gore   YOB: 1960   Facility:   [facility] [Inpatient]   Referring/PCP: Leslee Chanel MD  Procedure:   Esophagogastroduodenoscopy with placement of a percutaneous endoscopic gastrostomy tube  Date:     9/17/2021   Endoscopist:  Jayna Amin MD     Preoperative Diagnosis:  Feeding Difficulties    Postoperative Diagnosis:  Feeding Difficulties    Preprocedure medications: Rocephin. Anesthesia:  MAC. Estimated blood loss: Estimated amount of blood loss is 1ml. Complications: None    Description of Procedure:  Informed consent was obtained from the patient after explanation of the procedure including indications, description of the procedure,  benefits and possible risks and complications of the procedure, and alternatives. Questions were answered. The patient's history was reviewed and a directed physical examination was performed prior to the procedure. Patient recieved prophylactic antibiotics immediately prior to the start of the procedure and was monitored throughout the procedure with pulse oximetry and periodic assessment of vital signs. Patient was sedated as noted above. The Nursing staff and I performed a time out. With the patient in supine position with the head of the bed slightly elevated, Olympus  flexible endoscope was introduced and passed without difficulty. Visualization of the esophagus, stomach, and duodenum was performed and retroflexed view of the proximal stomach was obtained. The scope was passed to the 2nd portion of the duodenum. Esophagus: normal.  Stomach: normal  Duodenum: normal    No contraindication to placement of the gastrostomy tube was appreciated. The site for placement of the gastrostomy tube was identified with palpation and   transillumination of the abdomen. The abdomen, having been prepared, was draped in a sterile fashion.  Local anesthesia was provided with  3 mL of 1% Xylocaine. A nick was made in the skin with a #11 scalpel blade. Angiocath was introduced through the anterior abdominal wall. The needle was withdrawn and an insertion wire introduced. This was grasped with a snare and withdrawn through the patient's mouth with withdrawal of the endoscope. A 20-Italian Ponsky-type gastrostomy tube was affixed to the wire and traction applied to the later. The tube was delivered through the anterior abdominal wall and a bolster placed at 2 cm. The endoscope was not reintroduced to confirm appropriate placement of the PEG. Dry sterile dressing was applied and an abdominal binder was placed. Findings:  -Successful PEG tube placement.     Recommendations: Keep NPO today  OK to use PEG tomorrow    Adam Salinas MD       (O) [phone]            Adam Salinas MD, MD

## 2021-09-17 NOTE — ANESTHESIA PRE PROCEDURE
IntraVENous Continuous Rosemary Barrientos MD        sodium chloride flush 0.9 % injection 10 mL  10 mL IntraVENous 2 times per day Rosemary Barrientos MD   10 mL at 09/17/21 0750    sodium chloride flush 0.9 % injection 10 mL  10 mL IntraVENous PRN Rosemary Barrientos MD        0.9 % sodium chloride infusion  25 mL IntraVENous PRN Rosemary Barrientos MD        famotidine (PEPCID) injection 20 mg  20 mg IntraVENous Once Rosemary Barrientos MD        dextrose 5 % and 0.45 % NaCl with KCl 20 mEq infusion   IntraVENous Continuous Shivam Beck MD 60 mL/hr at 09/16/21 2011 New Bag at 09/16/21 2011    cefTRIAXone (ROCEPHIN) 1000 mg IVPB in 50 mL D5W minibag  1,000 mg IntraVENous Q24H Shivam Beck MD   Stopped at 09/17/21 0825    pantoprazole (PROTONIX) injection 40 mg  40 mg IntraVENous BID Shivam Beck MD   40 mg at 09/17/21 0750    levETIRAcetam (KEPPRA) 500 mg in sodium chloride 0.9 % 100 mL IVPB  500 mg IntraVENous Q12H Shivam Beck MD   Stopped at 09/17/21 0522    morphine (PF) injection 2 mg  2 mg IntraVENous Q4H PRN Shivam Beck MD   2 mg at 09/07/21 1525    sodium chloride flush 0.9 % injection 10 mL  10 mL IntraVENous PRN Tarsha Lauren MD   10 mL at 09/14/21 1118    0.9 % sodium chloride infusion  25 mL IntraVENous PRN Tarsha Lauren  mL/hr at 09/15/21 0539 25 mL at 09/15/21 0539    potassium chloride 10 mEq/100 mL IVPB (Peripheral Line)  10 mEq IntraVENous PRN Tarsha Lauren MD        magnesium sulfate 1000 mg in dextrose 5% 100 mL IVPB  1,000 mg IntraVENous PRN Tarsha Lauren MD   Stopped at 09/17/21 0855    ondansetron (ZOFRAN) injection 4 mg  4 mg IntraVENous Q6H PRN Tarsha Lauren MD   4 mg at 09/09/21 0921    acetaminophen (TYLENOL) tablet 650 mg  650 mg Oral Q4H PRN Tarsha Laruen MD        Or    acetaminophen (TYLENOL) suppository 650 mg  650 mg Rectal Q4H PRN Tarsha Lauren MD   650 mg at 09/09/21 0042 Allergies: Allergies   Allergen Reactions    Penicillins      Other reaction(s): Unknown    Benadryl [Diphenhydramine] Other (See Comments)     Pt becomes agitated and aggressive with Benadryl    Clonidine Derivatives      Other reaction(s): Unknown    Tetracyclines & Related      Other reaction(s): Unknown       Problem List:    Patient Active Problem List   Diagnosis Code    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) I48.91    Acute cystitis with hematuria N30.01    Altered mental status R41.82    Brain herniation ([facility]) G93.5    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 ([address] S06.5X9S    Constipation K59.00    Acute urinary retention R33.8    Atrial fibrillation, chronic ([facility]) I48.20    History of subdural hematoma Z86.79    Pneumonia J18.9    COVID-19 U07.1    Pulmonary infiltrates R91.8    Thrombocytopenia ([facility]) D69.6    RUBEN (acute kidney injury) ([address] N17.9    Sepsis ([facility]) A41.9    Seizure disorder ([facility]) G40.909    PAF (paroxysmal atrial fibrillation) ([facility]) I48.0    Hypothermia T68. Cathye Spar       Past Medical History:        Diagnosis Date    A-fib ([address]     Bipolar disorder ([address]     Brain herniation ([address]     Constipation     COVID-19 01/20/2021 9/5/21 positive    Cystitis     Developmental non-verbal disorder     Impulse control disorder     Influenza A 02/13/2014    Mental retardation, profound (I.Q. < 20)     Osteopenia     Seizure ([address]     Subdural hematoma ([address]        Past Surgical History:        Procedure Laterality Date    CATARACT REMOVAL      COLONOSCOPY  06/20/2016    incomplete, poor prep    COLONOSCOPY  08/26/2020    COLON W/ANES.  COLONOSCOPY N/A 8/26/2020    COLON W/ANES. (9:00) COVID TEST 8/20-8/22. PT IS NON-VERBAL, IS NOT IN A FACILITY. IS CARED FOR BY 2 CAREGIVERS IN HIS HOME. performed by Ko Agarwal MD at [address] [facility] COLONOSCOPY N/A 09/01/2021    COLONOSCOPY N/A 9/1/2021    COLON W/ANES. (10:30) PT IS COMBATIVE. [facility] SERVICES. [phone] XSTEPH performed by Gladis Haro MD at [address] Right 12/2/2019    Right Trephine Craniotomy For Evacuation of Subdural Hematoma performed by Charles Lam MD at [address] TURP N/A 2/28/2020    CYSTOSCOPY, TRANSURETHRAL RESECTION OF PROSTATE performed by Ana Lilia Jackson MD at [address] [facility] History:    Social History     Tobacco Use    Smoking status: Never Smoker    Smokeless tobacco: Never Used   Substance Use Topics    Alcohol use: No                                Counseling given: Not Answered      Vital Signs (Current):   Vitals:    09/17/21 0023 09/17/21 0303 09/17/21 0701 09/17/21 1149   BP: 112/70  (!) 91/49 118/69   Pulse: (!) 47  50 52   Resp: 16 18 16   Temp: 97.5 °F (36.4 °C)  97.7 °F (36.5 °C) 98 °F (36.7 °C)   TempSrc: Axillary  Oral Axillary   SpO2: 95%  98% 95%   Weight:  90 lb 9.7 oz (41.1 kg)     Height:                                                  BP Readings from Last 3 Encounters:   09/17/21 118/69   09/01/21 95/60   09/01/21 (!) 0/0       NPO Status:                                                                                 BMI:   Wt Readings from Last 3 Encounters:   09/17/21 90 lb 9.7 oz (41.1 kg)   08/11/21 100 lb (45.4 kg)   05/27/21 92 lb (41.7 kg)     Body mass index is 20.31 kg/m².     CBC:   Lab Results   Component Value Date    WBC 8.7 09/17/2021    RBC 3.70 09/17/2021    HGB 10.5 09/17/2021    HCT 30.9 09/17/2021    MCV 83.4 09/17/2021    RDW 16.8 09/17/2021     09/17/2021       CMP:   Lab Results   Component Value Date     09/17/2021    K 4.1 09/17/2021     09/17/2021    CO2 25 09/17/2021    BUN 14 09/17/2021    CREATININE 0.7 09/17/2021    GFRAA >60 09/17/2021    AGRATIO 1.0 09/09/2021    LABGLOM >60 09/17/2021    GLUCOSE 106 09/17/2021    PROT 5.5 09/09/2021    CALCIUM 9.2 09/17/2021    BILITOT 0.3 09/09/2021    ALKPHOS 120 09/09/2021    AST 15 09/09/2021    ALT 20 09/09/2021       POC Tests: No results for input(s): POCGLU, POCNA, POCK, POCCL, POCBUN, POCHEMO, POCHCT in the last 72 hours. Coags:   Lab Results   Component Value Date    PROTIME 14.8 09/17/2021    INR 1.30 09/17/2021       HCG (If Applicable): No results found for: PREGTESTUR, PREGSERUM, HCG, HCGQUANT     ABGs:   Lab Results   Component Value Date    PHART 7.401 02/01/2021    PO2ART 494.6 02/01/2021    CXF0KJJ 45.2 02/01/2021    GDE6ITU 28.1 02/01/2021    BEART 3 02/01/2021    U8SIDCBV 100 02/01/2021        Type & Screen (If Applicable):  No results found for: LABABO, [address]    Drug/Infectious Status (If Applicable):  No results found for: HIV, HEPCAB    COVID-19 Screening (If Applicable):   Lab Results   Component Value Date    COVID19 Not Detected 09/16/2021    COVID19 DETECTED 09/05/2021           Anesthesia Evaluation  Patient summary reviewed and Nursing notes reviewed no history of anesthetic complications:   Airway: Mallampati: III     Neck ROM: full   Dental:          Pulmonary:Negative Pulmonary ROS and normal exam    (+) pneumonia:                            ROS comment: Positive and more recenlty negative COVID tests, okay per ID   Cardiovascular:Negative CV ROS    (+) dysrhythmias Gonzalo Kerri okay per EP): atrial fibrillation,                   Neuro/Psych:   Negative Neuro/Psych ROS  (+) psychiatric history:            GI/Hepatic/Renal: Neg GI/Hepatic/Renal ROS       (-) hiatal hernia and GERD       Endo/Other: Negative Endo/Other ROS                    Abdominal:             Vascular: Other Findings:             Anesthesia Plan      general     ASA 3     (PIV, general anesthesia, IV Narcotics, PACU. )  Induction: intravenous. Pre-Operative Diagnosis: Hypernatremia [E87.0]; Pneumonia [J18.9]; Elevated troponin [R77.8]; Altered mental status, unspecified altered mental status type [R41.82]; Pneumonia of both lungs due to infectious organism, unspecified part of lung [J18.9]; Low body temperature [R68.89]    62 y.o.   BMI:  Body mass index is 20.31 kg/m².      Vitals:    09/17/21 0023 09/17/21 0303 09/17/21 0701 09/17/21 1149   BP: 112/70  (!) 91/49 118/69   Pulse: (!) 47  50 52   Resp: 16  18 16   Temp: 97.5 °F (36.4 °C)  97.7 °F (36.5 °C) 98 °F (36.7 °C)   TempSrc: Axillary  Oral Axillary   SpO2: 95%  98% 95%   Weight:  90 lb 9.7 oz (41.1 kg)     Height:           Allergies   Allergen Reactions    Penicillins      Other reaction(s): Unknown    Benadryl [Diphenhydramine] Other (See Comments)     Pt becomes agitated and aggressive with Benadryl    Clonidine Derivatives      Other reaction(s): Unknown    Tetracyclines & Related      Other reaction(s): Unknown       Social History     Tobacco Use    Smoking status: Never Smoker    Smokeless tobacco: Never Used   Substance Use Topics    Alcohol use: No       LABS:    CBC  Lab Results   Component Value Date/Time    WBC 8.7 09/17/2021 04:50 AM    HGB 10.5 (L) 09/17/2021 04:50 AM    HCT 30.9 (L) 09/17/2021 04:50 AM     09/17/2021 04:50 AM     RENAL  Lab Results   Component Value Date/Time     09/17/2021 04:50 AM    K 4.1 09/17/2021 04:50 AM     09/17/2021 04:50 AM    CO2 25 09/17/2021 04:50 AM    BUN 14 09/17/2021 04:50 AM    CREATININE 0.7 (L) 09/17/2021 04:50 AM    GLUCOSE 106 (H) 09/17/2021 04:50 AM     COAGS  Lab Results   Component Value Date/Time    PROTIME 14.8 (H) 09/17/2021 04:50 AM    INR 1.30 (H) 09/17/2021 04:50 AM     Pending completion of consent forms    Alayna Oliver MD   9/17/2021

## 2021-09-17 NOTE — CONSULTS
Comprehensive Nutrition Assessment    Type and Reason for Visit:  Reassess, Consult    Nutrition Recommendations/Plan:   1. Recommend order \"Diet: Adult Tube Feed\". Initiate Jevity 1.5 (standard with fiber formula) at 10 mL/hr and as tolerated, increase by 10 mL/hr q 6 hours until goal of 50 mL/hr. 2. Recommend 100 mL H20 q 4 hours. Recommend reevaluate IV fluids at this time. Monitor electrolytes and need for adjustment   3. Monitor closely and correct lytes (K, Phos, Mg) before progressing TF to goal d/t risk of refeeding syndrome (hx extended inadequate nutritional intake). 4. Monitor for tolerance (bowel habits, N/V, cramping, abdominal exam findings: distended, firm, tense, guarded, discomfort). 5. Monitor tolerance and ability to modify to bolus feeds when feasible   6. Monitor nutrition adequacy, pertinent labs, bowel habits, wt changes, and clinical progress      Nutrition Assessment:  Consulted for tube feeding recommendations. Plans for PEG tube placement today. No PO or nutrition x12 days this admission. Weights variable this admission. Concerns for tolerance of bolus feeds. Recommend starting continuous TFs to monitor tolerance and modify to bolus feeds once pt able to tolerate feeds. Malnutrition Assessment:  Malnutrition Status:  Severe malnutrition    Context:  Acute Illness     Findings of the 6 clinical characteristics of malnutrition:  Energy Intake:  7 - 50% or less of estimated energy requirements for 5 or more days  Body Fat Loss:  7 - Moderate body fat loss Orbital   Muscle Mass Loss:  7 - Moderate muscle mass loss Clavicles (pectoralis & deltoids)     Estimated Daily Nutrient Needs:  Energy (kcal):  9565-1959 kcal; Weight Used for Energy Requirements:  Current (46 kg)     Protein (g):  55-65 g; Weight Used for Protein Requirements:  Current (1.2-1.4 g/kg)        Fluid (ml/day):   ; Method Used for Fluid Requirements:  1 ml/kcal      Nutrition Related Findings:  NPO x12 days. Appears frail. Wounds:  None       Current Nutrition Therapies:    Diet NPO  Current Tube Feeding (TF) Orders:  · Feeding Route: PEG  · Formula: Standard with Fiber  · Schedule: Continuous  · Current TF & Flush Orders Provides: Jevity 1.5 at goal rate of 50 mL/hr x20 hours to provide 1000 mL TV, 1500 kcal, 63 g protein, and 760 mL free water. +100 mL free water flush q 4 hrs  · Goal TF & Flush Orders Provides: Bolus feeds of Jevity 1.5 x4 cans daily to provide 948 mL TV, 1420 kcal, 61 g protein, and 720 mL free water. +90 mL free water before and after each administration      Anthropometric Measures:  · Height: 4' 8\" (142.2 cm)  · Current Body Weight: 90 lb (40.8 kg)   · Usual Body Weight: 92 lb (41.7 kg) (bed scale on 1/20/21 per EMR)     · Ideal Body Weight: 82 lbs; % Ideal Body Weight 120.7 %   · BMI: 20.2  · BMI Categories: Normal Weight (BMI 18.5-24. 9)       Nutrition Diagnosis:   · Inadequate energy intake related to swallowing difficulty as evidenced by NPO or clear liquid status due to medical condition      Nutrition Interventions:   Food and/or Nutrient Delivery:  Continue NPO, Start Tube Feeding  Nutrition Education/Counseling:  No recommendation at this time   Coordination of Nutrition Care:  Continue to monitor while inpatient, Speech Therapy    Goals:  Pt will tolerate most appropriate form of nutrition this admission w/o GI distress       Nutrition Monitoring and Evaluation:   Behavioral-Environmental Outcomes:  None Identified   Food/Nutrient Intake Outcomes:  Enteral Nutrition Intake/Tolerance  Physical Signs/Symptoms Outcomes:  Biochemical Data, GI Status, Weight     Discharge Planning:    Enteral Nutrition     Electronically signed by Shawn Raza MS, RD, LD on 9/17/21 at 11:34 AM EDT    Contact: [phone]

## 2021-09-17 NOTE — CARE COORDINATION
[facility] And update on [facility] status and plan given to her. Pt to have EGD with PEG tube placement today. Following.

## 2021-09-17 NOTE — PROGRESS NOTES
Hospitalist Progress Note    Date of Admission: 9/5/2021    Chief Complaint: Decreased oral intake    Hospital Course:   Irene Rico is a 62 y.o. male. He has a h/o MRDD and is nonverbal. He lives in a group home and was sent to the ER because he has not been eating or drinking as usual for the past four days. He underwent colonoscopy on 9/1/21 and has not been taking much oral intake since then. He received the [employer] COVID-19 vaccine on 5/7/21. Prior to that he was hospitalized in January, 2021 for acute respiratory failure due to COVID-19. Subjective: Bradycardia has persisted. Awaiting GI eval for possibility of G tube placement. Labs:   Recent Labs     09/15/21  0535 09/16/21  0540 09/17/21  0450   WBC 10.8 9.8 8.7   HGB 10.0* 9.8* 10.5*   HCT 29.5* 29.6* 30.9*    358 418     Recent Labs     09/15/21  0535 09/16/21  0540 09/17/21  0450    141 137   K 4.3 3.8 4.1    108 104   CO2 24 26 25   BUN 18 17 14   CREATININE 0.6* 0.7* 0.7*   CALCIUM 9.4 9.2 9.2   PHOS 2.5 2.3* 2.5     No results for input(s): AST, ALT, BILIDIR, BILITOT, ALKPHOS in the last 72 hours. Recent Labs     09/15/21  0535 09/16/21  0540 09/17/21  0450   INR 1.17* 1.25* 1.30*       Physical Exam Performed:    BP (!) 91/49   Pulse 50   Temp 97.7 °F (36.5 °C) (Oral)   Resp 18   Ht 4' 8\" (1.422 m)   Wt 90 lb 9.7 oz (41.1 kg)   SpO2 98%   BMI 20.31 kg/m²     General appearance: NAD, pale   HEENT: Pupils equal, round, and reactive to light. Conjunctivae/corneas clear. Facial/ear deformities. Congential anomalies   Neck: Supple, no jugular venous distention. Trachea midline with full range of motion. MM profoundly dry   Respiratory:  Normal respiratory effort. Decreased bilaterally   Cardiovascular: Regular rate and rhythm with normal S1/S2 without murmurs, rubs or gallops. Acey Finical down in the 30's   Abdomen: Soft, non-tender, non-distended with normal bowel sounds.   Musculoskelatal: No clubbing, cyanosis or edema bilaterally. Full range of motion without deformity. Neurologic: non verbal   Psychiatric: Drowsy,   Non-verbal, MRDD. Skin: Skin color, texture, turgor normal.  No rashes or lesions. Capillary Refill: Brisk,< 3 seconds   Peripheral Pulses: +2 palpable, equal bilaterally       Assessment/Plan:    Active Hospital Problems    Diagnosis     Seizure disorder ([facility] [G40.909]     PAF (paroxysmal atrial fibrillation) ([address] [I48.0]     Hypothermia [J57. XXXA]     Thrombocytopenia ([address] [D69.6]     RUBEN (acute kidney injury) ([address] [N17.9]     Sepsis ([address] [A41.9]     COVID-19 [U07.1]     Pneumonia [J18.9]      Sepsis, COVID-19 Pneumonia, Possible Aspiration Pneumonia, Acute Respiratory Failure with Hypoxia  - Patient has a multifocal pneumonia distributed throughout dependent lungs regions most c/w aspiration pneumonia.   - COVID 19 tested positive from 9/5  - Had been treated with empiric Abx with clinical improvement; Continue ceftriaxone for one more day  - currently stable on room air  - stopped steroids    Sinus Bradycardia  - Ongoing episodes of sinus bradycardia intermittently with occasional pauses. - no overt symptoms noted though difficult to tell  - Blood pressure is now stable. No offending agents noted  - EP consulted  - continue to monitor    Shock  - likely hypovolemic  - weaned off pressors  - continue IVF    RUBEN  - 2/2 hypovolemia  - improved with IVF  - continue to monitor    GI bleed  - isolated incidence of coffee ground emesis  - currently resolved  - continue PPI  - Hgb stable  - continue to monitor    Severe protein calorie malnutrition/Failure to thrive  - ongoing lack of appetite. Likely related to acute illness but longterm anorexia difficult to predict  - unable to participate in SLP eval  - GI consulted for PEG placement.  Plan for tomorrow at this time  - will start bolus tube feeds when able per GI    Seizures  - no obvious sign of seizures  - continue on

## 2021-09-18 LAB
ANION GAP SERPL CALCULATED.3IONS-SCNC: 5 MMOL/L (ref 3–16)
ANION GAP SERPL CALCULATED.3IONS-SCNC: 9 MMOL/L (ref 3–16)
BASOPHILS ABSOLUTE: 0 K/UL (ref 0–0.2)
BASOPHILS RELATIVE PERCENT: 0.4 %
BUN BLDV-MCNC: 10 MG/DL (ref 7–20)
BUN BLDV-MCNC: 9 MG/DL (ref 7–20)
CALCIUM SERPL-MCNC: 7.5 MG/DL (ref 8.3–10.6)
CALCIUM SERPL-MCNC: 8.8 MG/DL (ref 8.3–10.6)
CHLORIDE BLD-SCNC: 104 MMOL/L (ref 99–110)
CHLORIDE BLD-SCNC: 109 MMOL/L (ref 99–110)
CO2: 23 MMOL/L (ref 21–32)
CO2: 24 MMOL/L (ref 21–32)
CREAT SERPL-MCNC: 0.6 MG/DL (ref 0.8–1.3)
CREAT SERPL-MCNC: <0.5 MG/DL (ref 0.8–1.3)
EOSINOPHILS ABSOLUTE: 0.1 K/UL (ref 0–0.6)
EOSINOPHILS RELATIVE PERCENT: 1.6 %
GFR AFRICAN AMERICAN: >60
GFR AFRICAN AMERICAN: >60
GFR NON-AFRICAN AMERICAN: >60
GFR NON-AFRICAN AMERICAN: >60
GLUCOSE BLD-MCNC: 102 MG/DL (ref 70–99)
GLUCOSE BLD-MCNC: 107 MG/DL (ref 70–99)
GLUCOSE BLD-MCNC: 130 MG/DL (ref 70–99)
HCT VFR BLD CALC: 28.3 % (ref 40.5–52.5)
HEMOGLOBIN: 9.6 G/DL (ref 13.5–17.5)
INR BLD: 1.4 (ref 0.88–1.12)
LYMPHOCYTES ABSOLUTE: 0.6 K/UL (ref 1–5.1)
LYMPHOCYTES RELATIVE PERCENT: 9.4 %
MAGNESIUM: 1.7 MG/DL (ref 1.8–2.4)
MAGNESIUM: 2.1 MG/DL (ref 1.8–2.4)
MAGNESIUM: 2.4 MG/DL (ref 1.8–2.4)
MCH RBC QN AUTO: 28.9 PG (ref 26–34)
MCHC RBC AUTO-ENTMCNC: 33.8 G/DL (ref 31–36)
MCV RBC AUTO: 85.6 FL (ref 80–100)
MONOCYTES ABSOLUTE: 0.5 K/UL (ref 0–1.3)
MONOCYTES RELATIVE PERCENT: 7.6 %
NEUTROPHILS ABSOLUTE: 5.2 K/UL (ref 1.7–7.7)
NEUTROPHILS RELATIVE PERCENT: 81 %
PDW BLD-RTO: 17 % (ref 12.4–15.4)
PERFORMED ON: ABNORMAL
PHOSPHORUS: 2 MG/DL (ref 2.5–4.9)
PHOSPHORUS: 2.1 MG/DL (ref 2.5–4.9)
PHOSPHORUS: 2.5 MG/DL (ref 2.5–4.9)
PLATELET # BLD: 321 K/UL (ref 135–450)
PMV BLD AUTO: 7.8 FL (ref 5–10.5)
POTASSIUM REFLEX MAGNESIUM: 3.6 MMOL/L (ref 3.5–5.1)
POTASSIUM REFLEX MAGNESIUM: 4.7 MMOL/L (ref 3.5–5.1)
PROTHROMBIN TIME: 16 SEC (ref 9.9–12.7)
RBC # BLD: 3.31 M/UL (ref 4.2–5.9)
SODIUM BLD-SCNC: 137 MMOL/L (ref 136–145)
SODIUM BLD-SCNC: 137 MMOL/L (ref 136–145)
WBC # BLD: 6.4 K/UL (ref 4–11)

## 2021-09-18 PROCEDURE — 80048 BASIC METABOLIC PNL TOTAL CA: CPT

## 2021-09-18 PROCEDURE — 85025 COMPLETE CBC W/AUTO DIFF WBC: CPT

## 2021-09-18 PROCEDURE — C9113 INJ PANTOPRAZOLE SODIUM, VIA: HCPCS | Performed by: INTERNAL MEDICINE

## 2021-09-18 PROCEDURE — 6360000002 HC RX W HCPCS: Performed by: INTERNAL MEDICINE

## 2021-09-18 PROCEDURE — 1200000000 HC SEMI PRIVATE

## 2021-09-18 PROCEDURE — 83735 ASSAY OF MAGNESIUM: CPT

## 2021-09-18 PROCEDURE — 36592 COLLECT BLOOD FROM PICC: CPT

## 2021-09-18 PROCEDURE — 2580000003 HC RX 258: Performed by: INTERNAL MEDICINE

## 2021-09-18 PROCEDURE — 85610 PROTHROMBIN TIME: CPT

## 2021-09-18 PROCEDURE — 2580000003 HC RX 258: Performed by: ANESTHESIOLOGY

## 2021-09-18 PROCEDURE — 84100 ASSAY OF PHOSPHORUS: CPT

## 2021-09-18 PROCEDURE — 43762 RPLC GTUBE NO REVJ TRC: CPT

## 2021-09-18 RX ORDER — POTASSIUM CHLORIDE 7.45 MG/ML
10 INJECTION INTRAVENOUS
Status: COMPLETED | OUTPATIENT
Start: 2021-09-18 | End: 2021-09-18

## 2021-09-18 RX ORDER — MAGNESIUM SULFATE IN WATER 40 MG/ML
2000 INJECTION, SOLUTION INTRAVENOUS ONCE
Status: COMPLETED | OUTPATIENT
Start: 2021-09-18 | End: 2021-09-18

## 2021-09-18 RX ADMIN — POTASSIUM CHLORIDE 10 MEQ: 7.46 INJECTION, SOLUTION INTRAVENOUS at 12:16

## 2021-09-18 RX ADMIN — MAGNESIUM SULFATE HEPTAHYDRATE 2000 MG: 40 INJECTION, SOLUTION INTRAVENOUS at 09:12

## 2021-09-18 RX ADMIN — POTASSIUM CHLORIDE 10 MEQ: 7.46 INJECTION, SOLUTION INTRAVENOUS at 09:07

## 2021-09-18 RX ADMIN — PANTOPRAZOLE SODIUM 40 MG: 40 INJECTION, POWDER, FOR SOLUTION INTRAVENOUS at 17:43

## 2021-09-18 RX ADMIN — LEVETIRACETAM 500 MG: 100 INJECTION, SOLUTION INTRAVENOUS at 16:07

## 2021-09-18 RX ADMIN — Medication 10 ML: at 17:43

## 2021-09-18 RX ADMIN — PANTOPRAZOLE SODIUM 40 MG: 40 INJECTION, POWDER, FOR SOLUTION INTRAVENOUS at 07:57

## 2021-09-18 RX ADMIN — CEFTRIAXONE SODIUM 1000 MG: 1 INJECTION, POWDER, FOR SOLUTION INTRAMUSCULAR; INTRAVENOUS at 08:00

## 2021-09-18 RX ADMIN — Medication 10 ML: at 07:58

## 2021-09-18 RX ADMIN — SODIUM CHLORIDE, POTASSIUM CHLORIDE, SODIUM LACTATE AND CALCIUM CHLORIDE: 600; 310; 30; 20 INJECTION, SOLUTION INTRAVENOUS at 04:44

## 2021-09-18 RX ADMIN — POTASSIUM CHLORIDE 10 MEQ: 7.46 INJECTION, SOLUTION INTRAVENOUS at 11:14

## 2021-09-18 RX ADMIN — POTASSIUM CHLORIDE 10 MEQ: 7.46 INJECTION, SOLUTION INTRAVENOUS at 10:13

## 2021-09-18 RX ADMIN — LEVETIRACETAM 500 MG: 100 INJECTION, SOLUTION INTRAVENOUS at 04:22

## 2021-09-18 ASSESSMENT — PAIN SCALES - GENERAL
PAINLEVEL_OUTOF10: 0

## 2021-09-18 ASSESSMENT — PAIN SCALES - WONG BAKER
WONGBAKER_NUMERICALRESPONSE: 0

## 2021-09-18 NOTE — PROGRESS NOTES
Hospitalist Progress Note    Date of Admission: 9/5/2021    Chief Complaint: Decreased oral intake    Hospital Course:   Gino Nolan is a 62 y.o. male. He has a h/o MRDD and is nonverbal. He lives in a group home and was sent to the ER because he has not been eating or drinking as usual for the past four days. He underwent colonoscopy on 9/1/21 and has not been taking much oral intake since then. He received the [employer] COVID-19 vaccine on 5/7/21. Prior to that he was hospitalized in January, 2021 for acute respiratory failure due to COVID-19. Subjective: Bradycardia has persisted. Awaiting GI eval for possibility of G tube placement. Labs:   Recent Labs     09/16/21 0540 09/17/21 0450 09/18/21  0425   WBC 9.8 8.7 6.4   HGB 9.8* 10.5* 9.6*   HCT 29.6* 30.9* 28.3*    418 321     Recent Labs     09/16/21 0540 09/17/21 0450 09/18/21  0425    137 137   K 3.8 4.1 3.6    104 109   CO2 26 25 23   BUN 17 14 9   CREATININE 0.7* 0.7* <0.5*   CALCIUM 9.2 9.2 7.5*   PHOS 2.3* 2.5 2.1*     No results for input(s): AST, ALT, BILIDIR, BILITOT, ALKPHOS in the last 72 hours. Recent Labs     09/16/21 0540 09/17/21 0450 09/18/21  0425   INR 1.25* 1.30* 1.40*       Physical Exam Performed:    /64   Pulse 52   Temp 97.5 °F (36.4 °C) (Axillary)   Resp 16   Ht 4' 8\" (1.422 m)   Wt 93 lb 14.7 oz (42.6 kg)   SpO2 97%   BMI 21.06 kg/m²     General appearance: NAD, pale   HEENT: Pupils equal, round, and reactive to light. Conjunctivae/corneas clear. Facial/ear deformities. Congential anomalies   Neck: Supple, no jugular venous distention. Trachea midline with full range of motion. MM profoundly dry   Respiratory:  Normal respiratory effort. Decreased bilaterally   Cardiovascular: Regular rate and rhythm with normal S1/S2 without murmurs, rubs or gallops. Lourena Pals down in the 30's   Abdomen: Soft, non-tender, non-distended with normal bowel sounds.   Musculoskelatal: No clubbing, cyanosis or edema bilaterally. Full range of motion without deformity. Neurologic: non verbal   Psychiatric: Drowsy,   Non-verbal, MRDD. Skin: Skin color, texture, turgor normal.  No rashes or lesions. Capillary Refill: Brisk,< 3 seconds   Peripheral Pulses: +2 palpable, equal bilaterally       Assessment/Plan:    Active Hospital Problems    Diagnosis     Seizure disorder ([facility] [G40.909]     PAF (paroxysmal atrial fibrillation) ([address] [I48.0]     Hypothermia [I54. XXXA]     Thrombocytopenia ([address] [D69.6]     RUBEN (acute kidney injury) ([address] [N17.9]     Sepsis ([address] [A41.9]     COVID-19 [U07.1]     Pneumonia [J18.9]      Sepsis, COVID-19 Pneumonia, Possible Aspiration Pneumonia, Acute Respiratory Failure with Hypoxia  - Patient has a multifocal pneumonia distributed throughout dependent lungs regions most c/w aspiration pneumonia.   - COVID 19 tested positive from 9/5. No longer in isolation  - completed course of antibiotics  - currently stable on room air  - stopped steroids    Sinus Bradycardia  - Ongoing episodes of sinus bradycardia intermittently with occasional pauses. - no overt symptoms noted though difficult to tell  - Blood pressure is now stable. No offending agents noted  - EP consulted  - continue to monitor    Shock  - likely hypovolemic  - weaned off pressors  - stopping IVF. TF with free water boluses started    RUBEN  - 2/2 hypovolemia  - improved with IVF  - continue to monitor    GI bleed  - isolated incidence of coffee ground emesis  - currently resolved  - continue PPI  - Hgb stable  - continue to monitor    Severe protein calorie malnutrition/Failure to thrive  - ongoing lack of appetite. Likely related to acute illness but longterm anorexia difficult to predict  - unable to participate in SLP eval  - GI consulted. S/P PEG tube placement  - starting tube feeds today.  Will transition to bolus feeds tomorrow    Seizures  - no obvious sign of seizures  - continue on increased keppra dose for now    PAF  - currently in NSR  - no AC at baseline    Hypothermia  - 2/2 sepsis  - improved with active warming  - currently euthermic    Hypernatremia  - 2/2 poor PO intake  - improved with IVF  - continue to monitor    DVT prophylaxis: SCDs only d/t thrombocytopenia  Diet: Diet NPO  ADULT TUBE FEEDING; PEG; Standard with Fiber; Continuous; 10; Yes; 10; Q 6 hours; 50; 100; Q 4 hours  Code Status: Full Code  PT/OT Eval Status: NA    Dispo - will likely be ready by Monday if able to tolerate tube feeds    Stacy Norman MD

## 2021-09-18 NOTE — PROGRESS NOTES
PROGRESS NOTE  S:61 yrs Patient  admitted on 9/5/2021 with Hypernatremia [E87.0]  Pneumonia [J18.9]  Elevated troponin [R77.8]  Altered mental status, unspecified altered mental status type [R41.82]  Pneumonia of both lungs due to infectious organism, unspecified part of lung [J18.9]  Low body temperature [R68.89] . No major overnight events    Current Hospital Schedued Meds   pantoprazole  40 mg IntraVENous BID    levetiracetam  500 mg IntraVENous Q12H     Current Hospital IV Meds   lactated ringers Stopped (09/18/21 0725)    sodium chloride 25 mL (09/15/21 0539)     Current Hospital PRN Meds  morphine, sodium chloride flush, sodium chloride, ondansetron, acetaminophen **OR** acetaminophen    Exam:   Vitals:    09/18/21 1455   BP: 97/63   Pulse: 58   Resp: 16   Temp: 97.6 °F (36.4 °C)   SpO2: 96%     I/O last 3 completed shifts:   In: 7421.8 [I.V.:5131.8; NG/GT:240; IV Piggyback:2050]  Out: 2650 [Urine:2650]   General appearance: alert, appears stated age and cooperative  HEENT: PERRLA  Neck: no adenopathy, no carotid bruit, no JVD, supple, symmetrical, trachea midline and thyroid not enlarged, symmetric, no tenderness/mass/nodules  Lungs: clear to auscultation bilaterally  Heart: regular rate and rhythm, S1, S2 normal, no murmur, click, rub or gallop  Abdomen: PEG c/d/i, bumper at 2.5 cm  Extremities: extremities normal, atraumatic, no cyanosis or edema     Labs:  CBC:   Recent Labs     09/16/21  0540 09/17/21  0450 09/18/21  0425   WBC 9.8 8.7 6.4   HGB 9.8* 10.5* 9.6*   HCT 29.6* 30.9* 28.3*   MCV 84.3 83.4 85.6    418 321     BMP:   Recent Labs     09/17/21  0450 09/18/21  0425 09/18/21  1353 09/18/21  1600    137  --  137   K 4.1 3.6  --  4.7    109  --  104   CO2 25 23  --  24   PHOS 2.5 2.1* 2.0*  --    BUN 14 9  --  10   CREATININE 0.7* <0.5*  --  0.6*     LIVER PROFILE: No results for input(s): AST, ALT, LIPASE, PROT, BILIDIR, BILITOT, ALKPHOS in the last 72 hours. Invalid input(s): AMYLASE,  ALB  PT/INR:   Recent Labs     09/16/21  0540 09/17/21  0450 09/18/21  0425   INR 1.25* 1.30* 1.40*       IMAGING:  No results found. Hospital Problems         Last Modified POA    Pneumonia 9/5/2021 Yes    COVID-19 9/7/2021 Yes    Thrombocytopenia ([facility] 9/5/2021 Yes    RUBEN (acute kidney injury) ([facility] 9/5/2021 Yes    Sepsis ([address] 9/5/2021 Yes    Seizure disorder ([address] 9/6/2021 Yes    PAF (paroxysmal atrial fibrillation) ([facility] 9/6/2021 Yes    Hypothermia 9/6/2021 Yes         Impression:  63 yo nonverbal male with MRDD s/p PEG    Recommendation:  1. Loosened PEG to 3.5 cm. Spins freely. OK to use. Please call with questions or concerns.   Will sign off      Rocio Hester DO  5:55 PM 9/18/2021            [facility]    [address]      [address] [facility]     Phone: [phone]     Fax: [phone]

## 2021-09-19 LAB
ANION GAP SERPL CALCULATED.3IONS-SCNC: 5 MMOL/L (ref 3–16)
ANION GAP SERPL CALCULATED.3IONS-SCNC: 7 MMOL/L (ref 3–16)
ANION GAP SERPL CALCULATED.3IONS-SCNC: 8 MMOL/L (ref 3–16)
BASOPHILS ABSOLUTE: 0 K/UL (ref 0–0.2)
BASOPHILS RELATIVE PERCENT: 0.5 %
BUN BLDV-MCNC: 12 MG/DL (ref 7–20)
BUN BLDV-MCNC: 14 MG/DL (ref 7–20)
BUN BLDV-MCNC: 17 MG/DL (ref 7–20)
CALCIUM SERPL-MCNC: 8.7 MG/DL (ref 8.3–10.6)
CALCIUM SERPL-MCNC: 8.8 MG/DL (ref 8.3–10.6)
CALCIUM SERPL-MCNC: 8.9 MG/DL (ref 8.3–10.6)
CHLORIDE BLD-SCNC: 100 MMOL/L (ref 99–110)
CHLORIDE BLD-SCNC: 102 MMOL/L (ref 99–110)
CHLORIDE BLD-SCNC: 103 MMOL/L (ref 99–110)
CO2: 26 MMOL/L (ref 21–32)
CREAT SERPL-MCNC: 0.7 MG/DL (ref 0.8–1.3)
EOSINOPHILS ABSOLUTE: 0.1 K/UL (ref 0–0.6)
EOSINOPHILS RELATIVE PERCENT: 1.3 %
GFR AFRICAN AMERICAN: >60
GFR NON-AFRICAN AMERICAN: >60
GLUCOSE BLD-MCNC: 112 MG/DL (ref 70–99)
GLUCOSE BLD-MCNC: 113 MG/DL (ref 70–99)
GLUCOSE BLD-MCNC: 137 MG/DL (ref 70–99)
GLUCOSE BLD-MCNC: 137 MG/DL (ref 70–99)
GLUCOSE BLD-MCNC: 141 MG/DL (ref 70–99)
GLUCOSE BLD-MCNC: 158 MG/DL (ref 70–99)
GLUCOSE BLD-MCNC: 96 MG/DL (ref 70–99)
HCT VFR BLD CALC: 31.4 % (ref 40.5–52.5)
HEMOGLOBIN: 10.6 G/DL (ref 13.5–17.5)
LYMPHOCYTES ABSOLUTE: 0.7 K/UL (ref 1–5.1)
LYMPHOCYTES RELATIVE PERCENT: 8.6 %
MAGNESIUM: 1.9 MG/DL (ref 1.8–2.4)
MAGNESIUM: 2.1 MG/DL (ref 1.8–2.4)
MCH RBC QN AUTO: 28.9 PG (ref 26–34)
MCHC RBC AUTO-ENTMCNC: 33.6 G/DL (ref 31–36)
MCV RBC AUTO: 86.1 FL (ref 80–100)
MONOCYTES ABSOLUTE: 0.6 K/UL (ref 0–1.3)
MONOCYTES RELATIVE PERCENT: 6.8 %
NEUTROPHILS ABSOLUTE: 7.1 K/UL (ref 1.7–7.7)
NEUTROPHILS RELATIVE PERCENT: 82.8 %
PDW BLD-RTO: 16.5 % (ref 12.4–15.4)
PERFORMED ON: ABNORMAL
PERFORMED ON: NORMAL
PHOSPHORUS: 2.5 MG/DL (ref 2.5–4.9)
PHOSPHORUS: 2.7 MG/DL (ref 2.5–4.9)
PLATELET # BLD: 332 K/UL (ref 135–450)
PMV BLD AUTO: 7.8 FL (ref 5–10.5)
POTASSIUM REFLEX MAGNESIUM: 3.8 MMOL/L (ref 3.5–5.1)
POTASSIUM REFLEX MAGNESIUM: 3.9 MMOL/L (ref 3.5–5.1)
POTASSIUM REFLEX MAGNESIUM: 4 MMOL/L (ref 3.5–5.1)
RBC # BLD: 3.65 M/UL (ref 4.2–5.9)
REASON FOR REJECTION: NORMAL
REJECTED TEST: NORMAL
SODIUM BLD-SCNC: 131 MMOL/L (ref 136–145)
SODIUM BLD-SCNC: 136 MMOL/L (ref 136–145)
SODIUM BLD-SCNC: 136 MMOL/L (ref 136–145)
WBC # BLD: 8.6 K/UL (ref 4–11)

## 2021-09-19 PROCEDURE — 84100 ASSAY OF PHOSPHORUS: CPT

## 2021-09-19 PROCEDURE — 1200000000 HC SEMI PRIVATE

## 2021-09-19 PROCEDURE — 85025 COMPLETE CBC W/AUTO DIFF WBC: CPT

## 2021-09-19 PROCEDURE — 2580000003 HC RX 258: Performed by: INTERNAL MEDICINE

## 2021-09-19 PROCEDURE — 80048 BASIC METABOLIC PNL TOTAL CA: CPT

## 2021-09-19 PROCEDURE — 83735 ASSAY OF MAGNESIUM: CPT

## 2021-09-19 PROCEDURE — 36592 COLLECT BLOOD FROM PICC: CPT

## 2021-09-19 PROCEDURE — C9113 INJ PANTOPRAZOLE SODIUM, VIA: HCPCS | Performed by: INTERNAL MEDICINE

## 2021-09-19 PROCEDURE — 6360000002 HC RX W HCPCS: Performed by: INTERNAL MEDICINE

## 2021-09-19 RX ADMIN — LEVETIRACETAM 500 MG: 100 INJECTION, SOLUTION INTRAVENOUS at 04:20

## 2021-09-19 RX ADMIN — LEVETIRACETAM 500 MG: 100 INJECTION, SOLUTION INTRAVENOUS at 16:18

## 2021-09-19 RX ADMIN — PANTOPRAZOLE SODIUM 40 MG: 40 INJECTION, POWDER, FOR SOLUTION INTRAVENOUS at 17:57

## 2021-09-19 RX ADMIN — PANTOPRAZOLE SODIUM 40 MG: 40 INJECTION, POWDER, FOR SOLUTION INTRAVENOUS at 07:38

## 2021-09-19 RX ADMIN — Medication 10 ML: at 07:38

## 2021-09-19 ASSESSMENT — PAIN SCALES - GENERAL
PAINLEVEL_OUTOF10: 0

## 2021-09-19 ASSESSMENT — PAIN SCALES - WONG BAKER
WONGBAKER_NUMERICALRESPONSE: 0

## 2021-09-19 NOTE — PROGRESS NOTES
Hospitalist Progress Note    Date of Admission: 9/5/2021    Chief Complaint: Decreased oral intake    Hospital Course:   Vanessa Early is a 62 y.o. male. He has a h/o MRDD and is nonverbal. He lives in a group home and was sent to the ER because he has not been eating or drinking as usual for the past four days. He underwent colonoscopy on 9/1/21 and has not been taking much oral intake since then. He received the [employer] COVID-19 vaccine on 5/7/21. Prior to that he was hospitalized in January, 2021 for acute respiratory failure due to COVID-19. Subjective: PEG in place. Tolerating tube feeds well. Labs:   Recent Labs     09/17/21  0450 09/18/21  0425 09/19/21  0500   WBC 8.7 6.4 8.6   HGB 10.5* 9.6* 10.6*   HCT 30.9* 28.3* 31.4*    321 332     Recent Labs     09/18/21  0425 09/18/21  1353 09/18/21  1600 09/18/21  2146 09/19/21  0015 09/19/21  0500 09/19/21  0740   NA   < >  --  137  --  131*  --  136   K   < >  --  4.7  --  4.0  --  3.8   CL   < >  --  104  --  100  --  103   CO2   < >  --  24  --  26  --  26   BUN   < >  --  10  --  12  --  14   CREATININE   < >  --  0.6*  --  0.7*  --  0.7*   CALCIUM   < >  --  8.8  --  8.8  --  8.7   PHOS  --  2.0*  --  2.5  --  2.5  --     < > = values in this interval not displayed. No results for input(s): AST, ALT, BILIDIR, BILITOT, ALKPHOS in the last 72 hours. Recent Labs     09/17/21  0450 09/18/21  0425   INR 1.30* 1.40*       Physical Exam Performed:    /61   Pulse 66   Temp 97.5 °F (36.4 °C) (Axillary)   Resp 16   Ht 4' 8\" (1.422 m)   Wt 93 lb 9.6 oz (42.5 kg)   SpO2 98%   BMI 20.98 kg/m²     General appearance: NAD, pale   HEENT: Pupils equal, round, and reactive to light. Conjunctivae/corneas clear. Facial/ear deformities. Congential anomalies   Neck: Supple, no jugular venous distention. Trachea midline with full range of motion. MM profoundly dry   Respiratory:  Normal respiratory effort.  Decreased bilaterally   Cardiovascular: Regular rate and rhythm with normal S1/S2 without murmurs, rubs or gallops. Bryce Samina down in the 30's   Abdomen: Soft, non-tender, non-distended with normal bowel sounds. Musculoskelatal: No clubbing, cyanosis or edema bilaterally. Full range of motion without deformity. Neurologic: non verbal   Psychiatric: Drowsy,   Non-verbal, MRDD. Skin: Skin color, texture, turgor normal.  No rashes or lesions. Capillary Refill: Brisk,< 3 seconds   Peripheral Pulses: +2 palpable, equal bilaterally       Assessment/Plan:    Active Hospital Problems    Diagnosis     Seizure disorder ([facility] [G40.909]     PAF (paroxysmal atrial fibrillation) ([facility] [I48.0]     Hypothermia [G85. XXXA]     Thrombocytopenia ([facility] [D69.6]     RUBEN (acute kidney injury) ([facility] [N17.9]     Sepsis ([facility] [A41.9]     COVID-19 [U07.1]     Pneumonia [J18.9]      Sepsis, COVID-19 Pneumonia, Possible Aspiration Pneumonia, Acute Respiratory Failure with Hypoxia  - Patient has a multifocal pneumonia distributed throughout dependent lungs regions most c/w aspiration pneumonia.   - COVID 19 tested positive from 9/5. No longer in isolation  - completed course of antibiotics  - currently stable on room air  - stopped steroids    Sinus Bradycardia  - Ongoing episodes of sinus bradycardia intermittently with occasional pauses. - no overt symptoms noted though difficult to tell  - Blood pressure is now stable. No offending agents noted  - EP consulted  - continue to monitor    Shock  - likely hypovolemic  - weaned off pressors  - stopping IVF. TF with free water boluses started    RUBEN  - 2/2 hypovolemia  - improved with IVF  - continue to monitor    GI bleed  - isolated incidence of coffee ground emesis  - currently resolved  - continue PPI  - Hgb stable  - continue to monitor    Severe protein calorie malnutrition/Failure to thrive  - ongoing lack of appetite.  Likely related to acute illness but longterm anorexia difficult to predict  - unable to participate in SLP eval  - GI consulted. S/P PEG tube placement  - tolerating tube feeds well.  Will transition to bolus feeds later today  - no evidence of refeeding syndrome    Seizures  - no obvious sign of seizures  - continue on increased keppra dose for now    PAF  - currently in NSR  - no AC at baseline    Hypothermia  - 2/2 sepsis  - improved with active warming  - currently euthermic    Hypernatremia  - 2/2 poor PO intake  - improved with IVF  - continue to monitor    DVT prophylaxis: SCDs only d/t thrombocytopenia  Diet: Diet NPO  ADULT TUBE FEEDING; PEG; Standard with Fiber; Continuous; 10; Yes; 10; Q 6 hours; 50; 100; Q 4 hours  Code Status: Full Code  PT/OT Eval Status: NA    Dispo - will likely be ready by Monday    Omar Su MD

## 2021-09-20 LAB
ANION GAP SERPL CALCULATED.3IONS-SCNC: 7 MMOL/L (ref 3–16)
BASOPHILS ABSOLUTE: 0.1 K/UL (ref 0–0.2)
BASOPHILS RELATIVE PERCENT: 0.9 %
BUN BLDV-MCNC: 23 MG/DL (ref 7–20)
CALCIUM SERPL-MCNC: 8.7 MG/DL (ref 8.3–10.6)
CHLORIDE BLD-SCNC: 103 MMOL/L (ref 99–110)
CO2: 27 MMOL/L (ref 21–32)
CREAT SERPL-MCNC: 0.7 MG/DL (ref 0.8–1.3)
EOSINOPHILS ABSOLUTE: 0.2 K/UL (ref 0–0.6)
EOSINOPHILS RELATIVE PERCENT: 2.6 %
GFR AFRICAN AMERICAN: >60
GFR NON-AFRICAN AMERICAN: >60
GLUCOSE BLD-MCNC: 126 MG/DL (ref 70–99)
GLUCOSE BLD-MCNC: 127 MG/DL (ref 70–99)
GLUCOSE BLD-MCNC: 128 MG/DL (ref 70–99)
GLUCOSE BLD-MCNC: 146 MG/DL (ref 70–99)
GLUCOSE BLD-MCNC: 172 MG/DL (ref 70–99)
GLUCOSE BLD-MCNC: 199 MG/DL (ref 70–99)
GLUCOSE BLD-MCNC: 86 MG/DL (ref 70–99)
HCT VFR BLD CALC: 29 % (ref 40.5–52.5)
HEMOGLOBIN: 9.7 G/DL (ref 13.5–17.5)
INR BLD: 1.14 (ref 0.88–1.12)
LYMPHOCYTES ABSOLUTE: 0.9 K/UL (ref 1–5.1)
LYMPHOCYTES RELATIVE PERCENT: 10.3 %
MAGNESIUM: 1.7 MG/DL (ref 1.8–2.4)
MCH RBC QN AUTO: 28.4 PG (ref 26–34)
MCHC RBC AUTO-ENTMCNC: 33.4 G/DL (ref 31–36)
MCV RBC AUTO: 85.1 FL (ref 80–100)
MONOCYTES ABSOLUTE: 0.6 K/UL (ref 0–1.3)
MONOCYTES RELATIVE PERCENT: 7.7 %
NEUTROPHILS ABSOLUTE: 6.6 K/UL (ref 1.7–7.7)
NEUTROPHILS RELATIVE PERCENT: 78.5 %
PDW BLD-RTO: 16.8 % (ref 12.4–15.4)
PERFORMED ON: ABNORMAL
PERFORMED ON: NORMAL
PHOSPHORUS: 2.9 MG/DL (ref 2.5–4.9)
PLATELET # BLD: 318 K/UL (ref 135–450)
PMV BLD AUTO: 8.1 FL (ref 5–10.5)
POTASSIUM REFLEX MAGNESIUM: 4.3 MMOL/L (ref 3.5–5.1)
PROTHROMBIN TIME: 12.9 SEC (ref 9.9–12.7)
RBC # BLD: 3.41 M/UL (ref 4.2–5.9)
SODIUM BLD-SCNC: 137 MMOL/L (ref 136–145)
WBC # BLD: 8.4 K/UL (ref 4–11)

## 2021-09-20 PROCEDURE — 83735 ASSAY OF MAGNESIUM: CPT

## 2021-09-20 PROCEDURE — 6360000002 HC RX W HCPCS: Performed by: NURSE PRACTITIONER

## 2021-09-20 PROCEDURE — 2500000003 HC RX 250 WO HCPCS: Performed by: REGISTERED NURSE

## 2021-09-20 PROCEDURE — 85025 COMPLETE CBC W/AUTO DIFF WBC: CPT

## 2021-09-20 PROCEDURE — 1200000000 HC SEMI PRIVATE

## 2021-09-20 PROCEDURE — 2580000003 HC RX 258: Performed by: INTERNAL MEDICINE

## 2021-09-20 PROCEDURE — 6370000000 HC RX 637 (ALT 250 FOR IP): Performed by: REGISTERED NURSE

## 2021-09-20 PROCEDURE — 80048 BASIC METABOLIC PNL TOTAL CA: CPT

## 2021-09-20 PROCEDURE — C9113 INJ PANTOPRAZOLE SODIUM, VIA: HCPCS | Performed by: INTERNAL MEDICINE

## 2021-09-20 PROCEDURE — 85610 PROTHROMBIN TIME: CPT

## 2021-09-20 PROCEDURE — 6370000000 HC RX 637 (ALT 250 FOR IP): Performed by: INTERNAL MEDICINE

## 2021-09-20 PROCEDURE — 6360000002 HC RX W HCPCS: Performed by: INTERNAL MEDICINE

## 2021-09-20 PROCEDURE — 2580000003 HC RX 258: Performed by: NURSE PRACTITIONER

## 2021-09-20 PROCEDURE — 84100 ASSAY OF PHOSPHORUS: CPT

## 2021-09-20 RX ORDER — TAMSULOSIN HYDROCHLORIDE 0.4 MG/1
0.4 CAPSULE ORAL DAILY
Status: DISCONTINUED | OUTPATIENT
Start: 2021-09-20 | End: 2021-09-21 | Stop reason: HOSPADM

## 2021-09-20 RX ORDER — TRAZODONE HYDROCHLORIDE 50 MG/1
150 TABLET ORAL NIGHTLY
Status: DISCONTINUED | OUTPATIENT
Start: 2021-09-20 | End: 2021-09-21

## 2021-09-20 RX ORDER — LACTULOSE 10 G/15ML
20 SOLUTION ORAL 2 TIMES DAILY
Status: DISCONTINUED | OUTPATIENT
Start: 2021-09-20 | End: 2021-09-21 | Stop reason: HOSPADM

## 2021-09-20 RX ORDER — LEVETIRACETAM 500 MG/1
500 TABLET ORAL 2 TIMES DAILY
Status: DISCONTINUED | OUTPATIENT
Start: 2021-09-20 | End: 2021-09-20

## 2021-09-20 RX ORDER — QUETIAPINE FUMARATE 25 MG/1
50 TABLET, FILM COATED ORAL DAILY
Status: DISCONTINUED | OUTPATIENT
Start: 2021-09-21 | End: 2021-09-21

## 2021-09-20 RX ORDER — LANSOPRAZOLE
30 KIT
Qty: 300 ML | Refills: 0 | Status: SHIPPED | OUTPATIENT
Start: 2021-09-21 | End: 2021-09-21

## 2021-09-20 RX ORDER — 0.9 % SODIUM CHLORIDE 0.9 %
500 INTRAVENOUS SOLUTION INTRAVENOUS ONCE
Status: COMPLETED | OUTPATIENT
Start: 2021-09-20 | End: 2021-09-21

## 2021-09-20 RX ORDER — QUETIAPINE FUMARATE 100 MG/1
100 TABLET, FILM COATED ORAL EVERY 24 HOURS
Status: DISCONTINUED | OUTPATIENT
Start: 2021-09-21 | End: 2021-09-21

## 2021-09-20 RX ORDER — LEVETIRACETAM 250 MG/1
250 TABLET ORAL 2 TIMES DAILY
Status: DISCONTINUED | OUTPATIENT
Start: 2021-09-20 | End: 2021-09-20

## 2021-09-20 RX ORDER — LORAZEPAM 0.5 MG/1
0.5 TABLET ORAL EVERY 6 HOURS PRN
Status: DISCONTINUED | OUTPATIENT
Start: 2021-09-20 | End: 2021-09-21 | Stop reason: HOSPADM

## 2021-09-20 RX ORDER — LANSOPRAZOLE
30 KIT
Status: DISCONTINUED | OUTPATIENT
Start: 2021-09-21 | End: 2021-09-21 | Stop reason: HOSPADM

## 2021-09-20 RX ORDER — LANOLIN ALCOHOL/MO/W.PET/CERES
3 CREAM (GRAM) TOPICAL NIGHTLY PRN
Status: DISCONTINUED | OUTPATIENT
Start: 2021-09-20 | End: 2021-09-21 | Stop reason: HOSPADM

## 2021-09-20 RX ADMIN — TAMSULOSIN HYDROCHLORIDE 0.4 MG: 0.4 CAPSULE ORAL at 10:17

## 2021-09-20 RX ADMIN — MORPHINE SULFATE 2 MG: 2 INJECTION, SOLUTION INTRAMUSCULAR; INTRAVENOUS at 00:01

## 2021-09-20 RX ADMIN — LACTULOSE 20 G: 20 SOLUTION ORAL at 23:07

## 2021-09-20 RX ADMIN — LEVETIRACETAM 500 MG: 100 INJECTION, SOLUTION INTRAVENOUS at 23:09

## 2021-09-20 RX ADMIN — SODIUM CHLORIDE 500 ML: 9 INJECTION, SOLUTION INTRAVENOUS at 23:13

## 2021-09-20 RX ADMIN — MICONAZOLE NITRATE: 20 POWDER TOPICAL at 23:08

## 2021-09-20 RX ADMIN — TRAZODONE HYDROCHLORIDE 150 MG: 50 TABLET ORAL at 23:07

## 2021-09-20 RX ADMIN — LEVETIRACETAM 500 MG: 100 INJECTION, SOLUTION INTRAVENOUS at 05:22

## 2021-09-20 RX ADMIN — Medication 10 ML: at 08:55

## 2021-09-20 RX ADMIN — PANTOPRAZOLE SODIUM 40 MG: 40 INJECTION, POWDER, FOR SOLUTION INTRAVENOUS at 08:55

## 2021-09-20 RX ADMIN — MAGNESIUM GLUCONATE 500 MG ORAL TABLET 400 MG: 500 TABLET ORAL at 10:17

## 2021-09-20 RX ADMIN — INSULIN LISPRO 1 UNITS: 100 INJECTION, SOLUTION INTRAVENOUS; SUBCUTANEOUS at 13:21

## 2021-09-20 RX ADMIN — QUETIAPINE FUMARATE 150 MG: 25 TABLET ORAL at 23:06

## 2021-09-20 ASSESSMENT — PAIN SCALES - WONG BAKER
WONGBAKER_NUMERICALRESPONSE: 0

## 2021-09-20 ASSESSMENT — PAIN SCALES - GENERAL
PAINLEVEL_OUTOF10: 10
PAINLEVEL_OUTOF10: 0
PAINLEVEL_OUTOF10: 0

## 2021-09-20 NOTE — CARE COORDINATION
Referral received to check Tube Feed Benefits. Patients Tube Feed benefits and any additional information needed as follows:    Please put the following information on the LEELEE for tube feeds    1. Name of the formula and or equivalent  2. Type of tube- PEG/ G-tube, J- tube,  NG- tube, NJ-tube/ ND tubes (duotubes)  3. Method of administration:  bolus (via syringe), pump w/ how many hours on the pump  (via kangaroo buck pump), or gravity (via gravity bags)  4. Total volume of formula/ # cartons/day. 5. Water flushes  how much, how often. Protein powders/ liquids/ gels etc, anything not formula , are not covered and must be set up as self-pay.   Examples are Promod, Prosource, Beneprotein etc.       Patient is covered at 100%    Electronically signed by Silverio Walker on 9/20/2021 at 12:51 PM   Cell Ph# [phone], Office # [phone]

## 2021-09-20 NOTE — CARE COORDINATION
Call back received from [facility] with [facility] Calderon. CM made her aware that patient is discharge ready today. [facility] advised CM to contact [facility] staff regarding what is needed for discharge (ie: training for PEG tube and feedings)    12:44 PM  CM called and spoke with Director of Nursing, Janette Pike ([phone]), regarding pt being ready for discharge today. Encarnacion Shone states she is training staff at [facility] today, how to care for PEG tube. Patient cannot return home until ALL staff is trained (this will be completed today). Plans for discharge tomorrow. The DON at [facility] requested provider write orders for the following:    - 4x4 gauze  - Extension tubing for PEG tube  - QTips  - 60 ml syringes    **also need clarification written on LEELEE regarding how much water flush to be given BETWEEN medications and after medication administration. Perfect serve message sent to [employer], to please address above information. Charge/primary nurse, Bessie Pop, updated as well.

## 2021-09-20 NOTE — CARE COORDINATION
ANDRES received at 1315 from [place] with [facility], concerned that pt would be returning on IV antibiotics. CM called Tamara back and confirmed pt WOULD NOT be discharged on IV meds.

## 2021-09-20 NOTE — DISCHARGE INSTR - COC
Continuity of Care Form    Patient Name: Renzo Garcia   :  1960  MRN:  [id number]    Admit date:  2021  Discharge date:  21    Code Status Order: Full Code   Advance Directives:      Admitting Physician:  Sierra Ruby MD  PCP: Luis Wolfe MD    [facility]/Room[facility] 3577/0824-70  Discharging Unit Phone Number: [phone]    Emergency Contact:   Extended Emergency Contact Information  Primary Emergency Contact: [address], [facility]  Home Phone: [phone]  Relation: Other  Secondary Emergency Contact: [facility]  Address: [address] [place] [address], [address] [facility]  Home Phone: [phone]  Mobile Phone: [phone]  Relation: Legal Guardian    Past Surgical History:  Past Surgical History:   Procedure Laterality Date    CATARACT REMOVAL      COLONOSCOPY  2016    incomplete, poor prep    COLONOSCOPY  2020    COLON W/ANES.  COLONOSCOPY N/A 2020    COLON W/ANES. (9:00) COVID TEST -. PT IS NON-VERBAL, IS NOT IN A FACILITY. IS CARED FOR BY 2 CAREGIVERS IN HIS HOME. performed by Clint Ganser, MD at [address] [facility] COLONOSCOPY N/A 2021    COLONOSCOPY N/A 2021    COLON W/ANES. (10:30) PT IS COMBATIVE. [facility]UM[facility] SERVICES.  [phone] XSTEPH performed by Clint Ganser, MD at [address] [facility] Right 2019    Right Trephine Craniotomy For Evacuation of Subdural Hematoma performed by Mahad Mason MD at [address] TURP N/A 2020    CYSTOSCOPY, TRANSURETHRAL RESECTION OF PROSTATE performed by Jesusita Gibbs MD at [address] [place] [address] 2021    EGD ESOPHAGOGASTRODUODENOSCOPY PEG TUBE INSERTION performed by Pascual Barroso MD at [address] [facility]       Immunization History:   Immunization History   Administered Date(s) Administered    COVID-19, J&J, PF, 0.5 mL 2021    Td, unspecified formulation 2011    Tdap (Boostrix, Adacel) 2013, 2014       Active Problems:  Patient Active Problem List   Diagnosis Code    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    Atrial fibrillation with RVR ([facility] I48.91    Acute cystitis with hematuria N30.01    Altered mental status R41.82    Brain herniation ([facility]) G93.5    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 ([facility] S06.5X9S    Constipation K59.00    Acute urinary retention R33.8    Atrial fibrillation, chronic ([facility]) I48.20    History of subdural hematoma Z86.79    Pneumonia J18.9    COVID-19 U07.1    Pulmonary infiltrates R91.8    Thrombocytopenia ([facility]) D69.6    RUBEN (acute kidney injury) ([facility] N17.9    Sepsis ([facility]) A41.9    Seizure disorder ([facility]) G40.909    PAF (paroxysmal atrial fibrillation) ([facility]) I48.0    Hypothermia T68. Lamonte        Isolation/Infection:   Isolation            No Isolation          Patient Infection Status       Infection Onset Added Last Indicated Last Indicated By Review Planned Expiration Resolved Resolved By    None active    Resolved    COVID-19 21 COVID-19   21 Taylor Mosqueda RN    COVID-19 Rule Out 21 COVID-19 (Ordered)   21 Rule-Out Test Resulted    COVID-19 Rule Out 21 COVID-19, Rapid (Ordered)   21 Rule-Out Test Resulted    COVID-19 21 COVID-19   21     COVID-19 Rule Out 21 COVID-19 (Ordered)   21 Rule-Out Test Resulted    COVID-19 Rule Out 20 COVID-19 (Ordered)   20 Rule-Out Test Resulted            Nurse Assessment:  Last Vital Signs: BP 97/62   Pulse 76   Temp 99.5 °F (37.5 °C) (Oral)   Resp 16   Ht 4' 8\" (1.422 m)   Wt 94 lb (42.6 kg)   SpO2 95%   BMI 21.07 kg/m²     Last documented pain score (0-10 scale): Pain Level: 0  Last Weight:   Wt Readings from Last 1 Encounters:   21 94 lb (42.6 kg)     Mental Status:  disoriented    IV Access:  - None    Nursing Mobility/ADLs:  Walking   Assisted  Transfer  Assisted  Bathing  Assisted  Dressing  Dependent  Toileting  Assisted  Feeding  Dependent  Med Admin  Dependent  Med Delivery   peg tube    Wound Care Documentation and Therapy:        Elimination:  Continence:   · Bowel: No  · Bladder: No  Urinary Catheter: straight cath every 6 hours to empty bladder   Colostomy/Ileostomy/Ileal Conduit: No       Date of Last BM: 9/21/21    Intake/Output Summary (Last 24 hours) at 9/20/2021 1015  Last data filed at 9/20/2021 0559  Gross per 24 hour   Intake 880 ml   Output 750 ml   Net 130 ml     I/O last 3 completed shifts: In: 880 [NG/GT:880]  Out: 750 [Urine:750]    Safety Concerns:     History of Seizures and Aspiration Risk    Impairments/Disabilities:      Language Barrier - speech    Nutrition Therapy:  Current Nutrition Therapy:   - Tube Feedings:  see order    Routes of Feeding: Gastrostomy Tube  Liquids: as ordered  Daily Fluid Restriction: no  Last Modified Barium Swallow with Video (Video Swallowing Test): not done    Treatments at the Time of Hospital Discharge:   Respiratory Treatments: n/a  Oxygen Therapy:  is not on home oxygen therapy.   Ventilator:    - No ventilator support    Rehab Therapies: none  Weight Bearing Status/Restrictions: No weight bearing restirctions  Other Medical Equipment (for information only, NOT a DME order):  {EQUIPMENT:[id number]}  Other Treatments: n/a    Patient's personal belongings (please select all that are sent with patient):  None    RN SIGNATURE:  Electronically signed by Hua Sanchez RN on 9/21/21 at 2:23 PM EDT    CASE MANAGEMENT/SOCIAL WORK SECTION    Inpatient Status Date: 9/5/21    Readmission Risk Assessment Score:  Readmission Risk              Risk of Unplanned Readmission:  32         Discharging to Facility/ Agency   · Name:   · Address:  · Phone:  · Fax:    / signature: Electronically

## 2021-09-20 NOTE — CARE COORDINATION
Per discussion with nursing and NP, Mel, pt is ready for discharge today. Pt from group home (ABSY is legal guardian)  VM left for [facility] with [facility], as patient's usual rep is off today per  report. CM pending call back from [facility] rep to clarify how patient will transport home and if additional arrangements need made d/t patient returning with tube feed.  **nursing aware that updated dietitian note needs placed to reflect BOLUS feeds that pt is currently receiving**     10:24 AM  Referral called to David Hameed with [employer] for tube feed supplies

## 2021-09-20 NOTE — PROGRESS NOTES
Hospitalist Progress Note    Date of Admission: 9/5/2021    Chief Complaint: Decreased oral intake    Hospital Course:   Norman Phillips is a 62 y.o. male. He has a h/o MRDD and is nonverbal. He lives in a group home and was sent to the ER because he has not been eating or drinking as usual for the past four days. He underwent colonoscopy on 9/1/21 and has not been taking much oral intake since then. He received the [employer] COVID-19 vaccine on 5/7/21. Prior to that he was hospitalized in January, 2021 for acute respiratory failure due to COVID-19. Subjective:  Pt is on RA. Afebrile. VSS. Tolerating tube feedings. Unable to obtain ROS due to pt's MRDD/non-verbal.       Labs:   Recent Labs     09/18/21  0425 09/19/21  0500 09/20/21  0524   WBC 6.4 8.6 8.4   HGB 9.6* 10.6* 9.7*   HCT 28.3* 31.4* 29.0*    332 318     Recent Labs     09/19/21  0015 09/19/21  0500 09/19/21  0740 09/19/21  1355 09/19/21  1615 09/20/21  0524   NA   < >  --  136  --  136 137   K   < >  --  3.8  --  3.9 4.3   CL   < >  --  103  --  102 103   CO2   < >  --  26  --  26 27   BUN   < >  --  14  --  17 23*   CREATININE   < >  --  0.7*  --  0.7* 0.7*   CALCIUM   < >  --  8.7  --  8.9 8.7   PHOS  --  2.5  --  2.7  --  2.9    < > = values in this interval not displayed. Recent Labs     09/18/21  0425 09/20/21  0524   INR 1.40* 1.14*       Physical Exam Performed:    BP (!) 90/55   Pulse 82   Temp 97.9 °F (36.6 °C) (Axillary)   Resp 18   Ht 4' 8\" (1.422 m)   Wt 94 lb (42.6 kg)   SpO2 95%   BMI 21.07 kg/m²     General appearance: NAD, pale   HEENT: Pupils equal, round, and reactive to light. Conjunctivae/corneas clear. Facial/ear deformities. Congential anomalies   Neck: Supple, no jugular venous distention. Trachea midline with full range of motion. MM profoundly dry   Respiratory:  Normal respiratory effort.  Decreased bilaterally   Cardiovascular: Regular rate and rhythm with normal S1/S2 without murmurs, rubs or gallops. Mervat Tr down in the 30's   Abdomen: Soft, non-tender, non-distended with normal bowel sounds. Musculoskelatal: No clubbing, cyanosis or edema bilaterally. Full range of motion without deformity. Neurologic: non verbal   Psychiatric: Drowsy,   Non-verbal, MRDD. Skin: Skin color, texture, turgor normal.  No rashes or lesions. Capillary Refill: Brisk,< 3 seconds   Peripheral Pulses: +2 palpable, equal bilaterally       Assessment/Plan:    Active Hospital Problems    Diagnosis     Seizure disorder ([facility] [G40.909]     PAF (paroxysmal atrial fibrillation) ([facility] [I48.0]     Hypothermia [S68. XXXA]     Thrombocytopenia ([facility] [D69.6]     RUBEN (acute kidney injury) ([facility] [N17.9]     Sepsis ([facility] [A41.9]     COVID-19 [U07.1]     Pneumonia [J18.9]      Sepsis, COVID-19 Pneumonia, Possible Aspiration Pneumonia, Acute Respiratory Failure with Hypoxia (resolved)  - Multifocal pneumonia distributed throughout dependent lungs regions most c/w aspiration pneumonia.   - COVID 19 tested positive from 9/5. No longer in isolation. - completed course of antibiotics  - currently stable on room air  - stopped steroids    Sinus Bradycardia  - Ongoing episodes of sinus bradycardia intermittently with occasional pauses. - no overt symptoms noted though difficult to tell  - Blood pressure is now stable. No offending agents noted  - EP consulted who recommends cardiac event monitor at dc  - continue to monitor    Shock (resolved)  - likely hypovolemic  - weaned off pressors  - stopped IVF. TF with free water boluses started    RUBEN (resolved)  - 2/2 hypovolemia  - improved with IVF  - continue to monitor    GI bleed (resolved)  - isolated incidence of coffee ground emesis  - continue PPI  - Hgb stable  - continue to monitor    Severe protein calorie malnutrition/Failure to thrive  - ongoing lack of appetite.  Likely related to acute illness but longterm anorexia difficult to predict  - unable to participate in SLP eval  - GI consulted.  S/P PEG tube placement  - tolerating tube feeds well  - no evidence of refeeding syndrome    Seizures  - no obvious sign of seizures  - continue on increased keppra dose for now    PAF  - currently in NSR  - no AC at baseline    Hypothermia (resolved)  - 2/2 sepsis  - improved with active warming  - currently euthermic    Hypernatremia (resolved)  - 2/2 poor PO intake  - improved with IVF  - continue to monitor    DVT prophylaxis: SCDs only d/t thrombocytopenia  Diet: Diet NPO  ADULT TUBE FEEDING; PEG; Standard with Fiber; Bolus; 4 TIMES DAILY; 237; Gravity; 90; Before and after each bolus  Code Status: Full Code  PT/OT Eval Status: NA    Dispo - pt is medically ready for discharge     [address], APRN - CNP

## 2021-09-20 NOTE — DISCHARGE INSTR - DIET

## 2021-09-21 VITALS
WEIGHT: 93.6 LBS | BODY MASS INDEX: 18.38 KG/M2 | DIASTOLIC BLOOD PRESSURE: 56 MMHG | TEMPERATURE: 98.4 F | SYSTOLIC BLOOD PRESSURE: 94 MMHG | OXYGEN SATURATION: 99 % | HEART RATE: 96 BPM | HEIGHT: 60 IN | RESPIRATION RATE: 18 BRPM

## 2021-09-21 LAB
GLUCOSE BLD-MCNC: 129 MG/DL (ref 70–99)
GLUCOSE BLD-MCNC: 132 MG/DL (ref 70–99)
GLUCOSE BLD-MCNC: 202 MG/DL (ref 70–99)
PERFORMED ON: ABNORMAL

## 2021-09-21 PROCEDURE — 6370000000 HC RX 637 (ALT 250 FOR IP): Performed by: REGISTERED NURSE

## 2021-09-21 PROCEDURE — 51798 US URINE CAPACITY MEASURE: CPT

## 2021-09-21 PROCEDURE — 2580000003 HC RX 258: Performed by: INTERNAL MEDICINE

## 2021-09-21 PROCEDURE — P9047 ALBUMIN (HUMAN), 25%, 50ML: HCPCS | Performed by: NURSE PRACTITIONER

## 2021-09-21 PROCEDURE — 6360000002 HC RX W HCPCS: Performed by: NURSE PRACTITIONER

## 2021-09-21 RX ORDER — LANSOPRAZOLE
30 KIT
Qty: 300 ML | Refills: 0 | Status: SHIPPED | OUTPATIENT
Start: 2021-09-21

## 2021-09-21 RX ORDER — LANOLIN ALCOHOL/MO/W.PET/CERES
5 CREAM (GRAM) TOPICAL NIGHTLY
Qty: 30 TABLET | Refills: 0 | Status: SHIPPED | OUTPATIENT
Start: 2021-09-21

## 2021-09-21 RX ORDER — LEVETIRACETAM 100 MG/ML
500 SOLUTION ORAL 2 TIMES DAILY
Status: DISCONTINUED | OUTPATIENT
Start: 2021-09-21 | End: 2021-09-21 | Stop reason: HOSPADM

## 2021-09-21 RX ORDER — QUETIAPINE FUMARATE 25 MG/1
75 TABLET, FILM COATED ORAL NIGHTLY
Status: DISCONTINUED | OUTPATIENT
Start: 2021-09-21 | End: 2021-09-21 | Stop reason: HOSPADM

## 2021-09-21 RX ORDER — LEVETIRACETAM 100 MG/ML
500 SOLUTION ORAL 2 TIMES DAILY
Qty: 120 ML | Refills: 3 | Status: SHIPPED | OUTPATIENT
Start: 2021-09-21

## 2021-09-21 RX ORDER — LEVETIRACETAM 100 MG/ML
500 SOLUTION ORAL 2 TIMES DAILY
Qty: 120 ML | Refills: 3 | Status: SHIPPED | OUTPATIENT
Start: 2021-09-21 | End: 2021-09-21

## 2021-09-21 RX ORDER — ALBUMIN (HUMAN) 12.5 G/50ML
50 SOLUTION INTRAVENOUS ONCE
Status: COMPLETED | OUTPATIENT
Start: 2021-09-21 | End: 2021-09-21

## 2021-09-21 RX ORDER — QUETIAPINE FUMARATE 25 MG/1
25 TABLET, FILM COATED ORAL DAILY
Status: DISCONTINUED | OUTPATIENT
Start: 2021-09-21 | End: 2021-09-21 | Stop reason: HOSPADM

## 2021-09-21 RX ORDER — TRAZODONE HYDROCHLORIDE 50 MG/1
150 TABLET ORAL NIGHTLY PRN
Status: DISCONTINUED | OUTPATIENT
Start: 2021-09-21 | End: 2021-09-21 | Stop reason: HOSPADM

## 2021-09-21 RX ORDER — LACTULOSE 10 G/15ML
20 SOLUTION ORAL 2 TIMES DAILY
Qty: 1 EACH | Refills: 1 | Status: SHIPPED | OUTPATIENT
Start: 2021-09-21 | End: 2021-10-25

## 2021-09-21 RX ORDER — SENNA AND DOCUSATE SODIUM 50; 8.6 MG/1; MG/1
2 TABLET, FILM COATED ORAL
Qty: 30 TABLET | Refills: 0 | Status: SHIPPED | OUTPATIENT
Start: 2021-09-21

## 2021-09-21 RX ORDER — QUETIAPINE FUMARATE 25 MG/1
50 TABLET, FILM COATED ORAL EVERY 24 HOURS
Status: DISCONTINUED | OUTPATIENT
Start: 2021-09-21 | End: 2021-09-21 | Stop reason: HOSPADM

## 2021-09-21 RX ORDER — POLYETHYLENE GLYCOL 3350 17 G/17G
17 POWDER, FOR SOLUTION ORAL DAILY PRN
Qty: 527 G | Refills: 1 | Status: SHIPPED | OUTPATIENT
Start: 2021-09-21 | End: 2021-10-25

## 2021-09-21 RX ADMIN — MICONAZOLE NITRATE: 20 POWDER TOPICAL at 09:17

## 2021-09-21 RX ADMIN — Medication 10 ML: at 09:17

## 2021-09-21 RX ADMIN — LACTULOSE 20 G: 20 SOLUTION ORAL at 09:16

## 2021-09-21 RX ADMIN — ALBUMIN (HUMAN) 50 G: 0.25 INJECTION, SOLUTION INTRAVENOUS at 04:23

## 2021-09-21 RX ADMIN — LEVETIRACETAM 500 MG: 100 SOLUTION ORAL at 12:13

## 2021-09-21 RX ADMIN — TAMSULOSIN HYDROCHLORIDE 0.4 MG: 0.4 CAPSULE ORAL at 09:17

## 2021-09-21 RX ADMIN — LANSOPRAZOLE 30 MG: KIT at 12:13

## 2021-09-21 ASSESSMENT — PAIN SCALES - GENERAL
PAINLEVEL_OUTOF10: 0
PAINLEVEL_OUTOF10: 0

## 2021-09-21 ASSESSMENT — PAIN SCALES - WONG BAKER
WONGBAKER_NUMERICALRESPONSE: 0
WONGBAKER_NUMERICALRESPONSE: 0

## 2021-09-21 NOTE — PROGRESS NOTES
Progress Note  Date:2021       IREB:[id number]  Patient Name:Duane A Carota     Date of Birth:     Age:61 y.o. Subjective    Subjective:  Symptoms:  Stable. Pain:  He reports no pain. Review of Systems  Objective         Vitals Last 24 Hours:  TEMPERATURE:  Temp  Av.1 °F (36.7 °C)  Min: 97.5 °F (36.4 °C)  Max: 98.5 °F (36.9 °C)  RESPIRATIONS RANGE: Resp  Av.6  Min: 15  Max: 18  PULSE OXIMETRY RANGE: SpO2  Av.4 %  Min: 95 %  Max: 96 %  PULSE RANGE: Pulse  Av.6  Min: 68  Max: 106  BLOOD PRESSURE RANGE: Systolic (54TOC), TRQ:37 , Min:75 , NEW:63   ; Diastolic (72PVL), NUF:59, Min:45, Max:60    I/O (24Hr): Intake/Output Summary (Last 24 hours) at 2021 1208  Last data filed at 2021 0913  Gross per 24 hour   Intake 1551 ml   Output 700 ml   Net 851 ml     Objective:  General Appearance: In no acute distress and not in pain. Vital signs: (most recent): Blood pressure 99/60, pulse 106, temperature 97.5 °F (36.4 °C), temperature source Axillary, resp. rate 15, height 4' 8\" (1.422 m), weight 93 lb 9.6 oz (42.5 kg), SpO2 96 %. Abdomen: Abdomen is soft. Bowel sounds are normal.   There is no abdominal tenderness. Labs/Imaging/Diagnostics    Labs:  CBC:  Recent Labs     21  0500 21  0524   WBC 8.6 8.4   RBC 3.65* 3.41*   HGB 10.6* 9.7*   HCT 31.4* 29.0*   MCV 86.1 85.1   RDW 16.5* 16.8*    318     CHEMISTRIES:  Recent Labs     21  0015 21  0500 21  0740 21  1355 21  1615 21  0524   NA   < >  --  136  --  136 137   K   < >  --  3.8  --  3.9 4.3   CL   < >  --  103  --  102 103   CO2   < >  --  26  --  26 27   BUN   < >  --  14  --  17 23*   CREATININE   < >  --  0.7*  --  0.7* 0.7*   GLUCOSE   < >  --  158*  --  112* 146*   PHOS  --  2.5  --  2.7  --  2.9   MG  --  2.10  --  1.90  --  1.70*    < > = values in this interval not displayed.      PT/INR:  Recent Labs     21  0524   PROTIME 12.9* INR 1.14*     APTT:No results for input(s): APTT in the last 72 hours. LIVER PROFILE:No results for input(s): AST, ALT, BILIDIR, BILITOT, ALKPHOS in the last 72 hours. Imaging Last 24 Hours:  No results found. Assessment//Plan           Hospital Problems         Last Modified POA    Pneumonia 9/5/2021 Yes    COVID-19 9/7/2021 Yes    Thrombocytopenia ([facility] 9/5/2021 Yes    RUBEN (acute kidney injury) ([facility] [address] 9/5/2021 Yes    Sepsis ([address] 9/5/2021 Yes    Seizure disorder ([address] 9/6/2021 Yes    PAF (paroxysmal atrial fibrillation) ([facility] [address] 9/6/2021 Yes    Hypothermia 9/6/2021 Yes        Assessment & Plan  61 yo nonverbal male with MRDD s/p PEG     Recommendation:  1.  Continue PEG feeding    Sue Eden MD       (O) [phone]         Electronically signed by Sue Eden MD on 9/21/21 at 12:08 PM EDT

## 2021-09-21 NOTE — CARE COORDINATION
CASE MANAGEMENT DISCHARGE SUMMARY      Discharge to: [facility]    Precertification completed: [address] Notification (HENS) completed: n/a    IMM given: (date) 09/21/21     New Durable Medical Equipment ordered/agency: n/a    Transportation:    Medical Transport explained to [employer]. Pt/family voice no agency preference. Agency used: Yoselyn Méndez up time: 1500   Ambulance form completed: Yes    Confirmed discharge plan with:     Patient: no - MRDD, nonverbal     Family:  Yes. Marianna Jama with [facility] notified ([phone]) and Tamara REEVES with [facility] ([phone])     Facility/Agency, name:  [facility]/AVS faxed to [facility]   Phone number for report to facility: [phone]     RN, name: Peyton Jesus    Note: Discharging nurse to complete LEELEE, reconcile AVS, and place final copy with patient's discharge packet. RN to ensure that written prescriptions for Level II medications are sent with patient to the facility as per protocol.     Talha Canchola RN

## 2021-09-21 NOTE — PROGRESS NOTES
Report to group home nurse kamari. Report to transport team. Supplies and patients personal wheelchair sent with patient. avs and paperwork sent with transport. Scripts given to transport.

## 2021-09-21 NOTE — DISCHARGE SUMMARY
Hospital Medicine Discharge Summary    Patient ID: Juan Morales      Patient's PCP: Dania Grady MD    Admit Date: 9/5/2021     Discharge Date:   9/21/2021    Admitting Physician: Nathaly Carrillo MD     Discharge Physician: MAYA Hill - CNP     Discharge Diagnoses: Active Hospital Problems    Diagnosis     Seizure disorder ([facility] [address] [U47.715]     PAF (paroxysmal atrial fibrillation) ([facility] [address] [I48.0]     Hypothermia [D83. XXXA]     Thrombocytopenia ([facility] [address] [D69.6]     RUBEN (acute kidney injury) ([facility] [address] [N17.9]     Sepsis ([facility] [address] [A41.9]     COVID-19 [U07.1]     Pneumonia [J18.9]        The patient was seen and examined on day of discharge and this discharge summary is in conjunction with any daily progress note from day of discharge. Hospital Course:   Juan Morales is a 62 y.o. male. He has a h/o MRDD and is nonverbal. He lives in a group home and was sent to the ER because he has not been eating or drinking as usual for the past four days. He underwent colonoscopy on 9/1/21 and has not been taking much oral intake since then. He received the [employer] COVID-19 vaccine on 5/7/21. Prior to that he was hospitalized in January, 2021 for acute respiratory failure due to COVID-19. Sepsis, COVID-19 Pneumonia, Possible Aspiration Pneumonia, Acute Respiratory Failure with Hypoxia (resolved)  - Multifocal pneumonia distributed throughout dependent lungs regions most c/w aspiration pneumonia.   - COVID 19 tested positive from 9/5. No longer in isolation. - completed course of antibiotics, s/p steroids   - currently stable on room air     Sinus Bradycardia (resolved)  - Ongoing episodes of sinus bradycardia intermittently with occasional pauses. - no overt symptoms noted though difficult to tell  - Blood pressure is now stable.  No offending agents noted  - EP consulted who recommends cardiac event monitor at dc     Shock (resolved)  - likely hypovolemic  - weaned off pressors  - stopped IVF. TF with free water boluses started     RUBEN (resolved)  - 2/2 hypovolemia  - resolved with IVF     GI bleed (resolved)  - isolated incidence of coffee ground emesis  - continue PPI  - Hgb stable     Severe protein calorie malnutrition/Failure to thrive  - ongoing lack of appetite. Likely related to acute illness but longterm anorexia difficult to predict  - unable to participate in SLP eval  - GI consulted. S/P PEG tube placement  - tolerating tube feeds well  - no evidence of refeeding syndrome     Seizures  - no obvious sign of seizures  - continue on increased keppra dose for now     PAF  - currently in NSR  - no AC at baseline     Hypothermia (resolved)  - 2/2 sepsis  - improved with active warming  - currently euthermic     Hypernatremia (resolved)  - 2/2 poor PO intake  - resolved with IVF      Physical Exam Performed:     BP (!) 94/56   Pulse 96   Temp 98.4 °F (36.9 °C) (Oral)   Resp 18   Ht 4' 8\" (1.422 m)   Wt 93 lb 9.6 oz (42.5 kg)   SpO2 99%   BMI 20.98 kg/m²       General appearance: NAD, pale   HEENT: Pupils equal, round, and reactive to light. Conjunctivae/corneas clear. Facial/ear deformities. Congential anomalies   Neck: Supple, no jugular venous distention. Trachea midline with full range of motion. MM profoundly dry   Respiratory:  Normal respiratory effort. Decreased bilaterally   Cardiovascular: Regular rate and rhythm with normal S1/S2 without murmurs, rubs or gallops. Lourena Pals down in the 30's   Abdomen: Soft, non-tender, non-distended with normal bowel sounds. Musculoskelatal: No clubbing, cyanosis or edema bilaterally. Full range of motion without deformity. Neurologic: non verbal   Psychiatric: Drowsy,   Non-verbal, MRDD. Skin: Skin color, texture, turgor normal.  No rashes or lesions. Capillary Refill: Brisk,< 3 seconds   Peripheral Pulses: +2 palpable, equal bilaterally       Labs:  For convenience and continuity at follow-up the following most recent labs are provided:      CBC:    Lab Results   Component Value Date    WBC 8.4 09/20/2021    HGB 9.7 09/20/2021    HCT 29.0 09/20/2021     09/20/2021       Renal:    Lab Results   Component Value Date     09/20/2021    K 4.3 09/20/2021     09/20/2021    CO2 27 09/20/2021    BUN 23 09/20/2021    CREATININE 0.7 09/20/2021    CALCIUM 8.7 09/20/2021    PHOS 2.9 09/20/2021         Significant Diagnostic Studies    Radiology:   XR CHEST PORTABLE   Final Result   No substantial interval change in right greater than left airspace disease as   compared to prior. XR CHEST PORTABLE   Final Result   Central venous catheter in expected position without pneumothorax. CT CHEST WO CONTRAST   Final Result   Bilateral consolidations and ground-glass opacities representing pneumonia,   which COVID-19 pneumonia cannot be ruled out. XR ACUTE ABD SERIES CHEST 1 VW   Final Result   Right perihilar and basilar opacification, concerning for infiltrate   superimposed on atelectasis. Nonspecific abdominal bowel gas pattern. Left-sided nephrolithiasis.                 Consults:     IP CONSULT TO PHARMACY  IP CONSULT TO CRITICAL CARE  IP CONSULT TO CARDIOLOGY  IP CONSULT TO ETHICS  IP CONSULT TO GI  IP CONSULT TO DIETITIAN    Disposition:  Group home    Condition at Discharge: Stable    Discharge Instructions/Follow-up:  Follow-up with PCP     Code Status:  Full Code     Activity: activity as tolerated    Diet: NPO, tube feedings       Discharge Medications:     Current Discharge Medication List           Details   lansoprazole 3 MG/ML SUSP 10 mLs by Per G Tube route every morning (before breakfast)  Qty: 300 mL, Refills: 0      levETIRAcetam (KEPPRA) 100 MG/ML solution 5 mLs by PEG Tube route 2 times daily  Qty: 120 mL, Refills: 3              Details   lactulose (CHRONULAC) 10 GM/15ML solution 30 mLs by PEG Tube route 2 times daily  Qty: 1 each, Refills: 1      melatonin 3 MG TABS tablet 1.5 tablets by PEG Tube route nightly  Qty: 30 tablet, Refills: 0      sennosides-docusate sodium (SENOKOT-S) 8.6-50 MG tablet 2 tablets by PEG Tube route every 72 hours  Qty: 30 tablet, Refills: 0      polyethylene glycol (GLYCOLAX) 17 g packet 17 g by Per G Tube route daily as needed for Constipation  Qty: 527 g, Refills: 1              Details   LORazepam (ATIVAN) 0.5 MG tablet Take 0.5 mg by mouth every 6 hours as needed for Anxiety. tamsulosin (FLOMAX) 0.4 MG capsule Take 0.8 mg by mouth daily      traZODone (DESYREL) 50 MG tablet 1 tablet by Per NG tube route nightly  Qty: 30 tablet, Refills: 0             Time Spent on discharge is more than 45 minutes in the examination, evaluation, counseling and review of medications and discharge plan. Signed:    [facility], APRN - [facility]   9/21/2021      Thank you Musa Smith MD for the opportunity to be involved in this patient's care. If you have any questions or concerns please feel free to contact me at [phone].

## 2021-09-21 NOTE — PLAN OF CARE
Problem: Skin Integrity:  Goal: Absence of new skin breakdown  Description: Absence of new skin breakdown  Outcome: Ongoing   Pt educated on importance of turning q2h, cleanse skin & apply barrier cream after each incontinent episode, inspect skin per unit guidelines & specialty low air loss mattress in place. No evidence of learning.

## 2021-10-11 ENCOUNTER — HOSPITAL ENCOUNTER (EMERGENCY)
Age: 61
Discharge: HOME | End: 2021-10-11
Attending: EMERGENCY MEDICINE
Payer: MEDICARE

## 2021-10-11 VITALS
BODY MASS INDEX: 18.26 KG/M2 | WEIGHT: 93 LBS | RESPIRATION RATE: 16 BRPM | TEMPERATURE: 98.6 F | HEART RATE: 97 BPM | SYSTOLIC BLOOD PRESSURE: 134 MMHG | HEIGHT: 60 IN | OXYGEN SATURATION: 96 % | DIASTOLIC BLOOD PRESSURE: 82 MMHG

## 2021-10-11 DIAGNOSIS — S90.822A BLISTER OF LEFT HEEL, INITIAL ENCOUNTER: Primary | ICD-10-CM

## 2021-10-11 DIAGNOSIS — S90.821A BLISTER OF RIGHT HEEL, INITIAL ENCOUNTER: ICD-10-CM

## 2021-10-11 PROCEDURE — 99283 EMERGENCY DEPT VISIT LOW MDM: CPT

## 2021-10-11 ASSESSMENT — PAIN SCALES - WONG BAKER: WONGBAKER_NUMERICALRESPONSE: 2

## 2021-10-11 NOTE — ED PROVIDER NOTES
[facility] Emergency Department    CHIEF COMPLAINT  Blister (Per caretaker pt had a blister appear \"over the weekend\" on L heel)      SHARED SERVICE VISIT:  Evaluated by FARIDA. My supervising physician was available for consultation. HISTORY OF PRESENT ILLNESS  Duane A Salomon Holm is a 62 y.o. male who presents to the ED complaining of bilateral heel blisters first noticed today. Patient past medical history of A. fib, bipolar, developmental nonverbal disorder and seizure. Patient does have home health staff with him at all times and who presents with him to the emergency department today. They state he rubs his feet a lot on the couch and her bed when lying. He does not walk and is wheelchair-bound. They have not noticed any fevers, chills, nausea or vomiting. There are no known injuries. Patient is unable to provide any history himself. No other complaints, modifying factors or associated symptoms. Nursing notes reviewed. Past Medical History:   Diagnosis Date    A-fib ([address]     Bipolar disorder ([address]     Brain herniation ([address]     Constipation     COVID-19 01/20/2021 9/5/21 positive    Cystitis     Developmental non-verbal disorder     Impulse control disorder     Influenza A 02/13/2014    Mental retardation, profound (I.Q. < 20)     Osteopenia     Seizure ([address]     Subdural hematoma ([address]      Past Surgical History:   Procedure Laterality Date    CATARACT REMOVAL      COLONOSCOPY  06/20/2016    incomplete, poor prep    COLONOSCOPY  08/26/2020    COLON W/ANES.  COLONOSCOPY N/A 8/26/2020    COLON W/ANES. (9:00) COVID TEST 8/20-8/22. PT IS NON-VERBAL, IS NOT IN A FACILITY. IS CARED FOR BY 2 CAREGIVERS IN HIS HOME. performed by Davian Hood MD at [address] [facility] COLONOSCOPY N/A 09/01/2021    COLONOSCOPY N/A 9/1/2021    COLON W/ANES. (10:30) PT IS COMBATIVE. [facility] "[employer]" SERVICES.  [phone] XSTEPH performed by Noy Noyola Caitlin Lazcano MD at [address] Right 12/2/2019    Right Trephine Craniotomy For Evacuation of Subdural Hematoma performed by Hardy Epley, MD at [address] [facility] N/A 2/28/2020    CYSTOSCOPY, TRANSURETHRAL RESECTION OF PROSTATE performed by Zo Hewitt MD at [address] [facility] N/A 9/17/2021    EGD ESOPHAGOGASTRODUODENOSCOPY PEG TUBE INSERTION performed by Mao Rees MD at [address] History   Family history unknown: Yes     Social History     Socioeconomic History    Marital status: Single     Spouse name: Not on file    Number of children: Not on file    Years of education: Not on file    Highest education level: Not on file   Occupational History    Not on file   Tobacco Use    Smoking status: Never Smoker    Smokeless tobacco: Never Used   Vaping Use    Vaping Use: Never used   Substance and Sexual Activity    Alcohol use: No    Drug use: No    Sexual activity: Not Currently   Other Topics Concern    Not on file   Social History Narrative    Not on file     Social Determinants of Health     Financial Resource Strain:     Difficulty of Paying Living Expenses:    Food Insecurity:     Worried About Running Out of Food in the Last Year:     Ran Out of Food in the Last Year:    Transportation Needs:     Lack of Transportation (Medical):      Lack of Transportation (Non-Medical):    Physical Activity:     Days of Exercise per Week:     Minutes of Exercise per Session:    Stress:     Feeling of Stress :    Social Connections:     Frequency of Communication with Friends and Family:     Frequency of Social Gatherings with Friends and Family:     Attends Jew Services:     Active Member of Clubs or Organizations:     Attends Club or Organization Meetings:     Marital Status:    Intimate Partner Violence:     Fear of Current or Ex-Partner:     Emotionally Abused:     Physically Abused:     Sexually Abused:      No current facility-administered medications for this encounter. Current Outpatient Medications   Medication Sig Dispense Refill    lactulose (CHRONULAC) 10 GM/15ML solution 30 mLs by PEG Tube route 2 times daily 1 each 1    lansoprazole 3 MG/ML SUSP 10 mLs by Per G Tube route every morning (before breakfast) 300 mL 0    levETIRAcetam (KEPPRA) 100 MG/ML solution 5 mLs by PEG Tube route 2 times daily 120 mL 3    melatonin 3 MG TABS tablet 1.5 tablets by PEG Tube route nightly 30 tablet 0    sennosides-docusate sodium (SENOKOT-S) 8.6-50 MG tablet 2 tablets by PEG Tube route every 72 hours 30 tablet 0    polyethylene glycol (GLYCOLAX) 17 g packet 17 g by Per G Tube route daily as needed for Constipation 527 g 1    LORazepam (ATIVAN) 0.5 MG tablet Take 0.5 mg by mouth every 6 hours as needed for Anxiety.  tamsulosin (FLOMAX) 0.4 MG capsule Take 0.8 mg by mouth daily      traZODone (DESYREL) 50 MG tablet 1 tablet by Per NG tube route nightly (Patient taking differently: 150 mg by Per NG tube route nightly ) 30 tablet 0     Allergies   Allergen Reactions    Penicillins      Other reaction(s): Unknown    Benadryl [Diphenhydramine] Other (See Comments)     Pt becomes agitated and aggressive with Benadryl    Clonidine Derivatives      Other reaction(s): Unknown    Tetracyclines & Related      Other reaction(s): Unknown       REVIEW OF SYSTEMS  6 systems reviewed, pertinent positives per HPI otherwise noted to be negative    PHYSICAL EXAM  /82   Pulse 105   Temp 98.6 °F (37 °C) (Oral)   Resp 19   Ht 4' 8\" (1.422 m)   Wt 93 lb (42.2 kg)   SpO2 98%   BMI 20.85 kg/m²   GENERAL APPEARANCE: Awake and alert. Cooperative. No acute distress. HEAD: Normocephalic. Atraumatic. EYES: PERRL. EOM's grossly intact. ENT: Mucous membranes are moist.   NECK: Supple. Normal ROM. CHEST: Equal symmetric chest rise. LUNGS: Breathing is unlabored. Speaking comfortably in full sentences.    Abdomen: Nondistended  EXTREMITIES: MAEE. No acute deformities. Bilateral heels with fluctuant blisters. Left greater than right. Spontaneous drainage of left posterior did occur while in the emergency department noted to have serous fluid. Right blister intact. Pedal pulses are 2+ sensation is grossly intact. Cap refill less than 2 seconds. Lower extremity compartments are soft, nontender, without crepitus or palpable deformity. Minimal surrounding erythema secondary to reaction of swelling and blister. SKIN: Warm and dry. NEUROLOGICAL: Alert and oriented. Strength is 5/5 in all extremities and sensation is intact. RADIOLOGY  No results found. ED COURSE  Patient seen in the emergency department for wounds on bilateral heels. Left blister did continue to bleed drainable in the emergency department I did use an 18-gauge needle to expand the hole which was draining from and fluid was expressed manually. I then cleaned the blisters with normal saline and wiped down with Betadine prior to bandage placement. I consulted wound care to write down ankle padding and moon boots. discussion was had with the caretaker regarding wound care, return precautions and follow-up with [facility] wound clinic. Risk management discussed and shared decision making had with patient and/or surrogate. All questions were answered. Patient will follow up with [facility] wound clinic for further evaluation/treatment. Patient will return to ED for new/worsening symptoms. MDM    I estimate there is LOW risk for CELLULITIS, COMPARTMENT SYNDROME, NECROTIZING FASCIITIS, TENDON OR NEUROVASCULAR INJURY, or FOREIGN BODY, thus I consider the discharge disposition reasonable. Also, there is no evidence or peritonitis, sepsis, or toxicity. Duane A Carota and I have discussed the diagnosis and risks, and we agree with discharging home to follow-up with their primary doctor.  We also discussed returning to the Emergency Department immediately if new or worsening symptoms occur. We have discussed the symptoms which are most concerning (e.g., changing or worsening pain, fever, numbness, weakness, cool or painful digits) that necessitate immediate return. Final Impression    1. Blister of left heel, initial encounter    2. Blister of right heel, initial encounter        Discharge Vital Signs:  Blood pressure 134/82, pulse 97, temperature 98.6 °F (37 °C), temperature source Oral, resp. rate 16, height 4' 8\" (1.422 m), weight 93 lb (42.2 kg), SpO2 96 %. DISPOSITION  Patient was discharged to home in good condition.             Richard Domingo, [place]  10/11/21 5443

## 2021-10-11 NOTE — ED NOTES
Pt bilateral heel wounds covered with mepilex dressings per Magda HOLDER. Caregiver instructed on use and verbalized understanding.       Avila Pickard RN  10/11/21 1705

## 2021-10-11 NOTE — ED NOTES
--Patient caregiver provided with discharge instructions and any prescriptions. --Instructions, dosing, and follow-up appointments reviewed with patient/family. No further questions or needs at this time. --Vital signs and patient stable upon discharge. --Patient taken in wheelchair to lobby per baseline.       Avila Pickard RN  10/11/21 5015

## 2021-10-14 ENCOUNTER — APPOINTMENT (OUTPATIENT)
Dept: GENERAL RADIOLOGY | Age: 61
End: 2021-10-14
Payer: MEDICARE

## 2021-10-14 ENCOUNTER — APPOINTMENT (OUTPATIENT)
Dept: CT IMAGING | Age: 61
End: 2021-10-14
Payer: MEDICARE

## 2021-10-14 ENCOUNTER — HOSPITAL ENCOUNTER (EMERGENCY)
Age: 61
Discharge: HOME | End: 2021-10-14
Payer: MEDICARE

## 2021-10-14 VITALS
TEMPERATURE: 98.2 F | RESPIRATION RATE: 18 BRPM | SYSTOLIC BLOOD PRESSURE: 135 MMHG | BODY MASS INDEX: 22.42 KG/M2 | DIASTOLIC BLOOD PRESSURE: 110 MMHG | HEART RATE: 80 BPM | WEIGHT: 100 LBS | OXYGEN SATURATION: 98 %
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
## 2021-10-14 DIAGNOSIS — S09.90XA INJURY OF HEAD, INITIAL ENCOUNTER: Primary | ICD-10-CM

## 2021-10-14 PROCEDURE — 72125 CT NECK SPINE W/O DYE: CPT

## 2021-10-14 PROCEDURE — 70450 CT HEAD/BRAIN W/O DYE: CPT

## 2021-10-14 PROCEDURE — 99283 EMERGENCY DEPT VISIT LOW MDM: CPT

## 2021-10-14 PROCEDURE — 73070 X-RAY EXAM OF ELBOW: CPT

## 2021-10-14 NOTE — ED TRIAGE NOTES
Patients caregiver was attempting to lift patient from wheelchair and he slid around her causing the fall and injuring his forehead. Care giver denies loc and blood thinners.

## 2021-10-14 NOTE — ED NOTES
Bed: 26  Expected date:   Expected time:   Means of arrival:   Comments:  MEDIC 50     Rui Cabrera RN  10/14/21 9327

## 2021-10-16 NOTE — ED PROVIDER NOTES
[facility] Emergency Department    CHIEF COMPLAINT  Head Injury (patient slid out of wheelchair hit head on floor/ abrasion to right brow area. No blood thinners no loc)      SHARED SERVICE VISIT  Evaluated by FARIDA. My supervising physician was available for consultation. HISTORY OF PRESENT ILLNESS  Duane A Herby Glassing is a 62 y.o. male nonverbal male due to developmental disorder/MR; presents emergency department with his primary caregiver for evaluation of a fall. Was reported today that the patient slid out of his wheelchair and hit near his right eyebrow. There is also an abrasion to the abrasion on the right elbow therefore his concern seems to hit that as well. [place] any loss of consciousness or seizures. This was unobserved event. No anticoagulation. No other complaints, modifying factors or associated symptoms. Nursing notes reviewed. Past Medical History:   Diagnosis Date    A-fib ([address]     Bipolar disorder ([address]     Brain herniation ([address]     Constipation     COVID-19 01/20/2021 9/5/21 positive    Cystitis     Developmental non-verbal disorder     Impulse control disorder     Influenza A 02/13/2014    Mental retardation, profound (I.Q. < 20)     Osteopenia     Seizure ([address]     Subdural hematoma ([address]      Past Surgical History:   Procedure Laterality Date    CATARACT REMOVAL      COLONOSCOPY  06/20/2016    incomplete, poor prep    COLONOSCOPY  08/26/2020    COLON W/ANES.  COLONOSCOPY N/A 8/26/2020    COLON W/ANES. (9:00) COVID TEST 8/20-8/22. PT IS NON-VERBAL, IS NOT IN A FACILITY. IS CARED FOR BY 2 CAREGIVERS IN HIS HOME. performed by Daron Shields MD at [address] [facility] COLONOSCOPY N/A 09/01/2021    COLONOSCOPY N/A 9/1/2021    COLON W/ANES. (10:30) PT IS COMBATIVE. [facility] HUMAN SERVICES.  [phone] XSTEP performed by Daron Shields MD at [address] Right 12/2/2019    Right Trephine Craniotomy For Evacuation of Subdural Hematoma performed by Destini Carranza MD at [address] TURP N/A 2/28/2020    CYSTOSCOPY, TRANSURETHRAL RESECTION OF PROSTATE performed by Beto Thakur MD at [address] N/A 9/17/2021    EGD ESOPHAGOGASTRODUODENOSCOPY PEG TUBE INSERTION performed by Roseanna Talley MD at [address] History   Family history unknown: Yes     Social History     Socioeconomic History    Marital status: Single     Spouse name: Not on file    Number of children: Not on file    Years of education: Not on file    Highest education level: Not on file   Occupational History    Not on file   Tobacco Use    Smoking status: Never Smoker    Smokeless tobacco: Never Used   Vaping Use    Vaping Use: Never used   Substance and Sexual Activity    Alcohol use: No    Drug use: No    Sexual activity: Not Currently   Other Topics Concern    Not on file   Social History Narrative    Not on file     Social Determinants of Health     Financial Resource Strain:     Difficulty of Paying Living Expenses:    Food Insecurity:     Worried About Running Out of Food in the Last Year:     Ran Out of Food in the Last Year:    Transportation Needs:     Lack of Transportation (Medical):  Lack of Transportation (Non-Medical):    Physical Activity:     Days of Exercise per Week:     Minutes of Exercise per Session:    Stress:     Feeling of Stress :    Social Connections:     Frequency of Communication with Friends and Family:     Frequency of Social Gatherings with Friends and Family:     Attends Jewish Services:     Active Member of Clubs or Organizations:     Attends Club or Organization Meetings:     Marital Status:    Intimate Partner Violence:     Fear of Current or Ex-Partner:     Emotionally Abused:     Physically Abused:     Sexually Abused:      No current facility-administered medications for this encounter.      Current Outpatient Medications   Medication Sig Dispense Refill    lactulose (CHRONULAC) 10 GM/15ML solution 30 mLs by PEG Tube route 2 times daily 1 each 1    lansoprazole 3 MG/ML SUSP 10 mLs by Per G Tube route every morning (before breakfast) 300 mL 0    levETIRAcetam (KEPPRA) 100 MG/ML solution 5 mLs by PEG Tube route 2 times daily 120 mL 3    melatonin 3 MG TABS tablet 1.5 tablets by PEG Tube route nightly 30 tablet 0    sennosides-docusate sodium (SENOKOT-S) 8.6-50 MG tablet 2 tablets by PEG Tube route every 72 hours 30 tablet 0    polyethylene glycol (GLYCOLAX) 17 g packet 17 g by Per G Tube route daily as needed for Constipation 527 g 1    LORazepam (ATIVAN) 0.5 MG tablet Take 0.5 mg by mouth every 6 hours as needed for Anxiety.  tamsulosin (FLOMAX) 0.4 MG capsule Take 0.8 mg by mouth daily      traZODone (DESYREL) 50 MG tablet 1 tablet by Per NG tube route nightly (Patient taking differently: 150 mg by Per NG tube route nightly ) 30 tablet 0     Allergies   Allergen Reactions    Penicillins      Other reaction(s): Unknown    Benadryl [Diphenhydramine] Other (See Comments)     Pt becomes agitated and aggressive with Benadryl    Clonidine Derivatives      Other reaction(s): Unknown    Tetracyclines & Related      Other reaction(s): Unknown       REVIEW OF SYSTEMS  10 systems reviewed, pertinent positives per HPI otherwise noted to be negative    PHYSICAL EXAM  BP (!) 135/110   Pulse 80   Temp 98.2 °F (36.8 °C) (Axillary)   Resp 18   Wt 100 lb (45.4 kg)   SpO2 98%   BMI 22.42 kg/m²   GENERAL APPEARANCE: Awake and alert. Cooperative. Nonverbal  HEAD: Normocephalic. Atraumatic. Small supra right orbital abrasion no alaniz signs. No septal hematomas. No periorbital ecchymosis. EYES: PERRL. EOM's grossly intact. ENT: Mucous membranes are moist.  Hemotympanums  NECK: Supple. HEART: RRR. No murmurs. LUNGS: Respirations unlabored. Good air exchange. ABDOMEN: Soft. Non-distended. Non-tender.  No guarding or rebound. No masses. No organomegaly. EXTREMITIES: No bony abnormalities. Region over the lateral aspect of the right elbow. No peripheral edema. Moves all extremities equally. All extremities neurovascularly intact. SKIN: Warm and dry. No acute rashes. NEUROLOGICAL: CN's 2-12 intact. No gross facial drooping. Strength 5/5, sensation intact. PSYCHIATRIC: Normal mood and affect. RADIOLOGY  CT Head WO Contrast   Final Result   No acute intracranial abnormality. CT CERVICAL SPINE WO CONTRAST   Final Result   No acute abnormality of the cervical spine. XR ELBOW RIGHT (2 VIEWS)   Final Result   Limited lateral view demonstrates no joint effusion or evident malalignment. If there is ongoing clinical concern for injury, recommend CT. LABS  Labs Reviewed - No data to display    PROCEDURES  Unless otherwise noted below, none  Procedures    MDM  MDM  60-year-old male presents emergency department for evaluation of a fall. Patient is nonverbal, MR and is with his primary caregivers. This wasn't observed follow-up no loss of consciousness or anticoagulation or seizures. On arrival patient has a small abrasion over the right supraorbital as well as abrasion to the right forearm. Imaging of the head cervical spine and elbow was obtained with no acute findings. Patient will be discharged back to the long-term care facility for full-time care. Recommended return to ED if symptoms worsen or change. No results found for this visit on 10/14/21. I estimate there is LOW risk for SUBARACHNOID HEMORRHAGE, MENINGITIS, INTRACRANIAL HEMORRHAGE, SUBDURAL HEMATOMA, OR STROKE, thus I consider the discharge disposition reasonable. Duane A Carota and I have discussed the diagnosis and risks, and we agree with discharging home to follow-up with their primary doctor. We also discussed returning to the Emergency Department immediately if new or worsening symptoms occur.  We have

## 2021-10-18 ENCOUNTER — HOSPITAL ENCOUNTER (OUTPATIENT)
Dept: WOUND CARE | Age: 61
Discharge: HOME | End: 2021-10-18
Payer: MEDICARE

## 2021-10-18 VITALS
TEMPERATURE: 97.2 F | WEIGHT: 88.8 LBS | DIASTOLIC BLOOD PRESSURE: 90 MMHG | HEART RATE: 121 BPM | HEIGHT: 60 IN | RESPIRATION RATE: 18 BRPM | SYSTOLIC BLOOD PRESSURE: 102 MMHG | BODY MASS INDEX: 17.43 KG/M2

## 2021-10-18 DIAGNOSIS — L89.622 PRESSURE ULCER OF LEFT HEEL, STAGE 2 (HCC): Primary | ICD-10-CM

## 2021-10-18 PROCEDURE — 99215 OFFICE O/P EST HI 40 MIN: CPT

## 2021-10-18 RX ORDER — LIDOCAINE HYDROCHLORIDE 40 MG/ML
SOLUTION TOPICAL ONCE
Status: CANCELLED | OUTPATIENT
Start: 2021-10-18 | End: 2021-10-18

## 2021-10-18 RX ORDER — LIDOCAINE 50 MG/G
OINTMENT TOPICAL ONCE
Status: CANCELLED | OUTPATIENT
Start: 2021-10-18 | End: 2021-10-18

## 2021-10-18 RX ORDER — LIDOCAINE 40 MG/G
CREAM TOPICAL ONCE
Status: DISCONTINUED | OUTPATIENT
Start: 2021-10-18 | End: 2021-10-19 | Stop reason: HOSPADM

## 2021-10-18 RX ORDER — BACITRACIN ZINC AND POLYMYXIN B SULFATE 500; 1000 [USP'U]/G; [USP'U]/G
OINTMENT TOPICAL ONCE
Status: CANCELLED | OUTPATIENT
Start: 2021-10-18 | End: 2021-10-18

## 2021-10-18 RX ORDER — LIDOCAINE 40 MG/G
CREAM TOPICAL ONCE
Status: CANCELLED | OUTPATIENT
Start: 2021-10-18 | End: 2021-10-18

## 2021-10-18 NOTE — PLAN OF CARE
[address]:     [address]  [address] [facility]  Dept: [phone]   Fax# [phone]    Patient Information:      Abram Salvador[address]  Monika Muñoz   [phone]   : 1960  AGE: 62 y.o. GENDER: male   TODAYS DATE:  10/18/2021    Insurance:      PRIMARY INSURANCE:  Plan: MEDICARE PART A AND B  Coverage: MEDICARE  Effective Date: 2018  Group Number: [unfilled]  Subscriber Number: [id number] - (Medicare)    Payor/Plan Subscr  Sex Relation Sub. Ins. ID Effective Group Num   1. GENERIC AUTO * CAROTA,DUANE A 1960 Male Self  19                                    [address]th floor, [address] [facility]   2. 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 G93.5    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 ([facility] S06.5X9S    Constipation K59.00    Acute urinary retention R33.8    Atrial fibrillation, chronic (HCC) I48.20    History of subdural hematoma Z86.79    Pneumonia J18.9    COVID-19 U07.1    Pulmonary infiltrates R91.8    Thrombocytopenia (HCC) D69.6    RUBEN (acute kidney injury) ([facility] N17.9    Sepsis (HCC) A41.9    Seizure disorder (HCC) G40.909    PAF (paroxysmal atrial fibrillation) ([facility]) I48.0    Hypothermia T68. XXXA       WOUNDS REQUIRING DRESSING SUPPLIES:     Wound 10/18/21 #1 Right Heel, Pressure, Stage 1, onset 10/11/21 (Active)   Wound Image   10/18/21 1524   Wound Etiology Pressure Stage  1 10/18/21 1555   Dressing Status New dressing applied;Clean;Dry; Intact 10/18/21 1555   Wound Cleansed Soap and water 10/18/21 1501   Dressing/Treatment Other (comment) 10/18/21 1555   Wound Length (cm) 1.5 cm 10/18/21 1501   Wound Width (cm) 2.5 cm 10/18/21 1501   Wound Depth (cm) 0.1 cm 10/18/21 1555   Wound Surface Area (cm^2) 3.75 cm^2 10/18/21 1501   Distance Tunneling (cm) 0 cm 10/18/21 1501   Undermining Maxium Distance (cm) 0 10/18/21 1501   Wound Assessment Fluid filled blister 10/18/21 1501   Drainage Amount None 10/18/21 1501   Odor None 10/18/21 1501   Jennifer-wound Assessment Intact 10/18/21 1501   Number of days: 0       Wound 10/18/21 #2 Left Heel, Pressure, Stage 2, onset 10/11/21 (Active)   Wound Image   10/18/21 1524   Wound Etiology Pressure Stage  2 10/18/21 1555   Dressing Status New dressing applied;Clean;Dry; Intact 10/18/21 1555   Wound Cleansed Soap and water 10/18/21 1501   Dressing/Treatment Other (comment) 10/18/21 1555   Wound Length (cm) 4 cm 10/18/21 1504   Wound Width (cm) 3.5 cm 10/18/21 1504   Wound Depth (cm) 0.1 cm 10/18/21 1504   Wound Surface Area (cm^2) 14 cm^2 10/18/21 1504   Wound Volume (cm^3) 1.4 cm^3 10/18/21 1504   Distance Tunneling (cm) 0 cm 10/18/21 1504   Undermining Maxium Distance (cm) 0 10/18/21 1504   Wound Assessment Pink/red;Slough;Eschar moist 10/18/21 1504   Drainage Amount Small 10/18/21 1504   Drainage Description Serosanguinous 10/18/21 1504   Odor None 10/18/21 1504   Jennifer-wound Assessment Dry/flaky 10/18/21 1504   Number of days: 0          Supplies Requested :      WOUND #: 1 & 2   PRIMARY DRESSING:       Cover and Secure with:  Other bordered gauze     FREQUENCY OF DRESSING CHANGES:  Daily    Wound Thickness [x] Full   []Partial         Patient Wound(s) Debrided: [x] Yes   [] No    Debridement Date: 10/18/2021    Debribement Type: Mechanical     ADDITIONAL ITEMS:  [] Gloves Small  [x] Gloves Medium [] Gloves Large [] Gloves Roxy Nava  [] Paper Tape 1\" [] Paper Tape 2\" [] Paper Tape 3\"  [] Medipore Tape 3\"  [x] Saline  [] Skin Prep   [] Adhesive Remover [] Cotton Tip Applicators  [] Tubular Stocking   [] Size E  [] Size G  [] Other:    Patient currently being seen by Home Health: [] Yes   [x] No    Duration for needed supplies:  []15  [x]30  []60  []90 Days    Provider Information:      PROVIDER'S NAME/NPI  Dr. Darby Hyatt  NPI [id number]  I give permission to coordinate the care for this patient

## 2021-10-18 NOTE — PLAN OF CARE
Pt to the [facility] for initial appointment for bilateral heel wounds. Pt to have mepilex border to right heel wound and football dressing to left heel wound. Caregiver to place mepilex border to left heel wound if football dressing should become soiled or come off. Pt to follow up in the [facility] in 1 week. Information sent to [facility] for dressing supplies. Discharge instructions reviewed with patient, all questions answered, copy given to patient. Dressings were applied to all wounds per M.D. Instructions at this visit.

## 2021-10-20 NOTE — PROGRESS NOTES
[facility] Progress Note      Duane A Carota     : 1960    DATE OF VISIT:  10/18/2021    Subjective:     Duane A Carota is a 62 y.o. male who has a chief complaint of a pressure ulcer located on the bilateral foot. Seen with caretaker. Patient has profound MR and is nonverbal.   Per caretakers he did not tolerate offloading boots. Pushes with his feet a lot while he is resting. Has done OK with mepilex border dressings. Right foot wound had a blister that just popped. Left foot is being slower to respond. Mr. Rodrigues has a past medical history of A-fib ([address] Bipolar disorder ([address] Brain herniation ([address] Constipation, COVID-19, Cystitis, Developmental non-verbal disorder, Impulse control disorder, Influenza A, Mental retardation, profound (I.Q. < 20), Osteopenia, Seizure ([address] and Subdural hematoma ([address] He has a past surgical history that includes Cataract removal; Colonoscopy (2016); craniotomy (Right, 2019); TURP (N/A, 2020); Colonoscopy (2020); Colonoscopy (N/A, 2020); Colonoscopy (N/A, 2021); Colonoscopy (N/A, 2021); Upper gastrointestinal endoscopy (N/A, 2021); and Gastrostomy tube placement. His Family history is unknown by patient. Mr. Machelle Tracy reports that he has never smoked. He has never used smokeless tobacco. He reports that he does not drink alcohol and does not use drugs. His current medication list consists of LORazepam, lactulose, lansoprazole, levETIRAcetam, melatonin, polyethylene glycol, sennosides-docusate sodium, tamsulosin, and traZODone. Allergies: Penicillins, Benadryl [diphenhydramine], Clonidine derivatives, and Tetracyclines & related    Pertinent items from the review of systems are discussed in the HPI; the remainder of the ROS was reviewed and is negative.        Objective:     BP (!) 102/90   Pulse 121   Temp 97.2 °F (36.2 °C) (Axillary)   Resp 18   Ht 4' 8\" (1.422 m) Comment: per staff  Wt 88 lb 12.8 oz (40.3 kg)   BMI 19.91 kg/m²      General Appearance: alert and oriented to person, place and time, well developed and well- nourished, in no acute distress  Skin: warm and dry, no rash or erythema  Head: normocephalic and atraumatic  Eyes: pupils equal, round, and reactive to light, extraocular eye movements intact, conjunctivae normal  ENT: tympanic membrane, external ear and ear canal normal bilaterally, nose without deformity, nasal mucosa and turbinates normal without polyps  Neck: supple and non-tender without mass, no thyromegaly or thyroid nodules, no cervical lymphadenopathy  Pulmonary/Chest: clear to auscultation bilaterally- no wheezes, rales or rhonchi, normal air movement, no respiratory distress  Cardiovascular: normal rate, regular rhythm, normal S1 and S2, no murmurs, rubs, clicks, or gallops, distal pulses intact, no carotid bruits  Abdomen: soft, non-tender, non-distended, normal bowel sounds, no masses or organomegaly.      Dorsalis pedis pulse left palpable  Posterior tibial pulse left palpable  Dorsalis pedis pulse right palpable  Posterior tibial pulse right palpable      Ulcer on right heel with lysing skin noted periwound with minimal fibrotic tissue, red granulation tissue, mild serous drainage, no hyperkeratotic rim, no undermining, no tunneling, no purulence, no malodor, no eschar, no periwound maceration, mild periwound erythema, mild edema, no crepitus, no increase in skin temperature, ulcer probes to soft tissue only  Immature epithelial tissue noted on periphery    Ulcer on the left heel with mild to moderate fibrotic tissue, red granulation tissue, mild serous drainage, no hyperkeratotic rim, no undermining, no tunneling, no purulence, no malodor, no eschar, no periwound maceration, mild periwound erythema, mild edema, no crepitus, no increase in skin temperature, ulcer probes to soft tissue only       Today's ulcer measurements are in the wound documentation flowsheet. Wound measurements:  Wound 10/18/21 #1 Right Heel, Pressure, Stage 1, onset 10/11/21-Wound Length (cm): 1.5 cm  Wound 10/18/21 #2 Left Heel, Pressure, Stage 2, onset 10/11/21-Wound Length (cm): 4 cm    Wound 10/18/21 #1 Right Heel, Pressure, Stage 1, onset 10/11/21-Wound Width (cm): 2.5 cm  Wound 10/18/21 #2 Left Heel, Pressure, Stage 2, onset 10/11/21-Wound Width (cm): 3.5 cm    Wound 10/18/21 #1 Right Heel, Pressure, Stage 1, onset 10/11/21-Wound Depth (cm): 0.1 cm  Wound 10/18/21 #2 Left Heel, Pressure, Stage 2, onset 10/11/21-Wound Depth (cm): 0.1 cm    LABS  No results found for: LABA1C      Assessment:     Patient Active Problem List   Diagnosis Code    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) I48.91    Acute cystitis with hematuria N30.01    Altered mental status R41.82    Brain herniation ([facility]) G93.5    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 ([facility] S06.5X9S    Constipation K59.00    Acute urinary retention R33.8    Atrial fibrillation, chronic ([facility]) I48.20    History of subdural hematoma Z86.79    Pneumonia J18.9    COVID-19 U07.1    Pulmonary infiltrates R91.8    Thrombocytopenia ([facility]) D69.6    RUBEN (acute kidney injury) ([facility] N17.9    Sepsis ([facility]) A41.9    Seizure disorder ([facility]) G40.909    PAF (paroxysmal atrial fibrillation) ([facility]) I48.0    Hypothermia T68. XXXA    Pressure ulcer of left heel, stage 2 ([facility]) B04.008       Assessment of today's active condition(s): pressure ulcer bilateral heel. Factors contributing to occurrence and/or persistence of the chronic ulcer include chronic pressure. Sharp debridement is not indicated today, based upon the exam findings in the ulcer(s) above.       Discharge plan:     Treatment in the wound care center today: Wound 10/18/21 #1 Right Heel, Pressure, Stage 1, onset 10/11/21-Dressing/Treatment: Other (comment) (mepilex border )  Wound 10/18/21 #2 Left Heel, Pressure, Stage 2, onset 10/11/21-Dressing/Treatment: Other (comment) (xeroform, cast padding x3, kerlix, coban ). Home treatment: good handwashing before and after any dressing changes. Cleanse ulcer with saline or soap & water before dressing change. May use Vaseline (petrolatum), Aquaphor, Aveeno, CeraVe, Cetaphil, Eucerin, Lubriderm, etc for dry skin. Dressing type for home: Mepilex border to the right heel. Well padded dry dressing (football dressing) to the left foot to remain intact for 1 week. If gets dirty or soiled then can remove and apply mepilex border. Written discharge instructions given to patient. Offload ulcer(s) as directed. Elevate leg(s) as directed. Follow up in 1 week. Try to avoid trauma and pressure to the heels.          Electronically signed by Neda Vargas DPM on 10/20/2021 at 11:29 AM.

## 2021-10-21 ENCOUNTER — TELEPHONE (OUTPATIENT)
Dept: WOUND CARE | Age: 61
End: 2021-10-21

## 2021-10-21 NOTE — TELEPHONE ENCOUNTER
Group home where the patient resides has not heard anything regarding dressing supplies. Spoke with Omar Carlson with [facility].  He investigating where the order is. The order was sent over via computer on 10/18/2021. Faxed orders today to [facility] at [place]. Left message for [employer] ([phone]) with [place] [facility]'s name and phone number.

## 2021-10-25 ENCOUNTER — HOSPITAL ENCOUNTER (OUTPATIENT)
Dept: WOUND CARE | Age: 61
Discharge: HOME | End: 2021-10-25
Payer: MEDICARE

## 2021-10-25 VITALS
SYSTOLIC BLOOD PRESSURE: 108 MMHG | RESPIRATION RATE: 18 BRPM | WEIGHT: 90 LBS | HEIGHT: 60 IN | BODY MASS INDEX: 17.67 KG/M2 | HEART RATE: 108 BPM | DIASTOLIC BLOOD PRESSURE: 79 MMHG

## 2021-10-25 DIAGNOSIS — L89.622 PRESSURE ULCER OF LEFT HEEL, STAGE 2 (HCC): Primary | ICD-10-CM

## 2021-10-25 PROCEDURE — 99213 OFFICE O/P EST LOW 20 MIN: CPT

## 2021-10-25 RX ORDER — LIDOCAINE HYDROCHLORIDE 40 MG/ML
SOLUTION TOPICAL ONCE
Status: CANCELLED | OUTPATIENT
Start: 2021-10-25 | End: 2021-10-25

## 2021-10-25 RX ORDER — ACETAMINOPHEN 160 MG/5ML
15 SUSPENSION, ORAL (FINAL DOSE FORM) ORAL EVERY 6 HOURS PRN
COMMUNITY

## 2021-10-25 RX ORDER — LIDOCAINE 40 MG/G
CREAM TOPICAL ONCE
Status: CANCELLED | OUTPATIENT
Start: 2021-10-25 | End: 2021-10-25

## 2021-10-25 RX ORDER — LIDOCAINE 50 MG/G
OINTMENT TOPICAL ONCE
Status: CANCELLED | OUTPATIENT
Start: 2021-10-25 | End: 2021-10-25

## 2021-10-25 RX ORDER — QUETIAPINE FUMARATE 50 MG/1
TABLET, FILM COATED ORAL 2 TIMES DAILY
COMMUNITY

## 2021-10-25 RX ORDER — LIDOCAINE 40 MG/G
CREAM TOPICAL ONCE
Status: DISCONTINUED | OUTPATIENT
Start: 2021-10-25 | End: 2021-10-26 | Stop reason: HOSPADM

## 2021-10-25 RX ORDER — BACITRACIN ZINC AND POLYMYXIN B SULFATE 500; 1000 [USP'U]/G; [USP'U]/G
OINTMENT TOPICAL ONCE
Status: CANCELLED | OUTPATIENT
Start: 2021-10-25 | End: 2021-10-25

## 2021-10-25 ASSESSMENT — PAIN SCALES - WONG BAKER: WONGBAKER_NUMERICALRESPONSE: 0

## 2021-10-25 NOTE — PLAN OF CARE
Pt to the [address] [place] [address] for follow up appointment. Right heel healed, continue to protect area with bordered gauze. Pt to have football dressing applied to left heel wound. Pt to follow up in the [address] [place] [address],[address] in 1 week. Discharge instructions reviewed with patient, all questions answered, copy given to patient. Dressings were applied to all wounds per M.D. Instructions at this visit.

## 2021-10-28 NOTE — PROGRESS NOTES
[facility] Progress Note      Duane A Carota     : 1960    DATE OF VISIT:  10/25/2021    Subjective:     Duane A Vernard Raker is a 62 y.o. male who has a chief complaint of a pressure ulcer located on the bilateral foot. Seen with caretaker. Patient has profound MR and is nonverbal.   Per caretakers he did not tolerate offloading boots. Pushes with his feet a lot while he is resting. Caretaker states he did OK with Mepilex border and football dressing on the left. Does seem to be in pain at times. Mr. Rodrigues has a past medical history of A-fib ([address] Bipolar disorder ([address] Brain herniation ([address] Constipation, COVID-19, Cystitis, Developmental non-verbal disorder, Impulse control disorder, Influenza A, Mental retardation, profound (I.Q. < 20), Osteopenia, Seizure ([address] and Subdural hematoma ([address] He has a past surgical history that includes Cataract removal; Colonoscopy (2016); craniotomy (Right, 2019); TURP (N/A, 2020); Colonoscopy (2020); Colonoscopy (N/A, 2020); Colonoscopy (N/A, 2021); Colonoscopy (N/A, 2021); Upper gastrointestinal endoscopy (N/A, 2021); and Gastrostomy tube placement. His Family history is unknown by patient. Mr. Kevin Oliver reports that he has never smoked. He has never used smokeless tobacco. He reports that he does not drink alcohol and does not use drugs. His current medication list consists of LORazepam, Nutritional Supplements, QUEtiapine, acetaminophen, lansoprazole, levETIRAcetam, melatonin, sennosides-docusate sodium, and tamsulosin. Allergies: Penicillins, Benadryl [diphenhydramine], Clonidine derivatives, and Tetracyclines & related    Pertinent items from the review of systems are discussed in the HPI; the remainder of the ROS was reviewed and is negative.        Objective:     /79   Pulse 108   Resp 18   Ht 4' 8\" (1.422 m)   Wt 90 lb (40.8 kg)   BMI 20.18 kg/m² General Appearance: alert and oriented to person, place and time, well developed and well- nourished, in no acute distress  Skin: warm and dry, no rash or erythema  Head: normocephalic and atraumatic  Eyes: pupils equal, round, and reactive to light, extraocular eye movements intact, conjunctivae normal  ENT: tympanic membrane, external ear and ear canal normal bilaterally, nose without deformity, nasal mucosa and turbinates normal without polyps  Neck: supple and non-tender without mass, no thyromegaly or thyroid nodules, no cervical lymphadenopathy  Pulmonary/Chest: clear to auscultation bilaterally- no wheezes, rales or rhonchi, normal air movement, no respiratory distress  Cardiovascular: normal rate, regular rhythm, normal S1 and S2, no murmurs, rubs, clicks, or gallops, distal pulses intact, no carotid bruits  Abdomen: soft, non-tender, non-distended, normal bowel sounds, no masses or organomegaly. Dorsalis pedis pulse left palpable  Posterior tibial pulse left palpable  Dorsalis pedis pulse right palpable  Posterior tibial pulse right palpable      Ulcer on right heel epithelialized. No drainage noted. No signs of infection noted. Ulcer on the left heel with mild fibrotic tissue, red granulation tissue, mild serous drainage, no hyperkeratotic rim, no undermining, no tunneling, no purulence, no malodor, no eschar, no periwound maceration, mild periwound erythema, mild edema, no crepitus, no increase in skin temperature, ulcer probes to soft tissue only       Today's ulcer measurements are in the wound documentation flowsheet.      Wound measurements:  [REMOVED] Wound 10/18/21 #1 Right Heel, Pressure, Stage 1, onset 10/11/21-Wound Length (cm): 0 cm  Wound 10/18/21 #2 Left Heel, Pressure, Stage 2, onset 10/11/21-Wound Length (cm): 2 cm    [REMOVED] Wound 10/18/21 #1 Right Heel, Pressure, Stage 1, onset 10/11/21-Wound Width (cm): 0 cm  Wound 10/18/21 #2 Left Heel, Pressure, Stage 2, onset 10/11/21-Wound Width (cm): 2.5 cm    [REMOVED] Wound 10/18/21 #1 Right Heel, Pressure, Stage 1, onset 10/11/21-Wound Depth (cm): 0 cm  Wound 10/18/21 #2 Left Heel, Pressure, Stage 2, onset 10/11/21-Wound Depth (cm): 0.1 cm    LABS  No results found for: LABA1C      Assessment:     Patient Active Problem List   Diagnosis Code    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    Atrial fibrillation with RVR ([facility]) I48.91    Acute cystitis with hematuria N30.01    Altered mental status R41.82    Brain herniation ([facility]) G93.5    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 ([facility] S06.5X9S    Constipation K59.00    Acute urinary retention R33.8    Atrial fibrillation, chronic ([facility]) I48.20    History of subdural hematoma Z86.79    Pneumonia J18.9    COVID-19 U07.1    Pulmonary infiltrates R91.8    Thrombocytopenia ([facility]) D69.6    RUBEN (acute kidney injury) ([facility] N17.9    Sepsis ([facility]) A41.9    Seizure disorder ([facility]) G40.909    PAF (paroxysmal atrial fibrillation) ([facility]) I48.0    Hypothermia T68. XXXA    Pressure ulcer of left heel, stage 2 ([facility]) G69.740       Assessment of today's active condition(s): pressure ulcer bilateral heel. Factors contributing to occurrence and/or persistence of the chronic ulcer include chronic pressure. Sharp debridement is not indicated today, based upon the exam findings in the ulcer(s) above. Discharge plan:     Treatment in the wound care center today: [REMOVED] Wound 10/18/21 #1 Right Heel, Pressure, Stage 1, onset 10/11/21-Dressing/Treatment: Other (comment) (heel mepilex border )  Wound 10/18/21 #2 Left Heel, Pressure, Stage 2, onset 10/11/21-Dressing/Treatment: Other (comment) (xeroform, cast padding x3, kerlix, coban ). Home treatment: good handwashing before and after any dressing changes. Cleanse ulcer with saline or soap & water before dressing change. May use Vaseline (petrolatum), Aquaphor, Aveeno, CeraVe, Cetaphil, Eucerin, Lubriderm, etc for dry skin.      Dressing type for home: Mepilex border to the right heel for next week to allow skin to strengthen. Well padded dry dressing (football dressing) to the left foot to remain intact for 1 week. If gets dirty or soiled then can remove and apply mepilex border. Written discharge instructions given to patient. Offload ulcer(s) as directed. Elevate leg(s) as directed. Follow up in 1 week. Try to avoid trauma and pressure to the heels.          Electronically signed by Adolfo Carlson DPM on 10/28/2021 at 3:13 PM.

## 2021-10-29 ENCOUNTER — HOSPITAL ENCOUNTER (OUTPATIENT)
Age: 61
Discharge: HOME | End: 2021-10-29
Payer: MEDICARE

## 2021-10-29 PROCEDURE — U0003 INFECTIOUS AGENT DETECTION BY NUCLEIC ACID (DNA OR RNA); SEVERE ACUTE RESPIRATORY SYNDROME CORONAVIRUS 2 (SARS-COV-2) (CORONAVIRUS DISEASE [COVID-19]), AMPLIFIED PROBE TECHNIQUE, MAKING USE OF HIGH THROUGHPUT TECHNOLOGIES AS DESCRIBED BY CMS-2020-01-R: HCPCS

## 2021-10-29 PROCEDURE — U0005 INFEC AGEN DETEC AMPLI PROBE: HCPCS

## 2021-10-29 NOTE — PROGRESS NOTES
[facility] Preoperative Screening for Elective Surgery/Invasive Procedures While COVID-19 present in the community     Have you had any of the following symptoms? o Fever, chills  o Cough  o Shortness of breath  o Muscle aches/pain  o Diarrhea  o Abdominal pain, nausea, vomiting  o Loss or decrease in taste and / or smell   Risk of Exposure  o Have you recently been hospitalized for COVID-19 or flu-like illness, if so when?  o Recently diagnosed with COVID-19, if so when?  o Recently tested for COVID-19, if so when?  o Have you been in close contact with a person or family member who currently has or recently had COVID-19? If yes, when and in what context?  o Do you live with anybody who in the last 14 days has had fever, chills, shortness of breath, muscle aches, flu-like illness?  o Do you have any close contacts or family members who are currently in the hospital for COVID-19 or flu-like illness? If yes, assess recent close contact with this person. Indicate if the patient has a positive screen by answering yes to one or more of the above questions. Patients who test positive or screen positive prior to surgery or on the day of surgery should be evaluated in conjunction with the surgeon/proceduralist/anesthesiologist to determine the urgency of the procedure.

## 2021-10-30 LAB — SARS-COV-2, PCR: NOT DETECTED

## 2021-11-01 ENCOUNTER — HOSPITAL ENCOUNTER (OUTPATIENT)
Dept: WOUND CARE | Age: 61
Discharge: HOME | End: 2021-11-01
Payer: MEDICARE

## 2021-11-01 VITALS — BODY MASS INDEX: 21.16 KG/M2 | WEIGHT: 94.4 LBS

## 2021-11-01 DIAGNOSIS — L89.622 PRESSURE ULCER OF LEFT HEEL, STAGE 2 (HCC): Primary | ICD-10-CM

## 2021-11-01 PROCEDURE — 99213 OFFICE O/P EST LOW 20 MIN: CPT

## 2021-11-01 RX ORDER — LIDOCAINE HYDROCHLORIDE 40 MG/ML
SOLUTION TOPICAL ONCE
Status: CANCELLED | OUTPATIENT
Start: 2021-11-01 | End: 2021-11-01

## 2021-11-01 RX ORDER — LIDOCAINE 50 MG/G
OINTMENT TOPICAL ONCE
Status: CANCELLED | OUTPATIENT
Start: 2021-11-01 | End: 2021-11-01

## 2021-11-01 RX ORDER — BACITRACIN ZINC AND POLYMYXIN B SULFATE 500; 1000 [USP'U]/G; [USP'U]/G
OINTMENT TOPICAL ONCE
Status: CANCELLED | OUTPATIENT
Start: 2021-11-01 | End: 2021-11-01

## 2021-11-01 RX ORDER — LIDOCAINE 40 MG/G
CREAM TOPICAL ONCE
Status: DISCONTINUED | OUTPATIENT
Start: 2021-11-01 | End: 2021-11-02 | Stop reason: HOSPADM

## 2021-11-01 RX ORDER — LIDOCAINE 40 MG/G
CREAM TOPICAL ONCE
Status: CANCELLED | OUTPATIENT
Start: 2021-11-01 | End: 2021-11-01

## 2021-11-01 NOTE — PLAN OF CARE
Pt to the [facility] for follow up appointment. Pt very restless and anxious. If possible patient to receive ativan prior to next weeks [facility] appointment. Pt to continue with foot ball dressing to left foot. Pt to follow up in the [facility] in 1 week. Discharge instructions reviewed with patient, all questions answered, copy given to patient. Dressings were applied to all wounds per M.D. Instructions at this visit.

## 2021-11-03 ENCOUNTER — HOSPITAL ENCOUNTER (OUTPATIENT)
Age: 61
Setting detail: OUTPATIENT SURGERY
Discharge: HOME | End: 2021-11-03
Attending: INTERNAL MEDICINE | Admitting: INTERNAL MEDICINE
Payer: MEDICARE

## 2021-11-03 PROCEDURE — 2709999900 HC NON-CHARGEABLE SUPPLY: Performed by: INTERNAL MEDICINE

## 2021-11-03 PROCEDURE — 3600000032 HC G TUBE REPLACEMENT: Performed by: INTERNAL MEDICINE

## 2021-11-03 NOTE — OP NOTE
Operative Note      Patient: Trinity Whelan  YOB: 1960  MRN: [id number]    Date of Procedure: 11/3/2021    Pre-Op Diagnosis: PEG TUBE MALFUNCTION    Post-Op Diagnosis: same       * No procedures listed *    Surgeon(s):  Silva Mccrary MD    Assistant:   * No surgical staff found *    Anesthesia: * No anesthesia type entered *    Estimated Blood Loss (mL): Minimal    Complications: None    Specimens:   * No specimens in log *    Implants:  * No implants in log *      Drains:   Gastrostomy/Enterostomy/Jejunostomy Percutaneous Endoscopic Gastrostomy (PEG) RUQ (Active)       Findings: Malfunctioning PEG tube replaced with 20F Harvey 3cm button    Detailed Description of Procedure:   Patient was placed in supine position. Existing PEG was removed using pull method. New 20F x 3cm MERLE key button was inserted in the gastrostomy site. Internal balloon was inflated with 6mL NS. Dressing was applied. Patient was discharged in safe condition. Summary: Malfunctioning PEG tube replaced with 20F Harvey 3cm button    Recommendation  1. Restart TFs per [facility]  2. Flush 60mL free water every 6h and after use  3. Keep gastrostomy site clean and dry  4. Call with questions.     Electronically signed by Silva Mccrary MD on 11/3/2021 at 1:15 PM

## 2021-11-03 NOTE — H&P
History and Physical / Pre-Sedation Assessment    Patient:  Valentine Joyce   :   1960     Intended Procedure:  PEG replacement    HPI: 62year old male with dysphagia presents for PEG replacement with MERLE key button    Past Medical History:   Diagnosis Date    A-fib ([address]     Bipolar disorder ([address]     Brain herniation ([address]     Constipation     COVID-19 2021 positive    Cystitis     Developmental non-verbal disorder     Impulse control disorder     Influenza A 2014    Mental retardation, profound (I.Q. < 20)     Osteopenia     Seizure ([address]     Subdural hematoma ([address]      Past Surgical History:   Procedure Laterality Date    CATARACT REMOVAL      COLONOSCOPY  2016    incomplete, poor prep    COLONOSCOPY  2020    COLON W/ANES.  COLONOSCOPY N/A 2020    COLON W/ANES. (9:00) COVID TEST -. PT IS NON-VERBAL, IS NOT IN A FACILITY. IS CARED FOR BY 2 CAREGIVERS IN HIS HOME. performed by Jackeline Andrade MD at [address] [facility] COLONOSCOPY N/A 2021    COLONOSCOPY N/A 2021    COLON W/ANES. (10:30) PT IS COMBATIVE. [facility]. [phone] XSTEPH performed by Jackeline Andrade MD at [address] Right 2019    Right Trephine Craniotomy For Evacuation of Subdural Hematoma performed by Severiano Pruitt MD at [address] [place] [address] TURP N/A 2020    CYSTOSCOPY, TRANSURETHRAL RESECTION OF PROSTATE performed by Chris Black MD at [facility] 2021    EGD ESOPHAGOGASTRODUODENOSCOPY PEG TUBE INSERTION performed by Axel James MD at [address] [place] [address]       Medications reviewed  Prior to Admission medications    Medication Sig Start Date End Date Taking?  Authorizing Provider   QUEtiapine (SEROQUEL) 50 MG tablet 2 times daily 50 mg in morning & 150 mg in evening dissolve in 10 ml water, administer via g-tube    Historical with the planned procedure and sedation plan. I have explained the risk, benefits, and alternatives to the procedure; the patient's caregiver understands and agrees to proceed.        Rober Lacey MD  11/3/2021

## 2021-11-03 NOTE — PROGRESS NOTES
Dr Garcia Console to bedside to replace PEG tube with MERLE key Gastrostomy tube. 20Fr, 3 cm tube used. Pt tolerated procedure fair. Caregiver at bedside to assist with pt at all time. Dry dressing placed over tube. Pt D/C'd to home in stable condition with caregiver per wheelchair. Booklet for care and tube accessories sent with caregiver.

## 2021-11-08 ENCOUNTER — HOSPITAL ENCOUNTER (OUTPATIENT)
Dept: WOUND CARE | Age: 61
Discharge: HOME | End: 2021-11-08
Payer: MEDICARE

## 2021-11-08 VITALS
WEIGHT: 94 LBS | RESPIRATION RATE: 18 BRPM | HEIGHT: 60 IN | DIASTOLIC BLOOD PRESSURE: 75 MMHG | HEART RATE: 89 BPM | TEMPERATURE: 96.6 F | SYSTOLIC BLOOD PRESSURE: 112 MMHG | BODY MASS INDEX: 18.46 KG/M2

## 2021-11-08 DIAGNOSIS — L89.622 PRESSURE ULCER OF LEFT HEEL, STAGE 2 (HCC): Primary | ICD-10-CM

## 2021-11-08 PROCEDURE — 99213 OFFICE O/P EST LOW 20 MIN: CPT

## 2021-11-08 RX ORDER — BACITRACIN ZINC AND POLYMYXIN B SULFATE 500; 1000 [USP'U]/G; [USP'U]/G
OINTMENT TOPICAL ONCE
Status: CANCELLED | OUTPATIENT
Start: 2021-11-08 | End: 2021-11-08

## 2021-11-08 RX ORDER — LIDOCAINE 50 MG/G
OINTMENT TOPICAL ONCE
Status: CANCELLED | OUTPATIENT
Start: 2021-11-08 | End: 2021-11-08

## 2021-11-08 RX ORDER — LIDOCAINE 40 MG/G
CREAM TOPICAL ONCE
Status: CANCELLED | OUTPATIENT
Start: 2021-11-08 | End: 2021-11-08

## 2021-11-08 RX ORDER — LIDOCAINE HYDROCHLORIDE 40 MG/ML
SOLUTION TOPICAL ONCE
Status: CANCELLED | OUTPATIENT
Start: 2021-11-08 | End: 2021-11-08

## 2021-11-08 RX ORDER — LIDOCAINE 40 MG/G
CREAM TOPICAL ONCE
Status: DISCONTINUED | OUTPATIENT
Start: 2021-11-08 | End: 2021-11-09 | Stop reason: HOSPADM

## 2021-11-08 ASSESSMENT — PAIN SCALES - WONG BAKER: WONGBAKER_NUMERICALRESPONSE: 0

## 2021-11-08 NOTE — PLAN OF CARE
Pt to the [facility] for follow up appointment. Pt to continue with weekly football dressing. Pt to follow up in the [facility] in 1 week. Discharge instructions reviewed with patient, all questions answered, copy given to patient. Dressings were applied to all wounds per M.D. Instructions at this visit.

## 2021-11-15 ENCOUNTER — HOSPITAL ENCOUNTER (OUTPATIENT)
Dept: WOUND CARE | Age: 61
Discharge: HOME | End: 2021-11-15
Payer: MEDICARE

## 2021-11-15 VITALS — RESPIRATION RATE: 18 BRPM | HEIGHT: 60 IN | TEMPERATURE: 97 F | BODY MASS INDEX: 18.06 KG/M2 | WEIGHT: 92 LBS

## 2021-11-15 DIAGNOSIS — L89.622 PRESSURE ULCER OF LEFT HEEL, STAGE 2 (HCC): Primary | ICD-10-CM

## 2021-11-15 PROCEDURE — 99213 OFFICE O/P EST LOW 20 MIN: CPT

## 2021-11-15 RX ORDER — LIDOCAINE 40 MG/G
CREAM TOPICAL ONCE
Status: CANCELLED | OUTPATIENT
Start: 2021-11-15 | End: 2021-11-15

## 2021-11-15 RX ORDER — LIDOCAINE 40 MG/G
CREAM TOPICAL ONCE
Status: DISCONTINUED | OUTPATIENT
Start: 2021-11-15 | End: 2021-11-16 | Stop reason: HOSPADM

## 2021-11-15 RX ORDER — LIDOCAINE 50 MG/G
OINTMENT TOPICAL ONCE
Status: CANCELLED | OUTPATIENT
Start: 2021-11-15 | End: 2021-11-15

## 2021-11-15 RX ORDER — LIDOCAINE HYDROCHLORIDE 40 MG/ML
SOLUTION TOPICAL ONCE
Status: CANCELLED | OUTPATIENT
Start: 2021-11-15 | End: 2021-11-15

## 2021-11-15 RX ORDER — BACITRACIN ZINC AND POLYMYXIN B SULFATE 500; 1000 [USP'U]/G; [USP'U]/G
OINTMENT TOPICAL ONCE
Status: CANCELLED | OUTPATIENT
Start: 2021-11-15 | End: 2021-11-15

## 2021-11-15 ASSESSMENT — PAIN SCALES - WONG BAKER: WONGBAKER_NUMERICALRESPONSE: 0

## 2021-11-15 ASSESSMENT — PAIN SCALES - GENERAL: PAINLEVEL_OUTOF10: 0

## 2021-11-15 NOTE — PLAN OF CARE
Pt to the [address] [place] [address] for follow up appointment. Wound measuring smaller. Pt to continue with weekly football dressing on left foot this week. Pt to follow up in the [address] [place] [address],[address] in 1 week. Discharge instructions reviewed with patient, all questions answered, copy given to patient. Dressings were applied to all wounds per M.D. Instructions at this visit.

## 2021-11-16 NOTE — PROGRESS NOTES
(41.7 kg)   BMI 20.63 kg/m²      General Appearance: alert and oriented to person, place and time, well developed and well- nourished, in no acute distress  Skin: warm and dry, no rash or erythema  Head: normocephalic and atraumatic  Eyes: pupils equal, round, and reactive to light, extraocular eye movements intact, conjunctivae normal  ENT: tympanic membrane, external ear and ear canal normal bilaterally, nose without deformity, nasal mucosa and turbinates normal without polyps  Neck: supple and non-tender without mass, no thyromegaly or thyroid nodules, no cervical lymphadenopathy  Pulmonary/Chest: clear to auscultation bilaterally- no wheezes, rales or rhonchi, normal air movement, no respiratory distress  Cardiovascular: normal rate, regular rhythm, normal S1 and S2, no murmurs, rubs, clicks, or gallops, distal pulses intact, no carotid bruits  Abdomen: soft, non-tender, non-distended, normal bowel sounds, no masses or organomegaly. Dorsalis pedis pulse left palpable  Posterior tibial pulse left palpable  Dorsalis pedis pulse right palpable  Posterior tibial pulse right palpable      Ulcer on the left heel with mild fibrotic tissue, red granulation tissue, mild serous drainage, no hyperkeratotic rim, no undermining, no tunneling, no purulence, no malodor, no eschar, no periwound maceration, mild periwound erythema, mild edema, no crepitus, no increase in skin temperature, ulcer probes to soft tissue only       Today's ulcer measurements are in the wound documentation flowsheet.      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

## 2021-11-22 ENCOUNTER — HOSPITAL ENCOUNTER (OUTPATIENT)
Dept: WOUND CARE | Age: 61
Discharge: HOME | End: 2021-11-22
Payer: MEDICARE

## 2021-11-22 VITALS — WEIGHT: 91 LBS | RESPIRATION RATE: 20 BRPM | HEIGHT: 60 IN | BODY MASS INDEX: 17.87 KG/M2

## 2021-11-22 DIAGNOSIS — L89.622 PRESSURE ULCER OF LEFT HEEL, STAGE 2 (HCC): Primary | ICD-10-CM

## 2021-11-22 PROCEDURE — 99213 OFFICE O/P EST LOW 20 MIN: CPT

## 2021-11-22 RX ORDER — LIDOCAINE HYDROCHLORIDE 40 MG/ML
SOLUTION TOPICAL ONCE
Status: CANCELLED | OUTPATIENT
Start: 2021-11-22 | End: 2021-11-22

## 2021-11-22 RX ORDER — BACITRACIN ZINC AND POLYMYXIN B SULFATE 500; 1000 [USP'U]/G; [USP'U]/G
OINTMENT TOPICAL ONCE
Status: CANCELLED | OUTPATIENT
Start: 2021-11-22 | End: 2021-11-22

## 2021-11-22 RX ORDER — LIDOCAINE 40 MG/G
CREAM TOPICAL ONCE
Status: DISCONTINUED | OUTPATIENT
Start: 2021-11-22 | End: 2021-11-23 | Stop reason: HOSPADM

## 2021-11-22 RX ORDER — LIDOCAINE 50 MG/G
OINTMENT TOPICAL ONCE
Status: CANCELLED | OUTPATIENT
Start: 2021-11-22 | End: 2021-11-22

## 2021-11-22 RX ORDER — LIDOCAINE 40 MG/G
CREAM TOPICAL ONCE
Status: CANCELLED | OUTPATIENT
Start: 2021-11-22 | End: 2021-11-22

## 2021-11-22 ASSESSMENT — PAIN SCALES - GENERAL: PAINLEVEL_OUTOF10: 0

## 2021-11-22 NOTE — PROGRESS NOTES
[facility] Progress Note      Duane A Carota     : 1960    DATE OF VISIT:  2021    Subjective:     Duane A Susi Lemma is a 62 y.o. male who has a chief complaint of a pressure ulcer located on the bilateral foot. Seen with caretaker. Patient has profound MR and is nonverbal.   Per caretakers he did not tolerate offloading boots. Pushes with his feet a lot while he is resting. Caretaker states he did OK with football dressing on the left. Does seem to be in pain at times but not consistent. Dressing did get wet but did not seem to affect the wound. Mr. Leslie Fields has a past medical history of A-fib ([address] Bipolar disorder ([address] Brain herniation ([address] Constipation, COVID-19, Cystitis, Developmental non-verbal disorder, Impulse control disorder, Influenza A, Mental retardation, profound (I.Q. < 20), Osteopenia, Seizure ([address] and Subdural hematoma ([address] He has a past surgical history that includes Cataract removal; Colonoscopy (2016); craniotomy (Right, 2019); TURP (N/A, 2020); Colonoscopy (2020); Colonoscopy (N/A, 2020); Colonoscopy (N/A, 2021); Colonoscopy (N/A, 2021); Upper gastrointestinal endoscopy (N/A, 2021); Gastrostomy tube placement; and Endoscopy, colon, diagnostic. His Family history is unknown by patient. Mr. Leslie Fields reports that he has never smoked. He has never used smokeless tobacco. He reports that he does not drink alcohol and does not use drugs. His current medication list consists of LORazepam, Nutritional Supplements, QUEtiapine, acetaminophen, lansoprazole, levETIRAcetam, melatonin, sennosides-docusate sodium, and tamsulosin. Allergies: Penicillins, Benadryl [diphenhydramine], Clonidine derivatives, and Tetracyclines & related    Pertinent items from the review of systems are discussed in the HPI; the remainder of the ROS was reviewed and is negative.        Objective:     Resp 20   Ht 4' 8\" (1.422 m)   Wt 91 lb (41.3 kg)   BMI 20.40 kg/m²      General Appearance: alert and oriented to person, place and time, well developed and well- nourished, in no acute distress  Skin: warm and dry, no rash or erythema  Head: normocephalic and atraumatic  Eyes: pupils equal, round, and reactive to light, extraocular eye movements intact, conjunctivae normal  ENT: tympanic membrane, external ear and ear canal normal bilaterally, nose without deformity, nasal mucosa and turbinates normal without polyps  Neck: supple and non-tender without mass, no thyromegaly or thyroid nodules, no cervical lymphadenopathy  Pulmonary/Chest: clear to auscultation bilaterally- no wheezes, rales or rhonchi, normal air movement, no respiratory distress  Cardiovascular: normal rate, regular rhythm, normal S1 and S2, no murmurs, rubs, clicks, or gallops, distal pulses intact, no carotid bruits  Abdomen: soft, non-tender, non-distended, normal bowel sounds, no masses or organomegaly. Dorsalis pedis pulse left palpable  Posterior tibial pulse left palpable  Dorsalis pedis pulse right palpable  Posterior tibial pulse right palpable      Ulcer on the left heel with mild fibrotic tissue, red granulation tissue, mild serous drainage, no hyperkeratotic rim, no undermining, no tunneling, no purulence, no malodor, no eschar, no periwound maceration, mild periwound erythema, mild edema, no crepitus, no increase in skin temperature, ulcer probes to soft tissue only     Today's ulcer measurements are in the wound documentation flowsheet.      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    Atrial fibrillation with RVR ([facility]) I48.91    Acute cystitis with hematuria N30.01    Altered mental status R41.82    Brain herniation ([facility]) G93.5    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 [facility] S06.5X9S    Constipation K59.00    Acute urinary retention R33.8    Atrial fibrillation, chronic ([facility]) I48.20    History of subdural hematoma Z86.79    Pneumonia J18.9    COVID-19 U07.1    Pulmonary infiltrates R91.8    Thrombocytopenia ([facility]) D69.6    RUBEN (acute kidney injury) ([facility] N17.9    Sepsis ([facility]) A41.9    Seizure disorder ([facility]) G40.909    PAF (paroxysmal atrial fibrillation) ([facility]) I48.0    Hypothermia T68. XXXA    Pressure ulcer of left heel, stage 2 ([facility]) B93.325       Assessment of today's active condition(s): pressure ulcer bilateral heel. Factors contributing to occurrence and/or persistence of the chronic ulcer include chronic pressure. Sharp debridement is not indicated today, based upon the exam findings in the ulcer(s) above. Discharge plan:     Treatment in the wound care center today:  . Home treatment: good handwashing before and after any dressing changes. Cleanse ulcer with saline or soap & water before dressing change. May use Vaseline (petrolatum), Aquaphor, Aveeno, CeraVe, Cetaphil, Eucerin, Lubriderm, etc for dry skin. Dressing type for home:   Well padded dry dressing (football dressing) to the left foot to remain intact for 1 week. If gets dirty or soiled then can remove and apply mepilex border. Written discharge instructions given to patient. Offload ulcer(s) as directed. Elevate leg(s) as directed. Follow up in 1 week. Try to avoid trauma and pressure to the heels. Wound size is improving.          Electronically signed by Jeimy Kent DPM on 11/22/2021 at 3:01 PM. Detail Level: Detailed

## 2021-11-22 NOTE — PLAN OF CARE
Pt to the [facility] for follow up appointment. Wound was saturated with urine upon arrival to the [facility]. Wound measuring smaller this week. Pt to continue with foot ball dressing and follow up in the [facility] in 1 week. Discharge instructions reviewed with patient, all questions answered, copy given to patient. Dressings were applied to all wounds per M.D. Instructions at this visit.

## 2021-11-23 NOTE — PROGRESS NOTES
[facility] [place] Progress Note      Duane A Carota     : 1960    DATE OF VISIT:  2021    Subjective:     Duane A Carota is a 62 y.o. male who has a chief complaint of a pressure ulcer located on the bilateral foot. Seen with caretaker. Patient has profound MR and is nonverbal.   Per caretakers he did not tolerate offloading boots. Pushes with his feet a lot while he is resting. Caretaker states he did OK with football dressing on the left. Does seem to be in pain at times. Mr. Rodrigues has a past medical history of A-fib ([address] Bipolar disorder ([address] Brain herniation ([address] Constipation, COVID-19, Cystitis, Developmental non-verbal disorder, Impulse control disorder, Influenza A, Mental retardation, profound (I.Q. < 20), Osteopenia, Seizure ([address] and Subdural hematoma ([address] He has a past surgical history that includes Cataract removal; Colonoscopy (2016); craniotomy (Right, 2019); TURP (N/A, 2020); Colonoscopy (2020); Colonoscopy (N/A, 2020); Colonoscopy (N/A, 2021); Colonoscopy (N/A, 2021); Upper gastrointestinal endoscopy (N/A, 2021); Gastrostomy tube placement; and Endoscopy, colon, diagnostic. His Family history is unknown by patient. Mr. Osmel Garcia reports that he has never smoked. He has never used smokeless tobacco. He reports that he does not drink alcohol and does not use drugs. His current medication list consists of LORazepam, Nutritional Supplements, QUEtiapine, acetaminophen, lansoprazole, levETIRAcetam, melatonin, sennosides-docusate sodium, and tamsulosin. Allergies: Penicillins, Benadryl [diphenhydramine], Clonidine derivatives, and Tetracyclines & related    Pertinent items from the review of systems are discussed in the HPI; the remainder of the ROS was reviewed and is negative.        Objective:     /75   Pulse 89   Temp 96.6 °F (35.9 °C) (Axillary)   Resp 18   Ht 4' 8\" (1.422 m) Wt 94 lb (42.6 kg)   BMI 21.07 kg/m²      General Appearance: alert and oriented to person, place and time, well developed and well- nourished, in no acute distress  Skin: warm and dry, no rash or erythema  Head: normocephalic and atraumatic  Eyes: pupils equal, round, and reactive to light, extraocular eye movements intact, conjunctivae normal  ENT: tympanic membrane, external ear and ear canal normal bilaterally, nose without deformity, nasal mucosa and turbinates normal without polyps  Neck: supple and non-tender without mass, no thyromegaly or thyroid nodules, no cervical lymphadenopathy  Pulmonary/Chest: clear to auscultation bilaterally- no wheezes, rales or rhonchi, normal air movement, no respiratory distress  Cardiovascular: normal rate, regular rhythm, normal S1 and S2, no murmurs, rubs, clicks, or gallops, distal pulses intact, no carotid bruits  Abdomen: soft, non-tender, non-distended, normal bowel sounds, no masses or organomegaly. Dorsalis pedis pulse left palpable  Posterior tibial pulse left palpable  Dorsalis pedis pulse right palpable  Posterior tibial pulse right palpable      Ulcer on right heel epithelialized. No drainage noted. No signs of infection noted. Ulcer on the left heel with mild fibrotic tissue, red granulation tissue, mild serous drainage, no hyperkeratotic rim, no undermining, no tunneling, no purulence, no malodor, no eschar, no periwound maceration, mild periwound erythema, mild edema, no crepitus, no increase in skin temperature, ulcer probes to soft tissue only       Today's ulcer measurements are in the wound documentation flowsheet.      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    Atrial fibrillation with RVR ([facility]) I48.91    Acute cystitis with hematuria N30.01    Altered mental status R41.82    Brain herniation ([facility]) G93.5    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 ([facility] S06.5X9S    Constipation K59.00    Acute urinary retention R33.8    Atrial fibrillation, chronic ([facility]) I48.20    History of subdural hematoma Z86.79    Pneumonia J18.9    COVID-19 U07.1    Pulmonary infiltrates R91.8    Thrombocytopenia ([facility]) D69.6    RUBEN (acute kidney injury) ([facility] N17.9    Sepsis ([facility]) A41.9    Seizure disorder ([facility]) G40.909    PAF (paroxysmal atrial fibrillation) ([facility]) I48.0    Hypothermia T68. XXXA    Pressure ulcer of left heel, stage 2 ([facility]) D77.925       Assessment of today's active condition(s): pressure ulcer bilateral heel. Factors contributing to occurrence and/or persistence of the chronic ulcer include chronic pressure. Sharp debridement is not indicated today, based upon the exam findings in the ulcer(s) above. Discharge plan:     Treatment in the wound care center today: Wound 10/18/21 #2 Left Heel, Pressure, Stage 2, onset 10/11/21-Dressing/Treatment: Other (comment) (betadine-nazario,xeroform,football dressing ). Home treatment: good handwashing before and after any dressing changes. Cleanse ulcer with saline or soap & water before dressing change. May use Vaseline (petrolatum), Aquaphor, Aveeno, CeraVe, Cetaphil, Eucerin, Lubriderm, etc for dry skin. Dressing type for home: Mepilex border to the right heel for next week to allow skin to strengthen. Well padded dry dressing (football dressing) to the left foot to remain intact for 1 week. If gets dirty or soiled then can remove and apply mepilex border. Written discharge instructions given to patient. Offload ulcer(s) as directed. Elevate leg(s) as directed. Follow up in 1 week.      Try to avoid trauma and pressure to the heels. Consider Ativan prior to appointment to decrease agitation.          Electronically signed by Waldo Earl DPM on 11/23/2021 at 9:49 AM.

## 2021-11-29 ENCOUNTER — HOSPITAL ENCOUNTER (OUTPATIENT)
Dept: WOUND CARE | Age: 61
Discharge: HOME | End: 2021-11-29
Payer: MEDICARE

## 2021-11-29 VITALS
BODY MASS INDEX: 18.65 KG/M2 | SYSTOLIC BLOOD PRESSURE: 106 MMHG | WEIGHT: 95 LBS | RESPIRATION RATE: 18 BRPM | TEMPERATURE: 97.6 F | HEIGHT: 60 IN | DIASTOLIC BLOOD PRESSURE: 69 MMHG | HEART RATE: 51 BPM

## 2021-11-29 DIAGNOSIS — L89.622 PRESSURE ULCER OF LEFT HEEL, STAGE 2 (HCC): Primary | ICD-10-CM

## 2021-11-29 PROCEDURE — 99213 OFFICE O/P EST LOW 20 MIN: CPT

## 2021-11-29 RX ORDER — LIDOCAINE 40 MG/G
CREAM TOPICAL ONCE
Status: DISCONTINUED | OUTPATIENT
Start: 2021-11-29 | End: 2021-11-30 | Stop reason: HOSPADM

## 2021-11-29 RX ORDER — LIDOCAINE 50 MG/G
OINTMENT TOPICAL ONCE
Status: CANCELLED | OUTPATIENT
Start: 2021-11-29 | End: 2021-11-29

## 2021-11-29 RX ORDER — LIDOCAINE 40 MG/G
CREAM TOPICAL ONCE
Status: CANCELLED | OUTPATIENT
Start: 2021-11-29 | End: 2021-11-29

## 2021-11-29 RX ORDER — LIDOCAINE HYDROCHLORIDE 40 MG/ML
SOLUTION TOPICAL ONCE
Status: CANCELLED | OUTPATIENT
Start: 2021-11-29 | End: 2021-11-29

## 2021-11-29 RX ORDER — BACITRACIN ZINC AND POLYMYXIN B SULFATE 500; 1000 [USP'U]/G; [USP'U]/G
OINTMENT TOPICAL ONCE
Status: CANCELLED | OUTPATIENT
Start: 2021-11-29 | End: 2021-11-29

## 2021-11-29 ASSESSMENT — PAIN SCALES - GENERAL: PAINLEVEL_OUTOF10: 0

## 2021-11-30 NOTE — PLAN OF CARE
Pt to the [address] [place] [address],[address] for follow up appointment. Wound not debrided today. Pt to have weekly football dressing applied today. Pt to follow up in the [address] [place] [address],[facility] Floor in 1 week. Discharge instructions reviewed with patient, all questions answered, copy given to patient. Dressings were applied to all wounds per M.D. Instructions at this visit.

## 2021-12-05 NOTE — PROGRESS NOTES
88 [facility] Progress Note      Duane A Carota     : 1960    DATE OF VISIT:  2021    Subjective:     Duane A Carota is a 62 y.o. male who has a chief complaint of a pressure ulcer located on the bilateral foot. Seen with caretaker. Patient has profound MR and is nonverbal.   Per caretakers he did not tolerate offloading boots. Pushes with his feet a lot while he is resting. Caretaker states he did OK with football dressing on the left. Dressing did get wet but did not seem to affect the wound. Mr. Junior Guzmán has a past medical history of A-fib ([address] Bipolar disorder ([address] Brain herniation ([address] Constipation, COVID-19, Cystitis, Developmental non-verbal disorder, Impulse control disorder, Influenza A, Mental retardation, profound (I.Q. < 20), Osteopenia, Seizure ([address] and Subdural hematoma ([address] He has a past surgical history that includes Cataract removal; Colonoscopy (2016); craniotomy (Right, 2019); TURP (N/A, 2020); Colonoscopy (2020); Colonoscopy (N/A, 2020); Colonoscopy (N/A, 2021); Colonoscopy (N/A, 2021); Upper gastrointestinal endoscopy (N/A, 2021); Gastrostomy tube placement; and Endoscopy, colon, diagnostic. His Family history is unknown by patient. Mr. Juinor Guzmán reports that he has never smoked. He has never used smokeless tobacco. He reports that he does not drink alcohol and does not use drugs. His current medication list consists of LORazepam, Nutritional Supplements, QUEtiapine, acetaminophen, lansoprazole, levETIRAcetam, melatonin, sennosides-docusate sodium, and tamsulosin. Allergies: Penicillins, Benadryl [diphenhydramine], Clonidine derivatives, and Tetracyclines & related    Pertinent items from the review of systems are discussed in the HPI; the remainder of the ROS was reviewed and is negative.        Objective:     /69   Pulse 51   Temp 97.6 °F (36.4 °C) (Oral)   Resp 18   Ht 4' 8\" (1.422 m)   Wt 95 lb (43.1 kg)   BMI 21.30 kg/m²      General Appearance: alert and oriented to person, place and time, well developed and well- nourished, in no acute distress  Skin: warm and dry, no rash or erythema  Head: normocephalic and atraumatic  Eyes: pupils equal, round, and reactive to light, extraocular eye movements intact, conjunctivae normal  ENT: tympanic membrane, external ear and ear canal normal bilaterally, nose without deformity, nasal mucosa and turbinates normal without polyps  Neck: supple and non-tender without mass, no thyromegaly or thyroid nodules, no cervical lymphadenopathy  Pulmonary/Chest: clear to auscultation bilaterally- no wheezes, rales or rhonchi, normal air movement, no respiratory distress  Cardiovascular: normal rate, regular rhythm, normal S1 and S2, no murmurs, rubs, clicks, or gallops, distal pulses intact, no carotid bruits  Abdomen: soft, non-tender, non-distended, normal bowel sounds, no masses or organomegaly. Dorsalis pedis pulse left palpable  Posterior tibial pulse left palpable  Dorsalis pedis pulse right palpable  Posterior tibial pulse right palpable      Ulcer on the left heel with mild fibrotic tissue, red granulation tissue, mild serous drainage, no hyperkeratotic rim, no undermining, no tunneling, no purulence, no malodor, no eschar, no periwound maceration, mild periwound erythema, mild edema, no crepitus, no increase in skin temperature, ulcer probes to soft tissue only     Today's ulcer measurements are in the wound documentation flowsheet.      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    Atrial fibrillation with RVR ([facility]) I48.91    Acute cystitis with hematuria N30.01    Altered mental status R41.82    Brain herniation ([facility]) G93.5    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 ([facility] S06.5X9S    Constipation K59.00    Acute urinary retention R33.8    Atrial fibrillation, chronic ([facility]) I48.20    History of subdural hematoma Z86.79    Pneumonia J18.9    COVID-19 U07.1    Pulmonary infiltrates R91.8    Thrombocytopenia ([facility]) D69.6    RUBEN (acute kidney injury) ([facility] N17.9    Sepsis ([facility]) A41.9    Seizure disorder ([facility]) G40.909    PAF (paroxysmal atrial fibrillation) ([facility]) I48.0    Hypothermia T68. XXXA    Pressure ulcer of left heel, stage 2 ([facility]) O99.044       Assessment of today's active condition(s): pressure ulcer bilateral heel. Factors contributing to occurrence and/or persistence of the chronic ulcer include chronic pressure. Sharp debridement is not indicated today, based upon the exam findings in the ulcer(s) above. Discharge plan:     Treatment in the wound care center today: Wound 10/18/21 #2 Left Heel, Pressure, Stage 2, onset 10/11/21-Dressing/Treatment: Other (comment) (Betadine to nazario, Xeroform, Football dressing). Home treatment: good handwashing before and after any dressing changes. Cleanse ulcer with saline or soap & water before dressing change. May use Vaseline (petrolatum), Aquaphor, Aveeno, CeraVe, Cetaphil, Eucerin, Lubriderm, etc for dry skin. Dressing type for home:   Well padded dry dressing (football dressing) to the left foot to remain intact for 1 week. If gets dirty or soiled then can remove and apply mepilex border. Written discharge instructions given to patient. Offload ulcer(s) as directed. Elevate leg(s) as directed. Follow up in 1 week. Try to avoid trauma and pressure to the heels.         Electronically signed by Waldo Earl DPM on 12/5/2021 at 4:54 PM.

## 2021-12-06 ENCOUNTER — HOSPITAL ENCOUNTER (OUTPATIENT)
Dept: WOUND CARE | Age: 61
Discharge: HOME | End: 2021-12-06
Payer: MEDICARE

## 2021-12-06 VITALS — HEIGHT: 60 IN | BODY MASS INDEX: 18.34 KG/M2 | WEIGHT: 93.4 LBS | RESPIRATION RATE: 20 BRPM

## 2021-12-06 DIAGNOSIS — L89.622 PRESSURE ULCER OF LEFT HEEL, STAGE 2 (HCC): Primary | ICD-10-CM

## 2021-12-06 PROCEDURE — 99213 OFFICE O/P EST LOW 20 MIN: CPT

## 2021-12-06 RX ORDER — BACITRACIN ZINC AND POLYMYXIN B SULFATE 500; 1000 [USP'U]/G; [USP'U]/G
OINTMENT TOPICAL ONCE
Status: CANCELLED | OUTPATIENT
Start: 2021-12-06 | End: 2021-12-06

## 2021-12-06 RX ORDER — LIDOCAINE 40 MG/G
CREAM TOPICAL ONCE
Status: DISCONTINUED | OUTPATIENT
Start: 2021-12-06 | End: 2021-12-07 | Stop reason: HOSPADM

## 2021-12-06 RX ORDER — LIDOCAINE 40 MG/G
CREAM TOPICAL ONCE
Status: CANCELLED | OUTPATIENT
Start: 2021-12-06 | End: 2021-12-06

## 2021-12-06 RX ORDER — LIDOCAINE 50 MG/G
OINTMENT TOPICAL ONCE
Status: CANCELLED | OUTPATIENT
Start: 2021-12-06 | End: 2021-12-06

## 2021-12-06 RX ORDER — LIDOCAINE HYDROCHLORIDE 40 MG/ML
SOLUTION TOPICAL ONCE
Status: CANCELLED | OUTPATIENT
Start: 2021-12-06 | End: 2021-12-06

## 2021-12-06 ASSESSMENT — PAIN SCALES - WONG BAKER: WONGBAKER_NUMERICALRESPONSE: 0

## 2021-12-06 ASSESSMENT — PAIN SCALES - GENERAL: PAINLEVEL_OUTOF10: 0

## 2021-12-06 NOTE — PLAN OF CARE
Pt to the [facility] for follow up appointment. Wound not debrided today. Pt to continue with weekly football dressing. Pt to follow up in the [facility] in 1 week. Discharge instructions reviewed with patient and caregiver, all questions answered, copy given to patient. Dressings were applied to all wounds per M.D. Instructions at this visit.

## 2021-12-13 ENCOUNTER — HOSPITAL ENCOUNTER (OUTPATIENT)
Dept: WOUND CARE | Age: 61
Discharge: HOME | End: 2021-12-13
Payer: MEDICARE

## 2021-12-13 VITALS
SYSTOLIC BLOOD PRESSURE: 108 MMHG | HEIGHT: 60 IN | WEIGHT: 96.8 LBS | HEART RATE: 66 BPM | RESPIRATION RATE: 18 BRPM | BODY MASS INDEX: 19.01 KG/M2 | DIASTOLIC BLOOD PRESSURE: 77 MMHG

## 2021-12-13 DIAGNOSIS — L89.622 PRESSURE ULCER OF LEFT HEEL, STAGE 2 (HCC): Primary | ICD-10-CM

## 2021-12-13 PROCEDURE — 99213 OFFICE O/P EST LOW 20 MIN: CPT

## 2021-12-13 RX ORDER — BACITRACIN ZINC AND POLYMYXIN B SULFATE 500; 1000 [USP'U]/G; [USP'U]/G
OINTMENT TOPICAL ONCE
Status: CANCELLED | OUTPATIENT
Start: 2021-12-13 | End: 2021-12-13

## 2021-12-13 RX ORDER — LIDOCAINE 50 MG/G
OINTMENT TOPICAL ONCE
Status: CANCELLED | OUTPATIENT
Start: 2021-12-13 | End: 2021-12-13

## 2021-12-13 RX ORDER — LIDOCAINE 40 MG/G
CREAM TOPICAL ONCE
Status: DISCONTINUED | OUTPATIENT
Start: 2021-12-13 | End: 2021-12-14 | Stop reason: HOSPADM

## 2021-12-13 RX ORDER — LIDOCAINE 40 MG/G
CREAM TOPICAL ONCE
Status: CANCELLED | OUTPATIENT
Start: 2021-12-13 | End: 2021-12-13

## 2021-12-13 RX ORDER — LIDOCAINE HYDROCHLORIDE 40 MG/ML
SOLUTION TOPICAL ONCE
Status: CANCELLED | OUTPATIENT
Start: 2021-12-13 | End: 2021-12-13

## 2021-12-13 ASSESSMENT — PAIN SCALES - GENERAL: PAINLEVEL_OUTOF10: 0

## 2021-12-13 NOTE — PLAN OF CARE
Pt to the [facility] for follow up appointment. Wound a little deeper this week. Caregiver stated that the dressing irritated him more this week. Pt to have specialist cast padding placed this week. Pt to follow up in the [facility] in 1 week. Discharge instructions reviewed with patient, all questions answered, copy given to patient. Dressings were applied to all wounds per M.D. Instructions at this visit.

## 2021-12-13 NOTE — PROGRESS NOTES
88 [facility] Progress Note      Duane A Carota     : 1960    DATE OF VISIT:  2021    Subjective:     Duane A Carota is a 62 y.o. male who has a chief complaint of a pressure ulcer located on the bilateral foot. Seen with caretaker. Patient has profound MR and is nonverbal.   Per caretakers he did not tolerate offloading boots. Pushes with his feet a lot while he is resting. Caretaker states he did OK with football dressing on the left other then some irritation on his skin. Mr. Jerome Mc has a past medical history of A-fib ([address] Bipolar disorder ([address] Brain herniation ([address] Constipation, COVID-19, Cystitis, Developmental non-verbal disorder, Impulse control disorder, Influenza A, Mental retardation, profound (I.Q. < 20), Osteopenia, Seizure ([address] and Subdural hematoma ([address] He has a past surgical history that includes Cataract removal; Colonoscopy (2016); craniotomy (Right, 2019); TURP (N/A, 2020); Colonoscopy (2020); Colonoscopy (N/A, 2020); Colonoscopy (N/A, 2021); Colonoscopy (N/A, 2021); Upper gastrointestinal endoscopy (N/A, 2021); Gastrostomy tube placement; and Endoscopy, colon, diagnostic. His Family history is unknown by patient. Mr. Jerome Mc reports that he has never smoked. He has never used smokeless tobacco. He reports that he does not drink alcohol and does not use drugs. His current medication list consists of LORazepam, Nutritional Supplements, QUEtiapine, acetaminophen, lansoprazole, levETIRAcetam, melatonin, sennosides-docusate sodium, and tamsulosin. Allergies: Penicillins, Benadryl [diphenhydramine], Clonidine derivatives, and Tetracyclines & related    Pertinent items from the review of systems are discussed in the HPI; the remainder of the ROS was reviewed and is negative.        Objective:     /77 Comment: Per caregiver's smart watched placed on Pt, Pt unable to tolerate other method  Pulse 66 Comment: Per caregiver's smart watched placed on Pt, Pt unable to tolerate other method  Resp 18   Ht 4' 8\" (1.422 m)   Wt 96 lb 12.8 oz (43.9 kg)   BMI 21.70 kg/m²      General Appearance: alert and oriented to person, place and time, well developed and well- nourished, in no acute distress  Skin: warm and dry, no rash or erythema  Head: normocephalic and atraumatic  Eyes: pupils equal, round, and reactive to light, extraocular eye movements intact, conjunctivae normal  ENT: tympanic membrane, external ear and ear canal normal bilaterally, nose without deformity, nasal mucosa and turbinates normal without polyps  Neck: supple and non-tender without mass, no thyromegaly or thyroid nodules, no cervical lymphadenopathy  Pulmonary/Chest: clear to auscultation bilaterally- no wheezes, rales or rhonchi, normal air movement, no respiratory distress  Cardiovascular: normal rate, regular rhythm, normal S1 and S2, no murmurs, rubs, clicks, or gallops, distal pulses intact, no carotid bruits  Abdomen: soft, non-tender, non-distended, normal bowel sounds, no masses or organomegaly. Dorsalis pedis pulse left palpable  Posterior tibial pulse left palpable  Dorsalis pedis pulse right palpable  Posterior tibial pulse right palpable      Ulcer on the left heel with mild fibrotic tissue, red granulation tissue, mild serous drainage, no hyperkeratotic rim, no undermining, no tunneling, no purulence, no malodor, no eschar, no periwound maceration, mild periwound erythema, mild edema, no crepitus, no increase in skin temperature, ulcer probes to soft tissue only     Today's ulcer measurements are in the wound documentation flowsheet.      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    Atrial fibrillation with RVR ([facility]) I48.91    Acute cystitis with hematuria N30.01    Altered mental status R41.82    Brain herniation ([facility]) G93.5    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 ([facility] S06.5X9S    Constipation K59.00    Acute urinary retention R33.8    Atrial fibrillation, chronic ([facility]) I48.20    History of subdural hematoma Z86.79    Pneumonia J18.9    COVID-19 U07.1    Pulmonary infiltrates R91.8    Thrombocytopenia ([facility]) D69.6    RUBEN (acute kidney injury) ([facility] N17.9    Sepsis ([facility]) A41.9    Seizure disorder ([facility]) G40.909    PAF (paroxysmal atrial fibrillation) ([facility]) I48.0    Hypothermia T68. XXXA    Pressure ulcer of left heel, stage 2 ([facility]) O95.553       Assessment of today's active condition(s): pressure ulcer bilateral heel. Factors contributing to occurrence and/or persistence of the chronic ulcer include chronic pressure. Sharp debridement is not indicated today, based upon the exam findings in the ulcer(s) above. Discharge plan:     Treatment in the wound care center today:  . Home treatment: good handwashing before and after any dressing changes. Cleanse ulcer with saline or soap & water before dressing change. May use Vaseline (petrolatum), Aquaphor, Aveeno, CeraVe, Cetaphil, Eucerin, Lubriderm, etc for dry skin. Dressing type for home:   Well padded dry dressing (football dressing) to the left foot to remain intact for 1 week. If gets dirty or soiled then can remove and apply mepilex border. Written discharge instructions given to patient. Offload ulcer(s) as directed. Elevate leg(s) as directed. Follow up in 1 week. Try to avoid trauma and pressure to the heels.         Electronically signed by Michael Newton DPM on 12/13/2021 at 4:04 PM.

## 2021-12-20 ENCOUNTER — HOSPITAL ENCOUNTER (OUTPATIENT)
Dept: WOUND CARE | Age: 61
Discharge: HOME | End: 2021-12-20
Payer: MEDICARE

## 2021-12-20 VITALS — DIASTOLIC BLOOD PRESSURE: 73 MMHG | SYSTOLIC BLOOD PRESSURE: 132 MMHG | RESPIRATION RATE: 16 BRPM | HEART RATE: 82 BPM

## 2021-12-20 DIAGNOSIS — L89.622 PRESSURE ULCER OF LEFT HEEL, STAGE 2 (HCC): Primary | ICD-10-CM

## 2021-12-20 PROCEDURE — 99213 OFFICE O/P EST LOW 20 MIN: CPT

## 2021-12-20 RX ORDER — LIDOCAINE 40 MG/G
CREAM TOPICAL ONCE
Status: CANCELLED | OUTPATIENT
Start: 2021-12-20 | End: 2021-12-20

## 2021-12-20 RX ORDER — LIDOCAINE 40 MG/G
CREAM TOPICAL ONCE
Status: DISCONTINUED | OUTPATIENT
Start: 2021-12-20 | End: 2021-12-21 | Stop reason: HOSPADM

## 2021-12-20 RX ORDER — LIDOCAINE 50 MG/G
OINTMENT TOPICAL ONCE
Status: CANCELLED | OUTPATIENT
Start: 2021-12-20 | End: 2021-12-20

## 2021-12-20 RX ORDER — LIDOCAINE HYDROCHLORIDE 40 MG/ML
SOLUTION TOPICAL ONCE
Status: CANCELLED | OUTPATIENT
Start: 2021-12-20 | End: 2021-12-20

## 2021-12-20 RX ORDER — BACITRACIN ZINC AND POLYMYXIN B SULFATE 500; 1000 [USP'U]/G; [USP'U]/G
OINTMENT TOPICAL ONCE
Status: CANCELLED | OUTPATIENT
Start: 2021-12-20 | End: 2021-12-20

## 2021-12-20 ASSESSMENT — PAIN SCALES - GENERAL: PAINLEVEL_OUTOF10: 0

## 2021-12-20 NOTE — PLAN OF CARE
Pt to the [facility] for follow up appointment. Wound measuring smaller. Pt to continue with weekly dressing. Pt to follow up in the [facility] in 1 week for wound reassessment dressing change and in 2 weeks with Dr Gregorio Menon. Discharge instructions reviewed with patient, all questions answered, copy given to patient. Dressings were applied to all wounds per M.D. Instructions at this visit.

## 2021-12-27 ENCOUNTER — HOSPITAL ENCOUNTER (OUTPATIENT)
Dept: WOUND CARE | Age: 61
Discharge: HOME | End: 2021-12-27
Payer: MEDICARE

## 2021-12-27 VITALS
HEIGHT: 60 IN | SYSTOLIC BLOOD PRESSURE: 128 MMHG | WEIGHT: 94.1 LBS | DIASTOLIC BLOOD PRESSURE: 70 MMHG | HEART RATE: 63 BPM | BODY MASS INDEX: 18.47 KG/M2

## 2021-12-27 DIAGNOSIS — L89.622 PRESSURE ULCER OF LEFT HEEL, STAGE 2 (HCC): Primary | ICD-10-CM

## 2021-12-27 PROCEDURE — 99213 OFFICE O/P EST LOW 20 MIN: CPT

## 2021-12-27 RX ORDER — LIDOCAINE 40 MG/G
CREAM TOPICAL ONCE
Status: DISCONTINUED | OUTPATIENT
Start: 2021-12-27 | End: 2021-12-28 | Stop reason: HOSPADM

## 2021-12-27 RX ORDER — LIDOCAINE 40 MG/G
CREAM TOPICAL ONCE
Status: CANCELLED | OUTPATIENT
Start: 2021-12-27 | End: 2021-12-27

## 2021-12-27 RX ORDER — BACITRACIN ZINC AND POLYMYXIN B SULFATE 500; 1000 [USP'U]/G; [USP'U]/G
OINTMENT TOPICAL ONCE
Status: CANCELLED | OUTPATIENT
Start: 2021-12-27 | End: 2021-12-27

## 2021-12-27 RX ORDER — LIDOCAINE HYDROCHLORIDE 40 MG/ML
SOLUTION TOPICAL ONCE
Status: CANCELLED | OUTPATIENT
Start: 2021-12-27 | End: 2021-12-27

## 2021-12-27 RX ORDER — LIDOCAINE 50 MG/G
OINTMENT TOPICAL ONCE
Status: CANCELLED | OUTPATIENT
Start: 2021-12-27 | End: 2021-12-27

## 2021-12-27 ASSESSMENT — PAIN SCALES - GENERAL: PAINLEVEL_OUTOF10: 0

## 2022-01-03 ENCOUNTER — HOSPITAL ENCOUNTER (OUTPATIENT)
Dept: WOUND CARE | Age: 62
Discharge: HOME | End: 2022-01-03
Payer: MEDICARE

## 2022-01-03 VITALS
BODY MASS INDEX: 19.24 KG/M2 | WEIGHT: 98 LBS | DIASTOLIC BLOOD PRESSURE: 81 MMHG | HEART RATE: 110 BPM | RESPIRATION RATE: 18 BRPM | SYSTOLIC BLOOD PRESSURE: 117 MMHG | HEIGHT: 60 IN

## 2022-01-03 DIAGNOSIS — L89.622 PRESSURE ULCER OF LEFT HEEL, STAGE 2 (HCC): Primary | ICD-10-CM

## 2022-01-03 PROCEDURE — 99213 OFFICE O/P EST LOW 20 MIN: CPT

## 2022-01-03 RX ORDER — LIDOCAINE HYDROCHLORIDE 40 MG/ML
SOLUTION TOPICAL ONCE
Status: CANCELLED | OUTPATIENT
Start: 2022-01-03 | End: 2022-01-03

## 2022-01-03 RX ORDER — LIDOCAINE 50 MG/G
OINTMENT TOPICAL ONCE
Status: CANCELLED | OUTPATIENT
Start: 2022-01-03 | End: 2022-01-03

## 2022-01-03 RX ORDER — BACITRACIN ZINC AND POLYMYXIN B SULFATE 500; 1000 [USP'U]/G; [USP'U]/G
OINTMENT TOPICAL ONCE
Status: CANCELLED | OUTPATIENT
Start: 2022-01-03 | End: 2022-01-03

## 2022-01-03 RX ORDER — LIDOCAINE 40 MG/G
CREAM TOPICAL ONCE
Status: CANCELLED | OUTPATIENT
Start: 2022-01-03 | End: 2022-01-03

## 2022-01-03 RX ORDER — LIDOCAINE 40 MG/G
CREAM TOPICAL ONCE
Status: DISCONTINUED | OUTPATIENT
Start: 2022-01-03 | End: 2022-01-04 | Stop reason: HOSPADM

## 2022-01-03 ASSESSMENT — PAIN SCALES - GENERAL
PAINLEVEL_OUTOF10: 0
PAINLEVEL_OUTOF10: 0

## 2022-01-03 ASSESSMENT — PAIN DESCRIPTION - PAIN TYPE: TYPE: ACUTE PAIN

## 2022-01-03 ASSESSMENT — PAIN SCALES - WONG BAKER: WONGBAKER_NUMERICALRESPONSE: 0

## 2022-01-04 NOTE — PLAN OF CARE
Pt to the [facility] for follow up appointment. Wound measuring smaller. Pt to continue with modified football dressing to foot. Pt to follow up in the [facility] in 1 week. Discharge instructions reviewed with patient, all questions answered, copy given to patient. Dressings were applied to all wounds per M.D. Instructions at this visit.

## 2022-01-09 NOTE — PROGRESS NOTES
88 [facility] Progress Note      Duane A Carota     : 1960    DATE OF VISIT:  1/3/2022    Subjective:     Duane A Carota is a 62 y.o. male who has a chief complaint of a pressure ulcer located on the bilateral foot. Seen with caretaker. Patient has profound MR and is nonverbal.   Per caretakers he did not tolerate offloading boots. Pushes with his feet a lot while he is resting. Mr. Pamela Fontenot has a past medical history of A-fib ([address] Bipolar disorder ([address] Brain herniation ([address] Constipation, COVID-19, Cystitis, Developmental non-verbal disorder, Impulse control disorder, Influenza A, Mental retardation, profound (I.Q. < 20), Osteopenia, Seizure ([address] and Subdural hematoma ([address] He has a past surgical history that includes Cataract removal; Colonoscopy (2016); craniotomy (Right, 2019); TURP (N/A, 2020); Colonoscopy (2020); Colonoscopy (N/A, 2020); Colonoscopy (N/A, 2021); Colonoscopy (N/A, 2021); Upper gastrointestinal endoscopy (N/A, 2021); Gastrostomy tube placement; and Endoscopy, colon, diagnostic. His Family history is unknown by patient. Mr. Pamela Fontenot reports that he has never smoked. He has never used smokeless tobacco. He reports that he does not drink alcohol and does not use drugs. His current medication list consists of LORazepam, Nutritional Supplements, QUEtiapine, acetaminophen, lansoprazole, levETIRAcetam, melatonin, sennosides-docusate sodium, and tamsulosin. Allergies: Penicillins, Benadryl [diphenhydramine], Clonidine derivatives, and Tetracyclines & related    Pertinent items from the review of systems are discussed in the HPI; the remainder of the ROS was reviewed and is negative.        Objective:     /81   Pulse 110   Resp 18   Ht 4' 8\" (1.422 m)   Wt 98 lb (44.5 kg)   BMI 21.97 kg/m²      General Appearance: alert and oriented to person, place and time, well developed and well- nourished, in no acute distress  Skin: warm and dry, no rash or erythema  Head: normocephalic and atraumatic  Eyes: pupils equal, round, and reactive to light, extraocular eye movements intact, conjunctivae normal  ENT: tympanic membrane, external ear and ear canal normal bilaterally, nose without deformity, nasal mucosa and turbinates normal without polyps  Neck: supple and non-tender without mass, no thyromegaly or thyroid nodules, no cervical lymphadenopathy  Pulmonary/Chest: clear to auscultation bilaterally- no wheezes, rales or rhonchi, normal air movement, no respiratory distress  Cardiovascular: normal rate, regular rhythm, normal S1 and S2, no murmurs, rubs, clicks, or gallops, distal pulses intact, no carotid bruits  Abdomen: soft, non-tender, non-distended, normal bowel sounds, no masses or organomegaly. Dorsalis pedis pulse left palpable  Posterior tibial pulse left palpable  Dorsalis pedis pulse right palpable  Posterior tibial pulse right palpable      Ulcer on the left heel with mild fibrotic tissue, red granulation tissue, mild serous drainage, no hyperkeratotic rim, no undermining, no tunneling, no purulence, no malodor, no eschar, no periwound maceration, mild periwound erythema, mild edema, no crepitus, no increase in skin temperature, ulcer probes to soft tissue only     Today's ulcer measurements are in the wound documentation flowsheet.      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) I48.20    History of subdural hematoma Z86.79    Pneumonia J18.9    COVID-19 U07.1    Pulmonary infiltrates R91.8    Thrombocytopenia ([facility]) D69.6    RUBEN (acute kidney injury) ([facility] N17.9    Sepsis ([facility]) A41.9    Seizure disorder ([facility]) G40.909    PAF (paroxysmal atrial fibrillation) ([facility]) I48.0    Hypothermia T68. XXXA    Pressure ulcer of left heel, stage 2 ([facility]) R34.578       Assessment of today's active condition(s): pressure ulcer bilateral heel. Factors contributing to occurrence and/or persistence of the chronic ulcer include chronic pressure. Sharp debridement is not indicated today, based upon the exam findings in the ulcer(s) above. Discharge plan:     Treatment in the wound care center today: Wound 10/18/21 #2 Left Heel, Pressure, Stage 2, onset 10/11/21-Dressing/Treatment:  (xeroform,gauze,specialist padx3,kerlix,coban). Home treatment: good handwashing before and after any dressing changes. Cleanse ulcer with saline or soap & water before dressing change. May use Vaseline (petrolatum), Aquaphor, Aveeno, CeraVe, Cetaphil, Eucerin, Lubriderm, etc for dry skin. Dressing type for home:   Well padded dry dressing (football dressing) to the left foot to remain intact for 1 week. If gets dirty or soiled then can remove and apply mepilex border. Written discharge instructions given to patient. Offload ulcer(s) as directed. Elevate leg(s) as directed. Follow up in 1 week. Try to avoid trauma and pressure to the heels.         Electronically signed by Katrina Velez DPM on 1/9/2022 at 2:09 PM.

## 2022-01-09 NOTE — PROGRESS NOTES
88 [facility] Progress Note      Duane A Carota     : 1960    DATE OF VISIT:  2021    Subjective:     Duane A Salomon Holm is a 62 y.o. male who has a chief complaint of a pressure ulcer located on the bilateral foot. Seen with caretaker. Patient has profound MR and is nonverbal.   Per caretakers he did not tolerate offloading boots. Pushes with his feet a lot while he is resting. Caretaker states he did OK with football dressing on the left other then some irritation on his skin. Mr. Dev Wolf has a past medical history of A-fib ([address] Bipolar disorder ([address] Brain herniation ([address] Constipation, COVID-19, Cystitis, Developmental non-verbal disorder, Impulse control disorder, Influenza A, Mental retardation, profound (I.Q. < 20), Osteopenia, Seizure ([address] and Subdural hematoma ([address] He has a past surgical history that includes Cataract removal; Colonoscopy (2016); craniotomy (Right, 2019); TURP (N/A, 2020); Colonoscopy (2020); Colonoscopy (N/A, 2020); Colonoscopy (N/A, 2021); Colonoscopy (N/A, 2021); Upper gastrointestinal endoscopy (N/A, 2021); Gastrostomy tube placement; and Endoscopy, colon, diagnostic. His Family history is unknown by patient. Mr. Dev Wolf reports that he has never smoked. He has never used smokeless tobacco. He reports that he does not drink alcohol and does not use drugs. His current medication list consists of LORazepam, Nutritional Supplements, QUEtiapine, acetaminophen, lansoprazole, levETIRAcetam, melatonin, sennosides-docusate sodium, and tamsulosin. Allergies: Penicillins, Benadryl [diphenhydramine], Clonidine derivatives, and Tetracyclines & related    Pertinent items from the review of systems are discussed in the HPI; the remainder of the ROS was reviewed and is negative.        Objective:     /73   Pulse 82   Resp 16      General Appearance: alert and oriented to person, place and time, well developed and well- nourished, in no acute distress  Skin: warm and dry, no rash or erythema  Head: normocephalic and atraumatic  Eyes: pupils equal, round, and reactive to light, extraocular eye movements intact, conjunctivae normal  ENT: tympanic membrane, external ear and ear canal normal bilaterally, nose without deformity, nasal mucosa and turbinates normal without polyps  Neck: supple and non-tender without mass, no thyromegaly or thyroid nodules, no cervical lymphadenopathy  Pulmonary/Chest: clear to auscultation bilaterally- no wheezes, rales or rhonchi, normal air movement, no respiratory distress  Cardiovascular: normal rate, regular rhythm, normal S1 and S2, no murmurs, rubs, clicks, or gallops, distal pulses intact, no carotid bruits  Abdomen: soft, non-tender, non-distended, normal bowel sounds, no masses or organomegaly. Dorsalis pedis pulse left palpable  Posterior tibial pulse left palpable  Dorsalis pedis pulse right palpable  Posterior tibial pulse right palpable      Ulcer on the left heel with mild fibrotic tissue, red granulation tissue, mild serous drainage, no hyperkeratotic rim, no undermining, no tunneling, no purulence, no malodor, no eschar, no periwound maceration, mild periwound erythema, mild edema, no crepitus, no increase in skin temperature, ulcer probes to soft tissue only     Today's ulcer measurements are in the wound documentation flowsheet.      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

## 2022-01-10 ENCOUNTER — HOSPITAL ENCOUNTER (OUTPATIENT)
Dept: WOUND CARE | Age: 62
Discharge: HOME | End: 2022-01-10
Payer: MEDICARE

## 2022-01-10 VITALS
HEART RATE: 101 BPM | DIASTOLIC BLOOD PRESSURE: 70 MMHG | SYSTOLIC BLOOD PRESSURE: 111 MMHG | RESPIRATION RATE: 18 BRPM | BODY MASS INDEX: 18.69 KG/M2 | WEIGHT: 95.2 LBS | HEIGHT: 60 IN

## 2022-01-10 DIAGNOSIS — L89.622 PRESSURE ULCER OF LEFT HEEL, STAGE 2 (HCC): Primary | ICD-10-CM

## 2022-01-10 PROCEDURE — 99213 OFFICE O/P EST LOW 20 MIN: CPT

## 2022-01-10 RX ORDER — LIDOCAINE 50 MG/G
OINTMENT TOPICAL ONCE
Status: CANCELLED | OUTPATIENT
Start: 2022-01-10 | End: 2022-01-10

## 2022-01-10 RX ORDER — LIDOCAINE HYDROCHLORIDE 40 MG/ML
SOLUTION TOPICAL ONCE
Status: CANCELLED | OUTPATIENT
Start: 2022-01-10 | End: 2022-01-10

## 2022-01-10 RX ORDER — BACITRACIN ZINC AND POLYMYXIN B SULFATE 500; 1000 [USP'U]/G; [USP'U]/G
OINTMENT TOPICAL ONCE
Status: CANCELLED | OUTPATIENT
Start: 2022-01-10 | End: 2022-01-10

## 2022-01-10 RX ORDER — LIDOCAINE 40 MG/G
CREAM TOPICAL ONCE
Status: DISCONTINUED | OUTPATIENT
Start: 2022-01-10 | End: 2022-01-11 | Stop reason: HOSPADM

## 2022-01-10 RX ORDER — LIDOCAINE 40 MG/G
CREAM TOPICAL ONCE
Status: CANCELLED | OUTPATIENT
Start: 2022-01-10 | End: 2022-01-10

## 2022-01-10 ASSESSMENT — PAIN SCALES - WONG BAKER: WONGBAKER_NUMERICALRESPONSE: 0

## 2022-01-11 NOTE — PLAN OF CARE
Pt to the [address] [place] [address],[address] for follow up appointment. Wound not debrided. Pt to continue with weekly dressing. Pt to follow up in the [address] [place] [address],[facility] Floor in 1 week. Discharge instructions reviewed with patient, all questions answered, copy given to patient. Dressings were applied to all wounds per M.D. Instructions at this visit.

## 2022-01-17 ENCOUNTER — HOSPITAL ENCOUNTER (OUTPATIENT)
Dept: WOUND CARE | Age: 62
Discharge: HOME | End: 2022-01-17

## 2022-01-19 NOTE — PROGRESS NOTES
88 [facility] Progress Note      Duane A Carota     : 1960    DATE OF VISIT:  1/10/2022    Subjective:     Duane A Carota is a 62 y.o. male who has a chief complaint of a pressure ulcer located on the bilateral foot. Seen with caretaker. Patient has profound MR and is nonverbal.   Per caretakers he did not tolerate offloading boots. Pushes with his feet a lot while he is resting. Mr. Lisbeth Anne has a past medical history of A-fib ([address] Bipolar disorder ([address] Brain herniation ([address] Constipation, COVID-19, Cystitis, Developmental non-verbal disorder, Impulse control disorder, Influenza A, Mental retardation, profound (I.Q. < 20), Osteopenia, Seizure ([address] and Subdural hematoma ([address] He has a past surgical history that includes Cataract removal; Colonoscopy (2016); craniotomy (Right, 2019); TURP (N/A, 2020); Colonoscopy (2020); Colonoscopy (N/A, 2020); Colonoscopy (N/A, 2021); Colonoscopy (N/A, 2021); Upper gastrointestinal endoscopy (N/A, 2021); Gastrostomy tube placement; and Endoscopy, colon, diagnostic. His Family history is unknown by patient. Mr. Lisbeth Anne reports that he has never smoked. He has never used smokeless tobacco. He reports that he does not drink alcohol and does not use drugs. His current medication list consists of LORazepam, Nutritional Supplements, QUEtiapine, acetaminophen, lansoprazole, levETIRAcetam, melatonin, sennosides-docusate sodium, and tamsulosin. Allergies: Penicillins, Benadryl [diphenhydramine], Clonidine derivatives, and Tetracyclines & related    Pertinent items from the review of systems are discussed in the HPI; the remainder of the ROS was reviewed and is negative.        Objective:     /70   Pulse 101   Resp 18   Ht 4' 8\" (1.422 m)   Wt 95 lb 3.2 oz (43.2 kg)   BMI 21.34 kg/m²      General Appearance: alert and oriented to person, place and time, well developed and well- nourished, in no acute distress  Skin: warm and dry, no rash or erythema  Head: normocephalic and atraumatic  Eyes: pupils equal, round, and reactive to light, extraocular eye movements intact, conjunctivae normal  ENT: tympanic membrane, external ear and ear canal normal bilaterally, nose without deformity, nasal mucosa and turbinates normal without polyps  Neck: supple and non-tender without mass, no thyromegaly or thyroid nodules, no cervical lymphadenopathy  Pulmonary/Chest: clear to auscultation bilaterally- no wheezes, rales or rhonchi, normal air movement, no respiratory distress  Cardiovascular: normal rate, regular rhythm, normal S1 and S2, no murmurs, rubs, clicks, or gallops, distal pulses intact, no carotid bruits  Abdomen: soft, non-tender, non-distended, normal bowel sounds, no masses or organomegaly. Dorsalis pedis pulse left palpable  Posterior tibial pulse left palpable  Dorsalis pedis pulse right palpable  Posterior tibial pulse right palpable      Ulcer on the left heel with mild fibrotic tissue, red granulation tissue, mild serous drainage, no hyperkeratotic rim, no undermining, no tunneling, no purulence, no malodor, no eschar, no periwound maceration, mild periwound erythema, mild edema, no crepitus, no increase in skin temperature, ulcer probes to soft tissue only     Today's ulcer measurements are in the wound documentation flowsheet.      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    Atrial fibrillation with RVR ([facility]) I48.91    Acute cystitis with hematuria N30.01    Altered mental status R41.82    Brain herniation ([facility] G93.5    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 ([facility] S06.5X9S    Constipation K59.00    Acute urinary retention R33.8    Atrial fibrillation, chronic ([facility]) I48.20    History of subdural hematoma Z86.79    Pneumonia J18.9    COVID-19 U07.1    Pulmonary infiltrates R91.8    Thrombocytopenia ([facility]) D69.6    RUBEN (acute kidney injury) ([facility] N17.9    Sepsis ([facility]) A41.9    Seizure disorder ([facility]) G40.909    PAF (paroxysmal atrial fibrillation) ([facility]) I48.0    Hypothermia T68. XXXA    Pressure ulcer of left heel, stage 2 ([facility]) K58.901       Assessment of today's active condition(s): pressure ulcer bilateral heel. Factors contributing to occurrence and/or persistence of the chronic ulcer include chronic pressure. Sharp debridement is not indicated today, based upon the exam findings in the ulcer(s) above. Discharge plan:     Treatment in the wound care center today: Wound 10/18/21 #2 Left Heel, Pressure, Stage 2, onset 10/11/21-Dressing/Treatment:  (betadine, xeroform, 4x4, specialist cast pad football drsg). Home treatment: good handwashing before and after any dressing changes. Cleanse ulcer with saline or soap & water before dressing change. May use Vaseline (petrolatum), Aquaphor, Aveeno, CeraVe, Cetaphil, Eucerin, Lubriderm, etc for dry skin. Dressing type for home:   Well padded dry dressing (football dressing) to the left foot to remain intact for 1 week. If gets dirty or soiled then can remove and apply mepilex border. Written discharge instructions given to patient. Offload ulcer(s) as directed. Elevate leg(s) as directed. Follow up in 1 week. Try to avoid trauma and pressure to the heels.         Electronically signed by Rip Stack DPM on 1/19/2022 at 8:56 AM.

## 2022-01-24 ENCOUNTER — HOSPITAL ENCOUNTER (OUTPATIENT)
Dept: WOUND CARE | Age: 62
Discharge: HOME | End: 2022-01-24
Payer: MEDICARE

## 2022-01-24 VITALS
RESPIRATION RATE: 16 BRPM | BODY MASS INDEX: 18.65 KG/M2 | SYSTOLIC BLOOD PRESSURE: 131 MMHG | WEIGHT: 95 LBS | HEIGHT: 60 IN | DIASTOLIC BLOOD PRESSURE: 77 MMHG | HEART RATE: 59 BPM

## 2022-01-24 DIAGNOSIS — L89.622 PRESSURE ULCER OF LEFT HEEL, STAGE 2 (HCC): Primary | ICD-10-CM

## 2022-01-24 PROCEDURE — 99213 OFFICE O/P EST LOW 20 MIN: CPT

## 2022-01-24 RX ORDER — LIDOCAINE 40 MG/G
CREAM TOPICAL ONCE
Status: CANCELLED | OUTPATIENT
Start: 2022-01-24 | End: 2022-01-24

## 2022-01-24 RX ORDER — BACITRACIN ZINC AND POLYMYXIN B SULFATE 500; 1000 [USP'U]/G; [USP'U]/G
OINTMENT TOPICAL ONCE
Status: CANCELLED | OUTPATIENT
Start: 2022-01-24 | End: 2022-01-24

## 2022-01-24 RX ORDER — LIDOCAINE 40 MG/G
CREAM TOPICAL ONCE
Status: DISCONTINUED | OUTPATIENT
Start: 2022-01-24 | End: 2022-01-25 | Stop reason: HOSPADM

## 2022-01-24 RX ORDER — LIDOCAINE 50 MG/G
OINTMENT TOPICAL ONCE
Status: CANCELLED | OUTPATIENT
Start: 2022-01-24 | End: 2022-01-24

## 2022-01-24 RX ORDER — LIDOCAINE HYDROCHLORIDE 40 MG/ML
SOLUTION TOPICAL ONCE
Status: CANCELLED | OUTPATIENT
Start: 2022-01-24 | End: 2022-01-24

## 2022-01-24 ASSESSMENT — PAIN SCALES - GENERAL: PAINLEVEL_OUTOF10: 0

## 2022-01-24 NOTE — PLAN OF CARE
Pt to the [facility] for follow up appointment. Wound measuring smaller. Pt to continue with weekly dressing to left foot. Pt to follow up in the [facility] in 1 week. Discharge instructions reviewed with patient, all questions answered, copy given to patient. Dressings were applied to all wounds per M.D. Instructions at this visit.

## 2022-01-31 ENCOUNTER — HOSPITAL ENCOUNTER (OUTPATIENT)
Dept: WOUND CARE | Age: 62
Discharge: HOME | End: 2022-01-31
Payer: MEDICARE

## 2022-01-31 VITALS
DIASTOLIC BLOOD PRESSURE: 73 MMHG | HEIGHT: 60 IN | SYSTOLIC BLOOD PRESSURE: 102 MMHG | HEART RATE: 59 BPM | WEIGHT: 96.4 LBS | RESPIRATION RATE: 16 BRPM | BODY MASS INDEX: 18.93 KG/M2

## 2022-01-31 DIAGNOSIS — L89.622 PRESSURE ULCER OF LEFT HEEL, STAGE 2 (HCC): Primary | ICD-10-CM

## 2022-01-31 PROCEDURE — 99213 OFFICE O/P EST LOW 20 MIN: CPT

## 2022-01-31 RX ORDER — BACITRACIN ZINC AND POLYMYXIN B SULFATE 500; 1000 [USP'U]/G; [USP'U]/G
OINTMENT TOPICAL ONCE
Status: CANCELLED | OUTPATIENT
Start: 2022-01-31 | End: 2022-01-31

## 2022-01-31 RX ORDER — LIDOCAINE 50 MG/G
OINTMENT TOPICAL ONCE
Status: CANCELLED | OUTPATIENT
Start: 2022-01-31 | End: 2022-01-31

## 2022-01-31 RX ORDER — LIDOCAINE HYDROCHLORIDE 40 MG/ML
SOLUTION TOPICAL ONCE
Status: CANCELLED | OUTPATIENT
Start: 2022-01-31 | End: 2022-01-31

## 2022-01-31 RX ORDER — LIDOCAINE 40 MG/G
CREAM TOPICAL ONCE
Status: CANCELLED | OUTPATIENT
Start: 2022-01-31 | End: 2022-01-31

## 2022-01-31 RX ORDER — LIDOCAINE 40 MG/G
CREAM TOPICAL ONCE
Status: DISCONTINUED | OUTPATIENT
Start: 2022-01-31 | End: 2022-02-01 | Stop reason: HOSPADM

## 2022-01-31 ASSESSMENT — PAIN SCALES - WONG BAKER: WONGBAKER_NUMERICALRESPONSE: 0

## 2022-01-31 NOTE — PLAN OF CARE
Pt to the [address] [place] [address],[address] [place] [address] for follow up appointment. Wound healed today. Bordered gauze or dry dressing to healed wound for the next 2 weeks for protection. Pt to follow up in the [address] [place] [address] as needed. Discharge instructions reviewed with patient, all questions answered, copy given to patient. Dressings were applied to all wounds per M.D. Instructions at this visit.

## 2022-02-03 NOTE — PROGRESS NOTES
88 [facility] Progress Note      Duane A Carota     : 1960    DATE OF VISIT:  2022    Subjective:     Duane A Flynn Carrion is a 62 y.o. male who has a chief complaint of a pressure ulcer located on the bilateral foot. Seen with caretaker. Patient has profound MR and is nonverbal.   Per caretakers he did not tolerate offloading boots. Pushes with his feet a lot while he is resting. Mr. Mitchell Morris has a past medical history of A-fib ([address] Bipolar disorder ([address] Brain herniation ([address] Constipation, COVID-19, COVID-19, Cystitis, Developmental non-verbal disorder, Impulse control disorder, Influenza A, Mental retardation, profound (I.Q. < 20), Osteopenia, Seizure ([address] and Subdural hematoma ([address] He has a past surgical history that includes Cataract removal; Colonoscopy (2016); craniotomy (Right, 2019); TURP (N/A, 2020); Colonoscopy (2020); Colonoscopy (N/A, 2020); Colonoscopy (N/A, 2021); Colonoscopy (N/A, 2021); Upper gastrointestinal endoscopy (N/A, 2021); Gastrostomy tube placement; and Endoscopy, colon, diagnostic. His Family history is unknown by patient. Mr. Mitchell Morris reports that he has never smoked. He has never used smokeless tobacco. He reports that he does not drink alcohol and does not use drugs. His current medication list consists of LORazepam, Nutritional Supplements, QUEtiapine, acetaminophen, lansoprazole, levETIRAcetam, melatonin, sennosides-docusate sodium, and tamsulosin. Allergies: Penicillins, Benadryl [diphenhydramine], Clonidine derivatives, and Tetracyclines & related    Pertinent items from the review of systems are discussed in the HPI; the remainder of the ROS was reviewed and is negative.        Objective:     /77   Pulse 59   Resp 16   Ht 4' 8\" (1.422 m)   Wt 95 lb (43.1 kg)   BMI 21.30 kg/m²      General Appearance: alert and oriented to person, place and time, well developed and status R41.82    Brain herniation ([facility]) G93.5    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 ([facility] S06.5X9S    Constipation K59.00    Acute urinary retention R33.8    Atrial fibrillation, chronic ([facility]) I48.20    History of subdural hematoma Z86.79    Pneumonia J18.9    COVID-19 U07.1    Pulmonary infiltrates R91.8    Thrombocytopenia ([facility]) D69.6    RUBEN (acute kidney injury) ([facility] N17.9    Sepsis ([facility]) A41.9    Seizure disorder ([facility]) G40.909    PAF (paroxysmal atrial fibrillation) ([facility]) I48.0    Hypothermia T68. XXXA    Pressure ulcer of left heel, stage 2 ([facility]) R63.159       Assessment of today's active condition(s): pressure ulcer bilateral heel. Factors contributing to occurrence and/or persistence of the chronic ulcer include chronic pressure. Sharp debridement is not indicated today, based upon the exam findings in the ulcer(s) above. Discharge plan:     Treatment in the wound care center today: [REMOVED] Wound 10/18/21 #2 Left Heel, Pressure, Stage 2, onset 10/11/21-Dressing/Treatment: Other (comment) (betadine,xeroform,special cast padx3,kerlix,coban). Home treatment: good handwashing before and after any dressing changes. Cleanse ulcer with saline or soap & water before dressing change. May use Vaseline (petrolatum), Aquaphor, Aveeno, CeraVe, Cetaphil, Eucerin, Lubriderm, etc for dry skin. Dressing type for home:   Well padded dry dressing (football dressing) to the left foot to remain intact for 1 week. If gets dirty or soiled then can remove and apply mepilex border. Written discharge instructions given to patient. Offload ulcer(s) as directed. Elevate leg(s) as directed. Follow up in 1 week. Try to avoid trauma and pressure to the heels.         Electronically signed by America Garcia DPM on 2/3/2022 at 10:57 AM.

## 2022-02-03 NOTE — PROGRESS NOTES
[facility] Progress Note      Duane A Carota     : 1960    DATE OF VISIT:  2022    Subjective:     Duane A Dwain Springer is a 62 y.o. male who has a chief complaint of a pressure ulcer located on the bilateral foot. Seen with caretaker. Patient has profound MR and is nonverbal.   Per caretakers he did not tolerate offloading boots. Pushes with his feet a lot while he is resting. Mr. Akash Choudhary has a past medical history of A-fib ([address] Bipolar disorder ([address] Brain herniation ([address] Constipation, COVID-19, COVID-19, Cystitis, Developmental non-verbal disorder, Impulse control disorder, Influenza A, Mental retardation, profound (I.Q. < 20), Osteopenia, Seizure ([address] and Subdural hematoma ([address] He has a past surgical history that includes Cataract removal; Colonoscopy (2016); craniotomy (Right, 2019); TURP (N/A, 2020); Colonoscopy (2020); Colonoscopy (N/A, 2020); Colonoscopy (N/A, 2021); Colonoscopy (N/A, 2021); Upper gastrointestinal endoscopy (N/A, 2021); Gastrostomy tube placement; and Endoscopy, colon, diagnostic. His Family history is unknown by patient. Mr. Akash Choudhary reports that he has never smoked. He has never used smokeless tobacco. He reports that he does not drink alcohol and does not use drugs. His current medication list consists of LORazepam, Nutritional Supplements, QUEtiapine, acetaminophen, lansoprazole, levETIRAcetam, melatonin, sennosides-docusate sodium, and tamsulosin. Allergies: Penicillins, Benadryl [diphenhydramine], Clonidine derivatives, and Tetracyclines & related    Pertinent items from the review of systems are discussed in the HPI; the remainder of the ROS was reviewed and is negative.        Objective:     /73   Pulse 59   Resp 16   Ht 4' 8\" (1.422 m)   Wt 96 lb 6.4 oz (43.7 kg)   BMI 21.61 kg/m²      General Appearance: alert and oriented to person, place and time, well developed and well- nourished, in no acute distress  Skin: warm and dry, no rash or erythema  Head: normocephalic and atraumatic  Eyes: pupils equal, round, and reactive to light, extraocular eye movements intact, conjunctivae normal  ENT: tympanic membrane, external ear and ear canal normal bilaterally, nose without deformity, nasal mucosa and turbinates normal without polyps  Neck: supple and non-tender without mass, no thyromegaly or thyroid nodules, no cervical lymphadenopathy  Pulmonary/Chest: clear to auscultation bilaterally- no wheezes, rales or rhonchi, normal air movement, no respiratory distress  Cardiovascular: normal rate, regular rhythm, normal S1 and S2, no murmurs, rubs, clicks, or gallops, distal pulses intact, no carotid bruits  Abdomen: soft, non-tender, non-distended, normal bowel sounds, no masses or organomegaly. Dorsalis pedis pulse left palpable  Posterior tibial pulse left palpable  Dorsalis pedis pulse right palpable  Posterior tibial pulse right palpable      Ulcer on the left heel epithelialized. Today's ulcer measurements are in the wound documentation flowsheet.      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 S06.5X9S    Constipation K59.00    Acute urinary retention R33.8    Atrial fibrillation, chronic ([facility]) I48.20    History of subdural hematoma Z86.79    Pneumonia J18.9    COVID-19 U07.1    Pulmonary infiltrates R91.8    Thrombocytopenia ([facility]) D69.6    RUBEN (acute kidney injury) ([facility] N17.9    Sepsis ([facility]) A41.9    Seizure disorder ([facility]) G40.909    PAF (paroxysmal atrial fibrillation) ([facility]) I48.0    Hypothermia T68. XXXA    Pressure ulcer of left heel, stage 2 ([facility]) O54.586       Assessment of today's active condition(s): pressure ulcer bilateral heel. Factors contributing to occurrence and/or persistence of the chronic ulcer include chronic pressure. Sharp debridement is not indicated today, based upon the exam findings in the ulcer(s) above. Discharge plan:     Treatment in the wound care center today: [REMOVED] Wound 10/18/21 #2 Left Heel, Pressure, Stage 2, onset 10/11/21-Dressing/Treatment: Other (comment) (bordered gauze). Home treatment: good handwashing before and after any dressing changes. Cleanse ulcer with saline or soap & water before dressing change. May use Vaseline (petrolatum), Aquaphor, Aveeno, CeraVe, Cetaphil, Eucerin, Lubriderm, etc for dry skin. Dressing type for home:   Mepilex border applied and to be changed every 2-3 days for next 1-2 weeks to allow skin to strengthen. Written discharge instructions given to patient. Offload ulcer(s) as directed. Elevate leg(s) as directed. Follow up in [address] [place] [address],[address] as needed. Try to avoid trauma and pressure to the heels.         Electronically signed by Marquez Briscoe DPM on 2/3/2022 at 2:48 PM.

## 2022-02-03 NOTE — PROGRESS NOTES
[facility] Progress Note      Duane A Carota     : 1960    DATE OF VISIT:  2021    Subjective:     Duane A Carota is a 62 y.o. male who has a chief complaint of a pressure ulcer located on the bilateral foot. Seen with caretaker. Patient has profound MR and is nonverbal.   Per caretakers he did not tolerate offloading boots. Pushes with his feet a lot while he is resting. Mr. Gordon Espinoza has a past medical history of A-fib ([address] Bipolar disorder ([address] Brain herniation ([address] Constipation, COVID-19, COVID-19, Cystitis, Developmental non-verbal disorder, Impulse control disorder, Influenza A, Mental retardation, profound (I.Q. < 20), Osteopenia, Seizure ([address] and Subdural hematoma ([address] He has a past surgical history that includes Cataract removal; Colonoscopy (2016); craniotomy (Right, 2019); TURP (N/A, 2020); Colonoscopy (2020); Colonoscopy (N/A, 2020); Colonoscopy (N/A, 2021); Colonoscopy (N/A, 2021); Upper gastrointestinal endoscopy (N/A, 2021); Gastrostomy tube placement; and Endoscopy, colon, diagnostic. His Family history is unknown by patient. Mr. Gordon Espinoza reports that he has never smoked. He has never used smokeless tobacco. He reports that he does not drink alcohol and does not use drugs. His current medication list consists of LORazepam, Nutritional Supplements, QUEtiapine, acetaminophen, lansoprazole, levETIRAcetam, melatonin, sennosides-docusate sodium, and tamsulosin. Allergies: Penicillins, Benadryl [diphenhydramine], Clonidine derivatives, and Tetracyclines & related    Pertinent items from the review of systems are discussed in the HPI; the remainder of the ROS was reviewed and is negative.        Objective:     Resp 20   Ht 4' 8\" (1.422 m)   Wt 93 lb 6.4 oz (42.4 kg)   BMI 20.94 kg/m²      General Appearance: alert and oriented to person, place and time, well developed and well- nourished, in no acute distress  Skin: warm and dry, no rash or erythema  Head: normocephalic and atraumatic  Eyes: pupils equal, round, and reactive to light, extraocular eye movements intact, conjunctivae normal  ENT: tympanic membrane, external ear and ear canal normal bilaterally, nose without deformity, nasal mucosa and turbinates normal without polyps  Neck: supple and non-tender without mass, no thyromegaly or thyroid nodules, no cervical lymphadenopathy  Pulmonary/Chest: clear to auscultation bilaterally- no wheezes, rales or rhonchi, normal air movement, no respiratory distress  Cardiovascular: normal rate, regular rhythm, normal S1 and S2, no murmurs, rubs, clicks, or gallops, distal pulses intact, no carotid bruits  Abdomen: soft, non-tender, non-distended, normal bowel sounds, no masses or organomegaly. Dorsalis pedis pulse left palpable  Posterior tibial pulse left palpable  Dorsalis pedis pulse right palpable  Posterior tibial pulse right palpable      Ulcer on the left heel with mild fibrotic tissue, red granulation tissue, mild serous drainage, no hyperkeratotic rim, no undermining, no tunneling, no purulence, no malodor, no eschar, no periwound maceration, mild periwound erythema, mild edema, no crepitus, no increase in skin temperature, ulcer probes to soft tissue only     Today's ulcer measurements are in the wound documentation flowsheet.      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    Atrial fibrillation with RVR ([facility]) I48.91    Acute cystitis with hematuria N30.01    Altered mental status R41.82    Brain herniation ([facility] G93.5    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 S06.5X9S    Constipation K59.00    Acute urinary retention R33.8    Atrial fibrillation, chronic ([facility]) I48.20    History of subdural hematoma Z86.79    Pneumonia J18.9    COVID-19 U07.1    Pulmonary infiltrates R91.8    Thrombocytopenia ([facility]) D69.6    RUBEN (acute kidney injury) ([facility] N17.9    Sepsis ([facility]) A41.9    Seizure disorder ([facility]) G40.909    PAF (paroxysmal atrial fibrillation) ([facility]) I48.0    Hypothermia T68. XXXA    Pressure ulcer of left heel, stage 2 ([facility]) S90.196       Assessment of today's active condition(s): pressure ulcer bilateral heel. Factors contributing to occurrence and/or persistence of the chronic ulcer include chronic pressure. Sharp debridement is not indicated today, based upon the exam findings in the ulcer(s) above. Discharge plan:     Treatment in the wound care center today: [REMOVED] Wound 10/18/21 #2 Left Heel, Pressure, Stage 2, onset 10/11/21-Dressing/Treatment:  (xeroform, gauze, football dressing). Home treatment: good handwashing before and after any dressing changes. Cleanse ulcer with saline or soap & water before dressing change. May use Vaseline (petrolatum), Aquaphor, Aveeno, CeraVe, Cetaphil, Eucerin, Lubriderm, etc for dry skin. Dressing type for home:   Well padded dry dressing (football dressing) to the left foot to remain intact for 1 week. If gets dirty or soiled then can remove and apply mepilex border. Written discharge instructions given to patient. Offload ulcer(s) as directed. Elevate leg(s) as directed. Follow up in 1 week. Try to avoid trauma and pressure to the heels.         Electronically signed by Law Gutiérrez DPM on 2/3/2022 at 12:08 PM.

## 2022-03-23 ENCOUNTER — APPOINTMENT (OUTPATIENT)
Dept: CT IMAGING | Age: 62
End: 2022-03-23
Payer: MEDICARE

## 2022-03-23 ENCOUNTER — APPOINTMENT (OUTPATIENT)
Dept: GENERAL RADIOLOGY | Age: 62
End: 2022-03-23
Payer: MEDICARE

## 2022-03-23 ENCOUNTER — HOSPITAL ENCOUNTER (EMERGENCY)
Age: 62
Discharge: HOME | End: 2022-03-23
Payer: MEDICARE

## 2022-03-23 VITALS
SYSTOLIC BLOOD PRESSURE: 100 MMHG | TEMPERATURE: 96.9 F | OXYGEN SATURATION: 94 % | DIASTOLIC BLOOD PRESSURE: 70 MMHG | RESPIRATION RATE: 16 BRPM | HEART RATE: 94 BPM

## 2022-03-23 DIAGNOSIS — S80.02XA CONTUSION OF LEFT KNEE, INITIAL ENCOUNTER: ICD-10-CM

## 2022-03-23 DIAGNOSIS — S50.02XA CONTUSION OF LEFT ELBOW, INITIAL ENCOUNTER: ICD-10-CM

## 2022-03-23 DIAGNOSIS — S09.90XA INJURY OF HEAD, INITIAL ENCOUNTER: ICD-10-CM
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
## 2022-03-23 PROCEDURE — 96372 THER/PROPH/DIAG INJ SC/IM: CPT

## 2022-03-23 PROCEDURE — 6360000002 HC RX W HCPCS: Performed by: PHYSICIAN ASSISTANT

## 2022-03-23 PROCEDURE — 73560 X-RAY EXAM OF KNEE 1 OR 2: CPT

## 2022-03-23 PROCEDURE — 70450 CT HEAD/BRAIN W/O DYE: CPT

## 2022-03-23 PROCEDURE — 73070 X-RAY EXAM OF ELBOW: CPT

## 2022-03-23 PROCEDURE — 99284 EMERGENCY DEPT VISIT MOD MDM: CPT

## 2022-03-23 PROCEDURE — 72125 CT NECK SPINE W/O DYE: CPT

## 2022-03-23 RX ORDER — MIDAZOLAM HYDROCHLORIDE 1 MG/ML
2 INJECTION INTRAMUSCULAR; INTRAVENOUS ONCE
Status: COMPLETED | OUTPATIENT
Start: 2022-03-23 | End: 2022-03-23

## 2022-03-23 RX ORDER — LORAZEPAM 2 MG/ML
1 INJECTION INTRAMUSCULAR ONCE
Status: COMPLETED | OUTPATIENT
Start: 2022-03-23 | End: 2022-03-23

## 2022-03-23 RX ADMIN — MIDAZOLAM 2 MG: 1 INJECTION INTRAMUSCULAR; INTRAVENOUS at 13:34

## 2022-03-23 RX ADMIN — LORAZEPAM 1 MG: 2 INJECTION INTRAMUSCULAR; INTRAVENOUS at 13:14

## 2022-03-23 NOTE — ED NOTES
CT having trouble with patient unable to follow commands and hold still for CT scan. Provider aware.      Brooke Flynn, RN  03/23/22 6030

## 2022-03-23 NOTE — ED NOTES
Pt well known to ER staff. Frequent visits for falls. Pt is MRDD, lives in group home with around the clock care. Non-verbal. Abrasion noted to left forehead and left elbow. No non-verbals of pain noted while pt is at rest. Care giver at bedside. Difficult to obtain vital signs on patient, not fully cooperative.       Tor Suárez RN  03/23/22 8488

## 2022-03-23 NOTE — ED PROVIDER NOTES
**ADVANCED PRACTICE PROVIDER, I HAVE EVALUATED THIS [facility]  ED  EMERGENCY DEPARTMENT ENCOUNTER      Pt Name: Luz Marina Hodge  YNESTORK:[id number]  Barbaragfkatiana 1960  Date of evaluation: 3/23/2022  Provider: GALLO [place]      Chief Complaint:    Chief Complaint   Patient presents with    Fall     fell down on concrete, hit head, left elbow. MRDD from group home. Nursing Notes, Past Medical Hx, Past Surgical Hx, Social Hx, Allergies, and Family Hx were all reviewed and agreed with or any disagreements were addressed in the HPI.    HPI: (Location, Duration, Timing, Severity, Quality, Assoc Sx, Context, Modifying factors)    Chief Complaint of fall, hit head and left elbow. This is a  65 y.o. male who presents via EMS with past medical history of A. fib, bipolar, seizures, and is an MRDD patient who is nonverbal living in a group home currently. His caregiver is with him and provides the patient's history. She states that they were getting him into the [employer] for an outing and the patient stepped onto the first step successfully however upon stepping on the steps second step he normally leans forward and this time leaned backwards. She was unable to hold her balance while standing behind him and they both fell to the ground. He did hit his head on the left side and has a superficial abrasion to left forehead and left elbow. There is no loss of consciousness. He has not had any vomiting since the incident. She is uncertain if he is on a blood thinner. No other injuries noted. She states he has otherwise been acting normally and has been very cooperative.     PastMedical/Surgical History:      Diagnosis Date    A-fib ([address]     B[facility] disorder [facility]     Brain herniation ([facility]     Constipation     COVID-19 01/20/2021 9/5/21 positive    COVID-19 01/14/2022    Test positive on 1/14/22    Cystitis     Developmental non-verbal disorder     Impulse control disorder     Influenza A 02/13/2014    Mental retardation, profound (I.Q. < 20)     Osteopenia     Seizure ([facility]     Subdural hematoma ([facility]          Procedure Laterality Date    CATARACT REMOVAL      COLONOSCOPY  06/20/2016    incomplete, poor prep    COLONOSCOPY  08/26/2020    COLON W/ANES.  COLONOSCOPY N/A 8/26/2020    COLON W/ANES. (9:00) COVID TEST 8/20-8/22. PT IS NON-VERBAL, IS NOT IN A FACILITY. IS CARED FOR BY 2 CAREGIVERS IN HIS HOME. performed by Zhen Waggoner MD at [address] [facility] COLONOSCOPY N/A 09/01/2021    COLONOSCOPY N/A 9/1/2021    COLON W/ANES. (10:30) PT IS COMBATIVE. [facility] SERVICES.  [phone] XSTEPH performed by Zhen Waggoner MD at [address] [facility] Right 12/2/2019    Right Trephine Craniotomy For Evacuation of Subdural Hematoma performed by Carlota Connors MD at [facility], COLON, DIAGNOSTIC     [address] TURP N/A 2/28/2020    CYSTOSCOPY, TRANSURETHRAL RESECTION OF PROSTATE performed by Juliet Iraheta MD at [facility] N/A 9/17/2021    EGD ESOPHAGOGASTRODUODENOSCOPY PEG TUBE INSERTION performed by Kevon Shell MD at [address] [facility]       Medications:  Discharge Medication List as of 3/23/2022  2:56 PM      CONTINUE these medications which have NOT CHANGED    Details   QUEtiapine (SEROQUEL) 50 MG tablet 2 times daily 50 mg in morning & 150 mg in evening dissolve in 10 ml water, administer via g-tubeHistorical Med      acetaminophen (TYLENOL) 160 MG/5ML suspension 15 mg/kg by Per G Tube route every 6 hours as needed for FeverHistorical Med      Nutritional Supplements (JEVITY 1.5 CABRERA PO) Take 237 mLs by mouth 4 times daily Historical Med      lansoprazole 3 MG/ML SUSP 10 mLs by Per G Tube route every morning (before breakfast), Disp-300 mL, R-0Normal      levETIRAcetam (KEPPRA) 100 MG/ML solution 5 mLs by PEG Tube route 2 times daily, Disp-120 mL, R-3Normal melatonin 3 MG TABS tablet 1.5 tablets by PEG Tube route nightly, Disp-30 tablet, R-0Normal      sennosides-docusate sodium (SENOKOT-S) 8.6-50 MG tablet 2 tablets by PEG Tube route every 72 hours, Disp-30 tablet, R-0Normal      LORazepam (ATIVAN) 0.5 MG tablet 0.5 mg by PEG Tube route every evening. Historical Med      tamsulosin (FLOMAX) 0.4 MG capsule 0.8 mg daily Dissolve in 10 ml and give per g-tubeHistorical Med               Review of Systems:  (2-9 systems needed)  Review of Systems   Unable to perform ROS: Patient nonverbal         Physical Exam:  Physical Exam  Vitals and nursing note reviewed. Constitutional:       Appearance: Normal appearance. He is not diaphoretic. HENT:      Head: Normocephalic and atraumatic. Nose: Nose normal.      Mouth/Throat:      Mouth: Mucous membranes are moist.   Eyes:      General:         Right eye: No discharge. Left eye: No discharge. Extraocular Movements: Extraocular movements intact. Pupils: Pupils are equal, round, and reactive to light. Pulmonary:      Effort: Pulmonary effort is normal. No respiratory distress. Abdominal:      Palpations: Abdomen is soft. Tenderness: There is no abdominal tenderness. Musculoskeletal:         General: Normal range of motion. Cervical back: Normal, normal range of motion and neck supple. Thoracic back: Normal.      Lumbar back: Normal.      Comments: Superficial skin abrasion to left lateral elbow with questionable tenderness upon palpation. Skin:     General: Skin is warm and dry. Coloration: Skin is not pale. Neurological:      Mental Status: He is alert. Mental status is at baseline.    Psychiatric:         Mood and Affect: Mood normal.         Behavior: Behavior normal.         MEDICAL DECISION MAKING    Vitals:    Vitals:    03/23/22 1210 03/23/22 1305 03/23/22 1312 03/23/22 1450   BP:   102/69 100/70   Pulse:  101 99 94   Resp:   18 16   Temp:   96.6 °F (35.9 °C) 96.9 results of any tests, a time was given to answer questions, a plan was proposed and they agreed with plan. CLINICAL IMPRESSION:  1. Fall, initial encounter    2. Injury of head, initial encounter    3. Contusion of left knee, initial encounter    4. Contusion of left elbow, initial encounter        I estimate there is LOW risk for SUBARACHNOID HEMORRHAGE, MENINGITIS, INTRACRANIAL HEMORRHAGE, SUBDURAL HEMATOMA, OR STROKE, thus I consider the discharge disposition reasonable. Duane A Carota and I have discussed the diagnosis and risks, and we agree with discharging home to follow-up with their primary doctor. We also discussed returning to the Emergency Department immediately if new or worsening symptoms occur. We have discussed the symptoms which are most concerning (e.g., changing or worsening pain, weakness, vomiting, fever) that necessitate immediate return. Final Impression    1. Fall, initial encounter    2. Injury of head, initial encounter    3. Contusion of left knee, initial encounter    4. Contusion of left elbow, initial encounter        Discharge Vital Signs:  Blood pressure 100/70, pulse 94, temperature 96.9 °F (36.1 °C), temperature source Oral, resp. rate 16, SpO2 94 %.       DISPOSITION        PATIENT REFERRED TO:  [facility]  ED  [address] [id number] [phone]  Go to   If symptoms worsen    MD Carola Alfonso  [facility]  [phone]    Schedule an appointment as soon as possible for a visit         DISCHARGE MEDICATIONS:  Discharge Medication List as of 3/23/2022  2:56 PM          DISCONTINUED MEDICATIONS:  Discharge Medication List as of 3/23/2022  2:56 PM                 (Please note the MDM and HPI sections of this note were completed with a voice recognition program.  Efforts were made to edit the dictations but occasionally words are mis-transcribed.)    Electronically signed, Andreas Meigs, PA,           [place] Castañeda [place], [place]  03/28/22 2037

## 2022-05-17 NOTE — PROGRESS NOTES
Medicare Annual Wellness Visit    Magdy Evans is here for Medicare AWV    Assessment & Plan   Type 2 diabetes mellitus without complication, without long-term current use of insulin ([facility] [address]  The following orders have not been finalized:  -     metFORMIN (GLUCOPHAGE) 1000 MG tablet  -     glipiZIDE (GLUCOTROL) 5 MG tablet  -     atorvastatin (LIPITOR) 20 MG tablet  Primary hypertension  The following orders have not been finalized:  -     losartan-hydroCHLOROthiazide (HYZAAR) 100-25 MG per tablet  History of atrial flutter  Initial Medicare annual wellness visit  Need for hepatitis C screening test  -     Hepatitis C Antibody; Future  Other problems related to lifestyle      Recommendations for Preventive Services Due: see orders and patient instructions/AVS.  Recommended screening schedule for the next 5-10 years is provided to the patient in written form: see Patient Instructions/AVS.     Return for Medicare Annual Wellness Visit in 1 year. Subjective       Patient's complete Health Risk Assessment and screening values have been reviewed and are found in Flowsheets. The following problems were reviewed today and where indicated follow up appointments were made and/or referrals ordered.     Positive Risk Factor Screenings with Interventions:             General Health and ACP:  General  In general, how would you say your health is?: Fair  In the past 7 days, have you experienced any of the following: New or Increased Pain, New or Increased Fatigue, Loneliness, Social Isolation, Stress or Anger?: No  Do you get the social and emotional support that you need?: Yes  Do you have a Living Will?: (!) No    Advance Directives     Power of  Living Will ACP-Advance Directive ACP-Power of     Not on File Not on File Not on File Not on File      General Health Risk Interventions:  · No Living Will: Patient declines ACP discussion/assistance    Health Habits/Nutrition:     Physical Activity: Unknown    Days Physician Progress Note      PATIENTEdith Hash  CSN #:                  [id number]  :                       1960  ADMIT DATE:       2021 7:25 PM  DISCH DATE:  RESPONDING  PROVIDER #:        MANOLO BANERJEE PA-C          QUERY TEXT:    Pt admitted with pneumonia. Pt noted to have laboratory test positive for   COVID 19. If possible, please document in progress notes and discharge summary   if COVID-19 was present on admission (POA): The medical record reflects the following:  Risk Factors: Pneumonia, RUBEN, living in group home. Clinical Indicators: Positive Covid test, Per ED consult note \"Suspected   COVID-19 virus infection\" Cough. Droplet pulse isolation  Treatment: IV Solumedrol, IV Rocephin, IV Zithromax, Remdesivir, serial labs,   convalescent plasma, supportive care. Options provided:  -- Yes, COVID-19 was present at the time of the order to admit to the hospital  -- No, COVID-19 was not present on admission and developed during the   inpatient stay  -- Other - I will add my own diagnosis  -- Disagree - Not applicable / Not valid  -- Disagree - Clinically unable to determine / Unknown  -- Refer to Clinical Documentation Reviewer    PROVIDER RESPONSE TEXT:    COVID-19 was present at the time of inpatient admission order.     Query created by: David Dejesus on 2021 2:40 PM      Electronically signed by:  Rikc Burns PA-C 2021 11:46 AM of Exercise per Week: 0 days    Minutes of Exercise per Session: Not on file     Have you lost any weight without trying in the past 3 months?: No  Body mass index: (!) 40.41  Have you seen the dentist within the past year?: (!) No    Health Habits/Nutrition Interventions:  · Nutritional issues:  educational materials for healthy, well-balanced diet provided    Hearing/Vision:  Do you or your family notice any trouble with your hearing that hasn't been managed with hearing aids?: (!) Yes  Do you have difficulty driving, watching TV, or doing any of your daily activities because of your eyesight?: No  Have you had an eye exam within the past year?: Yes  No exam data present    Hearing/Vision Interventions:  · Hearing concerns:  patient declines any further evaluation/treatment for hearing issues    Safety:  Do you have working smoke detectors?: Yes  Do you have any tripping hazards - loose or unsecured carpets or rugs?: No  Do you have any tripping hazards - clutter in doorways, halls, or stairs?: No  Do you have either shower bars, grab bars, non-slip mats or non-slip surfaces in your shower or bathtub?: (!) No  Do all of your stairways have a railing or banister?: Yes  Do you always fasten your seatbelt when you are in a car?: Yes    Safety Interventions:  · Home safety tips provided           Objective   Vitals:    05/17/22 0946   BP: 138/86   Pulse: 60   Temp: 98 °F (36.7 °C)   TempSrc: Temporal   SpO2: 97%   Weight: 298 lb (135.2 kg)   Height: 6' (1.829 m)      Body mass index is 40.42 kg/m².         General Appearance: alert and oriented to person, place and time, obese, in no acute distress  Skin: warm and dry, no rash or erythema  Head: normocephalic and atraumatic  Eyes: pupils equal, round, and reactive to light, extraocular eye movements intact, conjunctivae normal  ENT: tympanic membrane, external ear and ear canal normal bilaterally, nose without deformity, nasal mucosa and turbinates normal without polyps  Neck: supple and non-tender without mass, no thyromegaly or thyroid nodules, no cervical lymphadenopathy  Pulmonary/Chest: clear to auscultation bilaterally- no wheezes, rales or rhonchi, normal air movement, no respiratory distress  Cardiovascular: normal rate, regular rhythm, normal S1 and S2, no murmurs, rubs, clicks, or gallops, distal pulses intact, no carotid bruits  Abdomen: soft, non-tender, non-distended, normal bowel sounds, no masses or organomegaly  Extremities: no cyanosis, clubbing or edema  Musculoskeletal: normal range of motion, no joint swelling, deformity or tenderness  Neurologic: reflexes normal and symmetric, no cranial nerve deficit, gait, coordination and speech normal       No Known Allergies  Prior to Visit Medications    Medication Sig Taking?  Authorizing Provider   glipiZIDE (GLUCOTROL) 5 MG tablet  Yes Historical Provider, MD   metFORMIN (GLUCOPHAGE) 1000 MG tablet  Yes Historical Provider, MD   ACCU-CHEK FELICIA PLUS strip  Yes Historical Provider, MD   atorvastatin (LIPITOR) 20 MG tablet Take 20 mg by mouth daily Yes Historical Provider, MD   omeprazole (PRILOSEC) 40 MG delayed release capsule TAKE 1 CAPSULE EVERY DAY Yes Jorgevanessa Ramos, DO   tamsulosin (FLOMAX) 0.4 MG capsule TAKE 1 CAPSULE EVERY DAY Yes Jorge Ramos, DO   metoprolol tartrate (LOPRESSOR) 50 MG tablet TAKE 1 TABLET TWICE DAILY Yes Jorgevanessa Ramos, DO   losartan-hydroCHLOROthiazide (HYZAAR) 100-25 MG per tablet TAKE 1 TABLET EVERY DAY Yes Jorgevanessa Ramos, DO   aspirin 325 MG tablet Take 325 mg by mouth daily Yes Historical Provider, MD Negron (Including outside providers/suppliers regularly involved in providing care):   Patient Care Team:  Jerry Dubon DO as PCP - General (Family Medicine)  Jorge Ramos DO as PCP - [facility]  Rasta Gary DO as Consulting Physician (Gastroenterology)  Chrissy French MD as Surgeon (General Surgery)  Joby Velasco MD (Gastroenterology)     Reviewed and updated this visit:  Tobacco  Allergies  Meds  Med Hx  Surg Hx  Soc Hx  Fam Hx                  Asher Mcallister, DO

## 2022-07-18 ENCOUNTER — HOSPITAL ENCOUNTER (EMERGENCY)
Age: 62
Discharge: HOME | End: 2022-07-18
Attending: STUDENT IN AN ORGANIZED HEALTH CARE EDUCATION/TRAINING PROGRAM
Payer: MEDICARE

## 2022-07-18 ENCOUNTER — APPOINTMENT (OUTPATIENT)
Dept: CT IMAGING | Age: 62
End: 2022-07-18
Payer: MEDICARE

## 2022-07-18 VITALS
SYSTOLIC BLOOD PRESSURE: 114 MMHG | WEIGHT: 100 LBS | TEMPERATURE: 98.3 F | BODY MASS INDEX: 19.63 KG/M2 | OXYGEN SATURATION: 96 % | DIASTOLIC BLOOD PRESSURE: 79 MMHG | HEART RATE: 82 BPM | RESPIRATION RATE: 16 BRPM | HEIGHT: 60 IN

## 2022-07-18 DIAGNOSIS — Z86.79 HISTORY OF SUBDURAL HEMATOMA: ICD-10-CM

## 2022-07-18 DIAGNOSIS — S09.90XA CLOSED HEAD INJURY, INITIAL ENCOUNTER: Primary | ICD-10-CM
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
## 2022-07-18 PROCEDURE — 70450 CT HEAD/BRAIN W/O DYE: CPT

## 2022-07-18 PROCEDURE — 96372 THER/PROPH/DIAG INJ SC/IM: CPT

## 2022-07-18 PROCEDURE — 6360000002 HC RX W HCPCS: Performed by: STUDENT IN AN ORGANIZED HEALTH CARE EDUCATION/TRAINING PROGRAM

## 2022-07-18 PROCEDURE — 99284 EMERGENCY DEPT VISIT MOD MDM: CPT

## 2022-07-18 RX ORDER — MIDAZOLAM HYDROCHLORIDE 1 MG/ML
1 INJECTION INTRAMUSCULAR; INTRAVENOUS ONCE
Status: COMPLETED | OUTPATIENT
Start: 2022-07-18 | End: 2022-07-18

## 2022-07-18 RX ADMIN — MIDAZOLAM 0.5 MG: 1 INJECTION INTRAMUSCULAR; INTRAVENOUS at 20:42

## 2022-07-19 NOTE — ED PROVIDER NOTES
[address] [facility] Emergency Department       Date of evaluation: 7/18/2022    Chief Complaint     Fall (From group home, fell in bathtub, has Hx of brain bleed and was sent because of that Hx. No LOC )      History of Present Illness     Duane A Odella Halt is a 65 y.o. male who presents after mechanical fall. He does have history of subdural hematoma. Caretaker reports that he is nonverbal at baseline. Earlier today patient was having bowel movement on the toilet when caretaker tried to get him off the toilet but he fell striking his head on the floor. States that he initially cried out however he is currently back to baseline. There was no loss of consciousness with fall. They deny any additional trauma. Patient again is nonverbal and unable to say where pain is. Caretaker denies that he has had any recent fever, cough, shortness of breath, chest pain issues, vomiting, poor p.o. intake, bloody stools or changes in urinary habits. Care takers main concern is if he may have intracranial issue given history of brain herniation and subdermal hematoma. Denies additional issues at this time. Review of Systems     Review of Systems   Unable to perform ROS: Patient nonverbal     Past Medical, Surgical, Family, and Social History     He has a past medical history of A-fib ([address] Bipolar disorder ([address] Brain herniation ([address] Constipation, COVID-19, COVID-19, Cystitis, Developmental non-verbal disorder, Impulse control disorder, Influenza A, Mental retardation, profound (I.Q. < 20), Osteopenia, Seizure ([address] and Subdural hematoma ([address] He has a past surgical history that includes Cataract removal; Colonoscopy (06/20/2016); craniotomy (Right, 12/2/2019); TURP (N/A, 2/28/2020); Colonoscopy (08/26/2020); Colonoscopy (N/A, 8/26/2020); Colonoscopy (N/A, 09/01/2021); Colonoscopy (N/A, 9/1/2021); Upper gastrointestinal endoscopy (N/A, 9/17/2021);  Gastrostomy tube placement; and Endoscopy, colon, diagnostic. His Family history is unknown by patient. He reports that he has never smoked. He has never used smokeless tobacco. He reports that he does not drink alcohol and does not use drugs. Medications     Previous Medications    ACETAMINOPHEN (TYLENOL) 160 MG/5ML SUSPENSION    15 mg/kg by Per G Tube route every 6 hours as needed for Fever    LANSOPRAZOLE 3 MG/ML SUSP    10 mLs by Per G Tube route every morning (before breakfast)    LEVETIRACETAM (KEPPRA) 100 MG/ML SOLUTION    5 mLs by PEG Tube route 2 times daily    LORAZEPAM (ATIVAN) 0.5 MG TABLET    0.5 mg by PEG Tube route every evening. MELATONIN 3 MG TABS TABLET    1.5 tablets by PEG Tube route nightly    NUTRITIONAL SUPPLEMENTS (JEVITY 1.5 CABRERA PO)    Take 237 mLs by mouth 4 times daily     QUETIAPINE (SEROQUEL) 50 MG TABLET    2 times daily 50 mg in morning & 150 mg in evening dissolve in 10 ml water, administer via g-tube    SENNOSIDES-DOCUSATE SODIUM (SENOKOT-S) 8.6-50 MG TABLET    2 tablets by PEG Tube route every 72 hours    TAMSULOSIN (FLOMAX) 0.4 MG CAPSULE    0.8 mg daily Dissolve in 10 ml and give per g-tube       Allergies     He is allergic to penicillins, benadryl [diphenhydramine], clonidine derivatives, and tetracyclines & related. Physical Exam     INITIAL VITALS: BP: 115/88, Temp: 98.3 °F (36.8 °C), Heart Rate: 88, Resp: 18, SpO2: 95 %   Physical Exam  Vitals and nursing note reviewed. Constitutional:       General: He is not in acute distress. HENT:      Head: Normocephalic. Comments: Small abrasion to right temporal region, no laceration. Small palpable hematoma at the site. Right Ear: External ear normal.      Left Ear: External ear normal.      Nose: Nose normal.      Mouth/Throat:      Pharynx: Oropharynx is clear. Eyes:      Extraocular Movements: Extraocular movements intact.       Conjunctiva/sclera: Conjunctivae normal.   Cardiovascular:      Rate and Rhythm: Normal rate and regular rhythm. Pulses: Normal pulses. Heart sounds: Normal heart sounds. Pulmonary:      Effort: Pulmonary effort is normal.      Breath sounds: Normal breath sounds. Abdominal:      General: There is no distension. Palpations: Abdomen is soft. Tenderness: There is no abdominal tenderness. There is no rebound. Comments: G-tube site without surrounding erythema or discharge. Musculoskeletal:         General: No swelling, tenderness, deformity or signs of injury. Cervical back: Normal range of motion and neck supple. No rigidity or tenderness. Skin:     General: Skin is warm. Capillary Refill: Capillary refill takes less than 2 seconds. Neurological:      Mental Status: He is alert. Mental status is at baseline. Comments: Ranging all extremities equally       Diagnostic Results     EKG   NA    RADIOLOGY:  CT HEAD WO CONTRAST   Final Result   1. No acute findings in the head. 2. Chronic findings as above. LABS:   No results found for this visit on 07/18/22. ED BEDSIDE ULTRASOUND:  No results found. RECENT VITALS:  BP: 114/79,Temp: 98.3 °F (36.8 °C), Heart Rate: 82, Resp: 16, SpO2: 96 %     Procedures     NA    ED Course     Nursing Notes, Past Medical Hx, Past Surgical Hx, Social Hx,Allergies, and Family Hx were reviewed. patient was given the following medications:  Orders Placed This Encounter   Medications    midazolam (VERSED) injection 1 mg       CONSULTS:  None    MEDICAL DECISIONMAKING / ASSESSMENT / Carolynn Ti is a 65 y.o. male after mechanical ground-level fall with concern for brain injury. Patient presented with normal vitals. He was normotensive, afebrile, normal heart rate of 88, normal respiratory rate and satting at 95% on room air. On exam he had no obvious neurologic deficits and was ranging all extremities equally. Per caretaker he was at his baseline mental status.   Given history and exam my differential includes but is not limited to subarachnoid hematoma, subdural hematoma, epidural hematoma, skull fracture. I have low suspicion for cervical spine injury given that he is ranging his neck with no difficulties and no midline tenderness. He also has no other additional sites of trauma or injury on exam.  We obtained CT head without contrast.  Patient did require IM injection of Versed given that he was unable to tolerate CT initially. I interpreted the imaging and note:   CT head without contrast: No acute intracranial abnormality    Given reassuring work-up we felt that patient was was stable for discharge home. He remained hemodynamically stable and no change in mental status during emergency department stay. All questions and concerns were addressed. Strict return precautions reviewed. Clinical Impression     1. Closed head injury, initial encounter    2. Fall, initial encounter    3.  History of subdural hematoma        Disposition     PATIENT REFERRED TO:  Cole Jhaveri MD  [facility] 2  [phone]      As needed    DISCHARGE MEDICATIONS:  New Prescriptions    No medications on file       DISPOSITION Decision To Discharge 07/18/2022 09:53:10 PM          Philomena Hoffman MD  07/18/22 8145

## 2022-07-19 NOTE — DISCHARGE INSTRUCTIONS
Duane was seen in the emergency department after having ground-level fall. Fortunately his imaging study of his brain did not show any abnormality or bleed. We are overall reassured by her work-up today. Please return to the emergency department if he has another fall, his mental status is different from his baseline, fever or any new or concerning just to his health.

## 2023-03-24 NOTE — CARE COORDINATION
Call received from Ian Marquez, patient's caregiver, who asked for updated regarding patient disposition - so she could schedule her staffing. Per urology notes from today, pt is scheduled for a cystoscopy and TURP Friday, IF it is approved by his legal guardian, Tierra Sarahy. PA, Eri Blount, spoke with Daniel Gaitan today, who states he \"has to get it approved\". Exact disposition time frame unclear to this CM. Ianmilan Marquez has direct number to [facility]3 CM should she have other questions. But, it appears pt will not discharge until this weekend at the earliest.    CM team will continue to follow.     Anthony Ashford RN CM No

## 2024-06-12 NOTE — PROGRESS NOTES
1/24/21 @ 15:38 sent Mrae betancourt to Dr. Stone Boland for Pulm/ Critical Care consult.  [facility] [[phone]]

## 2024-10-30 NOTE — PROGRESS NOTES
Speech Language Pathology  Facility/Department: [facility] C2 CARD TELEMETRY   CLINICAL BEDSIDE SWALLOW EVALUATION  Recommended Diet   Solids:  Absent swallow with puree, no solids recommended   Liquids : Mildly Thick (Nectar) -by teaspoon only. NAME: Gregorio Jeffrey  : 1960  MRN: [id number]    ADMISSION DATE: 2021  ADMITTING DIAGNOSIS: has Lipoma of head; Atrial fibrillation with RVR ([address] Acute cystitis with hematuria; Altered mental status; Brain herniation ([address] 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 [facility]); Constipation; Acute urinary retention; Atrial fibrillation, chronic ([address] History of subdural hematoma; Pneumonia; 2019-nCoV acute respiratory disease; Pulmonary infiltrates; Thrombocytopenia ([address] RUBEN (acute kidney injury) ([address] Sepsis ([address] Seizure disorder ([address] PAF (paroxysmal atrial fibrillation) ([address] and Hypothermia on their problem list.  ONSET DATE: 21    Recent Chest Xray/CT of Chest: Chest x-ray 21  FINDINGS:   Cardiomediastinal silhouette is normal in size.  Right I central venous   catheter terminates at the cavoatrial junction.  No pleural effusion or   pneumothorax.  Bilateral pulmonary opacities are noted.  No acute osseous   abnormality.           Impression   Central venous catheter in expected position without pneumothorax.             Date of Eval: 2021  Evaluating Therapist: QASIM Sánchez    Current Diet level:  Current Diet : NPO  Current Liquid Diet : NPO      Primary Complaint  Patient Complaint: Gregorio Jeffrey is a 62 y.o. male. He has a h/o MRDD and is nonverbal. He lives in a group home and was sent to the ER because he has not been eating or drinking as usual for the past four days. He underwent colonoscopy on 21 and has not been taking much oral intake since then. Had [address] in [place].     Pain:  Pain Assessment  Pain Assessment: Faces  Pain Level: 6  Genao-Baker Pain Rating: Hurts even more  Non-Pharmaceutical Pain Intervention(s): Repositioned, Distraction  Response to Pain Intervention: Patient Satisfied    Reason for Referral  Duane A Carota was referred for a bedside swallow evaluation to assess the efficiency of his swallow function, identify signs and symptoms of aspiration and make recommendations regarding safe dietary consistencies, effective compensatory strategies, and safe eating environment. Impression  Dysphagia Diagnosis: Suspected needs further assessment; Moderate to severe oral stage dysphagia; Moderate pharyngeal stage dysphagia  Dysphagia Outcome Severity Scale: Level 2: Moderate Severe dysphagia- Maximum assistance or maximum use of strategies with partial PO only     Per Rn patient's baseline diet in minced and moist nectar thick liquids, however he is often downgraded at the group home to puree and honey thick when having difficulty. Pt is non-verbal and dependent for feeding. Pt  appears uncomfortable with vocalizations and moaning, RN aware and monitoring. Simple oral care completed with oral swabs, thick coating of white secretions removed from oral cavity and roof of mouth. Tsp of nectar and puree trials. Patient with limited labial seal around spoon, improved with further posterior placement of spoon on lingual surface however more significant premature bolus loss to the pharynx suspected with this strategies. Patient with successful swallows of 2 of 4 trials of nectar by tsp, 0 out of 2 trials of puree. Bolus remained in oral cavity and required suctioning to remove. No overt clinical indicators of aspiration with nectar thick liquids. Patient presents with chronic oral and suspected pharyngeal stage dysphagia. Agree with MD that acute illness may be impacting patient's PO intake versus acute changes in swallowing. His rehab potential for swallow improvement back to his baseline diet of minced and moist with nectar thick liquids is guarded pending further SLP swallow evaluation.  Patient follow per POC. \"  Consistencies Administered: Nectar - teaspoon;Dysphagia Pureed (Dysphagia I)           Vision/Hearing  Vision  Vision: Impaired    Oral Motor Deficits  Oral/Motor  Oral Motor: Exceptions to [facility]  Labial Strength: Reduced  Labial Coordination: Reduced  Lingual Strength: Reduced  Lingual Coordination: Reduced    Oral Phase Dysfunction  Oral Phase  Oral Phase: Exceptions  Oral Phase Dysfunction  Decreased Anterior to Posterior Transit: All  Suspected Premature Bolus Loss: Nectar  Lingual/Palatal Residue: All     Indicators of Pharyngeal Phase Dysfunction   Pharyngeal Phase  Pharyngeal Phase: Exceptions  Indicators of Pharyngeal Phase Dysfunction  Delayed Swallow: Nectar    Prognosis  Prognosis  Prognosis for safe diet advancement: guarded  Barriers to reach goals: cognitive deficits;time post onset;severity of dysphagia  Individuals consulted  Consulted and agree with results and recommendations: RN    Education  Patient Education: Educated on role of SLP  Patient Education Response: No evidence of learning  Safety Devices in place: Yes  Type of devices: Bed alarm in place;Nurse notified       Therapy Time  SLP Individual Minutes  Time In: Jose Ramos 79  Time Out: 1300  Minutes: [address] .[id number]  Speech Language Pathologist  9/6/2021 stand-by assist

## (undated) DEVICE — JEWISH HOSPITAL TURNOVER KIT: Brand: MEDLINE INDUSTRIES, INC.

## (undated) DEVICE — Z CONVERTED USE 2271043 CONTAINER SPEC COLL 4OZ SCR ON LID PEEL PCH

## (undated) DEVICE — SNARE VASC L240CM LOOP W10MM SHTH DIA2.4MM RND STIFF CLD

## (undated) DEVICE — SPONGE GZ W4XL4IN COT 12 PLY TYP VII WVN C FLD DSGN

## (undated) DEVICE — TRAP POLYP ETRAP

## (undated) DEVICE — ENDO CARRY-ON PROCEDURE KIT INCLUDES SUCTION TUBING, LUBRICANT, GAUZE, BIOHAZARD STICKER, TRANSPORT PAD AND INTERCEPT BEDSIDE KIT.: Brand: ENDO CARRY-ON PROCEDURE KIT

## (undated) DEVICE — CODMAN® RANEY SCALP CLIPS 20 UNITS OF 10 CLIPS: Brand: CODMAN®

## (undated) DEVICE — GARMENT,MEDLINE,DVT,INT,CALF,MED, GEN2: Brand: MEDLINE

## (undated) DEVICE — GOWN,SIRUS,POLYRNF,BRTHSLV,LG,30/CS: Brand: MEDLINE

## (undated) DEVICE — ADAPTER, OES PRO OUTER SHEATH TO ELLIK AND SYRINGE: Brand: OLYMPUS

## (undated) DEVICE — COVER,MAYO STAND,XL,STERILE: Brand: MEDLINE

## (undated) DEVICE — COUNTER NDL 40 COUNT HLD 70 NUM FOAM BLK SGL MAG W BLDE REMV

## (undated) DEVICE — GAUZE,SPONGE,4"X4",12PLY,WOVEN,NS,LF: Brand: MEDLINE

## (undated) DEVICE — 3L THIN WALL CAN: Brand: CRD

## (undated) DEVICE — MARKER,SKIN,WI/RULER AND LABELS: Brand: MEDLINE

## (undated) DEVICE — SUTURE ETHIB D 2-0 18IN FS GRN X664H

## (undated) DEVICE — MAYFIELD® DISPOSABLE ADULT SKULL PIN (PLASTIC BASE): Brand: MAYFIELD®

## (undated) DEVICE — TOWEL,OR,DSP,ST,BLUE,DLX,8/PK,10PK/CS: Brand: MEDLINE

## (undated) DEVICE — CABLE BPLR L12FT FLYING LD DISPOSABLE

## (undated) DEVICE — SUTURE VCRL SZ 2-0 L18IN ABSRB UD CT-1 L36MM 1/2 CIR J839D

## (undated) DEVICE — HOLDER RESTRAINT LIMB UNIV FOAM PR D RNG SINGLE STRP LF

## (undated) DEVICE — DRAIN SURG FLAT W7MMXL20CM FULL PERF

## (undated) DEVICE — BAG DRNGE 9OZ L9.5IN BILE W/ ADPT TWO ADJ RUB BELT DISP FOR

## (undated) DEVICE — NEURO SPONGES: Brand: DEROYAL

## (undated) DEVICE — COTTON BALLS: Brand: DEROYAL

## (undated) DEVICE — SYRINGE IRRIG 60ML SFT PLIABLE BLB EZ TO GRP 1 HND USE W/

## (undated) DEVICE — CONMED SCOPE SAVER BITE BLOCK, 20X27 MM: Brand: SCOPE SAVER

## (undated) DEVICE — RESERVOIR,SUCTION,100CC,SILICONE: Brand: MEDLINE

## (undated) DEVICE — SYRINGE,TOOMEY,IRRIGATION,70CC,STERILE: Brand: MEDLINE

## (undated) DEVICE — CODMAN® SURGICAL PATTIES 1" X 1" (2.54CM X 2.54CM): Brand: CODMAN®

## (undated) DEVICE — CATHETER URETH BLLN 30CC 22FR SIL ELASTMR F 3 W SPEC M RND

## (undated) DEVICE — PEG KIT STD 20 FR PUSH W/ ENFIT ENDOVIVE

## (undated) DEVICE — Device

## (undated) DEVICE — SPONGE GZ W4XL8IN COT WVN 12 PLY

## (undated) DEVICE — JEWISH CRANI PACK: Brand: MEDLINE INDUSTRIES, INC.

## (undated) DEVICE — SYRINGE MED 10ML TRNSLUC BRL PLUNG BLK MRK POLYPR CTRL

## (undated) DEVICE — TOOL F2/8TA23 LEGEND 8CM 2.3MM TAPER: Brand: MIDAS REX™

## (undated) DEVICE — COVER LT HNDL CAM BLU DISP W/ SURG CTRL

## (undated) DEVICE — BAG DRNGE 2000ML ROUNDED TEARDROP W/ ANTIREFLX CHMBR DISP

## (undated) DEVICE — SUTURE MCRYL SZ 4-0 L27IN ABSRB UD L19MM PS-2 1/2 CIR PRIM Y426H

## (undated) DEVICE — CHLORAPREP 26ML ORANGE

## (undated) DEVICE — 3 ML SYRINGE LUER-LOCK TIP: Brand: MONOJECT

## (undated) DEVICE — GOWN,SIRUS,POLYRNF,BRTHSLV,XL,30/CS: Brand: MEDLINE

## (undated) DEVICE — CYSTO: Brand: MEDLINE INDUSTRIES, INC.

## (undated) DEVICE — STERILE POLYISOPRENE POWDER-FREE SURGICAL GLOVES WITH EMOLLIENT COATING: Brand: PROTEXIS

## (undated) DEVICE — PROTECTOR ULN NRV PUR FOAM HK LOOP STRP ANATOMICALLY

## (undated) DEVICE — BLANKET WRM W40.2XL55.9IN IORT LO BODY + MISTRAL AIR

## (undated) DEVICE — UNDERGLOVE SURG SZ 8 BLU LTX FREE SYN POLYISOPRENE POLYMER

## (undated) DEVICE — ELECTRODE,RADIOTRANSLUCENT,FOAM,3PK: Brand: MEDLINE

## (undated) DEVICE — STAPLER SKIN H3.9MM WIRE DIA0.58MM CRWN 6.9MM 35 STPL ROT

## (undated) DEVICE — COVER,TABLE,HEAVY DUTY,77"X90",STRL: Brand: MEDLINE

## (undated) DEVICE — KIT GASTROSTMY TB 20FR STOMA L3CM RECESS DST TIP SITS AT

## (undated) DEVICE — PLATE ES AD W 9FT CRD 2

## (undated) DEVICE — BLADE CLIPPER SURG SENSICLIP

## (undated) DEVICE — SPONGE,NEURO,0.5"X3",XR,STRL,LF,10/PK: Brand: MEDLINE

## (undated) DEVICE — SET IRRIG L94IN DIA0.281IN L BOR W/ 2 N VENT SPIK 2 ON/OFF

## (undated) DEVICE — SOLUTION IV 1000ML 0.9% SOD CHL

## (undated) DEVICE — Z INACTIVE PER PHARM Z INACTIVE USE 2530107 SOLUTION ANTISEP 70% ISOPROPYL RUBBING ALC 16 OZ PLAS BTL

## (undated) DEVICE — SOLUTION IV IRRIG 0.9% NACL 3000ML BAG 2B7477

## (undated) DEVICE — SHEET,DRAPE,53X77,STERILE: Brand: MEDLINE

## (undated) DEVICE — SUTURE NRLN SZ 4-0 L18IN NONABSORBABLE BLK L13MM TF 1/2 CIR C584D

## (undated) DEVICE — ELECTRODE ES WIDE FRONTLOADING SURF LOOP SUPERSECT

## (undated) DEVICE — SOLUTION IV 500ML 0.9% SOD CHL PH 5 INJ USP VIAFLX PLAS

## (undated) DEVICE — SURE SET-DOUBLE BASIN-LF: Brand: MEDLINE INDUSTRIES, INC.

## (undated) DEVICE — BAG DRNGE COMB PK

## (undated) DEVICE — WAX SURG 2.5GM HEMSTAT BNE BEESWAX PARAFFIN ISO PALMITATE

## (undated) DEVICE — GLOVE SURG SZ 75 L12IN FNGR THK94MIL TRNSLUC YEL LTX